# Patient Record
Sex: MALE | Race: WHITE | NOT HISPANIC OR LATINO | Employment: OTHER | URBAN - METROPOLITAN AREA
[De-identification: names, ages, dates, MRNs, and addresses within clinical notes are randomized per-mention and may not be internally consistent; named-entity substitution may affect disease eponyms.]

---

## 2021-09-15 ENCOUNTER — OFFICE VISIT (OUTPATIENT)
Dept: INTERNAL MEDICINE CLINIC | Facility: CLINIC | Age: 81
End: 2021-09-15
Payer: MEDICARE

## 2021-09-15 VITALS
HEART RATE: 58 BPM | DIASTOLIC BLOOD PRESSURE: 70 MMHG | HEIGHT: 65 IN | TEMPERATURE: 97.5 F | SYSTOLIC BLOOD PRESSURE: 120 MMHG | WEIGHT: 128 LBS | RESPIRATION RATE: 18 BRPM | BODY MASS INDEX: 21.33 KG/M2 | OXYGEN SATURATION: 96 %

## 2021-09-15 DIAGNOSIS — Z12.11 SCREEN FOR COLON CANCER: ICD-10-CM

## 2021-09-15 DIAGNOSIS — Z11.59 ENCOUNTER FOR HEPATITIS C SCREENING TEST FOR LOW RISK PATIENT: ICD-10-CM

## 2021-09-15 DIAGNOSIS — G70.00 MYASTHENIA GRAVIS WITHOUT EXACERBATION (HCC): ICD-10-CM

## 2021-09-15 DIAGNOSIS — I10 ESSENTIAL HYPERTENSION: ICD-10-CM

## 2021-09-15 DIAGNOSIS — E78.2 MIXED HYPERLIPIDEMIA: ICD-10-CM

## 2021-09-15 DIAGNOSIS — Z13.31 NEGATIVE DEPRESSION SCREENING: ICD-10-CM

## 2021-09-15 DIAGNOSIS — R41.3 MEMORY DIFFICULTY: ICD-10-CM

## 2021-09-15 DIAGNOSIS — H25.89 OTHER AGE-RELATED CATARACT, UNSPECIFIED LATERALITY: ICD-10-CM

## 2021-09-15 DIAGNOSIS — Z13.1 SCREENING FOR DIABETES MELLITUS (DM): ICD-10-CM

## 2021-09-15 DIAGNOSIS — Z13.220 SCREENING FOR LIPID DISORDERS: ICD-10-CM

## 2021-09-15 DIAGNOSIS — R94.31 ABNORMAL EKG: ICD-10-CM

## 2021-09-15 DIAGNOSIS — H35.00 RETINOPATHY: ICD-10-CM

## 2021-09-15 DIAGNOSIS — N40.0 BENIGN PROSTATIC HYPERPLASIA WITHOUT LOWER URINARY TRACT SYMPTOMS: ICD-10-CM

## 2021-09-15 DIAGNOSIS — E11.319 TYPE 2 DIABETES MELLITUS WITH RETINOPATHY OF BOTH EYES, WITHOUT LONG-TERM CURRENT USE OF INSULIN, MACULAR EDEMA PRESENCE UNSPECIFIED, UNSPECIFIED RETINOPATHY SEVERITY (HCC): Primary | ICD-10-CM

## 2021-09-15 DIAGNOSIS — H40.1131 PRIMARY OPEN ANGLE GLAUCOMA (POAG) OF BOTH EYES, MILD STAGE: ICD-10-CM

## 2021-09-15 DIAGNOSIS — Z13.29 SCREENING FOR THYROID DISORDER: ICD-10-CM

## 2021-09-15 DIAGNOSIS — Z12.5 SCREENING FOR PROSTATE CANCER: ICD-10-CM

## 2021-09-15 DIAGNOSIS — R01.1 HEART MURMUR: ICD-10-CM

## 2021-09-15 DIAGNOSIS — Z13.0 SCREENING FOR DEFICIENCY ANEMIA: ICD-10-CM

## 2021-09-15 DIAGNOSIS — H02.403 PTOSIS OF BOTH EYELIDS: ICD-10-CM

## 2021-09-15 DIAGNOSIS — Z23 ENCOUNTER FOR VACCINATION: ICD-10-CM

## 2021-09-15 PROBLEM — E11.9 TYPE 2 DIABETES MELLITUS, WITHOUT LONG-TERM CURRENT USE OF INSULIN (HCC): Status: ACTIVE | Noted: 2021-09-15

## 2021-09-15 LAB — SL AMB POCT HEMOGLOBIN AIC: 9.7 (ref ?–6.5)

## 2021-09-15 PROCEDURE — 93000 ELECTROCARDIOGRAM COMPLETE: CPT | Performed by: INTERNAL MEDICINE

## 2021-09-15 PROCEDURE — 99204 OFFICE O/P NEW MOD 45 MIN: CPT | Performed by: INTERNAL MEDICINE

## 2021-09-15 PROCEDURE — 90662 IIV NO PRSV INCREASED AG IM: CPT | Performed by: INTERNAL MEDICINE

## 2021-09-15 PROCEDURE — G0008 ADMIN INFLUENZA VIRUS VAC: HCPCS | Performed by: INTERNAL MEDICINE

## 2021-09-15 PROCEDURE — G0009 ADMIN PNEUMOCOCCAL VACCINE: HCPCS | Performed by: INTERNAL MEDICINE

## 2021-09-15 PROCEDURE — 83036 HEMOGLOBIN GLYCOSYLATED A1C: CPT | Performed by: INTERNAL MEDICINE

## 2021-09-15 PROCEDURE — 90670 PCV13 VACCINE IM: CPT | Performed by: INTERNAL MEDICINE

## 2021-09-15 RX ORDER — ASPIRIN 81 MG/1
81 TABLET ORAL
COMMUNITY

## 2021-09-15 RX ORDER — LATANOPROST 50 UG/ML
1 SOLUTION/ DROPS OPHTHALMIC
COMMUNITY

## 2021-09-15 RX ORDER — ATORVASTATIN CALCIUM 20 MG/1
TABLET, FILM COATED ORAL
COMMUNITY
Start: 2021-05-12 | End: 2021-11-24 | Stop reason: SDUPTHER

## 2021-09-15 RX ORDER — GLIPIZIDE 5 MG/1
5 TABLET ORAL
COMMUNITY
End: 2021-10-22

## 2021-09-15 RX ORDER — CHLORAL HYDRATE 500 MG
CAPSULE ORAL
COMMUNITY

## 2021-09-15 RX ORDER — TAMSULOSIN HYDROCHLORIDE 0.4 MG/1
CAPSULE ORAL
COMMUNITY
Start: 2021-05-12 | End: 2022-01-10 | Stop reason: SDUPTHER

## 2021-09-15 NOTE — PROGRESS NOTES
Depression Screening and Follow-up Plan:   Patient was screened for depression during today's encounter  They screened negative with a PHQ-2 score of 0      Assessment/Plan:  Problem List Items Addressed This Visit        Endocrine    Type 2 diabetes mellitus with retinopathy, without long-term current use of insulin (HCC) - Primary    Relevant Medications    glipiZIDE (GLUCOTROL) 5 mg tablet    latanoprost (XALATAN) 0 005 % ophthalmic solution    metFORMIN (GLUCOPHAGE) 1000 MG tablet    Other Relevant Orders    CBC and differential    Comprehensive metabolic panel    Microalbumin / creatinine urine ratio    TSH, 3rd generation with Free T4 reflex    POCT hemoglobin A1c (Completed)       Cardiovascular and Mediastinum    Essential hypertension    Relevant Orders    POCT ECG (Completed)    Echo complete with contrast if indicated       Nervous and Auditory    Myasthenia gravis without exacerbation (HCC)    Relevant Orders    TSH, 3rd generation with Free T4 reflex       Genitourinary    Benign prostatic hyperplasia without lower urinary tract symptoms       Other    Retinopathy    Relevant Medications    latanoprost (XALATAN) 0 005 % ophthalmic solution    Ptosis of both eyelids    Primary open angle glaucoma (POAG) of both eyes, mild stage    Relevant Medications    latanoprost (XALATAN) 0 005 % ophthalmic solution    Other age-related cataract    Relevant Medications    latanoprost (XALATAN) 0 005 % ophthalmic solution    Mixed hyperlipidemia    Relevant Medications    atorvastatin (LIPITOR) 20 mg tablet    Other Relevant Orders    Lipid Panel with Direct LDL reflex    TSH, 3rd generation with Free T4 reflex    Memory difficulty      Other Visit Diagnoses     Negative depression screening        Screening for prostate cancer        Relevant Orders    PSA, Total Screen    Screening for lipid disorders        Screening for diabetes mellitus (DM)        Screening for thyroid disorder        Screening for deficiency anemia        Screen for colon cancer        Encounter for vaccination        Relevant Orders    influenza vaccine, high-dose, PF 0 5 mL (Completed)    PNEUMOCOCCAL CONJUGATE VACCINE 13-VALENT GREATER THAN 6 MONTHS (Completed)    Encounter for hepatitis C screening test for low risk patient        Relevant Orders    Hepatitis C antibody    Abnormal EKG        Relevant Orders    Echo complete with contrast if indicated    Heart murmur        Relevant Orders    Echo complete with contrast if indicated           Diagnoses and all orders for this visit:    Type 2 diabetes mellitus with retinopathy of both eyes, without long-term current use of insulin, macular edema presence unspecified, unspecified retinopathy severity (HCC)  -     CBC and differential; Future  -     Comprehensive metabolic panel; Future  -     Microalbumin / creatinine urine ratio  -     TSH, 3rd generation with Free T4 reflex; Future  -     POCT hemoglobin A1c    Essential hypertension  -     POCT ECG  -     Echo complete with contrast if indicated; Future    Myasthenia gravis without exacerbation (HCC)  -     TSH, 3rd generation with Free T4 reflex; Future    Benign prostatic hyperplasia without lower urinary tract symptoms    Mixed hyperlipidemia  -     Lipid Panel with Direct LDL reflex; Future  -     TSH, 3rd generation with Free T4 reflex; Future    Retinopathy    Primary open angle glaucoma (POAG) of both eyes, mild stage    Ptosis of both eyelids    Other age-related cataract, unspecified laterality    Negative depression screening    Screening for prostate cancer  -     PSA, Total Screen;  Future    Screening for lipid disorders    Screening for diabetes mellitus (DM)    Screening for thyroid disorder    Screening for deficiency anemia    Screen for colon cancer    Encounter for vaccination  -     influenza vaccine, high-dose, PF 0 5 mL  -     PNEUMOCOCCAL CONJUGATE VACCINE 13-VALENT GREATER THAN 6 MONTHS  -     Cancel: influenza vaccine, high-dose, PF 0 5 mL    Memory difficulty    Encounter for hepatitis C screening test for low risk patient  -     Hepatitis C antibody; Future    Abnormal EKG  -     Echo complete with contrast if indicated; Future    Heart murmur  -     Echo complete with contrast if indicated; Future    Other orders  -     B Complex-C (B COMPLEX-B12-C IJ); Take by mouth  -     Saw Palmetto, Serenoa repens, (Saw [place] Berries) 540 MG CAPS; Take by mouth  -     Misc Natural Products (PUMPKIN SEED OIL PO); Take by mouth  -     Omega-3 Fatty Acids (fish oil) 1,000 mg; Take by mouth  -     ECHINACEA HERB PO; Take by mouth  -     aspirin (ECOTRIN LOW STRENGTH) 81 mg EC tablet; Take 81 mg by mouth  -     atorvastatin (LIPITOR) 20 mg tablet; TAKE 10MG (ONE-HALF TABLET) BY MOUTH EVERY DAY AT 5 PM FOR CHOLESTEROL  -     glipiZIDE (GLUCOTROL) 5 mg tablet; Take 5 mg by mouth  -     latanoprost (XALATAN) 0 005 % ophthalmic solution; Apply 1 drop to eye  -     tamsulosin (FLOMAX) 0 4 mg; TAKE 1 CAPSULE BY MOUTH DAILY FOR PROSTATE/NIGHTTIME URINATION (BPH)  -     metFORMIN (GLUCOPHAGE) 1000 MG tablet; Take 1,000 mg by mouth 2 (two) times a day with meals        No problem-specific Assessment & Plan notes found for this encounter  A/P: Doing ok and in office HgA1c was 9 7  Needs meds adjustment, but will get the labs first to see what his renal function is  Check labs    Discussed vaccines and will get his flu and first pneumonia  EKG w/o acute changes, but ? Old IWMI and has a murmur  Consider getting an echo  BMI is good and continue current treatment  ??some  Memory issues  Await labs  COnsider aricept trial  Will discuss with relatives to see what degree of impairment there might be  Continue current treatment and RTC one month for f/u labs, ekg, DM and AWV  Just seen by his DPM      Subjective:      Patient ID: Donte Escalante is a [address] y o  male  WM, former pt of [facility], presents to establish   PMH includes DM, HTN, retinopathy, glaucoma, Myastenia Graves, cataracts, HLD, and BPH  PSH of tonsils, colonoscopy, dental, and vascetomy  Denies being a Smoker and denies ETOH  Doing ok and no c/o's  Remains active w/o difficulty and no falls  Denies depression  No suicidal or violent ideations  Working on group home from the [employer]  No recent travel  No fever, chills, or sweats  No unexplained wt change  Denies CP, SOB, palpitations, edema, orthopnea, or PND  No sz or syncope  No changes in bowel or bladder habits  No trouble swallowing  Appetite and sleep are good  Over due to see both a DDS and an eye doctor  Currently due for labs, vaccines, and CRC                The following portions of the patient's history were reviewed and updated as appropriate:   He has a past medical history of Diabetes mellitus ([address] ) and Hyperlipidemia ,  does not have any pertinent problems on file  ,   has a past surgical history that includes Tonsillectomy; Eye surgery; and Cataract extraction  ,  family history is not on file  ,   reports that he has never smoked  He has never used smokeless tobacco  He reports previous alcohol use  He reports that he does not use drugs  ,  is allergic to simvastatin     Current Outpatient Medications   Medication Sig Dispense Refill    aspirin (ECOTRIN LOW STRENGTH) 81 mg EC tablet Take 81 mg by mouth      atorvastatin (LIPITOR) 20 mg tablet TAKE 10MG (ONE-HALF TABLET) BY MOUTH EVERY DAY AT 5 PM FOR CHOLESTEROL      B Complex-C (B COMPLEX-B12-C IJ) Take by mouth      ECHINACEA HERB PO Take by mouth      glipiZIDE (GLUCOTROL) 5 mg tablet Take 5 mg by mouth      latanoprost (XALATAN) 0 005 % ophthalmic solution Apply 1 drop to eye      metFORMIN (GLUCOPHAGE) 1000 MG tablet Take 1,000 mg by mouth 2 (two) times a day with meals      Misc Natural Products (PUMPKIN SEED OIL PO) Take by mouth      Omega-3 Fatty Acids (fish oil) 1,000 mg Take by mouth      Saw Palmetto, Serenoa repens, (Saw [place] Berries) 540 MG CAPS Take by mouth      tamsulosin (FLOMAX) 0 4 mg TAKE 1 CAPSULE BY MOUTH DAILY FOR PROSTATE/NIGHTTIME URINATION (BPH)       No current facility-administered medications for this visit  Review of Systems   Constitutional: Negative for activity change, chills, diaphoresis, fatigue and fever  HENT: Negative  Eyes: Negative for visual disturbance  Respiratory: Negative for cough, chest tightness, shortness of breath and wheezing  Cardiovascular: Negative for chest pain, palpitations and leg swelling  Gastrointestinal: Negative for abdominal pain, constipation, diarrhea, nausea and vomiting  Endocrine: Negative for cold intolerance and heat intolerance  Genitourinary: Negative for difficulty urinating, dysuria and frequency  Musculoskeletal: Negative for arthralgias, gait problem and myalgias  Neurological: Negative for dizziness, seizures, syncope, weakness, light-headedness and headaches  Psychiatric/Behavioral: Negative for confusion, dysphoric mood and sleep disturbance  The patient is not nervous/anxious  PHQ-9 Depression Screening    PHQ-9:   Frequency of the following problems over the past two weeks:      Little interest or pleasure in doing things: 0 - not at all  Feeling down, depressed, or hopeless: 0 - not at all  PHQ-2 Score: 0        Objective:  Vitals:    09/15/21 1521   BP: 120/70   BP Location: Right arm   Patient Position: Sitting   Cuff Size: Adult   Pulse: 58   Resp: 18   Temp: 97 5 °F (36 4 °C)   TempSrc: Temporal   SpO2: 96%   Weight: 58 1 kg (128 lb)   Height: 5' 5" (1 651 m)     Body mass index is 21 3 kg/m²  Physical Exam  Vitals and nursing note reviewed  Constitutional:       General: He is not in acute distress  Appearance: Normal appearance  He is not ill-appearing  HENT:      Head: Normocephalic and atraumatic  Mouth/Throat:      Mouth: Mucous membranes are moist    Eyes:      Extraocular Movements: Extraocular movements intact  Conjunctiva/sclera: Conjunctivae normal       Pupils: Pupils are equal, round, and reactive to light  Neck:      Vascular: No carotid bruit  Cardiovascular:      Rate and Rhythm: Normal rate and regular rhythm  Pulses: no weak pulses          Dorsalis pedis pulses are 1+ on the right side and 1+ on the left side  Posterior tibial pulses are 1+ on the right side and 1+ on the left side  Heart sounds: Murmur heard  Pulmonary:      Effort: Pulmonary effort is normal  No respiratory distress  Breath sounds: Normal breath sounds  No wheezing or rales  Abdominal:      General: Bowel sounds are normal  There is no distension  Palpations: Abdomen is soft  Tenderness: There is no abdominal tenderness  Musculoskeletal:         General: No swelling, tenderness, deformity or signs of injury  Normal range of motion  Cervical back: Normal range of motion and neck supple  No rigidity or tenderness  Right lower leg: No edema  Left lower leg: No edema  Feet:      Right foot:      Skin integrity: No ulcer, skin breakdown, erythema, warmth, callus or dry skin  Left foot:      Skin integrity: No ulcer, skin breakdown, erythema, warmth, callus or dry skin  Lymphadenopathy:      Cervical: No cervical adenopathy  Neurological:      General: No focal deficit present  Mental Status: He is alert and oriented to person, place, and time  Mental status is at baseline  Cranial Nerves: No cranial nerve deficit  Motor: No weakness  Coordination: Coordination normal       Gait: Gait normal       Deep Tendon Reflexes: Reflexes normal    Psychiatric:         Mood and Affect: Mood normal          Behavior: Behavior normal          Thought Content: Thought content normal          Judgment: Judgment normal            Patient's shoes and socks removed  Right Foot/Ankle   Right Foot Inspection  Skin Exam: skin normal and skin intact no dry skin, no warmth, no callus, no erythema, no maceration, no abnormal color, no pre-ulcer, no ulcer and no callus                          Toe Exam: ROM and strength within normal limitsno swelling, no tenderness, erythema and  no right toe deformity  Sensory       Monofilament testing: intact  Vascular  Capillary refills: < 3 seconds  The right DP pulse is 1+  The right PT pulse is 1+  Left Foot/Ankle  Left Foot Inspection  Skin Exam: skin normal and skin intactno dry skin, no warmth, no erythema, no maceration, normal color, no pre-ulcer, no ulcer and no callus                         Toe Exam: ROM and strength within normal limitsno swelling, no tenderness, no erythema and no left toe deformity                   Sensory       Monofilament: intact  Vascular  Capillary refills: < 3 seconds  The left DP pulse is 1+  The left PT pulse is 1+  Assign Risk Category:  No deformity present; No loss of protective sensation;  No weak pulses       Risk: 0

## 2021-09-15 NOTE — PATIENT INSTRUCTIONS
Type 2 Diabetes in the Older Adult   WHAT YOU NEED TO KNOW:   The risk for type 2 diabetes increases as a person gets older  Type 2 diabetes means your pancreas does not make enough insulin, or your body does not use insulin well  Insulin helps move sugar out of the blood so it can be used for energy  Diabetes cannot be cured, but it can be managed  DISCHARGE INSTRUCTIONS:   Call or have someone close to you call your local emergency number (911 in the [address] [facility]) for any of the following:   · You have any of the following signs of a stroke:      ? Numbness or drooping on one side of your face     ? Weakness in an arm or leg    ? Confusion or difficulty speaking    ? Dizziness, a severe headache, or vision loss    · You have any of the following signs of a heart attack:      ? Squeezing, pressure, or pain in your chest    ? You may  also have any of the following:     § Discomfort or pain in your back, neck, jaw, stomach, or arm    § Shortness of breath    § Nausea or vomiting    § Lightheadedness or a sudden cold sweat    Return to the emergency department if:   · Your blood sugar level is higher than your goal and does not come down with treatment  · You have signs of a high blood sugar level, such as blurred or double vision  · You have signs of a high ketone level, such as fruity, sweet smelling breath, or shallow breathing  · You have symptoms of a low blood sugar level, such as trouble thinking, sweating, or a pounding heartbeat  · Your blood sugar level is lower than normal and does not improve with treatment  Call your doctor or diabetes care team if:   · You are vomiting or have diarrhea  · You have an upset stomach and cannot eat the foods on your meal plan  · You feel weak or more tired than usual     · You feel dizzy, have headaches, or are easily irritated  · Your skin is red, warm, dry, or swollen      · You have a wound that does not heal     · You have numbness in your arms or legs     · You have trouble coping with diabetes, or you feel anxious or depressed  · You have problems with your memory  · You have changes in your vision  · You have questions or concerns about your condition or care  Diabetes education:  Diabetes education will start right away, if the diagnosis is new  You may need diabetes education at a later time to refresh your memory  Members of your care team can help you, your family, and caregivers with a plan for the following:  · How to check your blood sugar level: You will learn when to check your blood sugar level and what the level should be  You will learn what to do if your level is too high or too low  Write down the times of your checks and your levels  Take them to all follow-up appointments  · About diabetes medicine:  Oral diabetes medicine may be given to help control your blood sugar levels  Your healthcare provider will teach you how and when to take your diabetes medicine  You will also be taught when not to take the medicine  You will also be taught about side effects oral diabetes medicine can cause  · If you need insulin:  Insulin may be added if oral diabetes medicine becomes less effective over time  You and your family members will be taught how to draw up and give insulin, if needed  You will learn how much insulin you need and what time to inject insulin  You will be taught when not to give insulin  You will also be taught what to do if your blood sugar level drops too low  This may happen if you take insulin and do not eat the right amount of carbohydrates  Your team will also teach you how to dispose of needles and syringes  · About nutrition:  A dietitian will help you make a meal plan to keep your blood sugar level steady  You will learn why protein in your diet is important  You will learn how food affects your blood sugar levels  You will also learn to keep track of sugar and starchy foods (carbohydrates)   Do not skip meals  Your blood sugar level may drop too low if you have taken insulin and do not eat  · How to prevent complications:  Diabetes that is not well controlled can lead to health problems  Examples include foot sores, retinopathy (vision loss), and peripheral neuropathy (loss of feeling in your hands and feet)  Also, risk for dementia can increase when blood sugar levels that are too high or too low for long periods of time  Your team will help you know when to get regular checkups, such as vision checks  They will teach you how to watch for problems and when to get a problem checked  Manage diabetes and prevent problems:  Sometimes type 2 diabetes can be managed with changes in nutrition and physical activity  · Work with your diabetes care team to create plans to meet your needs  Your diabetes care team may include a physician, nurse practitioner, and physician assistant  It may also include a diabetes nurse educator, dietitian, and an exercise specialist  Family members, or others who are close to you, may also be part of the team  You and your team will make goals and plans to manage diabetes and other health problems  For example, the plan will include how to manage medicines you may take for diabetes and for other health conditions  The plans and goals will be specific to your needs and abilities  Your plan will change as your needs and abilities change  · Manage other health issues as directed  Health issues may include high blood pressure, high cholesterol levels, and heart problems  Health issues may also include depression  Together you and your care team can create a plan to manage any other health issues  · Try to be physically active for 30 to 60 minutes most days of the week  Physical activity, such as exercise, helps keep your blood sugar level steady and lowers your risk for heart disease  Physical activity can help improve your balance and strength and lower your risk for falls  Start slowly  Activity can be done in 10-minute intervals  ? Set a goal for 30 minutes of aerobic activity at least 5 times a week  Aerobic activity helps your heart stay strong  Aerobic activity includes walking, bicycling, dancing, swimming, and raking leaves  ? Set a goal for strength training 2 times a week  Strength training helps you keep the muscles you have and build new muscles  Strength training includes lifting weights, climbing stairs, and doing yoga or gianna chi          ? Stay steady on your on your feet with balancing activities  These include walking backwards, standing on one foot, and walking heel to toe in a straight line  · Maintain a healthy weight  Ask your provider what a healthy weight is for you  A healthy weight can help you control diabetes and prevent heart disease  Ask your provider to help you create a weight loss plan if you are overweight  Weight loss of 10 to 15 pounds can help make a difference in managing diabetes  Together you and your care team can set manageable weight loss goals  · Know the risks if you choose to drink alcohol  Alcohol can cause your blood sugar levels to be low if you use insulin  Alcohol can cause high blood sugar levels and weight gain if you drink too much  Women 21 years or older and men 72 years or older should limit alcohol to 1 drink a day  Men 21 to 64 years should limit alcohol to 2 drinks a day  A drink of alcohol is 12 ounces of beer, 5 ounces of wine, or 1½ ounces of liquor  · Do not smoke  Nicotine and other chemicals in cigarettes can cause lung disease and other health problems  It can also cause blood vessel damage that makes diabetes more difficult to manage  Ask your healthcare provider for information if you currently smoke and need help to quit  Do not use e-cigarettes or smokeless tobacco in place of cigarettes or to help you quit  They still contain nicotine      Other ways to manage diabetes:   · Check your feet every day for sores  Look at your whole foot, including the bottom, and between and under your toes  Check for wounds, corns, and calluses  Use a mirror to see the bottom of your feet  The skin on your feet may be shiny, tight, dry, or darker than normal  Your feet may also be cold and pale  Feel your feet by running your hands along the tops, bottoms, sides, and between your toes  Redness, swelling, and warmth are signs of blood flow problems that can lead to a foot ulcer  Do not try to remove corns or calluses yourself  · Wear medical alert identification  Wear medical alert jewelry or carry a card that says you have type 2 diabetes  Ask your healthcare provider where to get these items  · Ask about vaccines  You have a higher risk for serious illness if you get the flu, pneumonia, or hepatitis  Ask your healthcare provider if you should get a flu, pneumonia, shingles, or hepatitis B vaccine, and when to get the vaccine  · Get help from family and friends  You may need help checking your blood sugar level, giving insulin injections, or preparing your meals  You may also need help to check your feet for sores  Ask your family and friends to help you with these tasks  Talk to your care team if you need someone at home to help you  Follow up with your doctor or diabetes care team as directed: You will need to return to meet with different care team members  You may need tests to monitor for problems  Write down your questions so you remember to ask them during your visits  Talk to your care team or doctor if you cannot afford your medicines  © Copyright [employer] 2021 Information is for End User's use only and may not be sold, redistributed or otherwise used for commercial purposes  All illustrations and images included in CareNotes® are the copyrighted property of [employer]  or [facility]   The above information is an  only   It is not intended as medical advice for individual conditions or treatments  Talk to your doctor, nurse or pharmacist before following any medical regimen to see if it is safe and effective for you

## 2021-09-16 ENCOUNTER — APPOINTMENT (OUTPATIENT)
Dept: LAB | Facility: CLINIC | Age: 81
End: 2021-09-16
Payer: MEDICARE

## 2021-09-16 ENCOUNTER — CONSULT (OUTPATIENT)
Dept: INTERNAL MEDICINE CLINIC | Facility: CLINIC | Age: 81
End: 2021-09-16
Payer: MEDICARE

## 2021-09-16 VITALS
BODY MASS INDEX: 21.16 KG/M2 | HEART RATE: 76 BPM | OXYGEN SATURATION: 98 % | TEMPERATURE: 96.5 F | HEIGHT: 65 IN | DIASTOLIC BLOOD PRESSURE: 70 MMHG | SYSTOLIC BLOOD PRESSURE: 120 MMHG | WEIGHT: 127 LBS

## 2021-09-16 DIAGNOSIS — E78.2 MIXED HYPERLIPIDEMIA: ICD-10-CM

## 2021-09-16 DIAGNOSIS — Z01.818 VISIT FOR PRE-OPERATIVE EXAMINATION: Primary | ICD-10-CM

## 2021-09-16 DIAGNOSIS — Z12.5 SCREENING FOR PROSTATE CANCER: ICD-10-CM

## 2021-09-16 DIAGNOSIS — I10 ESSENTIAL HYPERTENSION: ICD-10-CM

## 2021-09-16 DIAGNOSIS — E11.319 TYPE 2 DIABETES MELLITUS WITH RETINOPATHY OF BOTH EYES, WITHOUT LONG-TERM CURRENT USE OF INSULIN, MACULAR EDEMA PRESENCE UNSPECIFIED, UNSPECIFIED RETINOPATHY SEVERITY (HCC): ICD-10-CM

## 2021-09-16 DIAGNOSIS — G70.00 MYASTHENIA GRAVIS WITHOUT EXACERBATION (HCC): ICD-10-CM

## 2021-09-16 DIAGNOSIS — H02.403 PTOSIS OF BOTH EYELIDS: ICD-10-CM

## 2021-09-16 DIAGNOSIS — Z11.59 ENCOUNTER FOR HEPATITIS C SCREENING TEST FOR LOW RISK PATIENT: ICD-10-CM

## 2021-09-16 LAB
ALBUMIN SERPL BCP-MCNC: 3.5 G/DL (ref 3.5–5)
ALP SERPL-CCNC: 84 U/L (ref 46–116)
ALT SERPL W P-5'-P-CCNC: 24 U/L (ref 12–78)
ANION GAP SERPL CALCULATED.3IONS-SCNC: 1 MMOL/L (ref 4–13)
AST SERPL W P-5'-P-CCNC: 22 U/L (ref 5–45)
BASOPHILS # BLD AUTO: 0.03 THOUSANDS/ΜL (ref 0–0.1)
BASOPHILS NFR BLD AUTO: 1 % (ref 0–1)
BILIRUB SERPL-MCNC: 0.59 MG/DL (ref 0.2–1)
BUN SERPL-MCNC: 14 MG/DL (ref 5–25)
CALCIUM SERPL-MCNC: 9.6 MG/DL (ref 8.3–10.1)
CHLORIDE SERPL-SCNC: 106 MMOL/L (ref 100–108)
CHOLEST SERPL-MCNC: 174 MG/DL (ref 50–200)
CO2 SERPL-SCNC: 30 MMOL/L (ref 21–32)
CREAT SERPL-MCNC: 0.86 MG/DL (ref 0.6–1.3)
CREAT UR-MCNC: 108 MG/DL
EOSINOPHIL # BLD AUTO: 0.1 THOUSAND/ΜL (ref 0–0.61)
EOSINOPHIL NFR BLD AUTO: 2 % (ref 0–6)
ERYTHROCYTE [DISTWIDTH] IN BLOOD BY AUTOMATED COUNT: 12.2 % (ref 11.6–15.1)
GFR SERPL CREATININE-BSD FRML MDRD: 82 ML/MIN/1.73SQ M
GLUCOSE P FAST SERPL-MCNC: 179 MG/DL (ref 65–99)
HCT VFR BLD AUTO: 40.4 % (ref 36.5–49.3)
HCV AB SER QL: NORMAL
HDLC SERPL-MCNC: 61 MG/DL
HGB BLD-MCNC: 13.4 G/DL (ref 12–17)
IMM GRANULOCYTES # BLD AUTO: 0.02 THOUSAND/UL (ref 0–0.2)
IMM GRANULOCYTES NFR BLD AUTO: 0 % (ref 0–2)
LDLC SERPL CALC-MCNC: 87 MG/DL (ref 0–100)
LYMPHOCYTES # BLD AUTO: 1.36 THOUSANDS/ΜL (ref 0.6–4.47)
LYMPHOCYTES NFR BLD AUTO: 25 % (ref 14–44)
MCH RBC QN AUTO: 30.5 PG (ref 26.8–34.3)
MCHC RBC AUTO-ENTMCNC: 33.2 G/DL (ref 31.4–37.4)
MCV RBC AUTO: 92 FL (ref 82–98)
MICROALBUMIN UR-MCNC: 5.7 MG/L (ref 0–20)
MICROALBUMIN/CREAT 24H UR: 5 MG/G CREATININE (ref 0–30)
MONOCYTES # BLD AUTO: 0.54 THOUSAND/ΜL (ref 0.17–1.22)
MONOCYTES NFR BLD AUTO: 10 % (ref 4–12)
NEUTROPHILS # BLD AUTO: 3.5 THOUSANDS/ΜL (ref 1.85–7.62)
NEUTS SEG NFR BLD AUTO: 62 % (ref 43–75)
NRBC BLD AUTO-RTO: 0 /100 WBCS
PLATELET # BLD AUTO: 190 THOUSANDS/UL (ref 149–390)
PMV BLD AUTO: 10.3 FL (ref 8.9–12.7)
POTASSIUM SERPL-SCNC: 4.4 MMOL/L (ref 3.5–5.3)
PROT SERPL-MCNC: 7.3 G/DL (ref 6.4–8.2)
PSA SERPL-MCNC: 6.1 NG/ML (ref 0–4)
RBC # BLD AUTO: 4.39 MILLION/UL (ref 3.88–5.62)
SODIUM SERPL-SCNC: 137 MMOL/L (ref 136–145)
TRIGL SERPL-MCNC: 129 MG/DL
TSH SERPL DL<=0.05 MIU/L-ACNC: 2.27 UIU/ML (ref 0.36–3.74)
WBC # BLD AUTO: 5.55 THOUSAND/UL (ref 4.31–10.16)

## 2021-09-16 PROCEDURE — 80053 COMPREHEN METABOLIC PANEL: CPT

## 2021-09-16 PROCEDURE — 82043 UR ALBUMIN QUANTITATIVE: CPT | Performed by: INTERNAL MEDICINE

## 2021-09-16 PROCEDURE — 84443 ASSAY THYROID STIM HORMONE: CPT

## 2021-09-16 PROCEDURE — 99213 OFFICE O/P EST LOW 20 MIN: CPT | Performed by: INTERNAL MEDICINE

## 2021-09-16 PROCEDURE — 80061 LIPID PANEL: CPT

## 2021-09-16 PROCEDURE — 36415 COLL VENOUS BLD VENIPUNCTURE: CPT

## 2021-09-16 PROCEDURE — G0103 PSA SCREENING: HCPCS

## 2021-09-16 PROCEDURE — 86803 HEPATITIS C AB TEST: CPT

## 2021-09-16 PROCEDURE — 82570 ASSAY OF URINE CREATININE: CPT | Performed by: INTERNAL MEDICINE

## 2021-09-16 PROCEDURE — 85025 COMPLETE CBC W/AUTO DIFF WBC: CPT

## 2021-09-16 NOTE — PROGRESS NOTES
Assessment/Plan:  Problem List Items Addressed This Visit        Endocrine    Type 2 diabetes mellitus with retinopathy, without long-term current use of insulin ([address] )       Cardiovascular and Mediastinum    Essential hypertension       Other    Ptosis of both eyelids      Other Visit Diagnoses     Visit for pre-operative examination    -  Primary           Diagnoses and all orders for this visit:    Visit for pre-operative examination    Ptosis of both eyelids    Essential hypertension    Type 2 diabetes mellitus with retinopathy of both eyes, without long-term current use of insulin, macular edema presence unspecified, unspecified retinopathy severity ([address] )        No problem-specific Assessment & Plan notes found for this encounter  A/P: PAT tests done, but just had an HgA1c and an ekg  Sugars are up and ekg with old changes and no new issues    Pt and co-morbidities are stable other than the sugars and do not feel this will impact the sx  Pt is a Terrell's Class II, mainly due to his age and carries a cardiac risk of 1-7%, but given the type of sx and anesthesia, his risk is on the lower end    Recommend holding any ASA, NSAID's, omega 3, and MVT one week prior to the procedure  On the day prior to sx, pt to not take his PM metformin  ON the day of sx, he can take his flomax only with a sip of water  May resume meds once he is eating  Would check a sugar post op and cover with rapid insulin as needed  No other recs at this time  Thanks and good luck  Subjective:      Patient ID: Fannie Vernon is a [de-identified] y o  male  WM presents at the request of Dr Alee Lantigua for pre-op eval for upcoming OU Ptosis sx  tentatively scheduled for 9/23/21  Since last visit, doing well and no recent illnesses  Remains active w/o difficulty and no falls  No travel history  Denies depression  No recent illnesses  No fever, chills, or sweats  No unexplained wt changes  Denies CP, SOB, or palpitations  No edema  No orthopnea or PND  No sz or syncope  No changes in bowel or bladder habits  PMH includes DM, HTN, Myasthenia, BPH, glaucoma/retinopathy, and hyperlipidemia    Past sx include tonsils, vascetomy, colonoscopy, eye sx, cataracts,and dental sx, but  reports no problems with prior procedures or anesthesia  Denies smoking and denies ETOH use  No history of DVT or PE  NO history of bleeding issues and is on ASA, but not on anti-coagulants  Denies dental plates  Denies C spine issues  No objections to getting blood products if deemed necessary  No PAT testing done  The following portions of the patient's history were reviewed and updated as appropriate:   He has a past medical history of Diabetes mellitus ([address] ) and Hyperlipidemia ,  does not have any pertinent problems on file  ,   has a past surgical history that includes Tonsillectomy; Eye surgery; Cataract extraction; Dental surgery; Vasectomy; and Colonoscopy  ,  family history is not on file  ,   reports that he has never smoked  He has never used smokeless tobacco  He reports previous alcohol use  He reports that he does not use drugs  ,  is allergic to simvastatin     Current Outpatient Medications   Medication Sig Dispense Refill    aspirin (ECOTRIN LOW STRENGTH) 81 mg EC tablet Take 81 mg by mouth      atorvastatin (LIPITOR) 20 mg tablet TAKE 10MG (ONE-HALF TABLET) BY MOUTH EVERY DAY AT 5 PM FOR CHOLESTEROL      B Complex-C (B COMPLEX-B12-C IJ) Take by mouth      ECHINACEA HERB PO Take by mouth      glipiZIDE (GLUCOTROL) 5 mg tablet Take 5 mg by mouth      latanoprost (XALATAN) 0 005 % ophthalmic solution Apply 1 drop to eye      metFORMIN (GLUCOPHAGE) 1000 MG tablet Take 1,000 mg by mouth 2 (two) times a day with meals      Misc Natural Products (PUMPKIN SEED OIL PO) Take by mouth      Omega-3 Fatty Acids (fish oil) 1,000 mg Take by mouth      Saw Palmetto, Serenoa repens, (Saw [place] Berries) 540 MG CAPS Take by mouth      tamsulosin (FLOMAX) 0 4 mg TAKE 1 CAPSULE BY MOUTH DAILY FOR PROSTATE/NIGHTTIME URINATION (BPH)       No current facility-administered medications for this visit  Review of Systems   Constitutional: Negative for activity change, chills, diaphoresis, fatigue and fever  HENT: Negative  Eyes: Positive for visual disturbance  Respiratory: Negative for cough, chest tightness, shortness of breath and wheezing  Cardiovascular: Negative for chest pain, palpitations and leg swelling  Gastrointestinal: Negative for abdominal pain, constipation, diarrhea, nausea and vomiting  Endocrine: Negative for cold intolerance and heat intolerance  Genitourinary: Negative for difficulty urinating, dysuria and frequency  Musculoskeletal: Negative for arthralgias, gait problem and myalgias  Neurological: Negative for dizziness, seizures, syncope, weakness, light-headedness and headaches  Psychiatric/Behavioral: Negative for confusion, dysphoric mood and sleep disturbance  The patient is not nervous/anxious  PHQ-9 Depression Screening    PHQ-9:   Frequency of the following problems over the past two weeks:            Objective:  Vitals:    09/16/21 1301   BP: 120/70   Pulse: 76   Temp: (!) 96 5 °F (35 8 °C)   SpO2: 98%   Weight: 57 6 kg (127 lb)   Height: 5' 5" (1 651 m)     Body mass index is 21 13 kg/m²  Physical Exam  Vitals and nursing note reviewed  Constitutional:       General: He is not in acute distress  Appearance: Normal appearance  He is not ill-appearing  HENT:      Head: Normocephalic and atraumatic  Comments: No oropharyngeal obstructions  Mouth/Throat:      Mouth: Mucous membranes are moist    Eyes:      Extraocular Movements: Extraocular movements intact  Conjunctiva/sclera: Conjunctivae normal       Pupils: Pupils are equal, round, and reactive to light  Neck:      Comments: No C spine restrictions  Cardiovascular:      Rate and Rhythm: Normal rate and regular rhythm        Heart sounds: Normal heart sounds  Pulmonary:      Effort: Pulmonary effort is normal  No respiratory distress  Breath sounds: Normal breath sounds  No wheezing or rales  Abdominal:      General: Bowel sounds are normal  There is no distension  Palpations: Abdomen is soft  Tenderness: There is no abdominal tenderness  Musculoskeletal:      Cervical back: Neck supple  Right lower leg: No edema  Left lower leg: No edema  Neurological:      General: No focal deficit present  Mental Status: He is alert and oriented to person, place, and time  Mental status is at baseline  Psychiatric:         Mood and Affect: Mood normal          Behavior: Behavior normal          Thought Content:  Thought content normal          Judgment: Judgment normal

## 2021-09-17 DIAGNOSIS — R97.20 ELEVATED PSA: Primary | ICD-10-CM

## 2021-10-11 ENCOUNTER — HOSPITAL ENCOUNTER (OUTPATIENT)
Dept: NON INVASIVE DIAGNOSTICS | Facility: CLINIC | Age: 81
Discharge: HOME | End: 2021-10-11
Payer: MEDICARE

## 2021-10-11 VITALS
HEIGHT: 65 IN | DIASTOLIC BLOOD PRESSURE: 70 MMHG | BODY MASS INDEX: 21.16 KG/M2 | WEIGHT: 127 LBS | HEART RATE: 63 BPM | SYSTOLIC BLOOD PRESSURE: 120 MMHG

## 2021-10-11 DIAGNOSIS — R01.1 HEART MURMUR: ICD-10-CM

## 2021-10-11 DIAGNOSIS — I10 ESSENTIAL HYPERTENSION: ICD-10-CM

## 2021-10-11 DIAGNOSIS — R94.31 ABNORMAL EKG: ICD-10-CM

## 2021-10-11 LAB
AORTIC ROOT: 3.5 CM
AORTIC VALVE MEAN VELOCITY: 13.3 M/S
APICAL FOUR CHAMBER EJECTION FRACTION: 57 %
ASCENDING AORTA: 3.4 CM
AV AREA BY CONTINUOUS VTI: 1.5 CM2
AV AREA PEAK VELOCITY: 1.5 CM2
AV LVOT MEAN GRADIENT: 2 MMHG
AV LVOT PEAK GRADIENT: 3 MMHG
AV MEAN GRADIENT: 8 MMHG
AV PEAK GRADIENT: 14 MMHG
AV VELOCITY RATIO: 0.48
DOP CALC AO PEAK VEL: 1.9 M/S
DOP CALC AO VTI: 46.16 CM
DOP CALC LVOT DIAMETER: 2 CM
DOP CALC LVOT PEAK VEL VTI: 21.63 CM
DOP CALC LVOT PEAK VEL: 0.92 M/S
DOP CALC LVOT STROKE INDEX: 41.1 ML/M2
DOP CALC LVOT STROKE VOLUME: 67
E WAVE DECELERATION TIME: 371 MS
E/A RATIO: 1
FRACTIONAL SHORTENING: 26 (ref 28–44)
INTERVENTRICULAR SEPTUM IN DIASTOLE (PARASTERNAL SHORT AXIS VIEW): 1.3 CM
LEFT INTERNAL DIMENSION IN SYSTOLE: 2.6 CM (ref 2.1–4)
LEFT VENTRICULAR INTERNAL DIMENSION IN DIASTOLE: 3.5 CM (ref 3.77–5.61)
LEFT VENTRICULAR POSTERIOR WALL IN END DIASTOLE: 1.1 CM
LEFT VENTRICULAR STROKE VOLUME: 25 ML
MV E'TISSUE VEL-SEP: 5 CM/S
MV PEAK A VEL: 0.76 M/S
MV PEAK E VEL: 76 CM/S
RIGHT VENTRICLE ID DIMENSION: 3.1 CM
SL CV LV EF: 60
SL CV PED ECHO LEFT VENTRICLE DIASTOLIC VOLUME (MOD BIPLANE) 2D: 50 ML
SL CV PED ECHO LEFT VENTRICLE SYSTOLIC VOLUME (MOD BIPLANE) 2D: 25 ML
TRICUSPID VALVE S': 0.9 CM/S

## 2021-10-11 PROCEDURE — 93306 TTE W/DOPPLER COMPLETE: CPT

## 2021-10-11 PROCEDURE — 93306 TTE W/DOPPLER COMPLETE: CPT | Performed by: INTERNAL MEDICINE

## 2021-10-18 ENCOUNTER — RA CDI HCC (OUTPATIENT)
Dept: OTHER | Facility: HOSPITAL | Age: 81
End: 2021-10-18

## 2021-10-22 ENCOUNTER — OFFICE VISIT (OUTPATIENT)
Dept: INTERNAL MEDICINE CLINIC | Facility: CLINIC | Age: 81
End: 2021-10-22
Payer: MEDICARE

## 2021-10-22 ENCOUNTER — TELEPHONE (OUTPATIENT)
Dept: INTERNAL MEDICINE CLINIC | Facility: CLINIC | Age: 81
End: 2021-10-22

## 2021-10-22 VITALS
WEIGHT: 129 LBS | SYSTOLIC BLOOD PRESSURE: 128 MMHG | HEART RATE: 61 BPM | TEMPERATURE: 98.5 F | BODY MASS INDEX: 21.49 KG/M2 | DIASTOLIC BLOOD PRESSURE: 70 MMHG | OXYGEN SATURATION: 98 % | HEIGHT: 65 IN

## 2021-10-22 DIAGNOSIS — R41.3 MEMORY DIFFICULTY: ICD-10-CM

## 2021-10-22 DIAGNOSIS — I51.89 DIASTOLIC DYSFUNCTION: ICD-10-CM

## 2021-10-22 DIAGNOSIS — R97.20 ELEVATED PSA: ICD-10-CM

## 2021-10-22 DIAGNOSIS — E11.319 TYPE 2 DIABETES MELLITUS WITH RETINOPATHY OF BOTH EYES, WITHOUT LONG-TERM CURRENT USE OF INSULIN, MACULAR EDEMA PRESENCE UNSPECIFIED, UNSPECIFIED RETINOPATHY SEVERITY (HCC): Primary | ICD-10-CM

## 2021-10-22 DIAGNOSIS — I34.0 MILD MITRAL REGURGITATION: ICD-10-CM

## 2021-10-22 DIAGNOSIS — N40.0 BENIGN PROSTATIC HYPERPLASIA WITHOUT LOWER URINARY TRACT SYMPTOMS: ICD-10-CM

## 2021-10-22 DIAGNOSIS — I35.0 MILD AORTIC STENOSIS: ICD-10-CM

## 2021-10-22 DIAGNOSIS — I51.7 MILD CONCENTRIC LEFT VENTRICULAR HYPERTROPHY (LVH): ICD-10-CM

## 2021-10-22 PROCEDURE — G0438 PPPS, INITIAL VISIT: HCPCS | Performed by: INTERNAL MEDICINE

## 2021-10-22 PROCEDURE — 99214 OFFICE O/P EST MOD 30 MIN: CPT | Performed by: INTERNAL MEDICINE

## 2021-10-22 PROCEDURE — 1123F ACP DISCUSS/DSCN MKR DOCD: CPT | Performed by: INTERNAL MEDICINE

## 2021-10-22 RX ORDER — LISINOPRIL 5 MG/1
5 TABLET ORAL DAILY
Qty: 90 TABLET | Refills: 3 | Status: SHIPPED | OUTPATIENT
Start: 2021-10-22 | End: 2022-01-10 | Stop reason: SDUPTHER

## 2021-10-22 RX ORDER — DONEPEZIL HYDROCHLORIDE 5 MG/1
5 TABLET, FILM COATED ORAL
Qty: 90 TABLET | Refills: 1 | Status: SHIPPED | OUTPATIENT
Start: 2021-10-22 | End: 2021-12-29 | Stop reason: SDUPTHER

## 2021-10-22 RX ORDER — GLIMEPIRIDE 4 MG/1
8 TABLET ORAL
Qty: 180 TABLET | Refills: 3 | Status: SHIPPED | OUTPATIENT
Start: 2021-10-22 | End: 2021-11-24 | Stop reason: SDUPTHER

## 2021-10-25 ENCOUNTER — APPOINTMENT (OUTPATIENT)
Dept: LAB | Facility: CLINIC | Age: 81
End: 2021-10-25
Payer: MEDICARE

## 2021-10-25 DIAGNOSIS — R97.20 ELEVATED PSA: ICD-10-CM

## 2021-10-25 LAB — PSA SERPL-MCNC: 7.2 NG/ML (ref 0–4)

## 2021-10-25 PROCEDURE — 84153 ASSAY OF PSA TOTAL: CPT

## 2021-11-01 DIAGNOSIS — R97.20 ELEVATED PSA: Primary | ICD-10-CM

## 2021-11-24 ENCOUNTER — OFFICE VISIT (OUTPATIENT)
Dept: INTERNAL MEDICINE CLINIC | Facility: CLINIC | Age: 81
End: 2021-11-24
Payer: MEDICARE

## 2021-11-24 VITALS
BODY MASS INDEX: 21.58 KG/M2 | HEIGHT: 65 IN | HEART RATE: 97 BPM | TEMPERATURE: 96.6 F | SYSTOLIC BLOOD PRESSURE: 116 MMHG | WEIGHT: 129.5 LBS | DIASTOLIC BLOOD PRESSURE: 60 MMHG | OXYGEN SATURATION: 98 %

## 2021-11-24 DIAGNOSIS — E11.319 TYPE 2 DIABETES MELLITUS WITH RETINOPATHY OF BOTH EYES, WITHOUT LONG-TERM CURRENT USE OF INSULIN, MACULAR EDEMA PRESENCE UNSPECIFIED, UNSPECIFIED RETINOPATHY SEVERITY (HCC): Primary | ICD-10-CM

## 2021-11-24 DIAGNOSIS — Z78.9 DRUG INTOLERANCE: ICD-10-CM

## 2021-11-24 DIAGNOSIS — R41.3 MEMORY DIFFICULTY: ICD-10-CM

## 2021-11-24 DIAGNOSIS — E78.2 MIXED HYPERLIPIDEMIA: ICD-10-CM

## 2021-11-24 DIAGNOSIS — R19.7 DIARRHEA, UNSPECIFIED TYPE: ICD-10-CM

## 2021-11-24 PROCEDURE — 99213 OFFICE O/P EST LOW 20 MIN: CPT | Performed by: INTERNAL MEDICINE

## 2021-11-24 RX ORDER — LANCETS
EACH MISCELLANEOUS
Qty: 100 EACH | Refills: 5 | Status: SHIPPED | OUTPATIENT
Start: 2021-11-24 | End: 2021-11-26

## 2021-11-24 RX ORDER — BLOOD-GLUCOSE METER
KIT MISCELLANEOUS
Qty: 1 KIT | Refills: 0 | Status: SHIPPED | OUTPATIENT
Start: 2021-11-24

## 2021-11-24 RX ORDER — GLIMEPIRIDE 4 MG/1
4 TABLET ORAL
Qty: 180 TABLET | Refills: 3
Start: 2021-11-24 | End: 2022-01-10 | Stop reason: SDUPTHER

## 2021-11-24 RX ORDER — BLOOD SUGAR DIAGNOSTIC
STRIP MISCELLANEOUS
Qty: 100 STRIP | Refills: 5 | Status: SHIPPED | OUTPATIENT
Start: 2021-11-24

## 2021-11-24 RX ORDER — ATORVASTATIN CALCIUM 20 MG/1
20 TABLET, FILM COATED ORAL DAILY
Qty: 90 TABLET | Refills: 1 | Status: SHIPPED | OUTPATIENT
Start: 2021-11-24 | End: 2022-01-10 | Stop reason: SDUPTHER

## 2021-11-26 DIAGNOSIS — E11.319 TYPE 2 DIABETES MELLITUS WITH RETINOPATHY OF BOTH EYES, WITHOUT LONG-TERM CURRENT USE OF INSULIN, MACULAR EDEMA PRESENCE UNSPECIFIED, UNSPECIFIED RETINOPATHY SEVERITY (HCC): Primary | ICD-10-CM

## 2021-11-26 RX ORDER — LANCETS 33 GAUGE
EACH MISCELLANEOUS
Qty: 100 EACH | Refills: 5 | Status: SHIPPED | OUTPATIENT
Start: 2021-11-26

## 2021-11-29 ENCOUNTER — TELEPHONE (OUTPATIENT)
Dept: INTERNAL MEDICINE CLINIC | Facility: CLINIC | Age: 81
End: 2021-11-29

## 2021-12-22 ENCOUNTER — RA CDI HCC (OUTPATIENT)
Dept: OTHER | Facility: HOSPITAL | Age: 81
End: 2021-12-22

## 2021-12-29 ENCOUNTER — OFFICE VISIT (OUTPATIENT)
Dept: INTERNAL MEDICINE CLINIC | Facility: CLINIC | Age: 81
End: 2021-12-29
Payer: MEDICARE

## 2021-12-29 VITALS
DIASTOLIC BLOOD PRESSURE: 76 MMHG | HEIGHT: 65 IN | SYSTOLIC BLOOD PRESSURE: 116 MMHG | WEIGHT: 125.5 LBS | TEMPERATURE: 97.3 F | HEART RATE: 59 BPM | BODY MASS INDEX: 20.91 KG/M2 | OXYGEN SATURATION: 96 %

## 2021-12-29 DIAGNOSIS — R41.3 MEMORY DIFFICULTY: ICD-10-CM

## 2021-12-29 DIAGNOSIS — R19.7 DIARRHEA, UNSPECIFIED TYPE: Primary | ICD-10-CM

## 2021-12-29 DIAGNOSIS — Z78.9 DRUG INTOLERANCE: ICD-10-CM

## 2021-12-29 DIAGNOSIS — I10 ESSENTIAL HYPERTENSION: ICD-10-CM

## 2021-12-29 DIAGNOSIS — E11.319 TYPE 2 DIABETES MELLITUS WITH RETINOPATHY OF BOTH EYES, WITHOUT LONG-TERM CURRENT USE OF INSULIN, MACULAR EDEMA PRESENCE UNSPECIFIED, UNSPECIFIED RETINOPATHY SEVERITY (HCC): ICD-10-CM

## 2021-12-29 PROCEDURE — 99214 OFFICE O/P EST MOD 30 MIN: CPT | Performed by: INTERNAL MEDICINE

## 2021-12-29 RX ORDER — DONEPEZIL HYDROCHLORIDE 5 MG/1
10 TABLET, FILM COATED ORAL
Qty: 90 TABLET | Refills: 1
Start: 2021-12-29 | End: 2022-01-10 | Stop reason: SDUPTHER

## 2022-01-10 DIAGNOSIS — E78.2 MIXED HYPERLIPIDEMIA: ICD-10-CM

## 2022-01-10 DIAGNOSIS — R41.3 MEMORY DIFFICULTY: ICD-10-CM

## 2022-01-10 DIAGNOSIS — R32 URINARY INCONTINENCE, UNSPECIFIED TYPE: Primary | ICD-10-CM

## 2022-01-10 DIAGNOSIS — E11.319 TYPE 2 DIABETES MELLITUS WITH RETINOPATHY OF BOTH EYES, WITHOUT LONG-TERM CURRENT USE OF INSULIN, MACULAR EDEMA PRESENCE UNSPECIFIED, UNSPECIFIED RETINOPATHY SEVERITY (HCC): ICD-10-CM

## 2022-01-10 RX ORDER — GLIMEPIRIDE 4 MG/1
4 TABLET ORAL
Qty: 180 TABLET | Refills: 3 | Status: SHIPPED | OUTPATIENT
Start: 2022-01-10

## 2022-01-10 RX ORDER — DONEPEZIL HYDROCHLORIDE 5 MG/1
10 TABLET, FILM COATED ORAL
Qty: 90 TABLET | Refills: 1 | Status: SHIPPED | OUTPATIENT
Start: 2022-01-10

## 2022-01-10 RX ORDER — LISINOPRIL 5 MG/1
5 TABLET ORAL DAILY
Qty: 90 TABLET | Refills: 3 | Status: SHIPPED | OUTPATIENT
Start: 2022-01-10

## 2022-01-10 RX ORDER — TAMSULOSIN HYDROCHLORIDE 0.4 MG/1
0.4 CAPSULE ORAL
Qty: 90 CAPSULE | Refills: 1 | Status: SHIPPED | OUTPATIENT
Start: 2022-01-10

## 2022-01-10 RX ORDER — ATORVASTATIN CALCIUM 20 MG/1
20 TABLET, FILM COATED ORAL DAILY
Qty: 90 TABLET | Refills: 1 | Status: SHIPPED | OUTPATIENT
Start: 2022-01-10

## 2022-01-14 ENCOUNTER — TELEPHONE (OUTPATIENT)
Dept: INTERNAL MEDICINE CLINIC | Facility: CLINIC | Age: 82
End: 2022-01-14

## 2022-01-14 NOTE — TELEPHONE ENCOUNTER
----- Message from Jese Jane DO sent at 12/29/2021  2:55 PM EST -----  Call pt in two weeks and see how he is doing in regards to his diarrhea and sugars

## 2022-01-14 NOTE — TELEPHONE ENCOUNTER
Spoke with patient  He doing well said he is feeling better, diarrhea getting better believes it was bad salad dressing, his sugars are good they are running between 80-90

## 2022-01-25 ENCOUNTER — OFFICE VISIT (OUTPATIENT)
Dept: INTERNAL MEDICINE CLINIC | Facility: CLINIC | Age: 82
End: 2022-01-25
Payer: MEDICARE

## 2022-01-25 VITALS
WEIGHT: 123.5 LBS | HEART RATE: 74 BPM | BODY MASS INDEX: 20.58 KG/M2 | TEMPERATURE: 98.5 F | DIASTOLIC BLOOD PRESSURE: 60 MMHG | OXYGEN SATURATION: 97 % | SYSTOLIC BLOOD PRESSURE: 128 MMHG | HEIGHT: 65 IN

## 2022-01-25 DIAGNOSIS — Z12.5 SCREENING FOR PROSTATE CANCER: ICD-10-CM

## 2022-01-25 DIAGNOSIS — I10 ESSENTIAL HYPERTENSION: ICD-10-CM

## 2022-01-25 DIAGNOSIS — G70.00 MYASTHENIA GRAVIS WITHOUT EXACERBATION (HCC): ICD-10-CM

## 2022-01-25 DIAGNOSIS — R97.20 ELEVATED PSA: ICD-10-CM

## 2022-01-25 DIAGNOSIS — E11.319 TYPE 2 DIABETES MELLITUS WITH RETINOPATHY OF BOTH EYES, WITHOUT LONG-TERM CURRENT USE OF INSULIN, MACULAR EDEMA PRESENCE UNSPECIFIED, UNSPECIFIED RETINOPATHY SEVERITY (HCC): Primary | ICD-10-CM

## 2022-01-25 DIAGNOSIS — R13.10 DYSPHAGIA, UNSPECIFIED TYPE: ICD-10-CM

## 2022-01-25 DIAGNOSIS — E44.1 MILD PROTEIN-CALORIE MALNUTRITION (HCC): ICD-10-CM

## 2022-01-25 DIAGNOSIS — E78.2 MIXED HYPERLIPIDEMIA: ICD-10-CM

## 2022-01-25 DIAGNOSIS — N40.0 BENIGN PROSTATIC HYPERPLASIA WITHOUT LOWER URINARY TRACT SYMPTOMS: ICD-10-CM

## 2022-01-25 LAB — SL AMB POCT HEMOGLOBIN AIC: 6.8 (ref ?–6.5)

## 2022-01-25 PROCEDURE — 83036 HEMOGLOBIN GLYCOSYLATED A1C: CPT | Performed by: INTERNAL MEDICINE

## 2022-01-25 PROCEDURE — 99214 OFFICE O/P EST MOD 30 MIN: CPT | Performed by: INTERNAL MEDICINE

## 2022-01-25 NOTE — PATIENT INSTRUCTIONS
Foot Care for People with Diabetes   AMBULATORY CARE:   What you need to know about foot care:   · Foot care helps protect your feet and prevent foot ulcers or sores  Long-term high blood sugar levels can damage the blood vessels and nerves in your legs and feet  This damage makes it hard to feel pressure, pain, temperature, and touch  You may not be able to feel a cut or sore, or shoes that are too tight  Foot care is needed to prevent serious problems, such as an infection or amputation  · Diabetes may cause your toes to become crooked or curved under  These changes may affect the way you walk and can lead to increased pressure on your foot  The pressure can decrease blood flow to your feet  Lack of blood flow increases your risk for a foot ulcer  Do not ignore small problems, such as dry skin or small wounds  These can become life-threatening over time without proper care  Call your care team provider if:   · Your feet become numb, weak, or hard to move  · You have pus draining from a sore on your foot  · You have a wound on your foot that gets bigger, deeper, or does not heal      · You see blisters, cuts, scratches, calluses, or sores on your foot  · You have a fever, and your feet become red, warm, and swollen  · Your toenails become thick, curled, or yellow  · You find it hard to check your feet because your vision is poor  · You have questions or concerns about your condition or care  How to care for your feet:   · Check your feet each day  Look at your whole foot, including the bottom, and between and under your toes  Check for wounds, corns, and calluses  Use a mirror to see the bottom of your feet  The skin on your feet may be shiny, tight, or darker than normal  Your feet may also be cold and pale  Feel your feet by running your hands along the tops, bottoms, sides, and between your toes   Redness, swelling, and warmth are signs of blood flow problems that can lead to a foot ulcer  Do not try to remove corns or calluses yourself  · Wash your feet each day with soap and warm water  Do not use hot water, because this can injure your foot  Dry your feet gently with a towel after you wash them  Dry between and under your toes  · Apply lotion or a moisturizer on your dry feet  Ask your care team provider what lotions are best to use  Do not put lotion or moisturizer between your toes  Moisture between your toes could lead to skin breakdown  · Cut your toenails correctly  File or cut your toenails straight across  Use a soft brush to clean around your toenails  If your toenails are very thick, you may need to have a care team provider or specialist cut them  · Protect your feet  Do not walk barefoot or wear your shoes without socks  Check your shoes for rocks or other objects that can hurt your feet  Wear cotton socks to help keep your feet dry  Wear socks without toe seams, or wear them with the seams inside out  Change your socks each day  Do not wear socks that are dirty or damp  · Wear shoes that fit well  Wear shoes that do not rub against any area of your feet  Your shoes should be ½ to ¾ inch (1 to 2 centimeters) longer than your feet  Your shoes should also have extra space around the widest part of your feet  Walking or athletic shoes with laces or straps that adjust are best  Ask your care team provider for help to choose shoes that fit you best  Ask him or her if you need to wear an insert, orthotic, or bandage on your feet  · Go to your follow-up visits  Your care team provider will do a foot exam at least once a year  You may need a foot exam more often if you have nerve damage, foot deformities, or ulcers  He will check for nerve damage and how well you can feel your feet  He will check your shoes to see if they fit well  · Do not smoke  Smoking can damage your blood vessels and put you at increased risk for foot ulcers   Ask your care team provider for information if you currently smoke and need help to quit  E-cigarettes or smokeless tobacco still contain nicotine  Talk to your care team provider before you use these products  Follow up with your diabetes care team provider or foot specialist as directed: You will need to have your feet checked at least once a year  You may need a foot exam more often if you have nerve damage, foot deformities, or ulcers  Write down your questions so you remember to ask them during your visits  © Copyright [employer] 2021 Information is for End User's use only and may not be sold, redistributed or otherwise used for commercial purposes  All illustrations and images included in CareNotes® are the copyrighted property of A D A M , Inc  or [facility] Antonio Johnson   The above information is an  only  It is not intended as medical advice for individual conditions or treatments  Talk to your doctor, nurse or pharmacist before following any medical regimen to see if it is safe and effective for you

## 2022-01-25 NOTE — PROGRESS NOTES
Assessment/Plan:  Problem List Items Addressed This Visit        Endocrine    Type 2 diabetes mellitus with retinopathy, without long-term current use of insulin ([facility] [address] ) - Primary    Relevant Orders    Comprehensive metabolic panel    LDL cholesterol, direct    Triglycerides    POCT hemoglobin A1c (Completed)       Cardiovascular and Mediastinum    Essential hypertension       Nervous and Auditory    Myasthenia gravis without exacerbation (HCC)       Genitourinary    Benign prostatic hyperplasia without lower urinary tract symptoms       Other    Mixed hyperlipidemia    Relevant Orders    Comprehensive metabolic panel    LDL cholesterol, direct    Triglycerides    Mild protein-calorie malnutrition ([facility] [address] )      Other Visit Diagnoses     Screening for prostate cancer        Relevant Orders    PSA Total, Diagnostic    Elevated PSA        Relevant Orders    PSA Total, Diagnostic    Dysphagia, unspecified type               Diagnoses and all orders for this visit:    Type 2 diabetes mellitus with retinopathy of both eyes, without long-term current use of insulin, macular edema presence unspecified, unspecified retinopathy severity ([facility] [address] )  -     Comprehensive metabolic panel; Future  -     LDL cholesterol, direct; Future  -     Triglycerides; Future  -     POCT hemoglobin A1c    Essential hypertension    Myasthenia gravis without exacerbation (HCC)    Benign prostatic hyperplasia without lower urinary tract symptoms    Mixed hyperlipidemia  -     Comprehensive metabolic panel; Future  -     LDL cholesterol, direct; Future  -     Triglycerides; Future    Screening for prostate cancer  -     PSA Total, Diagnostic; Future    Mild protein-calorie malnutrition (HCC)    Elevated PSA  -     PSA Total, Diagnostic; Future    Dysphagia, unspecified type        No problem-specific Assessment & Plan notes found for this encounter  A/P: Doing ok and in office HgA1c much better at 6 8  Will order labs  Discussed vaccines is up to date  Karlene Trent Recommended barium swallow, but defers  BMI is good  Continue current treatment and RTC three months for routine  Subjective:      Patient ID: Christy Mask is a [de-identified] y o  male  WM RTC for f/u dm, htn, etc  DOing ok and no now issues  Was having diarrhea for ?cause and pt felt I may be due to the amaryl  Remains active w/o difficulty and no falls  Sugars less than 140 and no low sugar events  MS w/o flare  Vision and memory no worse  Due for labs and vaccines  ROS is positive for some difficulty swallowing  The following portions of the patient's history were reviewed and updated as appropriate:   He has a past medical history of Diabetes mellitus ([facility] [address] ) and Hyperlipidemia ,  does not have any pertinent problems on file  ,   has a past surgical history that includes Tonsillectomy; Eye surgery; Cataract extraction; Dental surgery; Vasectomy; and Colonoscopy  ,  family history is not on file  ,   reports that he has never smoked  He has never used smokeless tobacco  He reports previous alcohol use  He reports that he does not use drugs  ,  is allergic to simvastatin     Current Outpatient Medications   Medication Sig Dispense Refill    atorvastatin (LIPITOR) 20 mg tablet Take 1 tablet (20 mg total) by mouth daily 90 tablet 1    B Complex-C (B COMPLEX-B12-C IJ) Take by mouth      Blood Glucose Monitoring Suppl (ONE TOUCH ULTRA MINI) w/Device KIT Test once a day 1 kit 0    donepezil (ARICEPT) 5 mg tablet Take 2 tablets (10 mg total) by mouth daily at bedtime 90 tablet 1    ECHINACEA HERB PO Take by mouth      glimepiride (AMARYL) 4 mg tablet Take 1 tablet (4 mg total) by mouth daily with breakfast 180 tablet 3    glucose blood (OneTouch Verio) test strip Test once a day 100 strip 5    latanoprost (XALATAN) 0 005 % ophthalmic solution Apply 1 drop to eye      lisinopril (ZESTRIL) 5 mg tablet Take 1 tablet (5 mg total) by mouth daily 90 tablet 3    metFORMIN (GLUCOPHAGE) 1000 MG tablet Take 1 tablet (1,000 mg total) by mouth 2 (two) times a day with meals 180 tablet 1    Misc Natural Products (PUMPKIN SEED OIL PO) Take by mouth      Omega-3 Fatty Acids (fish oil) 1,000 mg Take by mouth      OneTouch Delica Lancets 92G MISC Test once a day 100 each 5    Saw Palmetto, Serenoa repens, (Saw [place] Berries) 540 MG CAPS Take by mouth      tamsulosin (FLOMAX) 0 4 mg Take 1 capsule (0 4 mg total) by mouth daily with dinner 90 capsule 1    aspirin (ECOTRIN LOW STRENGTH) 81 mg EC tablet Take 81 mg by mouth (Patient not taking: Reported on 10/22/2021)       No current facility-administered medications for this visit  Review of Systems   Constitutional: Negative for activity change, chills, diaphoresis, fatigue and fever  HENT: Positive for trouble swallowing  Eyes: Positive for visual disturbance  Respiratory: Negative for cough, chest tightness, shortness of breath and wheezing  Cardiovascular: Negative for chest pain, palpitations and leg swelling  Gastrointestinal: Negative for abdominal pain, constipation, diarrhea, nausea and vomiting  Endocrine: Negative for cold intolerance and heat intolerance  Genitourinary: Negative for difficulty urinating, dysuria and frequency  Musculoskeletal: Negative for arthralgias, gait problem and myalgias  Neurological: Negative for dizziness, seizures, syncope, weakness, light-headedness and headaches  Psychiatric/Behavioral: Negative for confusion, dysphoric mood and sleep disturbance  The patient is not nervous/anxious  PHQ-2/9 Depression Screening          Objective:  Vitals:    01/25/22 1409   BP: 128/60   Pulse: 74   Temp: 98 5 °F (36 9 °C)   SpO2: 97%   Weight: 56 kg (123 lb 8 oz)   Height: 5' 5" (1 651 m)     Body mass index is 20 55 kg/m²  Physical Exam  Vitals and nursing note reviewed  Constitutional:       General: He is not in acute distress  Appearance: Normal appearance  He is not ill-appearing     HENT:      Head: Normocephalic and atraumatic  Mouth/Throat:      Mouth: Mucous membranes are moist    Eyes:      Extraocular Movements: Extraocular movements intact  Conjunctiva/sclera: Conjunctivae normal       Pupils: Pupils are equal, round, and reactive to light  Cardiovascular:      Rate and Rhythm: Normal rate and regular rhythm  Heart sounds: Murmur heard  Pulmonary:      Effort: Pulmonary effort is normal  No respiratory distress  Breath sounds: Normal breath sounds  No wheezing or rales  Abdominal:      General: Bowel sounds are normal  There is no distension  Palpations: Abdomen is soft  Tenderness: There is no abdominal tenderness  Musculoskeletal:      Cervical back: Neck supple  Right lower leg: No edema  Left lower leg: No edema  Neurological:      General: No focal deficit present  Mental Status: He is alert and oriented to person, place, and time  Mental status is at baseline  Psychiatric:         Mood and Affect: Mood normal          Behavior: Behavior normal          Thought Content:  Thought content normal          Judgment: Judgment normal

## 2022-04-11 ENCOUNTER — RA CDI HCC (OUTPATIENT)
Dept: OTHER | Facility: HOSPITAL | Age: 82
End: 2022-04-11

## 2022-04-11 NOTE — PROGRESS NOTES
[address]  coding opportunities       Chart reviewed, no opportunity found: CHART REVIEWED, NO OPPORTUNITY FOUND        Patients Insurance     Medicare Insurance: Medicare

## 2022-04-20 ENCOUNTER — APPOINTMENT (OUTPATIENT)
Dept: LAB | Facility: CLINIC | Age: 82
End: 2022-04-20
Payer: MEDICARE

## 2022-04-20 DIAGNOSIS — E78.2 MIXED HYPERLIPIDEMIA: ICD-10-CM

## 2022-04-20 DIAGNOSIS — R97.20 ELEVATED PSA: ICD-10-CM

## 2022-04-20 DIAGNOSIS — Z12.5 SCREENING FOR PROSTATE CANCER: ICD-10-CM

## 2022-04-20 DIAGNOSIS — E11.319 TYPE 2 DIABETES MELLITUS WITH RETINOPATHY OF BOTH EYES, WITHOUT LONG-TERM CURRENT USE OF INSULIN, MACULAR EDEMA PRESENCE UNSPECIFIED, UNSPECIFIED RETINOPATHY SEVERITY (HCC): ICD-10-CM

## 2022-04-20 LAB
ALBUMIN SERPL BCP-MCNC: 4.1 G/DL (ref 3.5–5)
ALP SERPL-CCNC: 84 U/L (ref 46–116)
ALT SERPL W P-5'-P-CCNC: 30 U/L (ref 12–78)
ANION GAP SERPL CALCULATED.3IONS-SCNC: 2 MMOL/L (ref 4–13)
AST SERPL W P-5'-P-CCNC: 37 U/L (ref 5–45)
BILIRUB SERPL-MCNC: 0.5 MG/DL (ref 0.2–1)
BUN SERPL-MCNC: 28 MG/DL (ref 5–25)
CALCIUM SERPL-MCNC: 9.9 MG/DL (ref 8.3–10.1)
CHLORIDE SERPL-SCNC: 105 MMOL/L (ref 100–108)
CO2 SERPL-SCNC: 29 MMOL/L (ref 21–32)
CREAT SERPL-MCNC: 0.9 MG/DL (ref 0.6–1.3)
GFR SERPL CREATININE-BSD FRML MDRD: 80 ML/MIN/1.73SQ M
GLUCOSE P FAST SERPL-MCNC: 75 MG/DL (ref 65–99)
LDLC SERPL DIRECT ASSAY-MCNC: 70 MG/DL (ref 0–100)
POTASSIUM SERPL-SCNC: 4.4 MMOL/L (ref 3.5–5.3)
PROT SERPL-MCNC: 7.9 G/DL (ref 6.4–8.2)
PSA SERPL-MCNC: 7.8 NG/ML (ref 0–4)
SODIUM SERPL-SCNC: 136 MMOL/L (ref 136–145)
TRIGL SERPL-MCNC: 56 MG/DL

## 2022-04-20 PROCEDURE — 84153 ASSAY OF PSA TOTAL: CPT

## 2022-04-20 PROCEDURE — 84478 ASSAY OF TRIGLYCERIDES: CPT

## 2022-04-20 PROCEDURE — 36415 COLL VENOUS BLD VENIPUNCTURE: CPT

## 2022-04-20 PROCEDURE — 80053 COMPREHEN METABOLIC PANEL: CPT

## 2022-04-20 PROCEDURE — 83721 ASSAY OF BLOOD LIPOPROTEIN: CPT

## 2022-04-27 ENCOUNTER — OFFICE VISIT (OUTPATIENT)
Dept: INTERNAL MEDICINE CLINIC | Facility: CLINIC | Age: 82
End: 2022-04-27
Payer: MEDICARE

## 2022-04-27 VITALS
RESPIRATION RATE: 12 BRPM | BODY MASS INDEX: 20.96 KG/M2 | OXYGEN SATURATION: 98 % | WEIGHT: 125.8 LBS | HEIGHT: 65 IN | SYSTOLIC BLOOD PRESSURE: 116 MMHG | TEMPERATURE: 96.7 F | HEART RATE: 59 BPM | DIASTOLIC BLOOD PRESSURE: 60 MMHG

## 2022-04-27 DIAGNOSIS — E78.2 MIXED HYPERLIPIDEMIA: ICD-10-CM

## 2022-04-27 DIAGNOSIS — I10 ESSENTIAL HYPERTENSION: ICD-10-CM

## 2022-04-27 DIAGNOSIS — I51.89 DIASTOLIC DYSFUNCTION: ICD-10-CM

## 2022-04-27 DIAGNOSIS — E11.319 TYPE 2 DIABETES MELLITUS WITH RETINOPATHY OF BOTH EYES, WITHOUT LONG-TERM CURRENT USE OF INSULIN, MACULAR EDEMA PRESENCE UNSPECIFIED, UNSPECIFIED RETINOPATHY SEVERITY (HCC): Primary | ICD-10-CM

## 2022-04-27 DIAGNOSIS — N40.0 BENIGN PROSTATIC HYPERPLASIA WITHOUT LOWER URINARY TRACT SYMPTOMS: ICD-10-CM

## 2022-04-27 DIAGNOSIS — Z23 ENCOUNTER FOR VACCINATION: ICD-10-CM

## 2022-04-27 DIAGNOSIS — G70.00 MYASTHENIA GRAVIS WITHOUT EXACERBATION (HCC): ICD-10-CM

## 2022-04-27 LAB — SL AMB POCT HEMOGLOBIN AIC: 6.4 (ref ?–6.5)

## 2022-04-27 PROCEDURE — 99214 OFFICE O/P EST MOD 30 MIN: CPT | Performed by: INTERNAL MEDICINE

## 2022-04-27 PROCEDURE — 83036 HEMOGLOBIN GLYCOSYLATED A1C: CPT | Performed by: INTERNAL MEDICINE

## 2022-04-27 NOTE — PROGRESS NOTES
Assessment/Plan:  Problem List Items Addressed This Visit        Endocrine    Type 2 diabetes mellitus with retinopathy, without long-term current use of insulin ([address] ) - Primary    Relevant Medications    metFORMIN (GLUCOPHAGE) 1000 MG tablet    Other Relevant Orders    POCT hemoglobin A1c (Completed)       Cardiovascular and Mediastinum    Essential hypertension       Nervous and Auditory    Myasthenia gravis without exacerbation (HCC)       Genitourinary    Benign prostatic hyperplasia without lower urinary tract symptoms       Other    Mixed hyperlipidemia    Diastolic dysfunction      Other Visit Diagnoses     Encounter for vaccination               Diagnoses and all orders for this visit:    Type 2 diabetes mellitus with retinopathy of both eyes, without long-term current use of insulin, macular edema presence unspecified, unspecified retinopathy severity (HCC)  -     POCT hemoglobin A1c  -     metFORMIN (GLUCOPHAGE) 1000 MG tablet; Take 1 tablet (1,000 mg total) by mouth 2 (two) times a day with meals    Essential hypertension    Myasthenia gravis without exacerbation (HCC)    Benign prostatic hyperplasia without lower urinary tract symptoms    Mixed hyperlipidemia    Diastolic dysfunction    Encounter for vaccination        No problem-specific Assessment & Plan notes found for this encounter  A/P: Doing well and labs up to date except for HgA1c  In office HgA1c was 6 4  Discussed vaccines just had his covid booster    Continue current treatment and RTC three months for routine  Subjective:      Patient ID: Liat Signs is a [de-identified] y o  male  WM RTC for f/u dm, htn, etc  Doing well and no new issues  Remains active w/o difficulty and no falls  Sugars less than 140 and no low sugar events  No swallowing issues  Denies CP, SOB, palpitations, edema, orthopnea or PND  Memory no worse  Vision no worse  Due for labs and vaccines         The following portions of the patient's history were reviewed and updated as appropriate:   He has a past medical history of Diabetes mellitus ([address] ) and Hyperlipidemia ,  does not have any pertinent problems on file  ,   has a past surgical history that includes Tonsillectomy; Eye surgery; Cataract extraction; Dental surgery; Vasectomy; and Colonoscopy  ,  family history is not on file  ,   reports that he has never smoked  He has never used smokeless tobacco  He reports previous alcohol use  He reports that he does not use drugs  ,  is allergic to simvastatin     Current Outpatient Medications   Medication Sig Dispense Refill    atorvastatin (LIPITOR) 20 mg tablet Take 1 tablet (20 mg total) by mouth daily 90 tablet 1    B Complex-C (B COMPLEX-B12-C IJ) Take by mouth      Blood Glucose Monitoring Suppl (ONE TOUCH ULTRA MINI) w/Device KIT Test once a day 1 kit 0    donepezil (ARICEPT) 5 mg tablet Take 2 tablets (10 mg total) by mouth daily at bedtime 90 tablet 1    glimepiride (AMARYL) 4 mg tablet Take 1 tablet (4 mg total) by mouth daily with breakfast 180 tablet 3    glucose blood (OneTouch Verio) test strip Test once a day 100 strip 5    lisinopril (ZESTRIL) 5 mg tablet Take 1 tablet (5 mg total) by mouth daily 90 tablet 3    metFORMIN (GLUCOPHAGE) 1000 MG tablet Take 1 tablet (1,000 mg total) by mouth 2 (two) times a day with meals 180 tablet 1    Omega-3 Fatty Acids (fish oil) 1,000 mg Take by mouth      Saw Palmetto, Serenoa repens, (Saw Accident Berries) 540 MG CAPS Take by mouth      tamsulosin (FLOMAX) 0 4 mg Take 1 capsule (0 4 mg total) by mouth daily with dinner 90 capsule 1    aspirin (ECOTRIN LOW STRENGTH) 81 mg EC tablet Take 81 mg by mouth (Patient not taking: Reported on 10/22/2021)      ECHINACEA HERB PO Take by mouth (Patient not taking: Reported on 4/27/2022 )      latanoprost (XALATAN) 0 005 % ophthalmic solution Apply 1 drop to eye (Patient not taking: Reported on 4/27/2022 )      Misc Natural Products (PUMPKIN SEED OIL PO) Take by mouth (Patient not taking: Reported on 4/27/2022 )      OneTouch Delica Lancets 52J MISC Test once a day (Patient not taking: Reported on 4/27/2022 ) 100 each 5     No current facility-administered medications for this visit  Review of Systems   Constitutional: Negative for activity change, chills, diaphoresis, fatigue and fever  HENT: Negative  Eyes: Negative for visual disturbance  Respiratory: Negative for cough, chest tightness, shortness of breath and wheezing  Cardiovascular: Negative for chest pain, palpitations and leg swelling  Gastrointestinal: Negative for abdominal pain, constipation, diarrhea, nausea and vomiting  Endocrine: Negative for cold intolerance and heat intolerance  Genitourinary: Negative for difficulty urinating, dysuria and frequency  Musculoskeletal: Negative for arthralgias, gait problem and myalgias  Neurological: Negative for dizziness, seizures, syncope, weakness, light-headedness and headaches  Psychiatric/Behavioral: Negative for confusion, dysphoric mood and sleep disturbance  The patient is not nervous/anxious  PHQ-2/9 Depression Screening    Little interest or pleasure in doing things: 0 - not at all  Feeling down, depressed, or hopeless: 0 - not at all  PHQ-2 Score: 0  PHQ-2 Interpretation: Negative depression screen        Objective:  Vitals:    04/27/22 1317   BP: 116/60   BP Location: Left arm   Patient Position: Sitting   Cuff Size: Child   Pulse: 59   Resp: 12   Temp: (!) 96 7 °F (35 9 °C)   SpO2: 98%   Weight: 57 1 kg (125 lb 12 8 oz)   Height: 5' 5" (1 651 m)     Body mass index is 20 93 kg/m²  Physical Exam  Vitals and nursing note reviewed  Constitutional:       General: He is not in acute distress  Appearance: Normal appearance  He is not ill-appearing  HENT:      Head: Normocephalic and atraumatic  Mouth/Throat:      Mouth: Mucous membranes are moist    Eyes:      Extraocular Movements: Extraocular movements intact  Conjunctiva/sclera: Conjunctivae normal       Pupils: Pupils are equal, round, and reactive to light  Neck:      Vascular: No carotid bruit  Cardiovascular:      Rate and Rhythm: Normal rate and regular rhythm  Heart sounds: Normal heart sounds  Pulmonary:      Effort: Pulmonary effort is normal  No respiratory distress  Breath sounds: Normal breath sounds  No wheezing or rales  Abdominal:      General: Bowel sounds are normal  There is no distension  Palpations: Abdomen is soft  Tenderness: There is no abdominal tenderness  Musculoskeletal:      Cervical back: Neck supple  Right lower leg: No edema  Left lower leg: No edema  Neurological:      General: No focal deficit present  Mental Status: He is alert and oriented to person, place, and time  Mental status is at baseline  Psychiatric:         Mood and Affect: Mood normal          Behavior: Behavior normal          Thought Content:  Thought content normal          Judgment: Judgment normal

## 2022-04-27 NOTE — PATIENT INSTRUCTIONS
Type 2 Diabetes in Adults: New Diagnosis   AMBULATORY CARE:   Type 2 diabetes  is a disease that affects how your body uses glucose (sugar)  Normally, when the blood sugar level increases, the pancreas makes more insulin  Insulin helps move sugar out of the blood so it can be used for energy  Type 2 diabetes develops because either the body cannot make enough insulin, or it cannot use the insulin correctly  Type 2 diabetes can be controlled to prevent damage to your heart, blood vessels, and other organs  Common symptoms include the following:   · Numbness in your fingers or toes    · Blurred vision    · Frequent urination    · More hunger or thirst than usual    Have someone call your local emergency number (911 in the [address] [facility]) if:   · You have any of the following signs of a stroke:      ? Numbness or drooping on one side of your face     ? Weakness in an arm or leg    ? Confusion or difficulty speaking    ? Dizziness, a severe headache, or vision loss    · You have any of the following signs of a heart attack:      ? Squeezing, pressure, or pain in your chest    ? You may  also have any of the following:     § Discomfort or pain in your back, neck, jaw, stomach, or arm    § Shortness of breath    § Nausea or vomiting    § Lightheadedness or a sudden cold sweat    · You have trouble breathing  Seek care immediately if:   · You have severe abdominal pain  · You vomit for more than 2 hours  · You have trouble staying awake or focusing  · You are shaking or sweating  · You feel weak or more tired than usual     · You have blurred or double vision  · Your breath has a fruity, sweet smell  Call your doctor or diabetes care team if:   · Your arms and legs are swollen  · You have an upset stomach and cannot eat the foods on your meal plan  · You feel dizzy, have headaches, or are easily irritated  · Your skin is red, warm, dry, or swollen      · You have a wound that does not heal     · You have numbness in your arms or legs  · You have trouble coping with your illness, or you feel anxious or depressed  · You have questions or concerns about your condition or care  Treatment for type 2 diabetes  helps prevent or delay complications, including heart and kidney disease  You must eat healthy meals and do regular physical activity  You may  need any of the following:  · Diabetes medicines or insulin  may be given to decrease the amount of sugar in your blood  · Blood pressure medicine  may be given to lower your blood pressure  · Cholesterol-lowering medicine  may be given to prevent heart disease  · Antiplatelets , such as aspirin, help prevent blood clots  Take your antiplatelet medicine exactly as directed  These medicines make it more likely for you to bleed or bruise  If you are told to take aspirin, do not take acetaminophen or ibuprofen instead  · Take your medicine as directed  Contact your healthcare provider if you think your medicine is not helping or if you have side effects  Tell him or her if you are allergic to any medicine  Keep a list of the medicines, vitamins, and herbs you take  Include the amounts, and when and why you take them  Bring the list or the pill bottles to follow-up visits  Carry your medicine list with you in case of an emergency  Diabetes education:  Diabetes education will start right away  Diabetes education may also happen later to refresh your memory  Your diabetes care team may include physicians, nurse practitioners, and physician assistants  It may also include nurses, dietitians, exercise specialists, pharmacists, dentists, and podiatrists  Family members, or others who are close to you, may also be part of the team  You and your team will make goals and plans to manage diabetes and other health problems  The plans and goals will be specific to your needs   Members of your diabetes care team teach you the following:  · About nutrition:  A dietitian will help you make a meal plan to keep your blood sugar level steady  You will learn how food affects your blood sugar levels  You will also learn to keep track of sugar and starchy foods (carbohydrates)  Do not skip meals  Your blood sugar level may drop too low if you have taken diabetes medicine and do not eat  You may be taught to use the plate method for portion control  With the plate method, ½ of your plate contains vegetables  The other half is divided so that ¼ contains protein or meat, and ¼ contains starches, such as potatoes  · About physical activity and diabetes: You will learn why physical activity, such as walking, is important  You and your care team provider will make a plan for your activity  Your care team provider will tell you what a healthy weight is for you  He or she will help you make a plan to get to that weight and stay there  Maintain a healthy weight to help delay or prevent complications of diabetes  · About your blood sugar level: You will learn what your blood sugar level should be  You will be given information on when and how to check your blood sugar level  You may need to check by testing a drop of blood in a glucose monitor  You may instead be given a continuous glucose monitoring (CGM) device  A sensor is placed in your abdomen or on your arm  You put a transmitter on the sensor to get a reading that shows up on the monitor  You will learn what to do if your level is too high or too low  Write down the times of your checks and your levels  Take them to all follow-up appointments  · About diabetes medicine: You may need oral diabetes medicine, insulin, or both to help control your blood sugar levels  Your healthcare provider will teach you how and when to take oral diabetes medicine  You will also be taught about side effects oral diabetes medicine can cause   Insulin may be added if oral diabetes medicine becomes less effective over time  Insulin may be injected, or given through a pump or pen  You and your care team will discuss which method is best for you  ? An insulin pump  is an implanted device that gives your insulin 24 hours a day  An insulin pump prevents the need for multiple insulin injections in a day  ? An insulin pen  is a device prefilled with the right amount of insulin  ? You and your family members will be taught how to draw up and give insulin  if this is the best method for you  Your education team will also teach you how to dispose of needles and syringes  ? You will learn how much insulin you need  and when to give it  You will be taught when not to give insulin  You will also be taught what to do if your blood sugar level drops too low  This may happen if you take insulin and do not eat the right amount of carbohydrates  Other ways to help manage type 2 diabetes:   · Talk to your care team if you have increased stress about your diagnosis  When you feel stressed about your diagnosis, it can cause you not to take care of yourself properly  Your care team can help by offering tips about self-care  Your care team may suggest you talk to a mental health provider  The provider can listen and offer help with self-care issues  · Check your feet each day for sores  Wear shoes and socks that fit correctly  Do not trim your toenails  Go to a podiatrist  Ask your care team for more information about foot care  · Do not smoke  Nicotine and other chemicals in cigarettes and cigars can cause lung damage and make diabetes harder to manage  Ask your care team provider for information if you currently smoke and need help to quit  E-cigarettes or smokeless tobacco still contain nicotine  Talk to your care team provider before you use these products  · Drink water instead of sugary drinks such as soft drinks and fruit juices    Sugary drinks cause high blood sugar and cause an increase in your weight  Your healthcare provider may tell you that diet drinks do not help with weight loss  · Know the risks if you choose to drink alcohol  Alcohol can cause your blood sugar levels to be low if you use insulin  Alcohol can cause high blood sugar levels and weight gain if you drink too much  A drink of alcohol is 12 ounces of beer, 5 ounces of wine, or 1½ ounces of liquor  Your care team can tell you how many drinks are okay to have in 24 hours and in 1 week  · Check your blood pressure as directed  If you have high blood pressure (BP), talk to your care team about your BP goals  Together you can create a plan to lower your BP if needed and keep it in a healthy range  The plan may include lifestyle changes or medicines  A normal BP is 119/79 or lower  A normal blood pressure can help prevent or delay certain complications from diabetes  Examples include retinopathy (eye damage) and kidney damage  · Wear medical alert identification  Wear medical alert jewelry or carry a card that says you have diabetes  Ask your care team provider where to get these items  · Ask about vaccines you may need  You have a higher risk for serious illness if you get the flu, pneumonia, COVID-19, or hepatitis  Ask your provider if you should get a flu, pneumonia, or hepatitis B vaccine, and when to get the COVID-19 vaccine  Risks of type 2 diabetes: It is important to learn to keep your diabetes in control  Uncontrolled diabetes can damage your nerves, veins, and arteries  Your risk for dementia increases faster the longer your diabetes is not controlled  High blood sugar levels may damage other body tissues and organs over time  Damage to arteries may increase your risk for heart attack and stroke  Nerve damage may also lead to other heart, stomach, and nerve problems  Diabetes can become life-threatening if it is not controlled  Follow up with your care team providers as directed:   You may need to return to have your A1c checked every 3 months  You will need to have your feet checked during at least 1 visit each year  You will need an eye exam 1 time each year to check for retinopathy  You will also need urine tests every year to check for kidney problems  You may need tests to monitor for heart disease, such as an EKG, stress test, blood pressure monitoring, and blood tests  Write down your questions so you remember to ask them during your visits  © Copyright [employer] 2022 Information is for End User's use only and may not be sold, redistributed or otherwise used for commercial purposes  All illustrations and images included in CareNotes® are the copyrighted property of [employer]  or [address] [place], [place] [address]emily   The above information is an  only  It is not intended as medical advice for individual conditions or treatments  Talk to your doctor, nurse or pharmacist before following any medical regimen to see if it is safe and effective for you

## 2022-06-30 ENCOUNTER — RA CDI HCC (OUTPATIENT)
Dept: OTHER | Facility: HOSPITAL | Age: 82
End: 2022-06-30

## 2022-07-11 LAB
LEFT EYE DIABETIC RETINOPATHY: NORMAL
RIGHT EYE DIABETIC RETINOPATHY: NORMAL

## 2022-07-29 ENCOUNTER — OFFICE VISIT (OUTPATIENT)
Dept: INTERNAL MEDICINE CLINIC | Facility: CLINIC | Age: 82
End: 2022-07-29
Payer: MEDICARE

## 2022-07-29 VITALS
SYSTOLIC BLOOD PRESSURE: 112 MMHG | OXYGEN SATURATION: 97 % | TEMPERATURE: 97.3 F | HEART RATE: 67 BPM | WEIGHT: 133 LBS | DIASTOLIC BLOOD PRESSURE: 62 MMHG | HEIGHT: 65 IN | BODY MASS INDEX: 22.16 KG/M2

## 2022-07-29 DIAGNOSIS — R97.20 ELEVATED PSA: ICD-10-CM

## 2022-07-29 DIAGNOSIS — R97.20 ELEVATED PROSTATE SPECIFIC ANTIGEN (PSA): ICD-10-CM

## 2022-07-29 DIAGNOSIS — I10 ESSENTIAL HYPERTENSION: ICD-10-CM

## 2022-07-29 DIAGNOSIS — E44.1 MILD PROTEIN-CALORIE MALNUTRITION (HCC): ICD-10-CM

## 2022-07-29 DIAGNOSIS — E78.2 MIXED HYPERLIPIDEMIA: ICD-10-CM

## 2022-07-29 DIAGNOSIS — E11.9 ENCOUNTER FOR DIABETIC FOOT EXAM (HCC): ICD-10-CM

## 2022-07-29 DIAGNOSIS — E11.319 TYPE 2 DIABETES MELLITUS WITH RETINOPATHY OF BOTH EYES, WITHOUT LONG-TERM CURRENT USE OF INSULIN, MACULAR EDEMA PRESENCE UNSPECIFIED, UNSPECIFIED RETINOPATHY SEVERITY (HCC): Primary | ICD-10-CM

## 2022-07-29 DIAGNOSIS — R41.3 MEMORY DIFFICULTY: ICD-10-CM

## 2022-07-29 DIAGNOSIS — Z12.5 SCREENING FOR PROSTATE CANCER: ICD-10-CM

## 2022-07-29 DIAGNOSIS — G70.00 MYASTHENIA GRAVIS WITHOUT EXACERBATION (HCC): ICD-10-CM

## 2022-07-29 LAB — SL AMB POCT HEMOGLOBIN AIC: 6.6 (ref ?–6.5)

## 2022-07-29 PROCEDURE — 83036 HEMOGLOBIN GLYCOSYLATED A1C: CPT | Performed by: INTERNAL MEDICINE

## 2022-07-29 PROCEDURE — 99214 OFFICE O/P EST MOD 30 MIN: CPT | Performed by: INTERNAL MEDICINE

## 2022-07-29 NOTE — PATIENT INSTRUCTIONS
Chronic Hypertension   AMBULATORY CARE:   Hypertension  is high blood pressure  Your blood pressure is the force of your blood moving against the walls of your arteries  Hypertension causes your blood pressure to get so high that your heart has to work much harder than normal  This can damage your heart  Even if you have hypertension for years, lifestyle changes, medicines, or both can help bring your blood pressure to normal   Call your local emergency number (911 in the [address] [facility]) or have someone call if:   You have chest pain  You have any of the following signs of a heart attack:      Squeezing, pressure, or pain in your chest    You may  also have any of the following:     Discomfort or pain in your back, neck, jaw, stomach, or arm    Shortness of breath    Nausea or vomiting    Lightheadedness or a sudden cold sweat    You become confused or have difficulty speaking  You suddenly feel lightheaded or have trouble breathing  Seek care immediately if:   You have a severe headache or vision loss  You have weakness in an arm or leg  Call your doctor or cardiologist if:   You feel faint, dizzy, confused, or drowsy  You have been taking your blood pressure medicine but your pressure is higher than your provider says it should be  You have questions or concerns about your condition or care  Treatment for chronic hypertension  may include medicine to lower your blood pressure and cholesterol levels  A low cholesterol level helps prevent heart disease and makes it easier to control your blood pressure  Heart disease can make your blood pressure harder to control  You may also need to make lifestyle changes  What you need to know about the stages of hypertension:       Normal blood pressure is 119/79 or lower   Your healthcare provider may only check your blood pressure each year if it stays at a normal level  Elevated blood pressure is 120/79 to 129/79   This is sometimes called prehypertension  Your healthcare provider may suggest lifestyle changes to help lower your blood pressure to a normal level  He or she may then check it again in 3 to 6 months  Stage 1 hypertension is 130/80  to 139/89   Your provider may recommend lifestyle changes, medication, and checks every 3 to 6 months until your blood pressure is controlled  Stage 2 hypertension is 140/90 or higher   Your provider will recommend lifestyle changes and have you take 2 kinds of hypertension medicines  You will also need to have your blood pressure checked monthly until it is controlled  Manage chronic hypertension:   Check your blood pressure at home  Avoid smoking, caffeine, and exercise at least 30 minutes before checking your blood pressure  Sit and rest for 5 minutes before you take your blood pressure  Extend your arm and support it on a flat surface  Your arm should be at the same level as your heart  Follow the directions that came with your blood pressure monitor  Check your blood pressure 2 times, 1 minute apart, before you take your medicine in the morning  Also check your blood pressure before your evening meal  Keep a record of your readings and bring it to your follow-up visits  Ask your healthcare provider what your blood pressure should be  Manage any other health conditions you have  Health conditions such as diabetes can increase your risk for hypertension  Follow your healthcare provider's instructions and take all your medicines as directed  Talk to your healthcare provider about any new health conditions you have recently developed  Ask about all medicines  Certain medicines can increase your blood pressure  Examples include oral birth control pills, decongestants, herbal supplements, and NSAIDs, such as ibuprofen  Your healthcare provider can tell you which medicines are safe for you to take  This includes prescription and over-the-counter medicines      Lifestyle changes you can make to lower your blood pressure: Your provider may want you to make more lifestyle changes if you are having trouble controlling your blood pressure  This may feel difficult over time, especially if you think you are making good changes but your pressure is still high  It might help to focus on one new change at a time  For example, try to add 1 more day of exercise, or exercise for an extra 10 minutes on 2 days  Small changes can make a big difference  Your healthcare provider can also refer you to specialists such as a dietitian who can help you make small changes  Your family members may be included in helping you learn to create lifestyle changes, such as the following:     Limit sodium (salt) as directed  Too much sodium can affect your fluid balance  Check labels to find low-sodium or no-salt-added foods  Some low-sodium foods use potassium salts for flavor  Too much potassium can also cause health problems  Your healthcare provider will tell you how much sodium and potassium are safe for you to have in a day  He or she may recommend that you limit sodium to 2,300 mg a day  Follow the meal plan recommended by your healthcare provider  A dietitian or your provider can give you more information on low-sodium plans or the DASH (Dietary Approaches to Stop Hypertension) eating plan  The DASH plan is low in sodium, processed sugar, unhealthy fats, and total fat  It is high in potassium, calcium, and fiber  These can be found in vegetables, fruit, and whole-grain foods  Be physically active throughout the day  Physical activity, such as exercise, can help control your blood pressure and your weight  Be physically active for at least 30 minutes per day, on most days of the week  Include aerobic activity, such as walking or riding a bicycle  Also include strength training at least 2 times each week  Your healthcare providers can help you create a physical activity plan  Decrease stress    This may help lower your blood pressure  Learn ways to relax, such as deep breathing or listening to music  Limit alcohol as directed  Alcohol can increase your blood pressure  A drink of alcohol is 12 ounces of beer, 5 ounces of wine, or 1½ ounces of liquor  Do not smoke  Nicotine and other chemicals in cigarettes and cigars can increase your blood pressure and also cause lung damage  Ask your healthcare provider for information if you currently smoke and need help to quit  E-cigarettes or smokeless tobacco still contain nicotine  Talk to your healthcare provider before you use these products  Follow up with your doctor or cardiologist as directed: You will need to return to have your blood pressure checked and to have other lab tests done  Write down your questions so you remember to ask them during your visits  © Copyright [facility] 2022 Information is for End User's use only and may not be sold, redistributed or otherwise used for commercial purposes  All illustrations and images included in CareNotes® are the copyrighted property of A D A M [employer] Inc  or [facility] [employer] Johnson   The above information is an  only  It is not intended as medical advice for individual conditions or treatments  Talk to your doctor, nurse or pharmacist before following any medical regimen to see if it is safe and effective for you

## 2022-07-29 NOTE — PROGRESS NOTES
Assessment/Plan:  Problem List Items Addressed This Visit        Endocrine    Type 2 diabetes mellitus with retinopathy, without long-term current use of insulin ([place] [address] ) - Primary    Relevant Orders    POCT hemoglobin A1c (Completed)       Cardiovascular and Mediastinum    Essential hypertension       Nervous and Auditory    Myasthenia gravis without exacerbation (HCC)       Other    Mixed hyperlipidemia    Mild protein-calorie malnutrition (HCC)    Memory difficulty    Elevated PSA      Other Visit Diagnoses     Screening for prostate cancer        Relevant Orders    PSA Total, Diagnostic    Elevated prostate specific antigen (PSA)         Relevant Orders    PSA Total, Diagnostic    Encounter for diabetic foot exam ([place] [address] )               Diagnoses and all orders for this visit:    Type 2 diabetes mellitus with retinopathy of both eyes, without long-term current use of insulin, macular edema presence unspecified, unspecified retinopathy severity ([place] [address] )  -     POCT hemoglobin A1c    Essential hypertension    Myasthenia gravis without exacerbation ([place] [address] )    Mixed hyperlipidemia    Mild protein-calorie malnutrition ([place] [address] )    Memory difficulty    Screening for prostate cancer  -     PSA Total, Diagnostic; Future    Elevated PSA    Elevated prostate specific antigen (PSA)   -     PSA Total, Diagnostic; Future    Encounter for diabetic foot exam ([place] [address] )      No problem-specific Assessment & Plan notes found for this encounter  A/P:  Doing well and will check labs  In office HgA1c was up slightly 6 6  Pt to work on his diet again rather than med adjustment    Continue current treatment and RTC three months for routine  Subjective:      Patient ID: Wynell Snellen is a 81 y o  male  WM RTC for f/u HTN, DM, etc  Doing well and no new issues  Sugars less than 140 and no low sugar events  Remains active w/o difficulty and no falls  Denies CP, SOB, palpitations, edema, orthopnea or PND  No trouble swallowing  VIsion no worse  No urinary s/s  Due for labs  The following portions of the patient's history were reviewed and updated as appropriate:   He has a past medical history of Diabetes mellitus ([address] ) and Hyperlipidemia ,  does not have any pertinent problems on file  ,   has a past surgical history that includes Tonsillectomy; Eye surgery; Cataract extraction; Dental surgery; Vasectomy; and Colonoscopy  ,  family history is not on file  ,   reports that he has never smoked  He has never used smokeless tobacco  He reports previous alcohol use  He reports that he does not use drugs  ,  is allergic to simvastatin     Current Outpatient Medications   Medication Sig Dispense Refill    aspirin (ECOTRIN LOW STRENGTH) 81 mg EC tablet Take 81 mg by mouth (Patient not taking: Reported on 10/22/2021)      atorvastatin (LIPITOR) 20 mg tablet Take 1 tablet (20 mg total) by mouth daily 90 tablet 1    B Complex-C (B COMPLEX-B12-C IJ) Take by mouth      Blood Glucose Monitoring Suppl (ONE TOUCH ULTRA MINI) w/Device KIT Test once a day 1 kit 0    donepezil (ARICEPT) 5 mg tablet Take 2 tablets (10 mg total) by mouth daily at bedtime 90 tablet 1    ECHINACEA HERB PO Take by mouth (Patient not taking: Reported on 4/27/2022 )      glimepiride (AMARYL) 4 mg tablet Take 1 tablet (4 mg total) by mouth daily with breakfast 180 tablet 3    glucose blood (OneTouch Verio) test strip Test once a day 100 strip 5    latanoprost (XALATAN) 0 005 % ophthalmic solution Apply 1 drop to eye (Patient not taking: Reported on 4/27/2022 )      lisinopril (ZESTRIL) 5 mg tablet Take 1 tablet (5 mg total) by mouth daily 90 tablet 3    metFORMIN (GLUCOPHAGE) 1000 MG tablet Take 1 tablet (1,000 mg total) by mouth 2 (two) times a day with meals 180 tablet 1    Misc Natural Products (PUMPKIN SEED OIL PO) Take by mouth (Patient not taking: Reported on 4/27/2022 )      Omega-3 Fatty Acids (fish oil) 1,000 mg Take by mouth      OneTouch Delica Lancets 90U MISC Test once a day (Patient not taking: Reported on 4/27/2022 ) 100 each 5    Saw Palmetto, Serenoa repens, (Saw [place]) 540 MG CAPS Take by mouth      tamsulosin (FLOMAX) 0 4 mg Take 1 capsule (0 4 mg total) by mouth daily with dinner 90 capsule 1     No current facility-administered medications for this visit  Review of Systems   Constitutional: Negative for activity change, chills, diaphoresis, fatigue and fever  HENT: Negative  Eyes: Negative for visual disturbance  Respiratory: Negative for cough, chest tightness, shortness of breath and wheezing  Cardiovascular: Negative for chest pain, palpitations and leg swelling  Gastrointestinal: Negative for abdominal pain, constipation, diarrhea, nausea and vomiting  Endocrine: Negative for cold intolerance and heat intolerance  Genitourinary: Negative for difficulty urinating, dysuria and frequency  Musculoskeletal: Negative for arthralgias, gait problem and myalgias  Neurological: Negative for dizziness, seizures, syncope, weakness, light-headedness and headaches  Psychiatric/Behavioral: Negative for confusion, dysphoric mood and sleep disturbance  The patient is not nervous/anxious  PHQ-2/9 Depression Screening          Objective:  Vitals:    07/29/22 1332   BP: 112/62   Pulse: 67   Temp: (!) 97 3 °F (36 3 °C)   SpO2: 97%   Weight: 60 3 kg (133 lb)   Height: 5' 5" (1 651 m)     Body mass index is 22 13 kg/m²  Physical Exam  Vitals and nursing note reviewed  Constitutional:       General: He is not in acute distress  Appearance: Normal appearance  He is not ill-appearing  HENT:      Head: Normocephalic and atraumatic  Mouth/Throat:      Mouth: Mucous membranes are moist    Eyes:      Extraocular Movements: Extraocular movements intact  Conjunctiva/sclera: Conjunctivae normal       Pupils: Pupils are equal, round, and reactive to light  Neck:      Vascular: No carotid bruit     Cardiovascular:      Rate and Rhythm: Normal rate and regular rhythm  Pulses: no weak pulses          Dorsalis pedis pulses are 1+ on the right side and 1+ on the left side  Posterior tibial pulses are 1+ on the right side and 1+ on the left side  Heart sounds: Normal heart sounds  Pulmonary:      Effort: Pulmonary effort is normal  No respiratory distress  Breath sounds: Normal breath sounds  No wheezing or rales  Abdominal:      General: Bowel sounds are normal  There is no distension  Palpations: Abdomen is soft  Tenderness: There is no abdominal tenderness  Musculoskeletal:      Cervical back: Neck supple  Right lower leg: No edema  Left lower leg: No edema  Feet:      Right foot:      Skin integrity: No ulcer, skin breakdown, erythema, warmth, callus or dry skin  Left foot:      Skin integrity: No ulcer, skin breakdown, erythema, warmth, callus or dry skin  Neurological:      General: No focal deficit present  Mental Status: He is alert and oriented to person, place, and time  Mental status is at baseline  Psychiatric:         Mood and Affect: Mood normal          Behavior: Behavior normal          Thought Content: Thought content normal          Judgment: Judgment normal        Patient's shoes and socks removed  Right Foot/Ankle   Right Foot Inspection  Skin Exam: skin normal and skin intact  No dry skin, no warmth, no callus, no erythema, no maceration, no abnormal color, no pre-ulcer, no ulcer and no callus  Toe Exam: ROM and strength within normal limits  No swelling, no tenderness, erythema and  no right toe deformity    Sensory   Monofilament testing: intact    Vascular  Capillary refills: < 3 seconds  The right DP pulse is 1+  The right PT pulse is 1+  Left Foot/Ankle  Left Foot Inspection  Skin Exam: skin normal and skin intact  No dry skin, no warmth, no erythema, no maceration, normal color, no pre-ulcer, no ulcer and no callus       Toe Exam: ROM and strength within normal limits  No swelling, no tenderness, no erythema and no left toe deformity  Sensory   Monofilament testing: intact    Vascular  Capillary refills: < 3 seconds  The left DP pulse is 1+  The left PT pulse is 1+       Assign Risk Category  No deformity present  No loss of protective sensation  No weak pulses  Risk: 0

## 2022-08-15 ENCOUNTER — APPOINTMENT (OUTPATIENT)
Dept: LAB | Facility: CLINIC | Age: 82
End: 2022-08-15
Payer: MEDICARE

## 2022-08-15 DIAGNOSIS — R97.20 ELEVATED PROSTATE SPECIFIC ANTIGEN (PSA): ICD-10-CM

## 2022-08-15 DIAGNOSIS — Z12.5 SCREENING FOR PROSTATE CANCER: ICD-10-CM

## 2022-08-15 LAB — PSA SERPL-MCNC: 8.2 NG/ML (ref 0–4)

## 2022-08-15 PROCEDURE — 84153 ASSAY OF PSA TOTAL: CPT

## 2022-08-17 ENCOUNTER — TELEPHONE (OUTPATIENT)
Dept: INTERNAL MEDICINE CLINIC | Facility: CLINIC | Age: 82
End: 2022-08-17

## 2022-08-17 DIAGNOSIS — R97.20 ELEVATED PSA: Primary | ICD-10-CM

## 2022-08-17 NOTE — TELEPHONE ENCOUNTER
PT called back regarding PSA labs  PT is willing to see   Can you please place referral  Please advise

## 2022-10-31 ENCOUNTER — OFFICE VISIT (OUTPATIENT)
Dept: INTERNAL MEDICINE CLINIC | Facility: CLINIC | Age: 82
End: 2022-10-31

## 2022-10-31 VITALS
DIASTOLIC BLOOD PRESSURE: 70 MMHG | HEIGHT: 65 IN | TEMPERATURE: 97.8 F | SYSTOLIC BLOOD PRESSURE: 126 MMHG | BODY MASS INDEX: 22.96 KG/M2 | OXYGEN SATURATION: 98 % | WEIGHT: 137.8 LBS | HEART RATE: 67 BPM

## 2022-10-31 DIAGNOSIS — Z23 ENCOUNTER FOR VACCINATION: ICD-10-CM

## 2022-10-31 DIAGNOSIS — N40.0 BENIGN PROSTATIC HYPERPLASIA WITHOUT LOWER URINARY TRACT SYMPTOMS: ICD-10-CM

## 2022-10-31 DIAGNOSIS — R97.20 ELEVATED PSA: ICD-10-CM

## 2022-10-31 DIAGNOSIS — E11.319 TYPE 2 DIABETES MELLITUS WITH RETINOPATHY OF BOTH EYES, WITHOUT LONG-TERM CURRENT USE OF INSULIN, MACULAR EDEMA PRESENCE UNSPECIFIED, UNSPECIFIED RETINOPATHY SEVERITY (HCC): Primary | ICD-10-CM

## 2022-10-31 DIAGNOSIS — G70.00 MYASTHENIA GRAVIS WITHOUT EXACERBATION (HCC): ICD-10-CM

## 2022-10-31 DIAGNOSIS — E78.2 MIXED HYPERLIPIDEMIA: ICD-10-CM

## 2022-10-31 DIAGNOSIS — Z00.00 MEDICARE ANNUAL WELLNESS VISIT, SUBSEQUENT: ICD-10-CM

## 2022-10-31 DIAGNOSIS — I10 ESSENTIAL HYPERTENSION: ICD-10-CM

## 2022-10-31 LAB — SL AMB POCT HEMOGLOBIN AIC: 7 (ref ?–6.5)

## 2022-10-31 NOTE — PROGRESS NOTES
Assessment and Plan:     Problem List Items Addressed This Visit        Endocrine    Type 2 diabetes mellitus with retinopathy, without long-term current use of insulin ([address] ) - Primary    Relevant Orders    CBC and differential    Comprehensive metabolic panel    Lipid Panel with Direct LDL reflex    Microalbumin / creatinine urine ratio    TSH, 3rd generation with Free T4 reflex    POCT hemoglobin A1c (Completed)       Cardiovascular and Mediastinum    Essential hypertension    Relevant Orders    Comprehensive metabolic panel    Microalbumin / creatinine urine ratio       Nervous and Auditory    Myasthenia gravis without exacerbation (HCC)       Genitourinary    Benign prostatic hyperplasia without lower urinary tract symptoms    Relevant Orders    PSA Total, Diagnostic       Other    Mixed hyperlipidemia    Elevated PSA    Relevant Orders    PSA Total, Diagnostic      Other Visit Diagnoses     Encounter for vaccination        Relevant Orders    influenza vaccine, high-dose, PF 0 5 mL    Medicare annual wellness visit, subsequent               Preventive health issues were discussed with patient, and age appropriate screening tests were ordered as noted in patient's After Visit Summary  Personalized health advice and appropriate referrals for health education or preventive services given if needed, as noted in patient's After Visit Summary       History of Present Illness:     Patient presents for a Medicare Wellness Visit    HPI   Patient Care Team:  Justino Trimble DO as PCP - General (Internal Medicine)  Jeannette Nichols as Nurse Practitioner (Urology)     Review of Systems:     Review of Systems     Problem List:     Patient Active Problem List   Diagnosis   • Type 2 diabetes mellitus with retinopathy, without long-term current use of insulin ([address] )   • Myasthenia gravis without exacerbation ([address] )   • Mixed hyperlipidemia   • Retinopathy   • Primary open angle glaucoma (POAG) of both eyes, mild stage   • Ptosis of both eyelids   • Benign prostatic hyperplasia without lower urinary tract symptoms   • Essential hypertension   • Other age-related cataract   • Memory difficulty   • Mild aortic stenosis   • Mild mitral regurgitation   • Diastolic dysfunction   • Mild concentric left ventricular hypertrophy (LVH)   • Mild protein-calorie malnutrition (HCC)   • Dysphagia   • Elevated PSA      Past Medical and Surgical History:     Past Medical History:   Diagnosis Date   • Diabetes mellitus ([address] )    • Hyperlipidemia      Past Surgical History:   Procedure Laterality Date   • CATARACT EXTRACTION     • COLONOSCOPY     • DENTAL SURGERY     • EYE SURGERY     • TONSILLECTOMY     • VASECTOMY        Family History:     History reviewed  No pertinent family history  Social History:     Social History     Socioeconomic History   • Marital status:      Spouse name: None   • Number of children: None   • Years of education: None   • Highest education level: None   Occupational History   • Occupation: retired   Tobacco Use   • Smoking status: Never Smoker   • Smokeless tobacco: Never Used   Vaping Use   • Vaping Use: Never used   Substance and Sexual Activity   • Alcohol use: Not Currently   • Drug use: Never   • Sexual activity: Not Currently     Partners: Female   Other Topics Concern   • None   Social History Narrative        Two children    Lives alone    Retired   Served in the [employer] and deployed to [facility]  Social Determinants of Health     Financial Resource Strain: Low Risk    • Difficulty of Paying Living Expenses: Not hard at all   Food Insecurity: Not on file   Transportation Needs: No Transportation Needs   • Lack of Transportation (Medical): No   • Lack of Transportation (Non-Medical):  No   Physical Activity: Not on file   Stress: Not on file   Social Connections: Not on file   Intimate Partner Violence: Not on file   Housing Stability: Not on file      Medications and Allergies: Current Outpatient Medications   Medication Sig Dispense Refill   • atorvastatin (LIPITOR) 20 mg tablet Take 1 tablet (20 mg total) by mouth daily 90 tablet 1   • B Complex-C (B COMPLEX-B12-C IJ) Take 1 capsule by mouth if needed     • Blood Glucose Monitoring Suppl (ONE TOUCH ULTRA MINI) w/Device KIT Test once a day 1 kit 0   • donepezil (ARICEPT) 5 mg tablet Take 2 tablets (10 mg total) by mouth daily at bedtime 90 tablet 1   • glimepiride (AMARYL) 4 mg tablet Take 1 tablet (4 mg total) by mouth daily with breakfast 180 tablet 3   • glucose blood (OneTouch Verio) test strip Test once a day 100 strip 5   • lisinopril (ZESTRIL) 5 mg tablet Take 1 tablet (5 mg total) by mouth daily 90 tablet 3   • metFORMIN (GLUCOPHAGE) 1000 MG tablet Take 1 tablet (1,000 mg total) by mouth 2 (two) times a day with meals 180 tablet 1   • Omega-3 Fatty Acids (fish oil) 1,000 mg Take 1,000 mg by mouth if needed     • OneTouch Delica Lancets 90S MISC Test once a day 100 each 5   • Saw Palmetto, Serenoa repens, (Saw [place] Berries) 540 MG CAPS Take 1 capsule by mouth in the morning     • tamsulosin (FLOMAX) 0 4 mg Take 1 capsule (0 4 mg total) by mouth daily with dinner 90 capsule 1   • aspirin (ECOTRIN LOW STRENGTH) 81 mg EC tablet Take 81 mg by mouth (Patient not taking: No sig reported)     • ECHINACEA HERB PO Take by mouth (Patient not taking: No sig reported)     • latanoprost (XALATAN) 0 005 % ophthalmic solution Apply 1 drop to eye (Patient not taking: No sig reported)     • Misc Natural Products (PUMPKIN SEED OIL PO) Take by mouth (Patient not taking: No sig reported)       No current facility-administered medications for this visit       Allergies   Allergen Reactions   • Simvastatin Myalgia and Other (See Comments)      Immunizations:     Immunization History   Administered Date(s) Administered   • COVID-19 PFIZER VACCINE 0 3 ML IM 04/01/2021, 04/22/2021   • H1N1, All Formulations 12/31/2009   • INFLUENZA 01/01/2002, 08/04/2003, 09/15/2003, 12/10/2004, 10/25/2005, 10/31/2006, 10/11/2007, 11/16/2007, 10/07/2008, 10/13/2009, 09/23/2010, 10/17/2011, 10/16/2012, 10/01/2016, 10/01/2017, 10/13/2018, 10/01/2019, 09/07/2020   • Influenza, high dose seasonal 0 7 mL 09/15/2021   • Pneumococcal 12/10/2004, 12/31/2009   • Pneumococcal Conjugate 13-Valent 06/01/2016, 09/15/2021   • Zoster 09/18/2014      Health Maintenance:         Topic Date Due   • Hepatitis C Screening  Completed         Topic Date Due   • COVID-19 Vaccine (3 - Booster for Pfizer series) 09/22/2021   • Influenza Vaccine (1) 09/01/2022   • Pneumococcal Vaccine: 65+ Years (2 - PPSV23 or PCV20) 09/15/2022      Medicare Screening Tests and Risk Assessments:     Jake Lux is here for his Subsequent Wellness visit  Last Medicare Wellness visit information reviewed, patient interviewed and updates made to the record today  Health Risk Assessment:   Patient rates overall health as very good  Patient feels that their physical health rating is same  Patient is satisfied with their life  Eyesight was rated as slightly worse  Hearing was rated as same  Patient feels that their emotional and mental health rating is same  Patients states they are sometimes angry  Patient states they are sometimes unusually tired/fatigued  Pain experienced in the last 7 days has been none  Patient states that he has experienced no weight loss or gain in last 6 months  Depression Screening:   PHQ-2 Score: 0      Fall Risk Screening: In the past year, patient has experienced: no history of falling in past year      Home Safety:  Patient does not have trouble with stairs inside or outside of their home  Patient has working smoke alarms and has working carbon monoxide detector  Home safety hazards include: none  Nutrition:   Current diet is Regular  Medications:   Patient is currently taking over-the-counter supplements   OTC medications include: see medication list  Patient is able to manage medications  Activities of Daily Living (ADLs)/Instrumental Activities of Daily Living (IADLs):   Walk and transfer into and out of bed and chair?: Yes  Dress and groom yourself?: Yes    Bathe or shower yourself?: Yes    Feed yourself? Yes  Do your laundry/housekeeping?: Yes  Manage your money, pay your bills and track your expenses?: Yes  Make your own meals?: Yes    Do your own shopping?: Yes    Previous Hospitalizations:   Any hospitalizations or ED visits within the last 12 months?: No      Advance Care Planning:   Living will: Yes    Durable POA for healthcare: Yes    Advanced directive: Yes    Advanced directive counseling given: Yes    Five wishes given: Yes    Patient declined ACP directive: No    End of Life Decisions reviewed with patient: Yes    Provider agrees with end of life decisions: Yes      Comments: Will discuss further once pt reviews info given today  Cognitive Screening:   Provider or family/friend/caregiver concerned regarding cognition?: No    PREVENTIVE SCREENINGS      Cardiovascular Screening:    General: Screening Not Indicated and History Lipid Disorder    Due for: Lipid Panel      Diabetes Screening:     General: Screening Not Indicated and History Diabetes    Due for: Blood Glucose      Colorectal Cancer Screening:     General: Screening Not Indicated      Prostate Cancer Screening:    General: Screening Not Indicated      Osteoporosis Screening:    General: Patient Declines      Abdominal Aortic Aneurysm (AAA) Screening:        General: Screening Not Indicated      Lung Cancer Screening:     General: Screening Not Indicated      Hepatitis C Screening:    General: Screening Current    Screening, Brief Intervention, and Referral to Treatment (SBIRT)    Screening  Typical number of drinks in a day: 0  Typical number of drinks in a week: 0  Interpretation: Low risk drinking behavior      Other Counseling Topics:   Car/seat belt/driving safety, sunscreen and calcium and vitamin D intake and regular weightbearing exercise  No exam data present     Physical Exam:     /70   Pulse 67   Temp 97 8 °F (36 6 °C) (Tympanic)   Ht 5' 5" (1 651 m)   Wt 62 5 kg (137 lb 12 8 oz)   SpO2 98%   BMI 22 93 kg/m²     Physical Exam   A/P: Doing well and no falls  Denies depression and feels safe at home  Diverse diet  Doesn't drive, but uses seat belts  No living will or POA  Information give for pt to review  No DME or referrals needed today  RTC in one year for medicare wellness       Michael Mccallum DO

## 2022-10-31 NOTE — PATIENT INSTRUCTIONS
Basic Carbohydrate Counting   AMBULATORY CARE:   Carbohydrate counting  is a way to plan your meals by counting the amount of carbohydrate in foods  Carbohydrates are the sugars, starches, and fiber found in fruit, grains, vegetables, and milk products  Carbohydrates increase your blood sugar levels  Carbohydrate counting can help you eat the right amount of carbohydrate to keep your blood sugar levels under control  What you need to know about planning meals using carbohydrate counting:  · A dietitian or healthcare provider will help you develop a healthy meal plan that works best for you  You will be taught how much carbohydrate to eat or drink for each meal and snack  Your meal plan will be based on your age, weight, usual food intake, and physical activity level  If you have diabetes, it will also include your blood sugar levels and diabetes medicine  Once you know how much carbohydrate you should eat, you can decide what type of food you want to eat  · You will need to know what foods contain carbohydrate and how much they contain  Keep track of the amount of carbohydrate in meals and snacks in order to follow your meal plan  Do not avoid carbohydrates or skip meals  Your blood sugar may fall too low if you do not eat enough carbohydrate or you skip meals  Foods that contain carbohydrate:   · Breads:  Each serving of food listed below contains about 15 g of carbohydrate   ? 1 slice of bread (1 ounce) or 1 flour or corn tortilla (6 inch)    ? ½ of a hamburger bun or ¼ of a large bagel (about 1 ounce)    ? 1 pancake (about 4 inches across and ¼ inch thick)    · Cereals and grains:  Serving sizes of ready-to-eat cereals vary  Look at the serving size and the total carbohydrate amount listed on the food label  Each serving of food listed below contains about 15 g of carbohydrate   ? ¾ cup of dry, unsweetened, ready-to-eat cereal or ¼ cup of low-fat granola     ?  ½ cup of oatmeal or other cooked cereal     ? ? cup of cooked rice or pasta    · Starchy vegetables and beans:  Each serving of food listed below contains about 15 g of carbohydrate   ? ½ cup of corn, green peas, sweet potatoes, or mashed potatoes    ? ¼ of a large baked potato    ? ½ cup of beans, lentils, and peas (garbanzo, krishnan, kidney, white, split, black-eyed)    · Crackers and snacks:  Each serving of food listed below contains about 15 g of carbohydrate   ? 3 miky cracker squares or 8 animal crackers     ? 6 saltine-type crackers    ? 3 cups of popcorn or ¾ ounce of pretzels, potato chips, or tortilla chips    · Fruit:  Each serving of food listed below contains about 15 g of carbohydrate   ? 1 small (4 ounce) piece of fresh fruit or ¾ to 1 cup of fresh fruit    ? ½ cup of canned or frozen fruit, packed in natural juice    ? ½ cup (4 ounces) of unsweetened fruit juice    ? 2 tablespoons of dried fruit    · Desserts or sugary foods:  Each serving of food listed below contains about 15 g of carbohydrate   ? 2-inch square unfrosted cake or brownie     ? 2 small cookies    ? ½ cup of ice cream, frozen yogurt, or nondairy frozen yogurt    ? ¼ cup of sherbet or sorbet    ? 1 tablespoon of regular syrup, jam, or jelly    ? 2 tablespoons of light syrup    · Milk and yogurt:  Foods from the milk group contain about 12 g of carbohydrate per serving  ? 1 cup of fat-free or low-fat milk    ? 1 cup of soy milk    ? ? cup of fat-free, yogurt sweetened with artificial sweetener    · Non-starchy vegetables:  Each serving contains about 5 g of carbohydrate   Three servings of non-starch vegetables count as 1 carbohydrate serving  ? ½ cup of cooked vegetables or 1 cup of raw vegetables  This includes beets, broccoli, cabbage, cauliflower, cucumber, mushrooms, tomatoes, and zucchini    ?  ½ cup of vegetable juice    How to use carbohydrate counting to plan meals:   · Count carbohydrate amounts using serving sizes:      ? Pasta dinner example: You plan to have pasta, tossed salad, and an 8-ounce glass of milk  Your healthcare provider tells you that you may have 4 carbohydrate servings for dinner  One carbohydrate serving of pasta is ? cup  One cup of pasta will equal 3 carbohydrate servings  An 8-ounce glass of milk will count as 1 carbohydrate serving  These amounts of food would equal 4 carbohydrate servings  One cup of tossed salad does not count toward your carbohydrate servings  · Count carbohydrate amounts using food labels:  Find the total amount of carbohydrate in a packaged food by reading the food label  Food labels tell you the serving size of the food and the total carbohydrate amount in each serving  Find the serving size on the food label and then decide how many servings you will eat  Multiply the number of servings you plan to eat by the carbohydrate amount per serving  ? Granola bar snack example: Your meal plan allows you to have 2 carbohydrate servings (30 grams) of carbohydrate for a snack  You plan to eat 1 package of granola bars, which contains 2 bars  According to the food label, the serving size of food in this package is 1 bar  Each serving (1 bar) contains 25 grams of carbohydrate  The total amount of carbohydrate in this package of granola bars would be 50 g  Based on your meal plan, you should eat only 1 bar  Follow up with your doctor as directed:  Write down your questions so you remember to ask them during your visits  © Copyright [employer] 2022 Information is for End User's use only and may not be sold, redistributed or otherwise used for commercial purposes  All illustrations and images included in CareNotes® are the copyrighted property of A D A M , Inc  or [facility] Johnson   The above information is an  only  It is not intended as medical advice for individual conditions or treatments   Talk to your doctor, nurse or pharmacist before following any medical regimen to see if it is safe and effective for you  Medicare Preventive Visit Patient Instructions  Thank you for completing your Welcome to Medicare Visit or Medicare Annual Wellness Visit today  Your next wellness visit will be due in one year (11/1/2023)  The screening/preventive services that you may require over the next 5-10 years are detailed below  Some tests may not apply to you based off risk factors and/or age  Screening tests ordered at today's visit but not completed yet may show as past due  Also, please note that scanned in results may not display below  Preventive Screenings:  Service Recommendations Previous Testing/Comments   Colorectal Cancer Screening  · Colonoscopy    · Fecal Occult Blood Test (FOBT)/Fecal Immunochemical Test (FIT)  · Fecal DNA/Cologuard Test  · Flexible Sigmoidoscopy Age: 39-70 years old   Colonoscopy: every 10 years (May be performed more frequently if at higher risk)  OR  FOBT/FIT: every 1 year  OR  Cologuard: every 3 years  OR  Sigmoidoscopy: every 5 years  Screening may be recommended earlier than age 39 if at higher risk for colorectal cancer  Also, an individualized decision between you and your healthcare provider will decide whether screening between the ages of 74-80 would be appropriate   Colonoscopy: Not on file  FOBT/FIT: Not on file  Cologuard: Not on file  Sigmoidoscopy: Not on file          Prostate Cancer Screening Individualized decision between patient and health care provider in men between ages of 53-78   Medicare will cover every 12 months beginning on the day after your 50th birthday PSA: 8 2 ng/mL     Screening Not Indicated     Hepatitis C Screening Once for adults born between 1945 and 1965  More frequently in patients at high risk for Hepatitis C Hep C Antibody: 09/16/2021    Screening Current   Diabetes Screening 1-2 times per year if you're at risk for diabetes or have pre-diabetes Fasting glucose: 75 mg/dL (4/20/2022)  A1C: 6 6 (7/29/2022)  Screening Not Indicated  History Diabetes   Cholesterol Screening Once every 5 years if you don't have a lipid disorder  May order more often based on risk factors  Lipid panel: 09/16/2021  Screening Not Indicated  History Lipid Disorder      Other Preventive Screenings Covered by Medicare:  1  Abdominal Aortic Aneurysm (AAA) Screening: covered once if your at risk  You're considered to be at risk if you have a family history of AAA or a male between the age of 73-68 who smoking at least 100 cigarettes in your lifetime  2  Lung Cancer Screening: covers low dose CT scan once per year if you meet all of the following conditions: (1) Age 50-69; (2) No signs or symptoms of lung cancer; (3) Current smoker or have quit smoking within the last 15 years; (4) You have a tobacco smoking history of at least 20 pack years (packs per day x number of years you smoked); (5) You get a written order from a healthcare provider  3  Glaucoma Screening: covered annually if you're considered high risk: (1) You have diabetes OR (2) Family history of glaucoma OR (3)  aged 48 and older OR (3)  American aged 72 and older  3  Osteoporosis Screening: covered every 2 years if you meet one of the following conditions: (1) Have a vertebral abnormality; (2) On glucocorticoid therapy for more than 3 months; (3) Have primary hyperparathyroidism; (4) On osteoporosis medications and need to assess response to drug therapy  5  HIV Screening: covered annually if you're between the age of 12-76  Also covered annually if you are younger than 13 and older than 72 with risk factors for HIV infection  For pregnant patients, it is covered up to 3 times per pregnancy      Immunizations:  Immunization Recommendations   Influenza Vaccine Annual influenza vaccination during flu season is recommended for all persons aged >= 6 months who do not have contraindications   Pneumococcal Vaccine   * Pneumococcal conjugate vaccine = PCV13 (Prevnar 13), PCV15 (Vaxneuvance), PCV20 (Prevnar 20)  * Pneumococcal polysaccharide vaccine = PPSV23 (Pneumovax) Adults 2364 years old: 1-3 doses may be recommended based on certain risk factors  Adults 72 years old: 1-2 doses may be recommended based off what pneumonia vaccine you previously received   Hepatitis B Vaccine 3 dose series if at intermediate or high risk (ex: diabetes, end stage renal disease, liver disease)   Tetanus (Td) Vaccine - COST NOT COVERED BY MEDICARE PART B Following completion of primary series, a booster dose should be given every 10 years to maintain immunity against tetanus  Td may also be given as tetanus wound prophylaxis  Tdap Vaccine - COST NOT COVERED BY MEDICARE PART B Recommended at least once for all adults  For pregnant patients, recommended with each pregnancy  Shingles Vaccine (Shingrix) - COST NOT COVERED BY MEDICARE PART B  2 shot series recommended in those aged 48 and above     Health Maintenance Due:      Topic Date Due   • Hepatitis C Screening  Completed     Immunizations Due:      Topic Date Due   • COVID-19 Vaccine (3 - Booster for Pfizer series) 09/22/2021   • Influenza Vaccine (1) 09/01/2022   • Pneumococcal Vaccine: 65+ Years (2 - PPSV23 or PCV20) 09/15/2022     Advance Directives   What are advance directives? Advance directives are legal documents that state your wishes and plans for medical care  These plans are made ahead of time in case you lose your ability to make decisions for yourself  Advance directives can apply to any medical decision, such as the treatments you want, and if you want to donate organs  What are the types of advance directives? There are many types of advance directives, and each state has rules about how to use them  You may choose a combination of any of the following:  · Living will: This is a written record of the treatment you want  You can also choose which treatments you do not want, which to limit, and which to stop at a certain time  This includes surgery, medicine, IV fluid, and tube feedings  · Durable power of  for healthcare [facility]): This is a written record that states who you want to make healthcare choices for you when you are unable to make them for yourself  This person, called a proxy, is usually a family member or a friend  You may choose more than 1 proxy  · Do not resuscitate (DNR) order:  A DNR order is used in case your heart stops beating or you stop breathing  It is a request not to have certain forms of treatment, such as CPR  A DNR order may be included in other types of advance directives  · Medical directive: This covers the care that you want if you are in a coma, near death, or unable to make decisions for yourself  You can list the treatments you want for each condition  Treatment may include pain medicine, surgery, blood transfusions, dialysis, IV or tube feedings, and a ventilator (breathing machine)  · Values history: This document has questions about your views, beliefs, and how you feel and think about life  This information can help others choose the care that you would choose  Why are advance directives important? An advance directive helps you control your care  Although spoken wishes may be used, it is better to have your wishes written down  Spoken wishes can be misunderstood, or not followed  Treatments may be given even if you do not want them  An advance directive may make it easier for your family to make difficult choices about your care  © Copyright [employer] 2018 Information is for End User's use only and may not be sold, redistributed or otherwise used for commercial purposes  All illustrations and images included in CareNotes® are the copyrighted property of A D A [employer] , [employer]  or [facility] Preventive Visit Patient Instructions  Thank you for completing your Welcome to Medicare Visit or Medicare Annual Wellness Visit today   Your next wellness visit will be due in one year (11/1/2023)  The screening/preventive services that you may require over the next 5-10 years are detailed below  Some tests may not apply to you based off risk factors and/or age  Screening tests ordered at today's visit but not completed yet may show as past due  Also, please note that scanned in results may not display below  Preventive Screenings:  Service Recommendations Previous Testing/Comments   Colorectal Cancer Screening  · Colonoscopy    · Fecal Occult Blood Test (FOBT)/Fecal Immunochemical Test (FIT)  · Fecal DNA/Cologuard Test  · Flexible Sigmoidoscopy Age: 39-70 years old   Colonoscopy: every 10 years (May be performed more frequently if at higher risk)  OR  FOBT/FIT: every 1 year  OR  Cologuard: every 3 years  OR  Sigmoidoscopy: every 5 years  Screening may be recommended earlier than age 39 if at higher risk for colorectal cancer  Also, an individualized decision between you and your healthcare provider will decide whether screening between the ages of 74-80 would be appropriate  Colonoscopy: Not on file  FOBT/FIT: Not on file  Cologuard: Not on file  Sigmoidoscopy: Not on file          Prostate Cancer Screening Individualized decision between patient and health care provider in men between ages of 53-78   Medicare will cover every 12 months beginning on the day after your 50th birthday PSA: 8 2 ng/mL     Screening Not Indicated     Hepatitis C Screening Once for adults born between 1945 and 1965  More frequently in patients at high risk for Hepatitis C Hep C Antibody: 09/16/2021    Screening Current   Diabetes Screening 1-2 times per year if you're at risk for diabetes or have pre-diabetes Fasting glucose: 75 mg/dL (4/20/2022)  A1C: 6 6 (7/29/2022)  Screening Not Indicated  History Diabetes   Cholesterol Screening Once every 5 years if you don't have a lipid disorder  May order more often based on risk factors   Lipid panel: 09/16/2021  Screening Not Indicated  History Lipid Disorder Other Preventive Screenings Covered by Medicare:  6  Abdominal Aortic Aneurysm (AAA) Screening: covered once if your at risk  You're considered to be at risk if you have a family history of AAA or a male between the age of 73-68 who smoking at least 100 cigarettes in your lifetime  7  Lung Cancer Screening: covers low dose CT scan once per year if you meet all of the following conditions: (1) Age 50-69; (2) No signs or symptoms of lung cancer; (3) Current smoker or have quit smoking within the last 15 years; (4) You have a tobacco smoking history of at least 20 pack years (packs per day x number of years you smoked); (5) You get a written order from a healthcare provider  8  Glaucoma Screening: covered annually if you're considered high risk: (1) You have diabetes OR (2) Family history of glaucoma OR (3)  aged 48 and older OR (3)  American aged 72 and older  5  Osteoporosis Screening: covered every 2 years if you meet one of the following conditions: (1) Have a vertebral abnormality; (2) On glucocorticoid therapy for more than 3 months; (3) Have primary hyperparathyroidism; (4) On osteoporosis medications and need to assess response to drug therapy  10  HIV Screening: covered annually if you're between the age of 12-76  Also covered annually if you are younger than 13 and older than 72 with risk factors for HIV infection  For pregnant patients, it is covered up to 3 times per pregnancy      Immunizations:  Immunization Recommendations   Influenza Vaccine Annual influenza vaccination during flu season is recommended for all persons aged >= 6 months who do not have contraindications   Pneumococcal Vaccine   * Pneumococcal conjugate vaccine = PCV13 (Prevnar 13), PCV15 (Vaxneuvance), PCV20 (Prevnar 20)  * Pneumococcal polysaccharide vaccine = PPSV23 (Pneumovax) Adults 25-60 years old: 1-3 doses may be recommended based on certain risk factors  Adults 72 years old: 1-2 doses may be recommended based off what pneumonia vaccine you previously received   Hepatitis B Vaccine 3 dose series if at intermediate or high risk (ex: diabetes, end stage renal disease, liver disease)   Tetanus (Td) Vaccine - COST NOT COVERED BY MEDICARE PART B Following completion of primary series, a booster dose should be given every 10 years to maintain immunity against tetanus  Td may also be given as tetanus wound prophylaxis  Tdap Vaccine - COST NOT COVERED BY MEDICARE PART B Recommended at least once for all adults  For pregnant patients, recommended with each pregnancy  Shingles Vaccine (Shingrix) - COST NOT COVERED BY MEDICARE PART B  2 shot series recommended in those aged 48 and above     Health Maintenance Due:      Topic Date Due   • Hepatitis C Screening  Completed     Immunizations Due:      Topic Date Due   • COVID-19 Vaccine (3 - Booster for Pfizer series) 09/22/2021   • Influenza Vaccine (1) 09/01/2022   • Pneumococcal Vaccine: 65+ Years (2 - PPSV23 or PCV20) 09/15/2022     Advance Directives   What are advance directives? Advance directives are legal documents that state your wishes and plans for medical care  These plans are made ahead of time in case you lose your ability to make decisions for yourself  Advance directives can apply to any medical decision, such as the treatments you want, and if you want to donate organs  What are the types of advance directives? There are many types of advance directives, and each state has rules about how to use them  You may choose a combination of any of the following:  · Living will: This is a written record of the treatment you want  You can also choose which treatments you do not want, which to limit, and which to stop at a certain time  This includes surgery, medicine, IV fluid, and tube feedings  · Durable power of  for healthcare [place] [facility]):   This is a written record that states who you want to make healthcare choices for you when you are unable to make them for yourself  This person, called a proxy, is usually a family member or a friend  You may choose more than 1 proxy  · Do not resuscitate (DNR) order:  A DNR order is used in case your heart stops beating or you stop breathing  It is a request not to have certain forms of treatment, such as CPR  A DNR order may be included in other types of advance directives  · Medical directive: This covers the care that you want if you are in a coma, near death, or unable to make decisions for yourself  You can list the treatments you want for each condition  Treatment may include pain medicine, surgery, blood transfusions, dialysis, IV or tube feedings, and a ventilator (breathing machine)  · Values history: This document has questions about your views, beliefs, and how you feel and think about life  This information can help others choose the care that you would choose  Why are advance directives important? An advance directive helps you control your care  Although spoken wishes may be used, it is better to have your wishes written down  Spoken wishes can be misunderstood, or not followed  Treatments may be given even if you do not want them  An advance directive may make it easier for your family to make difficult choices about your care  © Copyright [employer] 2018 Information is for End User's use only and may not be sold, redistributed or otherwise used for commercial purposes   All illustrations and images included in CareNotes® are the copyrighted property of A D A [employer]  or [address] [place] [address]

## 2022-10-31 NOTE — PROGRESS NOTES
Assessment/Plan:  Problem List Items Addressed This Visit        Endocrine    Type 2 diabetes mellitus with retinopathy, without long-term current use of insulin ([address] ) - Primary    Relevant Orders    CBC and differential    Comprehensive metabolic panel    Lipid Panel with Direct LDL reflex    Microalbumin / creatinine urine ratio    TSH, 3rd generation with Free T4 reflex    POCT hemoglobin A1c (Completed)       Cardiovascular and Mediastinum    Essential hypertension    Relevant Orders    Comprehensive metabolic panel    Microalbumin / creatinine urine ratio       Nervous and Auditory    Myasthenia gravis without exacerbation (HCC)       Genitourinary    Benign prostatic hyperplasia without lower urinary tract symptoms    Relevant Orders    PSA Total, Diagnostic       Other    Mixed hyperlipidemia    Elevated PSA    Relevant Orders    PSA Total, Diagnostic      Other Visit Diagnoses     Encounter for vaccination        Relevant Orders    influenza vaccine, high-dose, PF 0 5 mL    Medicare annual wellness visit, subsequent               Diagnoses and all orders for this visit:    Type 2 diabetes mellitus with retinopathy of both eyes, without long-term current use of insulin, macular edema presence unspecified, unspecified retinopathy severity (HCC)  -     CBC and differential; Future  -     Comprehensive metabolic panel; Future  -     Lipid Panel with Direct LDL reflex; Future  -     Microalbumin / creatinine urine ratio  -     TSH, 3rd generation with Free T4 reflex; Future  -     POCT hemoglobin A1c    Essential hypertension  -     Comprehensive metabolic panel; Future  -     Microalbumin / creatinine urine ratio    Myasthenia gravis without exacerbation (HCC)    Benign prostatic hyperplasia without lower urinary tract symptoms  -     PSA Total, Diagnostic; Future    Mixed hyperlipidemia    Elevated PSA  -     PSA Total, Diagnostic;  Future    Encounter for vaccination  -     influenza vaccine, high-dose, PF 0 5 mL    Medicare annual wellness visit, subsequent      No problem-specific Assessment & Plan notes found for this encounter  A/P: Doing ok and will check labs  In office HgA1c was higher,but still acceptable at 7 0  Discussed vaccines will up date his flu vaccine  Continue current treatment and RTC three months for routine  Subjective:      Patient ID: Catherine Rojo is a 81 y o  male  WM RTC for f/u DM, HTN, etc  Doing ok and no new issues  Remains active w/o difficulty and no falls  Sugars less than 140 and no low sugar events  Denies CP, SOB, palpitations, edema, orthopnea or PND  MG is manageable and no recent flares  No urinary issues  Due for labs and vaccines  The following portions of the patient's history were reviewed and updated as appropriate:   He has a past medical history of Diabetes mellitus ([address] ) and Hyperlipidemia ,  does not have any pertinent problems on file  ,   has a past surgical history that includes Tonsillectomy; Eye surgery; Cataract extraction; Dental surgery; Vasectomy; and Colonoscopy  ,  family history is not on file  ,   reports that he has never smoked  He has never used smokeless tobacco  He reports previous alcohol use  He reports that he does not use drugs  ,  is allergic to simvastatin     Current Outpatient Medications   Medication Sig Dispense Refill   • atorvastatin (LIPITOR) 20 mg tablet Take 1 tablet (20 mg total) by mouth daily 90 tablet 1   • B Complex-C (B COMPLEX-B12-C IJ) Take 1 capsule by mouth if needed     • Blood Glucose Monitoring Suppl (ONE TOUCH ULTRA MINI) w/Device KIT Test once a day 1 kit 0   • donepezil (ARICEPT) 5 mg tablet Take 2 tablets (10 mg total) by mouth daily at bedtime 90 tablet 1   • glimepiride (AMARYL) 4 mg tablet Take 1 tablet (4 mg total) by mouth daily with breakfast 180 tablet 3   • glucose blood (OneTouch Verio) test strip Test once a day 100 strip 5   • lisinopril (ZESTRIL) 5 mg tablet Take 1 tablet (5 mg total) by mouth daily 90 tablet 3   • metFORMIN (GLUCOPHAGE) 1000 MG tablet Take 1 tablet (1,000 mg total) by mouth 2 (two) times a day with meals 180 tablet 1   • Omega-3 Fatty Acids (fish oil) 1,000 mg Take 1,000 mg by mouth if needed     • OneTouch Delica Lancets 80C MISC Test once a day 100 each 5   • Saw Palmetto, Serenoa repens, (Saw [place] Berries) 540 MG CAPS Take 1 capsule by mouth in the morning     • tamsulosin (FLOMAX) 0 4 mg Take 1 capsule (0 4 mg total) by mouth daily with dinner 90 capsule 1   • aspirin (ECOTRIN LOW STRENGTH) 81 mg EC tablet Take 81 mg by mouth (Patient not taking: No sig reported)     • ECHINACEA HERB PO Take by mouth (Patient not taking: No sig reported)     • latanoprost (XALATAN) 0 005 % ophthalmic solution Apply 1 drop to eye (Patient not taking: No sig reported)     • Misc Natural Products (PUMPKIN SEED OIL PO) Take by mouth (Patient not taking: No sig reported)       No current facility-administered medications for this visit  Review of Systems   Constitutional: Negative for activity change, chills, diaphoresis, fatigue and fever  HENT: Negative  Eyes: Negative for visual disturbance  Respiratory: Negative for cough, chest tightness, shortness of breath and wheezing  Cardiovascular: Negative for chest pain, palpitations and leg swelling  Gastrointestinal: Negative for abdominal pain, constipation, diarrhea, nausea and vomiting  Endocrine: Negative for cold intolerance and heat intolerance  Genitourinary: Negative for difficulty urinating, dysuria and frequency  Musculoskeletal: Negative for arthralgias, gait problem and myalgias  Neurological: Negative for dizziness, syncope, weakness, light-headedness and headaches  Psychiatric/Behavioral: Negative for confusion, dysphoric mood and sleep disturbance  The patient is not nervous/anxious          PHQ-2/9 Depression Screening    Little interest or pleasure in doing things: 0 - not at all  Feeling down, depressed, or hopeless: 0 - not at all  PHQ-2 Score: 0  PHQ-2 Interpretation: Negative depression screen        Objective:  Vitals:    10/31/22 1301   BP: 126/70   Pulse: 67   Temp: 97 8 °F (36 6 °C)   TempSrc: Tympanic   SpO2: 98%   Weight: 62 5 kg (137 lb 12 8 oz)   Height: 5' 5" (1 651 m)     Body mass index is 22 93 kg/m²  Physical Exam  Vitals and nursing note reviewed  Constitutional:       General: He is not in acute distress  Appearance: Normal appearance  He is not ill-appearing  HENT:      Head: Normocephalic and atraumatic  Mouth/Throat:      Mouth: Mucous membranes are moist    Eyes:      Extraocular Movements: Extraocular movements intact  Conjunctiva/sclera: Conjunctivae normal       Pupils: Pupils are equal, round, and reactive to light  Neck:      Vascular: No carotid bruit  Cardiovascular:      Rate and Rhythm: Normal rate and regular rhythm  Heart sounds: Normal heart sounds  Pulmonary:      Effort: Pulmonary effort is normal  No respiratory distress  Breath sounds: Normal breath sounds  No wheezing, rhonchi or rales  Abdominal:      General: Bowel sounds are normal  There is no distension  Palpations: Abdomen is soft  Tenderness: There is no abdominal tenderness  Musculoskeletal:      Cervical back: Neck supple  Right lower leg: No edema  Left lower leg: No edema  Neurological:      General: No focal deficit present  Mental Status: He is alert and oriented to person, place, and time  Mental status is at baseline  Psychiatric:         Mood and Affect: Mood normal          Behavior: Behavior normal          Thought Content:  Thought content normal          Judgment: Judgment normal

## 2022-12-01 ENCOUNTER — APPOINTMENT (OUTPATIENT)
Dept: LAB | Facility: CLINIC | Age: 82
End: 2022-12-01

## 2022-12-01 DIAGNOSIS — R97.20 ELEVATED PSA: ICD-10-CM

## 2022-12-01 DIAGNOSIS — I10 ESSENTIAL HYPERTENSION: ICD-10-CM

## 2022-12-01 DIAGNOSIS — N40.0 BENIGN PROSTATIC HYPERPLASIA WITHOUT LOWER URINARY TRACT SYMPTOMS: ICD-10-CM

## 2022-12-01 DIAGNOSIS — E11.319 TYPE 2 DIABETES MELLITUS WITH RETINOPATHY OF BOTH EYES, WITHOUT LONG-TERM CURRENT USE OF INSULIN, MACULAR EDEMA PRESENCE UNSPECIFIED, UNSPECIFIED RETINOPATHY SEVERITY (HCC): ICD-10-CM

## 2022-12-01 LAB
ALBUMIN SERPL BCP-MCNC: 3.8 G/DL (ref 3.5–5)
ALP SERPL-CCNC: 89 U/L (ref 46–116)
ALT SERPL W P-5'-P-CCNC: 19 U/L (ref 12–78)
ANION GAP SERPL CALCULATED.3IONS-SCNC: 4 MMOL/L (ref 4–13)
AST SERPL W P-5'-P-CCNC: 22 U/L (ref 5–45)
BASOPHILS # BLD AUTO: 0.04 THOUSANDS/ÂΜL (ref 0–0.1)
BASOPHILS NFR BLD AUTO: 1 % (ref 0–1)
BILIRUB SERPL-MCNC: 0.44 MG/DL (ref 0.2–1)
BUN SERPL-MCNC: 18 MG/DL (ref 5–25)
CALCIUM SERPL-MCNC: 9.8 MG/DL (ref 8.3–10.1)
CHLORIDE SERPL-SCNC: 108 MMOL/L (ref 96–108)
CHOLEST SERPL-MCNC: 161 MG/DL
CO2 SERPL-SCNC: 28 MMOL/L (ref 21–32)
CREAT SERPL-MCNC: 1.01 MG/DL (ref 0.6–1.3)
CREAT UR-MCNC: 145 MG/DL
EOSINOPHIL # BLD AUTO: 0.13 THOUSAND/ÂΜL (ref 0–0.61)
EOSINOPHIL NFR BLD AUTO: 2 % (ref 0–6)
ERYTHROCYTE [DISTWIDTH] IN BLOOD BY AUTOMATED COUNT: 12.5 % (ref 11.6–15.1)
GFR SERPL CREATININE-BSD FRML MDRD: 69 ML/MIN/1.73SQ M
GLUCOSE P FAST SERPL-MCNC: 82 MG/DL (ref 65–99)
HCT VFR BLD AUTO: 39.1 % (ref 36.5–49.3)
HDLC SERPL-MCNC: 74 MG/DL
HGB BLD-MCNC: 12.8 G/DL (ref 12–17)
IMM GRANULOCYTES # BLD AUTO: 0.03 THOUSAND/UL (ref 0–0.2)
IMM GRANULOCYTES NFR BLD AUTO: 0 % (ref 0–2)
LDLC SERPL CALC-MCNC: 72 MG/DL (ref 0–100)
LYMPHOCYTES # BLD AUTO: 2.21 THOUSANDS/ÂΜL (ref 0.6–4.47)
LYMPHOCYTES NFR BLD AUTO: 32 % (ref 14–44)
MCH RBC QN AUTO: 29.9 PG (ref 26.8–34.3)
MCHC RBC AUTO-ENTMCNC: 32.7 G/DL (ref 31.4–37.4)
MCV RBC AUTO: 91 FL (ref 82–98)
MICROALBUMIN UR-MCNC: 12.1 MG/L (ref 0–20)
MICROALBUMIN/CREAT 24H UR: 8 MG/G CREATININE (ref 0–30)
MONOCYTES # BLD AUTO: 0.55 THOUSAND/ÂΜL (ref 0.17–1.22)
MONOCYTES NFR BLD AUTO: 8 % (ref 4–12)
NEUTROPHILS # BLD AUTO: 3.96 THOUSANDS/ÂΜL (ref 1.85–7.62)
NEUTS SEG NFR BLD AUTO: 57 % (ref 43–75)
NRBC BLD AUTO-RTO: 0 /100 WBCS
PLATELET # BLD AUTO: 226 THOUSANDS/UL (ref 149–390)
PMV BLD AUTO: 9.6 FL (ref 8.9–12.7)
POTASSIUM SERPL-SCNC: 4.4 MMOL/L (ref 3.5–5.3)
PROT SERPL-MCNC: 7.2 G/DL (ref 6.4–8.4)
PSA SERPL-MCNC: 8.1 NG/ML (ref 0–4)
RBC # BLD AUTO: 4.28 MILLION/UL (ref 3.88–5.62)
SODIUM SERPL-SCNC: 140 MMOL/L (ref 135–147)
TRIGL SERPL-MCNC: 77 MG/DL
TSH SERPL DL<=0.05 MIU/L-ACNC: 4.34 UIU/ML (ref 0.45–4.5)
WBC # BLD AUTO: 6.92 THOUSAND/UL (ref 4.31–10.16)

## 2022-12-05 DIAGNOSIS — E11.319 TYPE 2 DIABETES MELLITUS WITH RETINOPATHY OF BOTH EYES, WITHOUT LONG-TERM CURRENT USE OF INSULIN, MACULAR EDEMA PRESENCE UNSPECIFIED, UNSPECIFIED RETINOPATHY SEVERITY (HCC): ICD-10-CM

## 2022-12-05 RX ORDER — BLOOD-GLUCOSE METER
KIT MISCELLANEOUS
Qty: 1 KIT | Refills: 0 | Status: SHIPPED | OUTPATIENT
Start: 2022-12-05

## 2022-12-06 DIAGNOSIS — E11.319 TYPE 2 DIABETES MELLITUS WITH RETINOPATHY OF BOTH EYES, WITHOUT LONG-TERM CURRENT USE OF INSULIN, MACULAR EDEMA PRESENCE UNSPECIFIED, UNSPECIFIED RETINOPATHY SEVERITY (HCC): ICD-10-CM

## 2022-12-06 RX ORDER — BLOOD SUGAR DIAGNOSTIC
STRIP MISCELLANEOUS
Qty: 50 EACH | Refills: 0 | Status: SHIPPED | OUTPATIENT
Start: 2022-12-06

## 2023-01-31 ENCOUNTER — RA CDI HCC (OUTPATIENT)
Dept: OTHER | Facility: HOSPITAL | Age: 83
End: 2023-01-31

## 2023-02-07 ENCOUNTER — OFFICE VISIT (OUTPATIENT)
Dept: INTERNAL MEDICINE CLINIC | Facility: CLINIC | Age: 83
End: 2023-02-07

## 2023-02-07 VITALS
HEIGHT: 65 IN | OXYGEN SATURATION: 97 % | HEART RATE: 74 BPM | WEIGHT: 143.13 LBS | BODY MASS INDEX: 23.85 KG/M2 | TEMPERATURE: 97.4 F | SYSTOLIC BLOOD PRESSURE: 132 MMHG | DIASTOLIC BLOOD PRESSURE: 74 MMHG

## 2023-02-07 DIAGNOSIS — G70.00 MYASTHENIA GRAVIS WITHOUT EXACERBATION (HCC): ICD-10-CM

## 2023-02-07 DIAGNOSIS — E44.1 MILD PROTEIN-CALORIE MALNUTRITION (HCC): ICD-10-CM

## 2023-02-07 DIAGNOSIS — R41.3 MEMORY DIFFICULTY: ICD-10-CM

## 2023-02-07 DIAGNOSIS — E11.319 TYPE 2 DIABETES MELLITUS WITH RETINOPATHY OF BOTH EYES, WITHOUT LONG-TERM CURRENT USE OF INSULIN, MACULAR EDEMA PRESENCE UNSPECIFIED, UNSPECIFIED RETINOPATHY SEVERITY (HCC): Primary | ICD-10-CM

## 2023-02-07 DIAGNOSIS — Z23 ENCOUNTER FOR VACCINATION: ICD-10-CM

## 2023-02-07 DIAGNOSIS — I10 ESSENTIAL HYPERTENSION: ICD-10-CM

## 2023-02-07 DIAGNOSIS — E78.2 MIXED HYPERLIPIDEMIA: ICD-10-CM

## 2023-02-07 PROBLEM — R97.20 ELEVATED PSA: Status: RESOLVED | Noted: 2022-07-29 | Resolved: 2023-02-07

## 2023-02-07 LAB — SL AMB POCT HEMOGLOBIN AIC: 8.2 (ref ?–6.5)

## 2023-02-07 NOTE — PATIENT INSTRUCTIONS

## 2023-05-01 ENCOUNTER — RA CDI HCC (OUTPATIENT)
Dept: OTHER | Facility: HOSPITAL | Age: 83
End: 2023-05-01

## 2023-05-08 ENCOUNTER — OFFICE VISIT (OUTPATIENT)
Dept: INTERNAL MEDICINE CLINIC | Facility: CLINIC | Age: 83
End: 2023-05-08

## 2023-05-08 VITALS
OXYGEN SATURATION: 97 % | BODY MASS INDEX: 23.18 KG/M2 | WEIGHT: 139.13 LBS | HEIGHT: 65 IN | TEMPERATURE: 97.1 F | HEART RATE: 67 BPM | DIASTOLIC BLOOD PRESSURE: 64 MMHG | SYSTOLIC BLOOD PRESSURE: 122 MMHG

## 2023-05-08 DIAGNOSIS — G70.00 MYASTHENIA GRAVIS WITHOUT EXACERBATION (HCC): ICD-10-CM

## 2023-05-08 DIAGNOSIS — I10 ESSENTIAL HYPERTENSION: ICD-10-CM

## 2023-05-08 DIAGNOSIS — R32 URINARY INCONTINENCE, UNSPECIFIED TYPE: ICD-10-CM

## 2023-05-08 DIAGNOSIS — N40.0 BENIGN PROSTATIC HYPERPLASIA WITHOUT LOWER URINARY TRACT SYMPTOMS: ICD-10-CM

## 2023-05-08 DIAGNOSIS — R41.3 MEMORY DIFFICULTY: ICD-10-CM

## 2023-05-08 DIAGNOSIS — E11.319 TYPE 2 DIABETES MELLITUS WITH RETINOPATHY OF BOTH EYES, WITHOUT LONG-TERM CURRENT USE OF INSULIN, MACULAR EDEMA PRESENCE UNSPECIFIED, UNSPECIFIED RETINOPATHY SEVERITY (HCC): Primary | ICD-10-CM

## 2023-05-08 DIAGNOSIS — E78.2 MIXED HYPERLIPIDEMIA: ICD-10-CM

## 2023-05-08 DIAGNOSIS — E44.1 MILD PROTEIN-CALORIE MALNUTRITION (HCC): ICD-10-CM

## 2023-05-08 LAB — SL AMB POCT HEMOGLOBIN AIC: 8.2 (ref ?–6.5)

## 2023-05-08 RX ORDER — ATORVASTATIN CALCIUM 20 MG/1
20 TABLET, FILM COATED ORAL DAILY
Qty: 90 TABLET | Refills: 1 | Status: SHIPPED | OUTPATIENT
Start: 2023-05-08

## 2023-05-08 RX ORDER — TAMSULOSIN HYDROCHLORIDE 0.4 MG/1
0.4 CAPSULE ORAL
Qty: 90 CAPSULE | Refills: 1 | Status: SHIPPED | OUTPATIENT
Start: 2023-05-08

## 2023-05-08 RX ORDER — LISINOPRIL 5 MG/1
5 TABLET ORAL DAILY
Qty: 90 TABLET | Refills: 3 | Status: SHIPPED | OUTPATIENT
Start: 2023-05-08

## 2023-05-08 RX ORDER — GLIMEPIRIDE 4 MG/1
8 TABLET ORAL
Qty: 180 TABLET | Refills: 3 | Status: SHIPPED | OUTPATIENT
Start: 2023-05-08

## 2023-05-08 NOTE — PROGRESS NOTES
Assessment/Plan:  Problem List Items Addressed This Visit        Endocrine    Type 2 diabetes mellitus with retinopathy, without long-term current use of insulin (HCC) - Primary    Relevant Medications    atorvastatin (LIPITOR) 20 mg tablet    glimepiride (AMARYL) 4 mg tablet    lisinopril (ZESTRIL) 5 mg tablet    metFORMIN (GLUCOPHAGE) 1000 MG tablet    Other Relevant Orders    POCT hemoglobin A1c (Completed)       Cardiovascular and Mediastinum    Essential hypertension    Relevant Medications    lisinopril (ZESTRIL) 5 mg tablet       Nervous and Auditory    Myasthenia gravis without exacerbation (HCC)       Genitourinary    Benign prostatic hyperplasia without lower urinary tract symptoms       Other    Mixed hyperlipidemia    Relevant Medications    atorvastatin (LIPITOR) 20 mg tablet    Mild protein-calorie malnutrition (HCC)    Memory difficulty   Other Visit Diagnoses     Urinary incontinence, unspecified type        Relevant Medications    tamsulosin (FLOMAX) 0 4 mg           Diagnoses and all orders for this visit:    Type 2 diabetes mellitus with retinopathy of both eyes, without long-term current use of insulin, macular edema presence unspecified, unspecified retinopathy severity (HCC)  -     POCT hemoglobin A1c  -     atorvastatin (LIPITOR) 20 mg tablet; Take 1 tablet (20 mg total) by mouth daily  -     glimepiride (AMARYL) 4 mg tablet; Take 2 tablets (8 mg total) by mouth daily with breakfast  -     lisinopril (ZESTRIL) 5 mg tablet; Take 1 tablet (5 mg total) by mouth daily  -     metFORMIN (GLUCOPHAGE) 1000 MG tablet; Take 1 tablet (1,000 mg total) by mouth 2 (two) times a day with meals    Essential hypertension    Myasthenia gravis without exacerbation (HCC)    Benign prostatic hyperplasia without lower urinary tract symptoms    Mixed hyperlipidemia  -     atorvastatin (LIPITOR) 20 mg tablet;  Take 1 tablet (20 mg total) by mouth daily    Memory difficulty    Mild protein-calorie malnutrition ([facility] )    Urinary incontinence, unspecified type  -     tamsulosin (FLOMAX) 0 4 mg; Take 1 capsule (0 4 mg total) by mouth daily with dinner      No problem-specific Assessment & Plan notes found for this encounter  A/P: Doing ok and labs are up to date except for HgA1c, which was still up at 8 2  Will increase his amaryl to 8mg q day since newer meds unable to be afforded by the pt    Continue current treatment and RTC three months for routine per his request, but will call in several weeks for f/u DM  Subjective:      Patient ID: Sarabjit Evans is a 81 y o  male  WM RTC for f/u DM, HNT, etc  Doing ok and no new issues  Remains active w/o difficulty and no falls  Sugars less than 140 and no low sugar events  MG is manageable  Memory no worse  Due for labs  The following portions of the patient's history were reviewed and updated as appropriate:   He has a past medical history of Diabetes mellitus ([facility] ) and Hyperlipidemia ,  does not have any pertinent problems on file  ,   has a past surgical history that includes Tonsillectomy; Eye surgery; Cataract extraction; Dental surgery; Vasectomy; and Colonoscopy  ,  family history is not on file  ,   reports that he has never smoked  He has never used smokeless tobacco  He reports that he does not currently use alcohol  He reports that he does not use drugs  ,  is allergic to simvastatin     Current Outpatient Medications   Medication Sig Dispense Refill   • atorvastatin (LIPITOR) 20 mg tablet Take 1 tablet (20 mg total) by mouth daily 90 tablet 1   • glimepiride (AMARYL) 4 mg tablet Take 2 tablets (8 mg total) by mouth daily with breakfast 180 tablet 3   • lisinopril (ZESTRIL) 5 mg tablet Take 1 tablet (5 mg total) by mouth daily 90 tablet 3   • metFORMIN (GLUCOPHAGE) 1000 MG tablet Take 1 tablet (1,000 mg total) by mouth 2 (two) times a day with meals 180 tablet 1   • tamsulosin (FLOMAX) 0 4 mg Take 1 capsule (0 4 mg total) by mouth daily with dinner 90 capsule 1   • aspirin (ECOTRIN LOW STRENGTH) 81 mg EC tablet Take 81 mg by mouth (Patient not taking: No sig reported)     • B Complex-C (B COMPLEX-B12-C IJ) Take 1 capsule by mouth if needed     • Blood Glucose Monitoring Suppl (OneTouch Verio Reflect) w/Device KIT USE  ONCE DAILY 1 kit 0   • donepezil (ARICEPT) 5 mg tablet Take 2 tablets (10 mg total) by mouth daily at bedtime 90 tablet 1   • glucose blood (OneTouch Verio) test strip USE 1 STRIP TO CHECK GLUCOSE ONCE DAILY 50 each 0   • Misc Natural Products (PUMPKIN SEED OIL PO) Take by mouth (Patient not taking: No sig reported)     • Omega-3 Fatty Acids (fish oil) 1,000 mg Take 1,000 mg by mouth if needed     • OneTouch Delica Lancets 04P MISC Test once a day 100 each 5   • Saw Palmetto, Serenoa repens, (Saw [place] Berries) 540 MG CAPS Take 1 capsule by mouth in the morning       No current facility-administered medications for this visit  Review of Systems   Constitutional: Negative for activity change, chills, diaphoresis, fatigue and fever  HENT: Negative  Eyes: Negative for visual disturbance  Respiratory: Negative for cough, chest tightness, shortness of breath and wheezing  Cardiovascular: Negative for chest pain, palpitations and leg swelling  Gastrointestinal: Negative for abdominal pain, constipation, diarrhea, nausea and vomiting  Endocrine: Negative for cold intolerance and heat intolerance  Genitourinary: Negative for difficulty urinating, dysuria and frequency  Musculoskeletal: Negative for arthralgias, gait problem and myalgias  Neurological: Negative for dizziness, seizures, syncope, weakness, light-headedness and headaches  Psychiatric/Behavioral: Negative for confusion, dysphoric mood and sleep disturbance  The patient is not nervous/anxious          PHQ-2/9 Depression Screening    Little interest or pleasure in doing things: 0 - not at all  Feeling down, depressed, or hopeless: 0 - not at all  PHQ-2 Score: 0  PHQ-2 "Interpretation: Negative depression screen        Objective:  Vitals:    05/08/23 1310   BP: 122/64   Pulse: 67   Temp: (!) 97 1 °F (36 2 °C)   SpO2: 97%   Weight: 63 1 kg (139 lb 2 oz)   Height: 5' 5\" (1 651 m)     Body mass index is 23 15 kg/m²  Physical Exam  Vitals and nursing note reviewed  Constitutional:       General: He is not in acute distress  Appearance: Normal appearance  He is not ill-appearing  HENT:      Head: Normocephalic and atraumatic  Mouth/Throat:      Mouth: Mucous membranes are moist    Eyes:      Extraocular Movements: Extraocular movements intact  Conjunctiva/sclera: Conjunctivae normal       Pupils: Pupils are equal, round, and reactive to light  Neck:      Vascular: No carotid bruit  Cardiovascular:      Rate and Rhythm: Normal rate and regular rhythm  Heart sounds: Murmur heard  Pulmonary:      Effort: Pulmonary effort is normal  No respiratory distress  Breath sounds: Normal breath sounds  No wheezing, rhonchi or rales  Abdominal:      General: Bowel sounds are normal  There is no distension  Palpations: Abdomen is soft  Tenderness: There is no abdominal tenderness  Musculoskeletal:      Cervical back: Neck supple  No tenderness  Right lower leg: No edema  Left lower leg: No edema  Neurological:      General: No focal deficit present  Mental Status: He is alert and oriented to person, place, and time  Mental status is at baseline  Psychiatric:         Mood and Affect: Mood normal          Behavior: Behavior normal          Thought Content:  Thought content normal          Judgment: Judgment normal          "

## 2023-05-08 NOTE — PATIENT INSTRUCTIONS
Basic Carbohydrate Counting   AMBULATORY CARE:   Carbohydrate counting  is a way to plan your meals by counting the amount of carbohydrate in foods  Carbohydrates are the sugars, starches, and fiber found in fruit, grains, vegetables, and milk products  Carbohydrates increase your blood sugar levels  Carbohydrate counting can help you eat the right amount of carbohydrate to keep your blood sugar levels under control  What you need to know about planning meals using carbohydrate counting:  • A dietitian or healthcare provider will help you develop a healthy meal plan that works best for you  You will be taught how much carbohydrate to eat or drink for each meal and snack  Your meal plan will be based on your age, weight, usual food intake, and physical activity level  If you have diabetes, it will also include your blood sugar levels and diabetes medicine  Once you know how much carbohydrate you should eat, you can decide what type of food you want to eat  • You will need to know what foods contain carbohydrate and how much they contain  Keep track of the amount of carbohydrate in meals and snacks in order to follow your meal plan  Do not avoid carbohydrates or skip meals  Your blood sugar may fall too low if you do not eat enough carbohydrate or you skip meals  Foods that contain carbohydrate:   • Breads:  Each serving of food listed below contains about 15 g of carbohydrate   ? 1 slice of bread (1 ounce) or 1 flour or corn tortilla (6 inch)    ? ½ of a hamburger bun or ¼ of a large bagel (about 1 ounce)    ? 1 pancake (about 4 inches across and ¼ inch thick)    • Cereals and grains:  Serving sizes of ready-to-eat cereals vary  Look at the serving size and the total carbohydrate amount listed on the food label  Each serving of food listed below contains about 15 g of carbohydrate   ? ¾ cup of dry, unsweetened, ready-to-eat cereal or ¼ cup of low-fat granola     ?  ½ cup of oatmeal or other cooked cereal     ? ? cup of cooked rice or pasta    • Starchy vegetables and beans:  Each serving of food listed below contains about 15 g of carbohydrate   ? ½ cup of corn, green peas, sweet potatoes, or mashed potatoes    ? ¼ of a large baked potato    ? ½ cup of beans, lentils, and peas (garbanzo, krishnan, kidney, white, split, black-eyed)    • Crackers and snacks:  Each serving of food listed below contains about 15 g of carbohydrate   ? 3 miky cracker squares or 8 animal crackers     ? 6 saltine-type crackers    ? 3 cups of popcorn or ¾ ounce of pretzels, potato chips, or tortilla chips    • Fruit:  Each serving of food listed below contains about 15 g of carbohydrate   ? 1 small (4 ounce) piece of fresh fruit or ¾ to 1 cup of fresh fruit    ? ½ cup of canned or frozen fruit, packed in natural juice    ? ½ cup (4 ounces) of unsweetened fruit juice    ? 2 tablespoons of dried fruit    • Desserts or sugary foods:  Each serving of food listed below contains about 15 g of carbohydrate   ? 2-inch square unfrosted cake or brownie     ? 2 small cookies    ? ½ cup of ice cream, frozen yogurt, or nondairy frozen yogurt    ? ¼ cup of sherbet or sorbet    ? 1 tablespoon of regular syrup, jam, or jelly    ? 2 tablespoons of light syrup    • Milk and yogurt:  Foods from the milk group contain about 12 g of carbohydrate per serving  ? 1 cup of fat-free or low-fat milk    ? 1 cup of soy milk    ? ? cup of fat-free, yogurt sweetened with artificial sweetener    • Non-starchy vegetables:  Each serving contains about 5 g of carbohydrate   Three servings of non-starch vegetables count as 1 carbohydrate serving  ? ½ cup of cooked vegetables or 1 cup of raw vegetables  This includes beets, broccoli, cabbage, cauliflower, cucumber, mushrooms, tomatoes, and zucchini    ?  ½ cup of vegetable juice    How to use carbohydrate counting to plan meals:   • Count carbohydrate amounts using serving sizes:      ? Pasta dinner example: You plan to have pasta, tossed salad, and an 8-ounce glass of milk  Your healthcare provider tells you that you may have 4 carbohydrate servings for dinner  One carbohydrate serving of pasta is ? cup  One cup of pasta will equal 3 carbohydrate servings  An 8-ounce glass of milk will count as 1 carbohydrate serving  These amounts of food would equal 4 carbohydrate servings  One cup of tossed salad does not count toward your carbohydrate servings  • Count carbohydrate amounts using food labels:  Find the total amount of carbohydrate in a packaged food by reading the food label  Food labels tell you the serving size of the food and the total carbohydrate amount in each serving  Find the serving size on the food label and then decide how many servings you will eat  Multiply the number of servings you plan to eat by the carbohydrate amount per serving  ? Granola bar snack example: Your meal plan allows you to have 2 carbohydrate servings (30 grams) of carbohydrate for a snack  You plan to eat 1 package of granola bars, which contains 2 bars  According to the food label, the serving size of food in this package is 1 bar  Each serving (1 bar) contains 25 grams of carbohydrate  The total amount of carbohydrate in this package of granola bars would be 50 g  Based on your meal plan, you should eat only 1 bar  Follow up with your doctor as directed:  Write down your questions so you remember to ask them during your visits  © Copyright [employer] 2022 Information is for End User's use only and may not be sold, redistributed or otherwise used for commercial purposes  The above information is an  only  It is not intended as medical advice for individual conditions or treatments  Talk to your doctor, nurse or pharmacist before following any medical regimen to see if it is safe and effective for you

## 2023-05-19 ENCOUNTER — HOSPITAL ENCOUNTER (EMERGENCY)
Facility: HOSPITAL | Age: 83
Discharge: HOME | End: 2023-05-20
Attending: EMERGENCY MEDICINE

## 2023-05-19 ENCOUNTER — APPOINTMENT (EMERGENCY)
Dept: CT IMAGING | Facility: HOSPITAL | Age: 83
End: 2023-05-19

## 2023-05-19 VITALS
HEIGHT: 65 IN | OXYGEN SATURATION: 96 % | SYSTOLIC BLOOD PRESSURE: 120 MMHG | TEMPERATURE: 98.6 F | DIASTOLIC BLOOD PRESSURE: 69 MMHG | RESPIRATION RATE: 18 BRPM | HEART RATE: 92 BPM | WEIGHT: 139.11 LBS | BODY MASS INDEX: 23.18 KG/M2
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
## 2023-05-19 DIAGNOSIS — S22.49XA RIB FRACTURES: Primary | ICD-10-CM

## 2023-05-19 DIAGNOSIS — R07.81 RIB PAIN: ICD-10-CM

## 2023-05-19 RX ORDER — LIDOCAINE 50 MG/G
1 PATCH TOPICAL ONCE
Status: DISCONTINUED | OUTPATIENT
Start: 2023-05-19 | End: 2023-05-20 | Stop reason: HOSPADM

## 2023-05-19 RX ORDER — ACETAMINOPHEN 325 MG/1
650 TABLET ORAL ONCE
Status: COMPLETED | OUTPATIENT
Start: 2023-05-19 | End: 2023-05-19

## 2023-05-19 RX ADMIN — TETANUS TOXOID, REDUCED DIPHTHERIA TOXOID AND ACELLULAR PERTUSSIS VACCINE, ADSORBED 0.5 ML: 5; 2.5; 8; 8; 2.5 SUSPENSION INTRAMUSCULAR at 23:52

## 2023-05-19 RX ADMIN — ACETAMINOPHEN 650 MG: 325 TABLET ORAL at 23:01

## 2023-05-19 RX ADMIN — LIDOCAINE 1 PATCH: 50 PATCH CUTANEOUS at 23:02

## 2023-05-20 RX ORDER — OXYCODONE HYDROCHLORIDE 5 MG/1
5 TABLET ORAL EVERY 6 HOURS PRN
Qty: 8 TABLET | Refills: 0 | Status: SHIPPED | OUTPATIENT
Start: 2023-05-20

## 2023-05-20 RX ORDER — LIDOCAINE 50 MG/G
1 PATCH TOPICAL DAILY
Qty: 14 PATCH | Refills: 0 | Status: SHIPPED | OUTPATIENT
Start: 2023-05-20

## 2023-05-20 RX ORDER — OXYCODONE HYDROCHLORIDE 5 MG/1
5 TABLET ORAL ONCE
Status: COMPLETED | OUTPATIENT
Start: 2023-05-20 | End: 2023-05-20

## 2023-05-20 RX ADMIN — OXYCODONE HYDROCHLORIDE 5 MG: 5 TABLET ORAL at 00:35

## 2023-05-20 NOTE — DISCHARGE INSTRUCTIONS
Recommend tylenol and ibuprofen as needed for mild to moderate pain  Apply lidoderm patches daily  Take oxycodone as needed for severe/breakthrough pain    Follow-up with primary care physician return for any worsening symptoms including worsening pain, difficulty breathing, or any other concerning symptoms

## 2023-05-20 NOTE — ED PROVIDER NOTES
History  Chief Complaint   Patient presents with   • Fall     Patient fell out of bed Wednesday night and is now complaining of back pain  67-year-old male presents emergency department for evaluation of right posterior rib pain  Patient states he excellently fell out of bed yesterday and struck his back on a stepstool  He denies striking his head or losing consciousness  He reports progressively worsening right posterior rib pain since that is worse with movement  He denies any associated neck pain or stiffness  No numbness or tingling  No chest pain, shortness of breath, abdominal pain, nausea or vomiting  No blurry vision or double vision  Patient does not take any blood thinners or antiplatelet medications  Prior to Admission Medications   Prescriptions Last Dose Informant Patient Reported? Taking?    B Complex-C (B COMPLEX-B12-C IJ)   Yes No   Sig: Take 1 capsule by mouth if needed   Blood Glucose Monitoring Suppl (OneTouch Verio Reflect) w/Device KIT   No No   Sig: USE  ONCE DAILY   Misc Natural Products (PUMPKIN SEED OIL PO)   Yes No   Sig: Take by mouth   Patient not taking: No sig reported   Omega-3 Fatty Acids (fish oil) 1,000 mg   Yes No   Sig: Take 1,000 mg by mouth if needed   OneTouch Delica Lancets 82T MISC   No No   Sig: Test once a day   Saw Palmetto, Serenoa repens, (Saw [place] Berries) 540 MG CAPS   Yes No   Sig: Take 1 capsule by mouth in the morning   aspirin (ECOTRIN LOW STRENGTH) 81 mg EC tablet   Yes No   Sig: Take 81 mg by mouth   Patient not taking: No sig reported   atorvastatin (LIPITOR) 20 mg tablet   No No   Sig: Take 1 tablet (20 mg total) by mouth daily   donepezil (ARICEPT) 5 mg tablet   No No   Sig: Take 2 tablets (10 mg total) by mouth daily at bedtime   glimepiride (AMARYL) 4 mg tablet   No No   Sig: Take 2 tablets (8 mg total) by mouth daily with breakfast   glucose blood (OneTouch Verio) test strip   No No   Sig: USE 1 STRIP TO CHECK GLUCOSE ONCE DAILY lisinopril (ZESTRIL) 5 mg tablet   No No   Sig: Take 1 tablet (5 mg total) by mouth daily   metFORMIN (GLUCOPHAGE) 1000 MG tablet   No No   Sig: Take 1 tablet (1,000 mg total) by mouth 2 (two) times a day with meals   tamsulosin (FLOMAX) 0 4 mg   No No   Sig: Take 1 capsule (0 4 mg total) by mouth daily with dinner      Facility-Administered Medications: None       Past Medical History:   Diagnosis Date   • Diabetes mellitus ([facility] [address] )    • Hyperlipidemia        Past Surgical History:   Procedure Laterality Date   • CATARACT EXTRACTION     • COLONOSCOPY     • DENTAL SURGERY     • EYE SURGERY     • TONSILLECTOMY     • VASECTOMY         No family history on file  I have reviewed and agree with the history as documented  E-Cigarette/Vaping   • E-Cigarette Use Never User      E-Cigarette/Vaping Substances   • Nicotine No    • THC No    • CBD No    • Flavoring No    • Other No    • Unknown No      Social History     Tobacco Use   • Smoking status: Never   • Smokeless tobacco: Never   Vaping Use   • Vaping Use: Never used   Substance Use Topics   • Alcohol use: Not Currently   • Drug use: Never       Review of Systems   Constitutional: Negative for chills and fever  HENT: Negative for ear pain and sore throat  Eyes: Negative for pain and visual disturbance  Respiratory: Negative for cough and shortness of breath  Cardiovascular: Negative for chest pain and palpitations  Gastrointestinal: Negative for abdominal pain and vomiting  Genitourinary: Negative for dysuria and hematuria  Musculoskeletal: Positive for back pain  Negative for arthralgias  Skin: Negative for color change and rash  Neurological: Negative for seizures and syncope  All other systems reviewed and are negative  Physical Exam  Physical Exam  Vitals and nursing note reviewed  Constitutional:       General: He is not in acute distress  HENT:      Head: Normocephalic and atraumatic        Right Ear: External ear normal       Left Ear: External ear normal       Nose: Nose normal       Mouth/Throat:      Mouth: Mucous membranes are moist    Eyes:      Extraocular Movements: Extraocular movements intact  Conjunctiva/sclera: Conjunctivae normal       Pupils: Pupils are equal, round, and reactive to light  Cardiovascular:      Rate and Rhythm: Normal rate and regular rhythm  Pulses: Normal pulses  Heart sounds: Murmur heard  Comments: No anterior Chest wall tenderness, no crepitus, no obvious deformity or bruising  Patient does have tenderness to palpation over the right posterior ribs  Pulmonary:      Effort: Pulmonary effort is normal  No respiratory distress  Breath sounds: Normal breath sounds  No stridor  Abdominal:      General: Abdomen is flat  Bowel sounds are normal       Tenderness: There is no abdominal tenderness  There is no guarding or rebound  Musculoskeletal:      Cervical back: Normal range of motion and neck supple  No tenderness  Comments: No midline neck or back tenderness, no step-offs or deformities, pelvis is stable, patient has full range of motion of all extremities without pain  Skin:     General: Skin is warm and dry  Capillary Refill: Capillary refill takes less than 2 seconds  Comments: Abrasion, small skin tear right lateral upper back   Neurological:      General: No focal deficit present  Mental Status: He is alert and oriented to person, place, and time     Psychiatric:         Mood and Affect: Mood normal          Behavior: Behavior normal          Vital Signs  ED Triage Vitals   Temperature Pulse Respirations Blood Pressure SpO2   05/19/23 2226 05/19/23 2226 05/19/23 2226 05/19/23 2226 05/19/23 2226   98 6 °F (37 °C) 92 18 120/69 96 %      Temp Source Heart Rate Source Patient Position - Orthostatic VS BP Location FiO2 (%)   05/19/23 2226 05/19/23 2226 05/19/23 2226 05/19/23 2226 --   Tympanic Monitor Lying Left arm       Pain Score       05/19/23 2301       6 Vitals:    05/19/23 2226   BP: 120/69   Pulse: 92   Patient Position - Orthostatic VS: Lying         Visual Acuity      ED Medications  Medications   lidocaine (LIDODERM) 5 % patch 1 patch (1 patch Topical Medication Applied 5/19/23 2302)   acetaminophen (TYLENOL) tablet 650 mg (650 mg Oral Given 5/19/23 2301)   tetanus-diphtheria-acellular pertussis (BOOSTRIX) IM injection 0 5 mL (0 5 mL Intramuscular Given 5/19/23 2352)   oxyCODONE (ROXICODONE) IR tablet 5 mg (5 mg Oral Given 5/20/23 0035)       Diagnostic Studies  Results Reviewed     None                 CT chest wo contrast    (Results Pending)   CT recon only thoracic spine    (Results Pending)              Procedures  Procedures         ED Course  ED Course as of 05/20/23 0040   Sat May 20, 2023   0015 CT scan read by V rad mistreats acute minimally displaced fractures of the right posterior ninth and 10th ribs, no pneumothorax, no pleural effusions                               SBIRT 22yo+    Flowsheet Row Most Recent Value   Initial Alcohol Screen: US AUDIT-C     1  How often do you have a drink containing alcohol? 0 Filed at: 05/19/2023 2229   2  How many drinks containing alcohol do you have on a typical day you are drinking? 0 Filed at: 05/19/2023 2229   3a  Male UNDER 65: How often do you have five or more drinks on one occasion? 0 Filed at: 05/19/2023 2229   3b  FEMALE Any Age, or MALE 65+: How often do you have 4 or more drinks on one occassion? 0 Filed at: 05/19/2023 2229   Audit-C Score 0 Filed at: 05/19/2023 2229   KATINA: How many times in the past year have you    Used an illegal drug or used a prescription medication for non-medical reasons? Never Filed at: 05/19/2023 2229                    Medical Decision Making  81-year-old male presents emergency department for evaluation of back pain after a fall out of bed  Fall happened yesterday  There was no head strike or loss of consciousness    Patient does not take any blood thinners or antiplatelet medications  Has an abrasion, small skin tear, and tenderness to palpation of the right posterior ribs  Vital signs are stable  Airway is intact he has bilateral breath sounds and intact distal pulses  Differential diagnosis includes but is not limited to contusion, strain, fracture, pneumothorax unlikely  We will get CT scan of the chest and thoracic spine  We will treat symptomatically  Will update tetanus  Patient found to have 2 rib fractures on CT imaging without any associated pleural effusion or pneumothorax  Symptoms improved following medications  He will be discharged home with prescriptions for oxycodone and Lidoderm patches  He was advised to take Tylenol and ibuprofen as needed for mild to moderate pain and to take the oxycodone as needed for severe/breakthrough pain  He was also advised to apply Lidoderm patches daily and to utilize incentive spirometry multiple times a day while awake  He was given strict return precautions  Fall, initial encounter: acute illness or injury  Rib fractures: acute illness or injury  Rib pain: acute illness or injury  Amount and/or Complexity of Data Reviewed  Radiology: ordered and independent interpretation performed  Risk  OTC drugs  Prescription drug management  Disposition  Final diagnoses:   Rib fractures   Fall, initial encounter   Rib pain     Time reflects when diagnosis was documented in both MDM as applicable and the Disposition within this note     Time User Action Codes Description Comment    5/20/2023 12:16 AM Check, Vidya Lyles Add [S22 49XA] Rib fractures     5/20/2023 12:16 AM Oscar Arora [V78  Marylee Efrain Fall, initial encounter     5/20/2023 12:16 AM Oscar Arora [Y18 72] Rib pain       ED Disposition     ED Disposition   Discharge    Condition   Stable    Date/Time   Sat May 20, 2023 12:24 AM    Comment   Blayne Flanagan discharge to home/self care                 Follow-up Information     Follow up With Specialties Details Why Contact Info Additional [address] [place]  Emergency Department Emergency Medicine  If symptoms worsen [address] [place] Kruse [phone]  [facility] Emergency Department, [address] [facility] Ambrosio Mel[address]ter, [address] [facility], DO Internal Medicine Schedule an appointment as soon as possible for a visit   [address]  DUANE Wiley   [phone]             Discharge Medication List as of 5/20/2023 12:25 AM      START taking these medications    Details   lidocaine (Lidoderm) 5 % Apply 1 patch topically over 12 hours daily Remove & Discard patch within 12 hours or as directed by MD, Starting Sat 5/20/2023, Normal      oxyCODONE (Roxicodone) 5 immediate release tablet Take 1 tablet (5 mg total) by mouth every 6 (six) hours as needed for severe pain for up to 8 doses Max Daily Amount: 20 mg, Starting Sat 5/20/2023, Normal         CONTINUE these medications which have NOT CHANGED    Details   aspirin (ECOTRIN LOW STRENGTH) 81 mg EC tablet Take 81 mg by mouth, Historical Med      atorvastatin (LIPITOR) 20 mg tablet Take 1 tablet (20 mg total) by mouth daily, Starting Mon 5/8/2023, Normal      B Complex-C (B COMPLEX-B12-C IJ) Take 1 capsule by mouth if needed, Historical Med      Blood Glucose Monitoring Suppl (OneTouch Verio Reflect) w/Device KIT USE  ONCE DAILY, Normal      donepezil (ARICEPT) 5 mg tablet Take 2 tablets (10 mg total) by mouth daily at bedtime, Starting Mon 1/10/2022, Normal      glimepiride (AMARYL) 4 mg tablet Take 2 tablets (8 mg total) by mouth daily with breakfast, Starting Mon 5/8/2023, Normal      glucose blood (OneTouch Verio) test strip USE 1 STRIP TO CHECK GLUCOSE ONCE DAILY, Normal      lisinopril (ZESTRIL) 5 mg tablet Take 1 tablet (5 mg total) by mouth daily, Starting Mon 5/8/2023, Normal      metFORMIN (GLUCOPHAGE) 1000 MG tablet Take 1 tablet (1,000 mg total) by mouth 2 (two) times a day with meals, Starting Mon 5/8/2023, Normal      Misc Natural Products (PUMPKIN SEED OIL PO) Take by mouth, Historical Med      Omega-3 Fatty Acids (fish oil) 1,000 mg Take 1,000 mg by mouth if needed, Historical Med      OneTouch Delica Lancets 87B MISC Test once a day, Normal      Saw Palmetto, Serenoa repens, (Saw [place] [employer]ries) 540 MG CAPS Take 1 capsule by mouth in the morning, Historical Med      tamsulosin (FLOMAX) 0 4 mg Take 1 capsule (0 4 mg total) by mouth daily with dinner, Starting Mon 5/8/2023, Normal             No discharge procedures on file      PDMP Review     None          ED Provider  Electronically Signed by           Niels Bermeo MD  05/20/23 0040

## 2023-05-25 ENCOUNTER — OFFICE VISIT (OUTPATIENT)
Dept: URGENT CARE | Facility: CLINIC | Age: 83
End: 2023-05-25

## 2023-05-25 VITALS
SYSTOLIC BLOOD PRESSURE: 149 MMHG | DIASTOLIC BLOOD PRESSURE: 65 MMHG | OXYGEN SATURATION: 98 % | WEIGHT: 139 LBS | RESPIRATION RATE: 18 BRPM | TEMPERATURE: 97.7 F | BODY MASS INDEX: 23.13 KG/M2 | HEART RATE: 64 BPM

## 2023-05-25 DIAGNOSIS — T14.8XXA WOUND INFECTION: Primary | ICD-10-CM

## 2023-05-25 DIAGNOSIS — L08.9 WOUND INFECTION: Primary | ICD-10-CM

## 2023-05-25 RX ORDER — SULFAMETHOXAZOLE AND TRIMETHOPRIM 800; 160 MG/1; MG/1
1 TABLET ORAL EVERY 12 HOURS SCHEDULED
Qty: 14 TABLET | Refills: 0 | Status: SHIPPED | OUTPATIENT
Start: 2023-05-25 | End: 2023-05-30

## 2023-05-25 NOTE — PROGRESS NOTES
[facility]        NAME: Chanel Gonzalez is a 81 y o  male  : 1940    MRN: [id number]  DATE: May 25, 2023  TIME: 2:21 PM    Assessment and Plan   Wound infection [T14  8XXA, L08 9]  1  Wound infection  sulfamethoxazole-trimethoprim (BACTRIM DS) 800-160 mg per tablet            Patient Instructions     Apply bacitracin to affected area  Start Bactrim as prescribed  Keep wound clean and dry  Watch for signs of further infection including but not limited to fever, chills, chest pain, shortness of breath, swelling, redness, pus drainage, etc   Follow up with PCP in 3-5 days  Proceed to  ER if symptoms worsen  Eat yogurt with live and active cultures and/or take a probiotic at least 3 hours before or after antibiotic dose  Monitor stool for diarrhea and/or blood  If this occurs, contact primary care doctor ASAP  Chief Complaint     Chief Complaint   Patient presents with   • Abrasion     back         History of Present Illness       Patient is an 26-year-old male with significant PMH of DM2 presenting in the clinic today for back abrasion  Patient was seen in the ED on 2023 for rib fracture and fall  Back abrasion noted at the ED  patient notes that over the past couple of days the home health aide has been applying his lidocaine patches over the back abrasion  Admits purulent drainage, erythema, swelling, and warmth around back abrasion  Patient locates the back abrasion along the thoracic left paraspinal region  Denies numbness, tingling, fever, chills, chest pain, and SOB  Denies the use of OTC treatment for symptom management  Review of Systems   Review of Systems   Constitutional: Negative for chills and fever  Respiratory: Negative for shortness of breath  Cardiovascular: Negative for chest pain  Skin: Positive for wound  Negative for rash  Neurological: Negative for numbness           Current Medications       Current Outpatient Medications:   •  atorvastatin (LIPITOR) 20 mg tablet, Take 1 tablet (20 mg total) by mouth daily, Disp: 90 tablet, Rfl: 1  •  B Complex-C (B COMPLEX-B12-C IJ), Take 1 capsule by mouth if needed, Disp: , Rfl:   •  Blood Glucose Monitoring Suppl (OneTouch Verio Reflect) w/Device KIT, USE  ONCE DAILY, Disp: 1 kit, Rfl: 0  •  donepezil (ARICEPT) 5 mg tablet, Take 2 tablets (10 mg total) by mouth daily at bedtime, Disp: 90 tablet, Rfl: 1  •  glimepiride (AMARYL) 4 mg tablet, Take 2 tablets (8 mg total) by mouth daily with breakfast, Disp: 180 tablet, Rfl: 3  •  glucose blood (OneTouch Verio) test strip, USE 1 STRIP TO CHECK GLUCOSE ONCE DAILY, Disp: 50 each, Rfl: 0  •  lidocaine (Lidoderm) 5 %, Apply 1 patch topically over 12 hours daily Remove & Discard patch within 12 hours or as directed by MD, Disp: 14 patch, Rfl: 0  •  lisinopril (ZESTRIL) 5 mg tablet, Take 1 tablet (5 mg total) by mouth daily, Disp: 90 tablet, Rfl: 3  •  metFORMIN (GLUCOPHAGE) 1000 MG tablet, Take 1 tablet (1,000 mg total) by mouth 2 (two) times a day with meals, Disp: 180 tablet, Rfl: 1  •  Omega-3 Fatty Acids (fish oil) 1,000 mg, Take 1,000 mg by mouth if needed, Disp: , Rfl:   •  OneTouch Delica Lancets 29Z MISC, Test once a day, Disp: 100 each, Rfl: 5  •  oxyCODONE (Roxicodone) 5 immediate release tablet, Take 1 tablet (5 mg total) by mouth every 6 (six) hours as needed for severe pain for up to 8 doses Max Daily Amount: 20 mg, Disp: 8 tablet, Rfl: 0  •  Saw Palmetto, Serenoa repens, (Saw [place] [employer]) 540 MG CAPS, Take 1 capsule by mouth in the morning, Disp: , Rfl:   •  sulfamethoxazole-trimethoprim (BACTRIM DS) 800-160 mg per tablet, Take 1 tablet by mouth every 12 (twelve) hours for 7 days, Disp: 14 tablet, Rfl: 0  •  tamsulosin (FLOMAX) 0 4 mg, Take 1 capsule (0 4 mg total) by mouth daily with dinner, Disp: 90 capsule, Rfl: 1  •  aspirin (ECOTRIN LOW STRENGTH) 81 mg EC tablet, Take 81 mg by mouth (Patient not taking: Reported on 10/22/2021), Disp: , Rfl:   •  Misc Natural Products (PUMPKIN SEED OIL PO), Take by mouth (Patient not taking: Reported on 4/27/2022), Disp: , Rfl:     Current Allergies     Allergies as of 05/25/2023 - Reviewed 05/25/2023   Allergen Reaction Noted   • Simvastatin Myalgia and Other (See Comments) 04/25/2012            The following portions of the patient's history were reviewed and updated as appropriate: allergies, current medications, past family history, past medical history, past social history, past surgical history and problem list      Past Medical History:   Diagnosis Date   • Diabetes mellitus ([facility] [address] )    • Hyperlipidemia        Past Surgical History:   Procedure Laterality Date   • CATARACT EXTRACTION     • COLONOSCOPY     • DENTAL SURGERY     • EYE SURGERY     • TONSILLECTOMY     • VASECTOMY         History reviewed  No pertinent family history  Medications have been verified  Objective   /65   Pulse 64   Temp 97 7 °F (36 5 °C)   Resp 18   Wt 63 kg (139 lb)   SpO2 98%   BMI 23 13 kg/m²        Physical Exam     Physical Exam  Vitals reviewed  Constitutional:       General: He is not in acute distress  Appearance: Normal appearance  He is normal weight  He is not ill-appearing  HENT:      Head: Normocephalic  Nose: Nose normal       Mouth/Throat:      Mouth: Mucous membranes are moist    Eyes:      Conjunctiva/sclera: Conjunctivae normal    Cardiovascular:      Rate and Rhythm: Normal rate and regular rhythm  Pulses: Normal pulses  Heart sounds: Normal heart sounds  No murmur heard  No friction rub  No gallop  Pulmonary:      Effort: Pulmonary effort is normal       Breath sounds: Normal breath sounds  No wheezing, rhonchi or rales  Skin:     General: Skin is warm  Findings: Wound present  Comments: Abrasion and wound approximately 2 cm in length noted on the left thoracic paraspinous region  Purulent drainage and erythema noted     Neurological:      Mental Status: He is alert    Psychiatric:         Mood and Affect: Mood normal          Behavior: Behavior normal

## 2023-05-25 NOTE — PATIENT INSTRUCTIONS
Apply bacitracin to affected area  Start Bactrim as prescribed  Keep wound clean and dry  Watch for signs of further infection including but not limited to fever, chills, chest pain, shortness of breath, swelling, redness, pus drainage, etc   Follow up with PCP in 3-5 days  Proceed to  ER if symptoms worsen  Eat yogurt with live and active cultures and/or take a probiotic at least 3 hours before or after antibiotic dose  Monitor stool for diarrhea and/or blood  If this occurs, contact primary care doctor ASAP

## 2023-05-30 ENCOUNTER — OFFICE VISIT (OUTPATIENT)
Dept: INTERNAL MEDICINE CLINIC | Facility: CLINIC | Age: 83
End: 2023-05-30

## 2023-05-30 VITALS
HEART RATE: 65 BPM | TEMPERATURE: 97.7 F | DIASTOLIC BLOOD PRESSURE: 62 MMHG | OXYGEN SATURATION: 99 % | BODY MASS INDEX: 22.87 KG/M2 | SYSTOLIC BLOOD PRESSURE: 116 MMHG | WEIGHT: 137.25 LBS | HEIGHT: 65 IN

## 2023-05-30 DIAGNOSIS — S20.411D ABRASION OF RIGHT SIDE OF BACK, SUBSEQUENT ENCOUNTER: ICD-10-CM

## 2023-05-30 DIAGNOSIS — S22.41XD CLOSED FRACTURE OF MULTIPLE RIBS OF RIGHT SIDE WITH ROUTINE HEALING, SUBSEQUENT ENCOUNTER: ICD-10-CM

## 2023-05-30 DIAGNOSIS — R11.2 NAUSEA AND VOMITING, UNSPECIFIED VOMITING TYPE: ICD-10-CM

## 2023-05-30 DIAGNOSIS — R19.7 DIARRHEA, UNSPECIFIED TYPE: Primary | ICD-10-CM

## 2023-05-30 DIAGNOSIS — Z78.9 DRUG INTOLERANCE: ICD-10-CM
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
## 2023-05-30 NOTE — PROGRESS NOTES
Assessment/Plan:  Problem List Items Addressed This Visit    None  Visit Diagnoses     Diarrhea, unspecified type    -  Primary    Nausea and vomiting, unspecified vomiting type        Drug intolerance        Abrasion of right side of back, subsequent encounter        Fall, subsequent encounter        Closed fracture of multiple ribs of right side with routine healing, subsequent encounter               Diagnoses and all orders for this visit:    Diarrhea, unspecified type    Nausea and vomiting, unspecified vomiting type    Drug intolerance    Abrasion of right side of back, subsequent encounter    Fall, subsequent encounter    Closed fracture of multiple ribs of right side with routine healing, subsequent encounter        No problem-specific Assessment & Plan notes found for this encounter  A/P: Doing a little better  No more falls  Pain improving with ribs healing  Wound healing and doesn't look infected  Suspect n/v/d due to bactrim  Will monitor off abx  Start clears and advance to BRAT  No more topicals to wound and allow scabbing  Keep an eye on the wound for infection  Will label allergy to bactrim  RTC as scheduled, but will call for an up date in 48 hours  Subjective:      Patient ID: Delfin Ken is a 81 y o  male  WM presents with his granddaughter for f/u recent UC visit after following OOB and suffering an abrasion to the back and fractured ribs  Given a lidoderm patch and was inadvertently placed over the wound    Wound became infected and again seen in the UC and given bactrim for cellulitis  Several days after starting the abx, developed n/v/d  No one else ill  No other new meds  No dietary changes  Stopped the abx yesterday and s/s improving  The following portions of the patient's history were reviewed and updated as appropriate:   He has a past medical history of Diabetes mellitus ([address] ) and Hyperlipidemia ,  does not have any pertinent problems on file  ,   has a past surgical history that includes Tonsillectomy; Eye surgery; Cataract extraction; Dental surgery; Vasectomy; and Colonoscopy  ,  family history is not on file  ,   reports that he has never smoked  He has never used smokeless tobacco  He reports that he does not currently use alcohol  He reports that he does not use drugs  ,  is allergic to bactrim [sulfamethoxazole-trimethoprim] and simvastatin     Current Outpatient Medications   Medication Sig Dispense Refill   • aspirin (ECOTRIN LOW STRENGTH) 81 mg EC tablet Take 81 mg by mouth     • atorvastatin (LIPITOR) 20 mg tablet Take 1 tablet (20 mg total) by mouth daily 90 tablet 1   • B Complex-C (B COMPLEX-B12-C IJ) Take 1 capsule by mouth if needed     • donepezil (ARICEPT) 5 mg tablet Take 2 tablets (10 mg total) by mouth daily at bedtime 90 tablet 1   • glimepiride (AMARYL) 4 mg tablet Take 2 tablets (8 mg total) by mouth daily with breakfast 180 tablet 3   • lisinopril (ZESTRIL) 5 mg tablet Take 1 tablet (5 mg total) by mouth daily 90 tablet 3   • metFORMIN (GLUCOPHAGE) 1000 MG tablet Take 1 tablet (1,000 mg total) by mouth 2 (two) times a day with meals 180 tablet 1   • Omega-3 Fatty Acids (fish oil) 1,000 mg Take 1,000 mg by mouth if needed     • oxyCODONE (Roxicodone) 5 immediate release tablet Take 1 tablet (5 mg total) by mouth every 6 (six) hours as needed for severe pain for up to 8 doses Max Daily Amount: 20 mg 8 tablet 0   • Saw Palmetto, Serenoa repens, (Saw [place] Berries) 540 MG CAPS Take 1 capsule by mouth in the morning     • tamsulosin (FLOMAX) 0 4 mg Take 1 capsule (0 4 mg total) by mouth daily with dinner 90 capsule 1   • Blood Glucose Monitoring Suppl (OneTouch Verio Reflect) w/Device KIT USE  ONCE DAILY 1 kit 0   • glucose blood (OneTouch Verio) test strip USE 1 STRIP TO CHECK GLUCOSE ONCE DAILY 50 each 0   • lidocaine (Lidoderm) 5 % Apply 1 patch topically over 12 hours daily Remove & Discard patch within 12 hours or as directed by MD 14 patch 0   • Misc "Natural Products (PUMPKIN SEED OIL PO) Take by mouth (Patient not taking: Reported on 4/27/2022)     • OneTouch Delica Lancets 95H MISC Test once a day 100 each 5     No current facility-administered medications for this visit  Review of Systems   Constitutional: Negative for activity change, chills, diaphoresis, fatigue and fever  Eyes: Negative for visual disturbance  Respiratory: Negative for cough, chest tightness, shortness of breath and wheezing  Cardiovascular: Negative for chest pain, palpitations and leg swelling  Gastrointestinal: Positive for diarrhea, nausea and vomiting  Negative for abdominal pain and constipation  Endocrine: Negative for cold intolerance and heat intolerance  Genitourinary: Negative for difficulty urinating, dysuria and frequency  Musculoskeletal: Negative for arthralgias, gait problem and myalgias  Skin: Positive for wound  Neurological: Negative for dizziness, seizures, syncope, weakness, light-headedness and headaches  Psychiatric/Behavioral: Negative for confusion  The patient is not nervous/anxious  PHQ-2/9 Depression Screening          Objective:  Vitals:    05/30/23 1544   BP: 116/62   Pulse: 65   Temp: 97 7 °F (36 5 °C)   SpO2: 99%   Weight: 62 3 kg (137 lb 4 oz)   Height: 5' 5\" (1 651 m)     Body mass index is 22 84 kg/m²  Physical Exam  Vitals and nursing note reviewed  Constitutional:       General: He is not in acute distress  Appearance: Normal appearance  He is not ill-appearing  HENT:      Head: Normocephalic and atraumatic  Mouth/Throat:      Mouth: Mucous membranes are moist    Eyes:      Extraocular Movements: Extraocular movements intact  Conjunctiva/sclera: Conjunctivae normal       Pupils: Pupils are equal, round, and reactive to light  Cardiovascular:      Rate and Rhythm: Normal rate and regular rhythm  Heart sounds: Normal heart sounds  No murmur heard    Pulmonary:      Effort: Pulmonary effort is " normal  No respiratory distress  Breath sounds: Normal breath sounds  No wheezing, rhonchi or rales  Abdominal:      General: Bowel sounds are normal  There is no distension  Palpations: Abdomen is soft  Tenderness: There is no abdominal tenderness  Skin:     Findings: Lesion (quarter size abrasion right flank with good scab and slightly red border  No d/c, induration or fluctuation  ) present  Neurological:      General: No focal deficit present  Mental Status: He is alert and oriented to person, place, and time  Mental status is at baseline  Psychiatric:         Mood and Affect: Mood normal          Behavior: Behavior normal          Thought Content:  Thought content normal          Judgment: Judgment normal

## 2023-05-30 NOTE — PATIENT INSTRUCTIONS
Nutrition Tips for Relief of Diarrhea   AMBULATORY CARE:   Nutrition tips for relief of diarrhea  are diet changes you can make to help relieve or stop diarrhea  These changes include limiting or avoiding foods and liquids that are high in sugar, fat, fiber, and lactose  Lactose is a sugar found in milk products  Milk products can cause diarrhea in people who are lactose intolerant  You should also drink extra liquids to replace fluids that are lost when you have diarrhea  Diarrhea can lead to dehydration  Foods to limit or avoid:   Dairy:      Whole milk    Half-and-half, cream, and sour cream    Regular (whole milk) ice cream    Grains:      Whole wheat and whole grain breads, pasta, cereals, and crackers    Brown and wild rice    Breads and cereals with seeds or nuts    Popcorn    Fruit and vegetables: All raw fruits, except bananas and melon    Dried fruits, including prunes and raisins    Canned fruit in heavy syrup    Prune juice and any fruit juice with pulp    Raw vegetables, except lettuce     Fried vegetables    Corn, raw and cooked broccoli, cabbage, cauliflower, and mikayla greens    Protein:      Fried meat, poultry, and fish    High-fat luncheon meats, such as bologna    Fatty meats, such as sausage, knight, and hot dogs    Beans and nuts    Liquids:      Sodas and fruit-flavored drinks    Drinks that contain caffeine, such as energy drinks, coffee, and tea     Drinks that contain alcohol or sugar alcohol, such as sorbitol    Foods and liquids you may eat or drink:  Most people can tolerate the foods and liquids listed below  If any of them make your symptoms worse, stop eating or drinking them until you feel better  If you are lactose intolerant, avoid milk products    Dairy:      Skim or low-fat milk or evaporated milk    Soy milk or buttermilk     Low-fat, part-skim, and aged cheese    Yogurt, low-fat ice cream, or sherbert    Grains:  (Choose foods with less than 2 grams of dietary fiber per serving )     White or refined flour breads, bagels, pasta, and crackers    Cold or hot cereals made from white or refined flour such as puffed rice, cornflakes, or cream of wheat    White rice    Fruit and vegetables:      Bananas or melon    Fruit juice without pulp, except prune juice    Canned fruit in juice or light syrup    Lettuce and most well-cooked vegetables without seeds or skins     Strained vegetable juice    Protein:      Tender, well-cooked meat, poultry, or fish    Well-cooked eggs or soy foods (cooked without added fat)    Smooth nut butters    Fats:  (Limit fats to less than 8 teaspoons a day)     Oil, butter, or margarine, or mayonnaise    Cream cheese or salad dressings    Liquids: For infants, breast milk or formula    Oral rehydration solution     Decaffeinated coffee or caffeine-free teas    Soft drinks without caffeine    Other guidelines to follow:   Drink liquids as directed  You may need to drink more liquids than usual to prevent dehydration  Ask how much liquid to drink each day and which liquids are best for you  You may need to drink an oral rehydration solution (ORS)  An ORS helps replace fluids and electrolytes that you lose when you have diarrhea  Eat small meals or snacks every 3 to 4 hours  instead of large meals  Continue eating even if you still have diarrhea  Your diarrhea will continue for a few days but should gradually go away  Follow up with your doctor as directed:  Write down your questions so you remember to ask them during your visits  © Copyright Diana Mckeon 2022 Information is for End User's use only and may not be sold, redistributed or otherwise used for commercial purposes  The above information is an  only  It is not intended as medical advice for individual conditions or treatments  Talk to your doctor, nurse or pharmacist before following any medical regimen to see if it is safe and effective for you

## 2023-06-05 ENCOUNTER — TELEPHONE (OUTPATIENT)
Dept: INTERNAL MEDICINE CLINIC | Facility: CLINIC | Age: 83
End: 2023-06-05

## 2023-06-05 NOTE — TELEPHONE ENCOUNTER
----- Message from Teodora Boudreaux DO sent at 5/30/2023  4:11 PM EDT -----  Call granddaughter in two days and get up date on the wound and n/v/D

## 2023-06-08 DIAGNOSIS — R41.3 MEMORY DIFFICULTY: ICD-10-CM

## 2023-06-08 RX ORDER — DONEPEZIL HYDROCHLORIDE 5 MG/1
10 TABLET, FILM COATED ORAL
Qty: 90 TABLET | Refills: 1 | Status: SHIPPED | OUTPATIENT
Start: 2023-06-08

## 2023-06-29 DIAGNOSIS — E11.319 TYPE 2 DIABETES MELLITUS WITH RETINOPATHY OF BOTH EYES, WITHOUT LONG-TERM CURRENT USE OF INSULIN, MACULAR EDEMA PRESENCE UNSPECIFIED, UNSPECIFIED RETINOPATHY SEVERITY (HCC): Primary | ICD-10-CM

## 2023-07-26 DIAGNOSIS — E11.319 TYPE 2 DIABETES MELLITUS WITH RETINOPATHY OF BOTH EYES, WITHOUT LONG-TERM CURRENT USE OF INSULIN, MACULAR EDEMA PRESENCE UNSPECIFIED, UNSPECIFIED RETINOPATHY SEVERITY (HCC): ICD-10-CM

## 2023-07-26 RX ORDER — LANCETS 33 GAUGE
EACH MISCELLANEOUS
Qty: 100 EACH | Refills: 5 | Status: SHIPPED | OUTPATIENT
Start: 2023-07-26

## 2023-07-30 ENCOUNTER — RA CDI HCC (OUTPATIENT)
Dept: OTHER | Facility: HOSPITAL | Age: 83
End: 2023-07-30

## 2023-07-30 NOTE — PROGRESS NOTES
e11.65  [facility] coding opportunities          Chart Reviewed number of suggestions sent to Provider: 1     Patients Insurance     Medicare Insurance: [address] [place] [address]

## 2023-08-03 NOTE — PATIENT INSTRUCTIONS
Basic Carbohydrate Counting   AMBULATORY CARE:   Carbohydrate counting  is a way to plan your meals by counting the amount of carbohydrate in foods. Carbohydrates are the sugars, starches, and fiber found in fruit, grains, vegetables, and milk products. Carbohydrates increase your blood sugar levels. Carbohydrate counting can help you eat the right amount of carbohydrate to keep your blood sugar levels under control. What you need to know about planning meals using carbohydrate counting:  • A dietitian or healthcare provider will help you develop a healthy meal plan that works best for you. You will be taught how much carbohydrate to eat or drink for each meal and snack. Your meal plan will be based on your age, weight, usual food intake, and physical activity level. If you have diabetes, it will also include your blood sugar levels and diabetes medicine. Once you know how much carbohydrate you should eat, you can decide what type of food you want to eat. • You will need to know what foods contain carbohydrate and how much they contain. Keep track of the amount of carbohydrate in meals and snacks in order to follow your meal plan. Do not avoid carbohydrates or skip meals. Your blood sugar may fall too low if you do not eat enough carbohydrate or you skip meals. Foods that contain carbohydrate:   • Breads:  Each serving of food listed below contains about 15 g of carbohydrate . ? 1 slice of bread (1 ounce) or 1 flour or corn tortilla (6 inch)    ? ½ of a hamburger bun or ¼ of a large bagel (about 1 ounce)    ? 1 pancake (about 4 inches across and ¼ inch thick)    • Cereals and grains:  Serving sizes of ready-to-eat cereals vary. Look at the serving size and the total carbohydrate amount listed on the food label. Each serving of food listed below contains about 15 g of carbohydrate . ? ¾ cup of dry, unsweetened, ready-to-eat cereal or ¼ cup of low-fat granola     ?  ½ cup of oatmeal or other cooked cereal     ? ? cup of cooked rice or pasta    • Starchy vegetables and beans:  Each serving of food listed below contains about 15 g of carbohydrate . ? ½ cup of corn, green peas, sweet potatoes, or mashed potatoes    ? ¼ of a large baked potato    ? ½ cup of beans, lentils, and peas (garbanzo, krishnan, kidney, white, split, black-eyed)    • Crackers and snacks:  Each serving of food listed below contains about 15 g of carbohydrate . ? 3 miky cracker squares or 8 animal crackers     ? 6 saltine-type crackers    ? 3 cups of popcorn or ¾ ounce of pretzels, potato chips, or tortilla chips    • Fruit:  Each serving of food listed below contains about 15 g of carbohydrate . ? 1 small (4 ounce) piece of fresh fruit or ¾ to 1 cup of fresh fruit    ? ½ cup of canned or frozen fruit, packed in natural juice    ? ½ cup (4 ounces) of unsweetened fruit juice    ? 2 tablespoons of dried fruit    • Desserts or sugary foods:  Each serving of food listed below contains about 15 g of carbohydrate . ? 2-inch square unfrosted cake or brownie     ? 2 small cookies    ? ½ cup of ice cream, frozen yogurt, or nondairy frozen yogurt    ? ¼ cup of sherbet or sorbet    ? 1 tablespoon of regular syrup, jam, or jelly    ? 2 tablespoons of light syrup    • Milk and yogurt:  Foods from the milk group contain about 12 g of carbohydrate per serving. ? 1 cup of fat-free or low-fat milk    ? 1 cup of soy milk    ? ? cup of fat-free, yogurt sweetened with artificial sweetener    • Non-starchy vegetables:  Each serving contains about 5 g of carbohydrate . Three servings of non-starch vegetables count as 1 carbohydrate serving. ? ½ cup of cooked vegetables or 1 cup of raw vegetables. This includes beets, broccoli, cabbage, cauliflower, cucumber, mushrooms, tomatoes, and zucchini    ?  ½ cup of vegetable juice    How to use carbohydrate counting to plan meals:   • Count carbohydrate amounts using serving sizes:      ? Pasta dinner example: You plan to have pasta, tossed salad, and an 8-ounce glass of milk. Your healthcare provider tells you that you may have 4 carbohydrate servings for dinner. One carbohydrate serving of pasta is ? cup. One cup of pasta will equal 3 carbohydrate servings. An 8-ounce glass of milk will count as 1 carbohydrate serving. These amounts of food would equal 4 carbohydrate servings. One cup of tossed salad does not count toward your carbohydrate servings. • Count carbohydrate amounts using food labels:  Find the total amount of carbohydrate in a packaged food by reading the food label. Food labels tell you the serving size of the food and the total carbohydrate amount in each serving. Find the serving size on the food label and then decide how many servings you will eat. Multiply the number of servings you plan to eat by the carbohydrate amount per serving. ? Granola bar snack example: Your meal plan allows you to have 2 carbohydrate servings (30 grams) of carbohydrate for a snack. You plan to eat 1 package of granola bars, which contains 2 bars. According to the food label, the serving size of food in this package is 1 bar. Each serving (1 bar) contains 25 grams of carbohydrate. The total amount of carbohydrate in this package of granola bars would be 50 g. Based on your meal plan, you should eat only 1 bar. Follow up with your doctor as directed:  Write down your questions so you remember to ask them during your visits. © Copyright Hilary Guardian 2022 Information is for End User's use only and may not be sold, redistributed or otherwise used for commercial purposes. The above information is an  only. It is not intended as medical advice for individual conditions or treatments. Talk to your doctor, nurse or pharmacist before following any medical regimen to see if it is safe and effective for you.

## 2023-08-08 ENCOUNTER — OFFICE VISIT (OUTPATIENT)
Dept: INTERNAL MEDICINE CLINIC | Facility: CLINIC | Age: 83
End: 2023-08-08
Payer: COMMERCIAL

## 2023-08-08 VITALS
HEIGHT: 65 IN | DIASTOLIC BLOOD PRESSURE: 70 MMHG | SYSTOLIC BLOOD PRESSURE: 116 MMHG | OXYGEN SATURATION: 96 % | BODY MASS INDEX: 22.82 KG/M2 | TEMPERATURE: 97.6 F | WEIGHT: 137 LBS | HEART RATE: 63 BPM

## 2023-08-08 DIAGNOSIS — E11.9 ENCOUNTER FOR DIABETIC FOOT EXAM (HCC): ICD-10-CM

## 2023-08-08 DIAGNOSIS — G70.00 MYASTHENIA GRAVIS WITHOUT EXACERBATION (HCC): ICD-10-CM

## 2023-08-08 DIAGNOSIS — E78.2 MIXED HYPERLIPIDEMIA: ICD-10-CM

## 2023-08-08 DIAGNOSIS — Z23 ENCOUNTER FOR VACCINATION: ICD-10-CM

## 2023-08-08 DIAGNOSIS — N40.0 BENIGN PROSTATIC HYPERPLASIA WITHOUT LOWER URINARY TRACT SYMPTOMS: ICD-10-CM

## 2023-08-08 DIAGNOSIS — R41.3 MEMORY DIFFICULTY: ICD-10-CM

## 2023-08-08 DIAGNOSIS — Z12.5 SCREENING FOR PROSTATE CANCER: ICD-10-CM

## 2023-08-08 DIAGNOSIS — E11.319 TYPE 2 DIABETES MELLITUS WITH RETINOPATHY OF BOTH EYES, WITHOUT LONG-TERM CURRENT USE OF INSULIN, MACULAR EDEMA PRESENCE UNSPECIFIED, UNSPECIFIED RETINOPATHY SEVERITY (HCC): Primary | ICD-10-CM

## 2023-08-08 DIAGNOSIS — I10 ESSENTIAL HYPERTENSION: ICD-10-CM

## 2023-08-08 LAB — SL AMB POCT HEMOGLOBIN AIC: 7.7 (ref ?–6.5)

## 2023-08-08 PROCEDURE — 90677 PCV20 VACCINE IM: CPT | Performed by: INTERNAL MEDICINE

## 2023-08-08 PROCEDURE — 83036 HEMOGLOBIN GLYCOSYLATED A1C: CPT | Performed by: INTERNAL MEDICINE

## 2023-08-08 PROCEDURE — 99214 OFFICE O/P EST MOD 30 MIN: CPT | Performed by: INTERNAL MEDICINE

## 2023-08-08 PROCEDURE — G0009 ADMIN PNEUMOCOCCAL VACCINE: HCPCS | Performed by: INTERNAL MEDICINE

## 2023-08-08 NOTE — PROGRESS NOTES
Assessment/Plan:  Problem List Items Addressed This Visit        Endocrine    Type 2 diabetes mellitus with retinopathy, without long-term current use of insulin ([address]) - Primary    Relevant Orders    Comprehensive metabolic panel    LDL cholesterol, direct    Triglycerides    POCT hemoglobin A1c (Completed)       Cardiovascular and Mediastinum    Essential hypertension    Relevant Orders    Comprehensive metabolic panel       Nervous and Auditory    Myasthenia gravis without exacerbation (HCC)       Genitourinary    Benign prostatic hyperplasia without lower urinary tract symptoms       Other    Mixed hyperlipidemia    Relevant Orders    Comprehensive metabolic panel    LDL cholesterol, direct    Triglycerides    Memory difficulty   Other Visit Diagnoses     Screening for prostate cancer        Relevant Orders    PSA Total, Diagnostic    Encounter for vaccination        Relevant Orders    Pneumococcal Conjugate Vaccine 20-valent (Pcv20)    Encounter for diabetic foot exam ([address])               Diagnoses and all orders for this visit:    Type 2 diabetes mellitus with retinopathy of both eyes, without long-term current use of insulin, macular edema presence unspecified, unspecified retinopathy severity ([address])  -     Comprehensive metabolic panel; Future  -     LDL cholesterol, direct; Future  -     Triglycerides; Future  -     POCT hemoglobin A1c    Essential hypertension  -     Comprehensive metabolic panel; Future    Myasthenia gravis without exacerbation ([address])    Mixed hyperlipidemia  -     Comprehensive metabolic panel; Future  -     LDL cholesterol, direct; Future  -     Triglycerides; Future    Memory difficulty    Benign prostatic hyperplasia without lower urinary tract symptoms    Screening for prostate cancer  -     PSA Total, Diagnostic;  Future    Encounter for vaccination  -     Pneumococcal Conjugate Vaccine 20-valent (Pcv20)    Encounter for diabetic foot exam ([address])        No problem-specific Assessment & Plan notes found for this encounter. A/P: Doing ok and will check labs. In office HgA1c was better at 7.7. Could be a little lower, but given his age, probably ok. Add another med may go to low. But, would be able to get off the amaryl. Discussed with pt and wants to continue his current course. WIll up date his pneumonia vaccine. . Continue current treatment otherwise and RTC three months for routine. Subjective:      Patient ID: Mckenzie Meléndez is a 81 y.o. male. WM RTC for f/u DM, HTN, etc. Doing ok and no new issues. Remains active w/o difficulty and no falls. Sugars are less than 200 and no low sugar events. Denies CP, SOB, edema, edema, orthopnea or PND. MG is manageable. Memory no worse. Due for labs and vaccine. The following portions of the patient's history were reviewed and updated as appropriate:   He has a past medical history of Diabetes mellitus ([address]) and Hyperlipidemia.,  does not have any pertinent problems on file. ,   has a past surgical history that includes Tonsillectomy; Eye surgery; Cataract extraction; Dental surgery; Vasectomy; and Colonoscopy. ,  family history is not on file. ,   reports that he has never smoked. He has never been exposed to tobacco smoke. He has never used smokeless tobacco. He reports that he does not currently use alcohol. He reports that he does not use drugs. ,  is allergic to bactrim [sulfamethoxazole-trimethoprim] and simvastatin. .  Current Outpatient Medications   Medication Sig Dispense Refill   • aspirin (ECOTRIN LOW STRENGTH) 81 mg EC tablet Take 81 mg by mouth     • atorvastatin (LIPITOR) 20 mg tablet Take 1 tablet (20 mg total) by mouth daily 90 tablet 1   • B Complex-C (B COMPLEX-B12-C IJ) Take 1 capsule by mouth if needed     • Blood Glucose Monitoring Suppl (OneTouch Verio Reflect) w/Device KIT USE  ONCE DAILY 1 kit 0   • donepezil (ARICEPT) 5 mg tablet Take 2 tablets (10 mg total) by mouth daily at bedtime 90 tablet 1   • glimepiride (AMARYL) 4 mg tablet Take 2 tablets (8 mg total) by mouth daily with breakfast 180 tablet 3   • glucose blood (OneTouch Verio) test strip USE 1 STRIP TO CHECK GLUCOSE ONCE DAILY 50 each 0   • lidocaine (Lidoderm) 5 % Apply 1 patch topically over 12 hours daily Remove & Discard patch within 12 hours or as directed by MD 14 patch 0   • linaGLIPtin 5 MG TABS Take 5 mg by mouth daily with or without food 90 tablet 3   • lisinopril (ZESTRIL) 5 mg tablet Take 1 tablet (5 mg total) by mouth daily 90 tablet 3   • metFORMIN (GLUCOPHAGE) 1000 MG tablet Take 1 tablet (1,000 mg total) by mouth 2 (two) times a day with meals 180 tablet 1   • Misc Natural Products (PUMPKIN SEED OIL PO) Take by mouth (Patient not taking: Reported on 4/27/2022)     • Omega-3 Fatty Acids (fish oil) 1,000 mg Take 1,000 mg by mouth if needed     • OneTouch Delica Lancets 75N MISC Test once a day 100 each 5   • oxyCODONE (Roxicodone) 5 immediate release tablet Take 1 tablet (5 mg total) by mouth every 6 (six) hours as needed for severe pain for up to 8 doses Max Daily Amount: 20 mg 8 tablet 0   • Saw Palmetto, Serenoa repens, (Saw [place] Berries) 540 MG CAPS Take 1 capsule by mouth in the morning     • tamsulosin (FLOMAX) 0.4 mg Take 1 capsule (0.4 mg total) by mouth daily with dinner 90 capsule 1     No current facility-administered medications for this visit. Review of Systems   Constitutional: Negative for activity change, chills, diaphoresis, fatigue and fever. HENT: Negative. Eyes: Negative for visual disturbance. Respiratory: Negative for cough, chest tightness, shortness of breath and wheezing. Cardiovascular: Negative for chest pain, palpitations and leg swelling. Gastrointestinal: Negative for abdominal pain, constipation, diarrhea, nausea and vomiting. Endocrine: Negative for cold intolerance and heat intolerance. Genitourinary: Negative for difficulty urinating, dysuria and frequency.    Musculoskeletal: Negative for arthralgias, gait problem and myalgias. Neurological: Negative for dizziness, seizures, syncope, weakness, light-headedness and headaches. Psychiatric/Behavioral: Negative for confusion, dysphoric mood and sleep disturbance. The patient is not nervous/anxious. PHQ-2/9 Depression Screening          Objective:  Vitals:    08/08/23 1306   BP: 116/70   Pulse: 63   Temp: 97.6 °F (36.4 °C)   SpO2: 96%   Weight: 62.1 kg (137 lb)   Height: 5' 5" (1.651 m)     Body mass index is 22.8 kg/m². Physical Exam  Vitals and nursing note reviewed. Constitutional:       General: He is not in acute distress. Appearance: Normal appearance. He is not ill-appearing. HENT:      Head: Normocephalic and atraumatic. Mouth/Throat:      Mouth: Mucous membranes are moist.   Eyes:      Extraocular Movements: Extraocular movements intact. Conjunctiva/sclera: Conjunctivae normal.      Pupils: Pupils are equal, round, and reactive to light. Cardiovascular:      Rate and Rhythm: Normal rate and regular rhythm. Pulses: no weak pulses          Dorsalis pedis pulses are 1+ on the right side and 1+ on the left side. Posterior tibial pulses are 1+ on the right side and 1+ on the left side. Heart sounds: Normal heart sounds. No murmur heard. Pulmonary:      Effort: Pulmonary effort is normal. No respiratory distress. Breath sounds: Normal breath sounds. No wheezing, rhonchi or rales. Abdominal:      General: Bowel sounds are normal. There is no distension. Palpations: Abdomen is soft. Tenderness: There is no abdominal tenderness. Musculoskeletal:      Right lower leg: No edema. Left lower leg: No edema. Feet:      Right foot:      Skin integrity: No ulcer, skin breakdown, erythema, warmth, callus or dry skin. Left foot:      Skin integrity: No ulcer, skin breakdown, erythema, warmth, callus or dry skin. Neurological:      General: No focal deficit present.       Mental Status: He is alert and oriented to person, place, and time. Mental status is at baseline. Psychiatric:         Mood and Affect: Mood normal.         Behavior: Behavior normal.         Thought Content: Thought content normal.         Judgment: Judgment normal.       Patient's shoes and socks removed. Right Foot/Ankle   Right Foot Inspection  Skin Exam: skin normal and skin intact. No dry skin, no warmth, no callus, no erythema, no maceration, no abnormal color, no pre-ulcer, no ulcer and no callus. Toe Exam: ROM and strength within normal limits. No swelling, no tenderness, erythema and  no right toe deformity    Sensory   Monofilament testing: intact    Vascular  Capillary refills: < 3 seconds  The right DP pulse is 1+. The right PT pulse is 1+. Left Foot/Ankle  Left Foot Inspection  Skin Exam: skin normal and skin intact. No dry skin, no warmth, no erythema, no maceration, normal color, no pre-ulcer, no ulcer and no callus. Toe Exam: ROM and strength within normal limits. No swelling, no tenderness, no erythema and no left toe deformity. Sensory   Monofilament testing: intact    Vascular  Capillary refills: < 3 seconds  The left DP pulse is 1+. The left PT pulse is 1+.      Assign Risk Category  No deformity present  No loss of protective sensation  No weak pulses  Risk: 0

## 2023-08-10 ENCOUNTER — TELEPHONE (OUTPATIENT)
Dept: INTERNAL MEDICINE CLINIC | Facility: CLINIC | Age: 83
End: 2023-08-10

## 2023-08-10 NOTE — TELEPHONE ENCOUNTER
Rose Fowler (Marce Caller sister-in law) called and stated that she did call the insurance to get sergio a contunious glucose monitorer for his arm she stated that he is having a hard time seeing and using the needles to read his blood sugar, denny stated that as long as PCP authorizes the device patient should be able to get it.        She stated that if we needed to call the insurance to use this number [phone]    (Patient would need a device that does not connect to cell phone due to him not having one)  Please advise   Thank you

## 2023-08-11 DIAGNOSIS — E11.319 TYPE 2 DIABETES MELLITUS WITH RETINOPATHY OF BOTH EYES, WITHOUT LONG-TERM CURRENT USE OF INSULIN, MACULAR EDEMA PRESENCE UNSPECIFIED, UNSPECIFIED RETINOPATHY SEVERITY (HCC): Primary | ICD-10-CM

## 2023-08-17 ENCOUNTER — APPOINTMENT (OUTPATIENT)
Dept: LAB | Facility: CLINIC | Age: 83
End: 2023-08-17
Payer: COMMERCIAL

## 2023-08-17 DIAGNOSIS — Z12.5 SCREENING FOR PROSTATE CANCER: ICD-10-CM

## 2023-08-17 DIAGNOSIS — E78.2 MIXED HYPERLIPIDEMIA: ICD-10-CM

## 2023-08-17 DIAGNOSIS — E11.319 TYPE 2 DIABETES MELLITUS WITH RETINOPATHY OF BOTH EYES, WITHOUT LONG-TERM CURRENT USE OF INSULIN, MACULAR EDEMA PRESENCE UNSPECIFIED, UNSPECIFIED RETINOPATHY SEVERITY (HCC): ICD-10-CM

## 2023-08-17 DIAGNOSIS — I10 ESSENTIAL HYPERTENSION: ICD-10-CM

## 2023-08-17 PROCEDURE — 84478 ASSAY OF TRIGLYCERIDES: CPT

## 2023-08-17 PROCEDURE — 84153 ASSAY OF PSA TOTAL: CPT

## 2023-08-17 PROCEDURE — 36415 COLL VENOUS BLD VENIPUNCTURE: CPT

## 2023-08-17 PROCEDURE — 80053 COMPREHEN METABOLIC PANEL: CPT

## 2023-08-17 PROCEDURE — 83721 ASSAY OF BLOOD LIPOPROTEIN: CPT

## 2023-08-18 LAB
ALBUMIN SERPL BCP-MCNC: 3.8 G/DL (ref 3.5–5)
ALP SERPL-CCNC: 108 U/L (ref 46–116)
ALT SERPL W P-5'-P-CCNC: 22 U/L (ref 12–78)
ANION GAP SERPL CALCULATED.3IONS-SCNC: 3 MMOL/L
AST SERPL W P-5'-P-CCNC: 26 U/L (ref 5–45)
BILIRUB SERPL-MCNC: 0.27 MG/DL (ref 0.2–1)
BUN SERPL-MCNC: 23 MG/DL (ref 5–25)
CALCIUM SERPL-MCNC: 9.6 MG/DL (ref 8.3–10.1)
CHLORIDE SERPL-SCNC: 108 MMOL/L (ref 96–108)
CO2 SERPL-SCNC: 30 MMOL/L (ref 21–32)
CREAT SERPL-MCNC: 1.15 MG/DL (ref 0.6–1.3)
GFR SERPL CREATININE-BSD FRML MDRD: 58 ML/MIN/1.73SQ M
GLUCOSE P FAST SERPL-MCNC: 70 MG/DL (ref 65–99)
LDLC SERPL DIRECT ASSAY-MCNC: 62 MG/DL (ref 0–100)
POTASSIUM SERPL-SCNC: 4.9 MMOL/L (ref 3.5–5.3)
PROT SERPL-MCNC: 7.7 G/DL (ref 6.4–8.4)
PSA SERPL-MCNC: 10.11 NG/ML (ref 0–4)
SODIUM SERPL-SCNC: 141 MMOL/L (ref 135–147)
TRIGL SERPL-MCNC: 64 MG/DL

## 2023-08-30 DIAGNOSIS — R41.3 MEMORY DIFFICULTY: ICD-10-CM

## 2023-08-30 RX ORDER — DONEPEZIL HYDROCHLORIDE 5 MG/1
10 TABLET, FILM COATED ORAL
Qty: 90 TABLET | Refills: 0 | Status: SHIPPED | OUTPATIENT
Start: 2023-08-30

## 2023-08-31 ENCOUNTER — TELEPHONE (OUTPATIENT)
Dept: INTERNAL MEDICINE CLINIC | Facility: CLINIC | Age: 83
End: 2023-08-31

## 2023-08-31 NOTE — TELEPHONE ENCOUNTER
Patient has been having bad leg cramps at night for a while and would like some recommendations on what to take or what could you prescribe for them?

## 2023-10-11 ENCOUNTER — OFFICE VISIT (OUTPATIENT)
Dept: PODIATRY | Facility: CLINIC | Age: 83
End: 2023-10-11
Payer: COMMERCIAL

## 2023-10-11 VITALS — HEIGHT: 65 IN | WEIGHT: 137 LBS | BODY MASS INDEX: 22.82 KG/M2

## 2023-10-11 DIAGNOSIS — B35.1 ONYCHOMYCOSIS: ICD-10-CM

## 2023-10-11 DIAGNOSIS — E11.8 DIABETIC FOOT (HCC): Primary | ICD-10-CM

## 2023-10-11 DIAGNOSIS — I73.9 PERIPHERAL ARTERIAL DISEASE (HCC): ICD-10-CM

## 2023-10-11 PROCEDURE — 11720 DEBRIDE NAIL 1-5: CPT | Performed by: PODIATRIST

## 2023-10-11 PROCEDURE — 99203 OFFICE O/P NEW LOW 30 MIN: CPT | Performed by: PODIATRIST

## 2023-10-11 NOTE — PROGRESS NOTES
Diabetic Foot Exam    Patient's shoes and socks removed. Right Foot/Ankle   Right Foot Inspection  Skin Exam: skin normal and skin intact. No dry skin, no warmth, no callus, no erythema, no maceration, no abnormal color, no pre-ulcer, no ulcer and no callus. Sensory   Vibration: diminished  Proprioception: intact  Monofilament testing: intact    Vascular  Capillary refills: elevated  The right DP pulse is 1+. The right PT pulse is 0. Left Foot/Ankle  Left Foot Inspection  Skin Exam: skin normal and skin intact. No dry skin, no warmth, no erythema, no maceration, normal color, no pre-ulcer, no ulcer and no callus. Sensory   Vibration: diminished  Proprioception: intact  Monofilament testing: intact    Vascular  The left DP pulse is 1+. The left PT pulse is 0. Assign Risk Category  No deformity present  No loss of protective sensation  Weak pulses  Risk: 0            PATIENT:  Radha Ansari    1940    ASSESSMENT:     1. Diabetic foot ([address])        2. Peripheral arterial disease ([address])        3. Onychomycosis              PLAN:  1. Patient was counseled on the condition and diagnosis. 2.  Educated disease prevention and risks related to diabetes. 3.  Educated proper daily foot care and exam.  Instructed proper skin care / protection and footwear. Instructed to identify any signs of infection and related foot problem. 4.  The recent blood work was reviewed / discussed and the last HbA1c was 7.7. Discussed proper blood glucose control with diet and exercise. 5.  The patient has *0* risk diabetic foot and will return in 12 months for diabetic foot exam.      Imaging: I have personally reviewed pertinent films in PACS  Labs, pathology, and Other Studies: I have personally reviewed pertinent reports.       High risk  foot precautions reviewed with patient including the need to wear protective shoegear at all times when walking including in the home, the need to check feet all surfaces daily with a mirror and report and skin breaks to podiatry, the need to apply an emollient to skin of feet daily. Diabetic Foot Ulcer Risks    - Between 10-15% of diabetic foot ulcers do not heal.[v]  - Of diabetic foot ulcers that do not heal, 25% will require amputation. [v]    iv Zohaib Taylor., BOOGIE Hinton, Tyrone Schulte., and Precious POSADA, Foot Ulcers in the Diabetic Patient, Prevention and Treatment. Vascular Health Risk Management. 2007 Feb; 3(1): 65-76  v GLO MORALES., HERNANDEZ Scott, SWETHA Bower, YEIMY Ulrich., Ghazala, TAllyT., Risk factors for lower extremity amputation in patients with diabetic foot ulcers: a hospital-based case-control study. Diabetic Foot & Ankle, 2015. 6:10.3402        Procedure: All mycotic toenails were reduced and debrided in length, width, and girth using a nail nipper and dremel. All hyperkeratotic skin lesion(s) were sharply pared with a scalpel with no bleeding or evidence of ulceration. Patient tolerated procedure(s) well without complications. [id number], q8      Subjective:          HPI  The patient presents for diabetic foot evaluation. The patient has diabetes for >20 years. The blood glucose is under control and the last HbA1c was 7.7. The patient denied any history of diabetic foot ulcer or related foot infection. No significant numbness or paresthesia. Denied weakness or significant functional deficit. The patient denied any acute pedal disorder or injury. Denied symptoms of arterial claudication in legs. The patient presents with elongated painful nails he has difficulty cutting himself. The following portions of the patient's history were reviewed and updated as appropriate: allergies, current medications, past family history, past medical history, past social history, past surgical history and problem list.  All pertinent labs and images were reviewed.     Past Medical History  Past Medical History:   Diagnosis Date    Diabetes mellitus ([address])     Hyperlipidemia        Past Surgical History  Past Surgical History:   Procedure Laterality Date    CATARACT EXTRACTION      COLONOSCOPY      DENTAL SURGERY      EYE SURGERY      TONSILLECTOMY      VASECTOMY          Allergies:  Bactrim [sulfamethoxazole-trimethoprim] and Simvastatin    Medications:  Current Outpatient Medications   Medication Sig Dispense Refill    aspirin (ECOTRIN LOW STRENGTH) 81 mg EC tablet Take 81 mg by mouth      atorvastatin (LIPITOR) 20 mg tablet Take 1 tablet (20 mg total) by mouth daily 90 tablet 1    B Complex-C (B COMPLEX-B12-C IJ) Take 1 capsule by mouth if needed      Blood Glucose Monitoring Suppl (OneTouch Verio Reflect) w/Device KIT USE  ONCE DAILY 1 kit 0    Continuous Blood Gluc  (FreeStyle Petra 2 [place]) JAZLYN Check blood sugars multiple times per day 1 each 0    donepezil (ARICEPT) 5 mg tablet TAKE 2 TABLETS BY MOUTH ONCE DAILY AT BEDTIME 90 tablet 0    glimepiride (AMARYL) 4 mg tablet Take 2 tablets (8 mg total) by mouth daily with breakfast 180 tablet 3    glucose blood (OneTouch Verio) test strip USE 1 STRIP TO CHECK GLUCOSE ONCE DAILY 50 each 0    lidocaine (Lidoderm) 5 % Apply 1 patch topically over 12 hours daily Remove & Discard patch within 12 hours or as directed by MD 14 patch 0    linaGLIPtin 5 MG TABS Take 5 mg by mouth daily with or without food 90 tablet 3    lisinopril (ZESTRIL) 5 mg tablet Take 1 tablet (5 mg total) by mouth daily 90 tablet 3    metFORMIN (GLUCOPHAGE) 1000 MG tablet Take 1 tablet (1,000 mg total) by mouth 2 (two) times a day with meals 180 tablet 1    Omega-3 Fatty Acids (fish oil) 1,000 mg Take 1,000 mg by mouth if needed      OneTouch Delica Lancets 56N MISC Test once a day 100 each 5    oxyCODONE (Roxicodone) 5 immediate release tablet Take 1 tablet (5 mg total) by mouth every 6 (six) hours as needed for severe pain for up to 8 doses Max Daily Amount: 20 mg 8 tablet 0    Saw Palmetto, Serenoa repens, (Saw [place] Berries) 540 MG CAPS Take 1 capsule by mouth in the morning      tamsulosin (FLOMAX) 0.4 mg Take 1 capsule (0.4 mg total) by mouth daily with dinner 90 capsule 1    Misc Natural Products (PUMPKIN SEED OIL PO) Take by mouth (Patient not taking: Reported on 4/27/2022)       No current facility-administered medications for this visit. Social History:  Social History     Socioeconomic History    Marital status:      Spouse name: None    Number of children: None    Years of education: None    Highest education level: None   Occupational History    Occupation: retired   Tobacco Use    Smoking status: Never     Passive exposure: Never    Smokeless tobacco: Never   Vaping Use    Vaping Use: Never used   Substance and Sexual Activity    Alcohol use: Not Currently    Drug use: Never    Sexual activity: Not Currently     Partners: Female   Other Topics Concern    None   Social History Narrative        Two children    Lives alone    Retired . Served in the [employer] and deployed to [employer]. Social Determinants of Health     Financial Resource Strain: Low Risk  (10/31/2022)    Overall Financial Resource Strain (CARDIA)     Difficulty of Paying Living Expenses: Not hard at all   Food Insecurity: Not on file   Transportation Needs: No Transportation Needs (10/31/2022)    PRAPARE - Transportation     Lack of Transportation (Medical): No     Lack of Transportation (Non-Medical): No   Physical Activity: Not on file   Stress: Not on file   Social Connections: Not on file   Intimate Partner Violence: Not on file   Housing Stability: Not on file        Review of Systems   Constitutional:  Negative for chills and fever. HENT:  Negative for ear pain and sore throat. Eyes:  Negative for pain and visual disturbance. Respiratory:  Negative for cough and shortness of breath. Cardiovascular:  Negative for chest pain and palpitations. Gastrointestinal:  Negative for abdominal pain and vomiting.    Genitourinary:  Negative for dysuria and hematuria. Musculoskeletal:  Negative for arthralgias and back pain. Skin:  Negative for color change and rash. Neurological:  Negative for seizures and syncope. All other systems reviewed and are negative. Objective:      Ht 5' 5" (1.651 m)   Wt 62.1 kg (137 lb)   BMI 22.80 kg/m²          Physical Exam  Vitals reviewed. Constitutional:       Appearance: Normal appearance. HENT:      Head: Normocephalic and atraumatic. Nose: Nose normal.      Mouth/Throat:      Mouth: Mucous membranes are moist.   Eyes:      Conjunctiva/sclera: Conjunctivae normal.      Pupils: Pupils are equal, round, and reactive to light. Cardiovascular:      Pulses: Pulses are weak. Dorsalis pedis pulses are 1+ on the right side and 1+ on the left side. Posterior tibial pulses are 0 on the right side and 0 on the left side. Musculoskeletal:      Right lower leg: Edema present. Left lower leg: Edema present. Comments: Skin is shiny and atrophic, warm to cool proximal to distal. Nails 1,5 are thickened elongated with significant subunugal debris. Feet:      Right foot:      Skin integrity: No ulcer, skin breakdown, erythema, warmth, callus or dry skin. Left foot:      Skin integrity: No ulcer, skin breakdown, erythema, warmth, callus or dry skin. Skin:     Capillary Refill: Capillary refill takes more than 3 seconds. Neurological:      General: No focal deficit present. Mental Status: He is alert and oriented to person, place, and time. Mental status is at baseline.

## 2023-11-01 ENCOUNTER — RA CDI HCC (OUTPATIENT)
Dept: OTHER | Facility: HOSPITAL | Age: 83
End: 2023-11-01

## 2023-11-01 NOTE — PROGRESS NOTES
E11.65  [address] [facility] coding opportunities          Chart Reviewed number of suggestions sent to Provider: 1     Patients Insurance     Medicare Insurance: [employer]

## 2023-11-22 NOTE — PATIENT INSTRUCTIONS
Basic Carbohydrate Counting   AMBULATORY CARE:   Carbohydrate counting  is a way to plan your meals by counting the amount of carbohydrate in foods. Carbohydrates are the sugars, starches, and fiber found in fruit, grains, vegetables, and milk products. Carbohydrates increase your blood sugar levels. Carbohydrate counting can help you eat the right amount of carbohydrate to keep your blood sugar levels under control. What you need to know about planning meals using carbohydrate counting:  A dietitian or healthcare provider will help you develop a healthy meal plan that works best for you. You will be taught how much carbohydrate to eat or drink for each meal and snack. Your meal plan will be based on your age, weight, usual food intake, and physical activity level. If you have diabetes, it will also include your blood sugar levels and diabetes medicine. Once you know how much carbohydrate you should eat, you can decide what type of food you want to eat. You will need to know what foods contain carbohydrate and how much they contain. Keep track of the amount of carbohydrate in meals and snacks in order to follow your meal plan. Do not avoid carbohydrates or skip meals. Your blood sugar may fall too low if you do not eat enough carbohydrate or you skip meals. Foods that contain carbohydrate:   Breads:  Each serving of food listed below contains about 15 g of carbohydrate . 1 slice of bread (1 ounce) or 1 flour or corn tortilla (6 inch)    ½ of a hamburger bun or ¼ of a large bagel (about 1 ounce)    1 pancake (about 4 inches across and ¼ inch thick)    Cereals and grains:  Serving sizes of ready-to-eat cereals vary. Look at the serving size and the total carbohydrate amount listed on the food label. Each serving of food listed below contains about 15 g of carbohydrate .     ¾ cup of dry, unsweetened, ready-to-eat cereal or ¼ cup of low-fat granola     ½ cup of oatmeal or other cooked cereal     ? cup of cooked rice or pasta    Starchy vegetables and beans:  Each serving of food listed below contains about 15 g of carbohydrate . ½ cup of corn, green peas, sweet potatoes, or mashed potatoes    ¼ of a large baked potato    ½ cup of beans, lentils, and peas (garbanzo, krishnan, kidney, white, split, black-eyed)    Crackers and snacks:  Each serving of food listed below contains about 15 g of carbohydrate . 3 miky cracker squares or 8 animal crackers     6 saltine-type crackers    3 cups of popcorn or ¾ ounce of pretzels, potato chips, or tortilla chips    Fruit:  Each serving of food listed below contains about 15 g of carbohydrate . 1 small (4 ounce) piece of fresh fruit or ¾ to 1 cup of fresh fruit    ½ cup of canned or frozen fruit, packed in natural juice    ½ cup (4 ounces) of unsweetened fruit juice    2 tablespoons of dried fruit    Desserts or sugary foods:  Each serving of food listed below contains about 15 g of carbohydrate . 2-inch square unfrosted cake or brownie     2 small cookies    ½ cup of ice cream, frozen yogurt, or nondairy frozen yogurt    ¼ cup of sherbet or sorbet    1 tablespoon of regular syrup, jam, or jelly    2 tablespoons of light syrup    Milk and yogurt:  Foods from the milk group contain about 12 g of carbohydrate per serving. 1 cup of fat-free or low-fat milk    1 cup of soy milk    ? cup of fat-free, yogurt sweetened with artificial sweetener    Non-starchy vegetables:  Each serving contains about 5 g of carbohydrate . Three servings of non-starch vegetables count as 1 carbohydrate serving. ½ cup of cooked vegetables or 1 cup of raw vegetables. This includes beets, broccoli, cabbage, cauliflower, cucumber, mushrooms, tomatoes, and zucchini    ½ cup of vegetable juice    How to use carbohydrate counting to plan meals:   Count carbohydrate amounts using serving sizes:      Pasta dinner example: You plan to have pasta, tossed salad, and an 8-ounce glass of milk.  Your healthcare provider tells you that you may have 4 carbohydrate servings for dinner. One carbohydrate serving of pasta is ? cup. One cup of pasta will equal 3 carbohydrate servings. An 8-ounce glass of milk will count as 1 carbohydrate serving. These amounts of food would equal 4 carbohydrate servings. One cup of tossed salad does not count toward your carbohydrate servings. Count carbohydrate amounts using food labels:  Find the total amount of carbohydrate in a packaged food by reading the food label. Food labels tell you the serving size of the food and the total carbohydrate amount in each serving. Find the serving size on the food label and then decide how many servings you will eat. Multiply the number of servings you plan to eat by the carbohydrate amount per serving. Granola bar snack example: Your meal plan allows you to have 2 carbohydrate servings (30 grams) of carbohydrate for a snack. You plan to eat 1 package of granola bars, which contains 2 bars. According to the food label, the serving size of food in this package is 1 bar. Each serving (1 bar) contains 25 grams of carbohydrate. The total amount of carbohydrate in this package of granola bars would be 50 g. Based on your meal plan, you should eat only 1 bar. Follow up with your doctor as directed:  Write down your questions so you remember to ask them during your visits. © Copyright Haylee Desouza 2023 Information is for End User's use only and may not be sold, redistributed or otherwise used for commercial purposes. The above information is an  only. It is not intended as medical advice for individual conditions or treatments. Talk to your doctor, nurse or pharmacist before following any medical regimen to see if it is safe and effective for you. Medicare Preventive Visit Patient Instructions  Thank you for completing your Welcome to Medicare Visit or Medicare Annual Wellness Visit today.  Your next wellness visit will be due in one year (12/1/2024). The screening/preventive services that you may require over the next 5-10 years are detailed below. Some tests may not apply to you based off risk factors and/or age. Screening tests ordered at today's visit but not completed yet may show as past due. Also, please note that scanned in results may not display below. Preventive Screenings:  Service Recommendations Previous Testing/Comments   Colorectal Cancer Screening  Colonoscopy    Fecal Occult Blood Test (FOBT)/Fecal Immunochemical Test (FIT)  Fecal DNA/Cologuard Test  Flexible Sigmoidoscopy Age: 43-73 years old   Colonoscopy: every 10 years (May be performed more frequently if at higher risk)  OR  FOBT/FIT: every 1 year  OR  Cologuard: every 3 years  OR  Sigmoidoscopy: every 5 years  Screening may be recommended earlier than age 39 if at higher risk for colorectal cancer. Also, an individualized decision between you and your healthcare provider will decide whether screening between the ages of 77-80 would be appropriate. Colonoscopy: Not on file  FOBT/FIT: Not on file  Cologuard: Not on file  Sigmoidoscopy: Not on file    Screening Not Indicated     Prostate Cancer Screening Individualized decision between patient and health care provider in men between ages of 53-66   Medicare will cover every 12 months beginning on the day after your 50th birthday PSA: 10.11 ng/mL     Screening Not Indicated     Hepatitis C Screening Once for adults born between 1945 and 1965  More frequently in patients at high risk for Hepatitis C Hep C Antibody: 09/16/2021    Screening Current   Diabetes Screening 1-2 times per year if you're at risk for diabetes or have pre-diabetes Fasting glucose: 70 mg/dL (8/17/2023)  A1C: 7.7 (8/8/2023)  Screening Not Indicated  History Diabetes  Due for Blood Glucose   Cholesterol Screening Once every 5 years if you don't have a lipid disorder. May order more often based on risk factors.  Lipid panel: 12/01/2022  Screening Not Indicated  History Lipid Disorder  Due for Lipid Panel      Other Preventive Screenings Covered by Medicare:  Abdominal Aortic Aneurysm (AAA) Screening: covered once if your at risk. You're considered to be at risk if you have a family history of AAA or a male between the age of 70-76 who smoking at least 100 cigarettes in your lifetime. Lung Cancer Screening: covers low dose CT scan once per year if you meet all of the following conditions: (1) Age 48-67; (2) No signs or symptoms of lung cancer; (3) Current smoker or have quit smoking within the last 15 years; (4) You have a tobacco smoking history of at least 20 pack years (packs per day x number of years you smoked); (5) You get a written order from a healthcare provider. Glaucoma Screening: covered annually if you're considered high risk: (1) You have diabetes OR (2) Family history of glaucoma OR (3)  aged 48 and older OR (3)  American aged 72 and older  Osteoporosis Screening: covered every 2 years if you meet one of the following conditions: (1) Have a vertebral abnormality; (2) On glucocorticoid therapy for more than 3 months; (3) Have primary hyperparathyroidism; (4) On osteoporosis medications and need to assess response to drug therapy. HIV Screening: covered annually if you're between the age of 14-79. Also covered annually if you are younger than 13 and older than 72 with risk factors for HIV infection. For pregnant patients, it is covered up to 3 times per pregnancy.     Immunizations:  Immunization Recommendations   Influenza Vaccine Annual influenza vaccination during flu season is recommended for all persons aged >= 6 months who do not have contraindications   Pneumococcal Vaccine   * Pneumococcal conjugate vaccine = PCV13 (Prevnar 13), PCV15 (Vaxneuvance), PCV20 (Prevnar 20)  * Pneumococcal polysaccharide vaccine = PPSV23 (Pneumovax) Adults 69-34 yo with certain risk factors or if 69+ yo  If never received any pneumonia vaccine: recommend Prevnar 21 (PCV20)  Give PCV20 if previously received 1 dose of PCV13 or PPSV23   Hepatitis B Vaccine 3 dose series if at intermediate or high risk (ex: diabetes, end stage renal disease, liver disease)   Respiratory syncytial virus (RSV) Vaccine - COVERED BY MEDICARE PART D  * RSVPreF3 (Arexvy) CDC recommends that adults 61years of age and older may receive a single dose of RSV vaccine using shared clinical decision-making (SCDM)   Tetanus (Td) Vaccine - COST NOT COVERED BY MEDICARE PART B Following completion of primary series, a booster dose should be given every 10 years to maintain immunity against tetanus. Td may also be given as tetanus wound prophylaxis. Tdap Vaccine - COST NOT COVERED BY MEDICARE PART B Recommended at least once for all adults. For pregnant patients, recommended with each pregnancy. Shingles Vaccine (Shingrix) - COST NOT COVERED BY MEDICARE PART B  2 shot series recommended in those 19 years and older who have or will have weakened immune systems or those 50 years and older     Health Maintenance Due:      Topic Date Due   • Hepatitis C Screening  Completed     Immunizations Due:      Topic Date Due   • COVID-19 Vaccine (3 - Pfizer series) 06/17/2021   • Influenza Vaccine (1) 09/01/2023     Advance Directives   What are advance directives? Advance directives are legal documents that state your wishes and plans for medical care. These plans are made ahead of time in case you lose your ability to make decisions for yourself. Advance directives can apply to any medical decision, such as the treatments you want, and if you want to donate organs. What are the types of advance directives? There are many types of advance directives, and each state has rules about how to use them. You may choose a combination of any of the following:  Living will: This is a written record of the treatment you want.  You can also choose which treatments you do not want, which to limit, and which to stop at a certain time. This includes surgery, medicine, IV fluid, and tube feedings. Durable power of  for [facility]): This is a written record that states who you want to make healthcare choices for you when you are unable to make them for yourself. This person, called a proxy, is usually a family member or a friend. You may choose more than 1 proxy. Do not resuscitate (DNR) order:  A DNR order is used in case your heart stops beating or you stop breathing. It is a request not to have certain forms of treatment, such as CPR. A DNR order may be included in other types of advance directives. Medical directive: This covers the care that you want if you are in a coma, near death, or unable to make decisions for yourself. You can list the treatments you want for each condition. Treatment may include pain medicine, surgery, blood transfusions, dialysis, IV or tube feedings, and a ventilator (breathing machine). Values history: This document has questions about your views, beliefs, and how you feel and think about life. This information can help others choose the care that you would choose. Why are advance directives important? An advance directive helps you control your care. Although spoken wishes may be used, it is better to have your wishes written down. Spoken wishes can be misunderstood, or not followed. Treatments may be given even if you do not want them. An advance directive may make it easier for your family to make difficult choices about your care.    Narcotic (Opioid) Safety    Use narcotics safely:  Take prescribed narcotics exactly as directed  Do not give narcotics to others or take narcotics that belong to someone else  Do not mix narcotics without medicines or alcohol  Do not drive or operate heavy machinery after you take the narcotic  Monitor for side effects and notify your healthcare provider if you experienced side effects such as nausea, sleepiness, itching, or trouble thinking clearly. Manage constipation:    Constipation is the most common side effect of narcotic medicine. Constipation is when you have hard, dry bowel movements, or you go longer than usual between bowel movements. Tell your healthcare provider about all changes in your bowel movements while you are taking narcotics. He or she may recommend laxative medicine to help you have a bowel movement. He or she may also change the kind of narcotic you are taking, or change when you take it. The following are more ways you can prevent or relieve constipation:    Drink liquids as directed. You may need to drink extra liquids to help soften and move your bowels. Ask how much liquid to drink each day and which liquids are best for you. Eat high-fiber foods. This may help decrease constipation by adding bulk to your bowel movements. High-fiber foods include fruits, vegetables, whole-grain breads and cereals, and beans. Your healthcare provider or dietitian can help you create a high-fiber meal plan. Your provider may also recommend a fiber supplement if you cannot get enough fiber from food. Exercise regularly. Regular physical activity can help stimulate your intestines. Walking is a good exercise to prevent or relieve constipation. Ask which exercises are best for you. Schedule a time each day to have a bowel movement. This may help train your body to have regular bowel movements. Bend forward while you are on the toilet to help move the bowel movement out. Sit on the toilet for at least 10 minutes, even if you do not have a bowel movement. Store narcotics safely:   Store narcotics where others cannot easily get them. Keep them in a locked cabinet or secure area. Do not  keep them in a purse or other bag you carry with you. A person may be looking for something else and find the narcotics. Make sure narcotics are stored out of the reach of children.   A child can easily overdose on narcotics. Narcotics may look like candy to a small child. The best way to dispose of narcotics: The laws vary by country and area. In the [facility], the best way is to return the narcotics through a take-back program. This program is offered by the [employer] The following are options for using the program:  Take the narcotics to a [employer] collection site. The site is often a law enforcement center. Call your local law enforcement center for scheduled take-back days in your area. You will be given information on where to go if the collection site is in a different location. Take the narcotics to an approved pharmacy or hospital.  A pharmacy or hospital may be set up as a collection site. You will need to ask if it is a [employer] collection site if you were not directed there. A pharmacy or doctor's office may not be able to take back narcotics unless it is a [employer] site. Use a mail-back system. This means you are given containers to put the narcotics into. You will then mail them in the containers. Use a take-back drop box. This is a place to leave the narcotics at any time. People and animals will not be able to get into the box. Your local law enforcement agency can tell you where to find a drop box in your area. Other ways to manage pain:   Ask your healthcare provider about non-narcotic medicines to control pain. Nonprescription medicines include NSAIDs (such as ibuprofen) and acetaminophen. Prescription medicines include muscle relaxers, antidepressants, and steroids. Pain may be managed without any medicines. Some ways to relieve pain include massage, aromatherapy, or meditation. Physical or occupational therapy may also help. For more information:   Drug Enforcement Administration  [address] , [address]  Phone: [phone]  Web Address: [email or web address] [email or web address]/    [address] [facility]  [address] [facility] [address] [facility] , [address] [place] [address]  Phone: [phone]- [phone]  Web Address: http://[employer]/     © Copyright [address] 2018 Information is for End User's use only and may not be sold, redistributed or otherwise used for commercial purposes. All illustrations and images included in CareNotes® are the copyrighted property of [employer]. or [facility] Preventive Visit Patient Instructions  Thank you for completing your Welcome to Medicare Visit or Medicare Annual Wellness Visit today. Your next wellness visit will be due in one year (12/1/2024). The screening/preventive services that you may require over the next 5-10 years are detailed below. Some tests may not apply to you based off risk factors and/or age. Screening tests ordered at today's visit but not completed yet may show as past due. Also, please note that scanned in results may not display below. Preventive Screenings:  Service Recommendations Previous Testing/Comments   Colorectal Cancer Screening  Colonoscopy    Fecal Occult Blood Test (FOBT)/Fecal Immunochemical Test (FIT)  Fecal DNA/Cologuard Test  Flexible Sigmoidoscopy Age: 43-73 years old   Colonoscopy: every 10 years (May be performed more frequently if at higher risk)  OR  FOBT/FIT: every 1 year  OR  Cologuard: every 3 years  OR  Sigmoidoscopy: every 5 years  Screening may be recommended earlier than age 39 if at higher risk for colorectal cancer. Also, an individualized decision between you and your healthcare provider will decide whether screening between the ages of 77-80 would be appropriate.  Colonoscopy: Not on file  FOBT/FIT: Not on file  Cologuard: Not on file  Sigmoidoscopy: Not on file    Screening Not Indicated     Prostate Cancer Screening Individualized decision between patient and health care provider in men between ages of 53-66   Medicare will cover every 12 months beginning on the day after your 50th birthday PSA: 10.11 ng/mL     Screening Not Indicated     Hepatitis C Screening Once for adults born between 1945 and 1965  More frequently in patients at high risk for Hepatitis C Hep C Antibody: 09/16/2021    Screening Current   Diabetes Screening 1-2 times per year if you're at risk for diabetes or have pre-diabetes Fasting glucose: 70 mg/dL (8/17/2023)  A1C: 7.7 (8/8/2023)  Screening Not Indicated  History Diabetes  Due for Blood Glucose   Cholesterol Screening Once every 5 years if you don't have a lipid disorder. May order more often based on risk factors. Lipid panel: 12/01/2022  Screening Not Indicated  History Lipid Disorder  Due for Lipid Panel      Other Preventive Screenings Covered by Medicare:  Abdominal Aortic Aneurysm (AAA) Screening: covered once if your at risk. You're considered to be at risk if you have a family history of AAA or a male between the age of 70-76 who smoking at least 100 cigarettes in your lifetime. Lung Cancer Screening: covers low dose CT scan once per year if you meet all of the following conditions: (1) Age 48-67; (2) No signs or symptoms of lung cancer; (3) Current smoker or have quit smoking within the last 15 years; (4) You have a tobacco smoking history of at least 20 pack years (packs per day x number of years you smoked); (5) You get a written order from a healthcare provider. Glaucoma Screening: covered annually if you're considered high risk: (1) You have diabetes OR (2) Family history of glaucoma OR (3)  aged 48 and older OR (3)  American aged 72 and older  Osteoporosis Screening: covered every 2 years if you meet one of the following conditions: (1) Have a vertebral abnormality; (2) On glucocorticoid therapy for more than 3 months; (3) Have primary hyperparathyroidism; (4) On osteoporosis medications and need to assess response to drug therapy. HIV Screening: covered annually if you're between the age of 14-79.  Also covered annually if you are younger than 13 and older than 72 with risk factors for HIV infection. For pregnant patients, it is covered up to 3 times per pregnancy. Immunizations:  Immunization Recommendations   Influenza Vaccine Annual influenza vaccination during flu season is recommended for all persons aged >= 6 months who do not have contraindications   Pneumococcal Vaccine   * Pneumococcal conjugate vaccine = PCV13 (Prevnar 13), PCV15 (Vaxneuvance), PCV20 (Prevnar 20)  * Pneumococcal polysaccharide vaccine = PPSV23 (Pneumovax) Adults 01-88 yo with certain risk factors or if 69+ yo  If never received any pneumonia vaccine: recommend Prevnar 20 (PCV20)  Give PCV20 if previously received 1 dose of PCV13 or PPSV23   Hepatitis B Vaccine 3 dose series if at intermediate or high risk (ex: diabetes, end stage renal disease, liver disease)   Respiratory syncytial virus (RSV) Vaccine - COVERED BY MEDICARE PART D  * RSVPreF3 (Arexvy) CDC recommends that adults 61years of age and older may receive a single dose of RSV vaccine using shared clinical decision-making (SCDM)   Tetanus (Td) Vaccine - COST NOT COVERED BY MEDICARE PART B Following completion of primary series, a booster dose should be given every 10 years to maintain immunity against tetanus. Td may also be given as tetanus wound prophylaxis. Tdap Vaccine - COST NOT COVERED BY MEDICARE PART B Recommended at least once for all adults. For pregnant patients, recommended with each pregnancy. Shingles Vaccine (Shingrix) - COST NOT COVERED BY MEDICARE PART B  2 shot series recommended in those 19 years and older who have or will have weakened immune systems or those 50 years and older     Health Maintenance Due:      Topic Date Due   • Hepatitis C Screening  Completed     Immunizations Due:      Topic Date Due   • COVID-19 Vaccine (3 - Pfizer series) 06/17/2021   • Influenza Vaccine (1) 09/01/2023     Advance Directives   What are advance directives?   Advance directives are legal documents that state your wishes and plans for medical care. These plans are made ahead of time in case you lose your ability to make decisions for yourself. Advance directives can apply to any medical decision, such as the treatments you want, and if you want to donate organs. What are the types of advance directives? There are many types of advance directives, and each state has rules about how to use them. You may choose a combination of any of the following:  Living will: This is a written record of the treatment you want. You can also choose which treatments you do not want, which to limit, and which to stop at a certain time. This includes surgery, medicine, IV fluid, and tube feedings. Durable power of  for healthcare [facility]): This is a written record that states who you want to make healthcare choices for you when you are unable to make them for yourself. This person, called a proxy, is usually a family member or a friend. You may choose more than 1 proxy. Do not resuscitate (DNR) order:  A DNR order is used in case your heart stops beating or you stop breathing. It is a request not to have certain forms of treatment, such as CPR. A DNR order may be included in other types of advance directives. Medical directive: This covers the care that you want if you are in a coma, near death, or unable to make decisions for yourself. You can list the treatments you want for each condition. Treatment may include pain medicine, surgery, blood transfusions, dialysis, IV or tube feedings, and a ventilator (breathing machine). Values history: This document has questions about your views, beliefs, and how you feel and think about life. This information can help others choose the care that you would choose. Why are advance directives important? An advance directive helps you control your care. Although spoken wishes may be used, it is better to have your wishes written down. Spoken wishes can be misunderstood, or not followed.  Treatments may be given even if you do not want them. An advance directive may make it easier for your family to make difficult choices about your care. Narcotic (Opioid) Safety    Use narcotics safely:  Take prescribed narcotics exactly as directed  Do not give narcotics to others or take narcotics that belong to someone else  Do not mix narcotics without medicines or alcohol  Do not drive or operate heavy machinery after you take the narcotic  Monitor for side effects and notify your healthcare provider if you experienced side effects such as nausea, sleepiness, itching, or trouble thinking clearly. Manage constipation:    Constipation is the most common side effect of narcotic medicine. Constipation is when you have hard, dry bowel movements, or you go longer than usual between bowel movements. Tell your healthcare provider about all changes in your bowel movements while you are taking narcotics. He or she may recommend laxative medicine to help you have a bowel movement. He or she may also change the kind of narcotic you are taking, or change when you take it. The following are more ways you can prevent or relieve constipation:    Drink liquids as directed. You may need to drink extra liquids to help soften and move your bowels. Ask how much liquid to drink each day and which liquids are best for you. Eat high-fiber foods. This may help decrease constipation by adding bulk to your bowel movements. High-fiber foods include fruits, vegetables, whole-grain breads and cereals, and beans. Your healthcare provider or dietitian can help you create a high-fiber meal plan. Your provider may also recommend a fiber supplement if you cannot get enough fiber from food. Exercise regularly. Regular physical activity can help stimulate your intestines. Walking is a good exercise to prevent or relieve constipation. Ask which exercises are best for you. Schedule a time each day to have a bowel movement.   This may help train your body to have regular bowel movements. Bend forward while you are on the toilet to help move the bowel movement out. Sit on the toilet for at least 10 minutes, even if you do not have a bowel movement. Store narcotics safely:   Store narcotics where others cannot easily get them. Keep them in a locked cabinet or secure area. Do not  keep them in a purse or other bag you carry with you. A person may be looking for something else and find the narcotics. Make sure narcotics are stored out of the reach of children. A child can easily overdose on narcotics. Narcotics may look like candy to a small child. The best way to dispose of narcotics: The laws vary by country and area. In the [facility], the best way is to return the narcotics through a take-back program. This program is offered by the [employer] ([employer]). The following are options for using the program:  Take the narcotics to a [employer] collection site. The site is often a law enforcement center. Call your local law enforcement center for scheduled take-back days in your area. You will be given information on where to go if the collection site is in a different location. Take the narcotics to an approved pharmacy or hospital.  A pharmacy or hospital may be set up as a collection site. You will need to ask if it is a [employer] collection site if you were not directed there. A pharmacy or doctor's office may not be able to take back narcotics unless it is a [employer] site. Use a mail-back system. This means you are given containers to put the narcotics into. You will then mail them in the containers. Use a take-back drop box. This is a place to leave the narcotics at any time. People and animals will not be able to get into the box. Your local law enforcement agency can tell you where to find a drop box in your area. Other ways to manage pain:   Ask your healthcare provider about non-narcotic medicines to control pain.   Nonprescription medicines include NSAIDs (such as ibuprofen) and acetaminophen. Prescription medicines include muscle relaxers, antidepressants, and steroids. Pain may be managed without any medicines. Some ways to relieve pain include massage, aromatherapy, or meditation. Physical or occupational therapy may also help. For more information:   Drug Enforcement Administration  [address]  Phone: [phone]  Web Address: [facility].. [facility] [email or web address]_[email or web address]    [address] [place] [address]  Phone: 5- [phone]  Web Address: http://[employer]/     © Copyright [employer] 2018 Information is for End User's use only and may not be sold, redistributed or otherwise used for commercial purposes.  All illustrations and images included in CareNotes® are the copyrighted property of [employer]D.A[employer]M., Inc. or [address] [place] [address]

## 2023-12-01 ENCOUNTER — OFFICE VISIT (OUTPATIENT)
Dept: INTERNAL MEDICINE CLINIC | Facility: CLINIC | Age: 83
End: 2023-12-01
Payer: COMMERCIAL

## 2023-12-01 VITALS
OXYGEN SATURATION: 92 % | HEART RATE: 59 BPM | TEMPERATURE: 97 F | BODY MASS INDEX: 21.99 KG/M2 | DIASTOLIC BLOOD PRESSURE: 68 MMHG | HEIGHT: 65 IN | SYSTOLIC BLOOD PRESSURE: 122 MMHG | WEIGHT: 132 LBS

## 2023-12-01 DIAGNOSIS — Z23 ENCOUNTER FOR VACCINATION: ICD-10-CM

## 2023-12-01 DIAGNOSIS — N18.30 STAGE 3 CHRONIC KIDNEY DISEASE, UNSPECIFIED WHETHER STAGE 3A OR 3B CKD (HCC): ICD-10-CM

## 2023-12-01 DIAGNOSIS — I51.89 DIASTOLIC DYSFUNCTION: ICD-10-CM

## 2023-12-01 DIAGNOSIS — E78.2 MIXED HYPERLIPIDEMIA: ICD-10-CM

## 2023-12-01 DIAGNOSIS — H40.1131 PRIMARY OPEN ANGLE GLAUCOMA (POAG) OF BOTH EYES, MILD STAGE: ICD-10-CM

## 2023-12-01 DIAGNOSIS — R45.1 AGITATION: ICD-10-CM

## 2023-12-01 DIAGNOSIS — Z00.00 MEDICARE ANNUAL WELLNESS VISIT, SUBSEQUENT: ICD-10-CM

## 2023-12-01 DIAGNOSIS — G70.00 MYASTHENIA GRAVIS WITHOUT EXACERBATION (HCC): ICD-10-CM

## 2023-12-01 DIAGNOSIS — E11.319 TYPE 2 DIABETES MELLITUS WITH RETINOPATHY OF BOTH EYES, WITHOUT LONG-TERM CURRENT USE OF INSULIN, MACULAR EDEMA PRESENCE UNSPECIFIED, UNSPECIFIED RETINOPATHY SEVERITY (HCC): Primary | ICD-10-CM

## 2023-12-01 DIAGNOSIS — R41.3 MEMORY DIFFICULTY: ICD-10-CM

## 2023-12-01 DIAGNOSIS — I10 ESSENTIAL HYPERTENSION: ICD-10-CM

## 2023-12-01 DIAGNOSIS — N40.0 BENIGN PROSTATIC HYPERPLASIA WITHOUT LOWER URINARY TRACT SYMPTOMS: ICD-10-CM

## 2023-12-01 LAB — SL AMB POCT HEMOGLOBIN AIC: 6.9 (ref ?–6.5)

## 2023-12-01 PROCEDURE — G0008 ADMIN INFLUENZA VIRUS VAC: HCPCS

## 2023-12-01 PROCEDURE — 90662 IIV NO PRSV INCREASED AG IM: CPT

## 2023-12-01 PROCEDURE — G0439 PPPS, SUBSEQ VISIT: HCPCS

## 2023-12-01 PROCEDURE — 83036 HEMOGLOBIN GLYCOSYLATED A1C: CPT | Performed by: INTERNAL MEDICINE

## 2023-12-01 PROCEDURE — 99214 OFFICE O/P EST MOD 30 MIN: CPT | Performed by: INTERNAL MEDICINE

## 2023-12-01 RX ORDER — FLUOXETINE HYDROCHLORIDE 20 MG/1
20 CAPSULE ORAL EVERY MORNING
Qty: 90 CAPSULE | Refills: 1 | Status: SHIPPED | OUTPATIENT
Start: 2023-12-01

## 2023-12-01 NOTE — PROGRESS NOTES
Assessment and Plan:     Problem List Items Addressed This Visit        Endocrine    Type 2 diabetes mellitus with retinopathy, without long-term current use of insulin ([address]) - Primary    Relevant Orders    POCT hemoglobin A1c (Completed)       Cardiovascular and Mediastinum    Essential hypertension       Nervous and Auditory    Myasthenia gravis without exacerbation (HCC)       Genitourinary    Stage 3 chronic kidney disease, unspecified whether stage 3a or 3b CKD (HCC)    Benign prostatic hyperplasia without lower urinary tract symptoms       Other    Primary open angle glaucoma (POAG) of both eyes, mild stage    Mixed hyperlipidemia    Memory difficulty    Diastolic dysfunction   Other Visit Diagnoses     Medicare annual wellness visit, subsequent        Encounter for vaccination        Relevant Orders    influenza vaccine, high-dose, PF 0.5 mL    Agitation        Relevant Medications    FLUoxetine (PROzac) 20 mg capsule          Depression Screening and Follow-up Plan: Patient was screened for depression during today's encounter. They screened negative with a PHQ-2 score of 0. Preventive health issues were discussed with patient, and age appropriate screening tests were ordered as noted in patient's After Visit Summary. Personalized health advice and appropriate referrals for health education or preventive services given if needed, as noted in patient's After Visit Summary.      History of Present Illness:     Patient presents for a Medicare Wellness Visit    HPI   Patient Care Team:  Freeman Hogan DO as PCP - General (Internal Medicine)  Trevor Langley as Nurse Practitioner (Urology)     Review of Systems:     Review of Systems     Problem List:     Patient Active Problem List   Diagnosis   • Type 2 diabetes mellitus with retinopathy, without long-term current use of insulin ([address])   • Myasthenia gravis without exacerbation ([address])   • Mixed hyperlipidemia   • Retinopathy   • Primary open angle glaucoma (POAG) of both eyes, mild stage   • Ptosis of both eyelids   • Benign prostatic hyperplasia without lower urinary tract symptoms   • Essential hypertension   • Other age-related cataract   • Memory difficulty   • Mild aortic stenosis   • Mild mitral regurgitation   • Diastolic dysfunction   • Mild concentric left ventricular hypertrophy (LVH)   • Mild protein-calorie malnutrition (HCC)   • Dysphagia   • Stage 3 chronic kidney disease, unspecified whether stage 3a or 3b CKD (HCC)      Past Medical and Surgical History:     Past Medical History:   Diagnosis Date   • Diabetes mellitus ([address])    • Hyperlipidemia      Past Surgical History:   Procedure Laterality Date   • CATARACT EXTRACTION     • COLONOSCOPY     • DENTAL SURGERY     • EYE SURGERY     • TONSILLECTOMY     • VASECTOMY        Family History:     History reviewed. No pertinent family history. Social History:     Social History     Socioeconomic History   • Marital status:      Spouse name: None   • Number of children: None   • Years of education: None   • Highest education level: None   Occupational History   • Occupation: retired   Tobacco Use   • Smoking status: Never     Passive exposure: Never   • Smokeless tobacco: Never   Vaping Use   • Vaping Use: Never used   Substance and Sexual Activity   • Alcohol use: Not Currently   • Drug use: Never   • Sexual activity: Not Currently     Partners: Female   Other Topics Concern   • None   Social History Narrative        Two children    Lives alone    Retired . Served in the [employer] and deployed to [facility]. Social Determinants of Health     Financial Resource Strain: Low Risk  (12/1/2023)    Overall Financial Resource Strain (CARDIA)    • Difficulty of Paying Living Expenses: Not hard at all   Food Insecurity: Not on file   Transportation Needs: No Transportation Needs (12/1/2023)    PRAPARE - Transportation    • Lack of Transportation (Medical):  No    • Lack of Transportation (Non-Medical):  No   Physical Activity: Not on file   Stress: Not on file   Social Connections: Not on file   Intimate Partner Violence: Not on file   Housing Stability: Not on file      Medications and Allergies:     Current Outpatient Medications   Medication Sig Dispense Refill   • FLUoxetine (PROzac) 20 mg capsule Take 1 capsule (20 mg total) by mouth every morning 90 capsule 1   • aspirin (ECOTRIN LOW STRENGTH) 81 mg EC tablet Take 81 mg by mouth     • atorvastatin (LIPITOR) 20 mg tablet Take 1 tablet (20 mg total) by mouth daily 90 tablet 1   • B Complex-C (B COMPLEX-B12-C IJ) Take 1 capsule by mouth if needed     • Blood Glucose Monitoring Suppl (OneTouch Verio Reflect) w/Device KIT USE  ONCE DAILY 1 kit 0   • Continuous Blood Gluc  (FreeStyle Petra 2 [place]) JAZLYN Check blood sugars multiple times per day 1 each 0   • donepezil (ARICEPT) 5 mg tablet TAKE 2 TABLETS BY MOUTH ONCE DAILY AT BEDTIME 90 tablet 0   • glimepiride (AMARYL) 4 mg tablet Take 2 tablets (8 mg total) by mouth daily with breakfast 180 tablet 3   • glucose blood (OneTouch Verio) test strip USE 1 STRIP TO CHECK GLUCOSE ONCE DAILY 50 each 0   • lidocaine (Lidoderm) 5 % Apply 1 patch topically over 12 hours daily Remove & Discard patch within 12 hours or as directed by MD 14 patch 0   • linaGLIPtin 5 MG TABS Take 5 mg by mouth daily with or without food 90 tablet 3   • lisinopril (ZESTRIL) 5 mg tablet Take 1 tablet (5 mg total) by mouth daily 90 tablet 3   • metFORMIN (GLUCOPHAGE) 1000 MG tablet Take 1 tablet (1,000 mg total) by mouth 2 (two) times a day with meals 180 tablet 1   • Misc Natural Products (PUMPKIN SEED OIL PO) Take by mouth (Patient not taking: Reported on 4/27/2022)     • Omega-3 Fatty Acids (fish oil) 1,000 mg Take 1,000 mg by mouth if needed     • OneTouch Delica Lancets 87A MISC Test once a day 100 each 5   • oxyCODONE (Roxicodone) 5 immediate release tablet Take 1 tablet (5 mg total) by mouth every 6 (six) hours as needed for severe pain for up to 8 doses Max Daily Amount: 20 mg 8 tablet 0   • Saw Palmetto, Serenoa repens, (Saw Alsip Berries) 540 MG CAPS Take 1 capsule by mouth in the morning     • tamsulosin (FLOMAX) 0.4 mg Take 1 capsule (0.4 mg total) by mouth daily with dinner 90 capsule 1     No current facility-administered medications for this visit. Allergies   Allergen Reactions   • Bactrim [Sulfamethoxazole-Trimethoprim] GI Intolerance   • Simvastatin Myalgia and Other (See Comments)      Immunizations:     Immunization History   Administered Date(s) Administered   • COVID-19 PFIZER VACCINE 0.3 ML IM 04/01/2021, 04/22/2021   • H1N1, All Formulations 12/31/2009   • INFLUENZA 01/01/2002, 08/04/2003, 09/15/2003, 12/10/2004, 10/25/2005, 10/31/2006, 10/11/2007, 11/16/2007, 10/07/2008, 10/13/2009, 09/23/2010, 10/17/2011, 10/16/2012, 10/01/2016, 10/01/2017, 10/13/2018, 10/01/2019, 09/07/2020, 10/31/2022   • Influenza, high dose seasonal 0.7 mL 09/15/2021, 10/31/2022   • Pneumococcal 12/10/2004, 12/31/2009   • Pneumococcal Conjugate 13-Valent 06/01/2016, 09/15/2021   • Pneumococcal Conjugate Vaccine 20-valent (Pcv20), Polysace 08/09/2023   • Tdap 05/19/2023   • Zoster 09/18/2014      Health Maintenance:         Topic Date Due   • Hepatitis C Screening  Completed         Topic Date Due   • COVID-19 Vaccine (3 - Pfizer series) 06/17/2021   • Influenza Vaccine (1) 09/01/2023      Medicare Screening Tests and Risk Assessments:     Everette Rivera is here for his Subsequent Wellness visit. Last Medicare Wellness visit information reviewed, patient interviewed and updates made to the record today. Health Risk Assessment:   Patient rates overall health as very good. Patient feels that their physical health rating is same. Patient is satisfied with their life. Eyesight was rated as slightly worse. Hearing was rated as same. Patient feels that their emotional and mental health rating is same.  Patients states they are sometimes angry. Patient states they are sometimes unusually tired/fatigued. Pain experienced in the last 7 days has been none. Patient states that he has experienced no weight loss or gain in last 6 months. Depression Screening:   PHQ-2 Score: 0      Fall Risk Screening: In the past year, patient has experienced: no history of falling in past year      Home Safety:  Patient does not have trouble with stairs inside or outside of their home. Patient has working smoke alarms and has working carbon monoxide detector. Home safety hazards include: none. Nutrition:   Current diet is Regular. Medications:   Patient is currently taking over-the-counter supplements. OTC medications include: see medication list. Patient is able to manage medications. Activities of Daily Living (ADLs)/Instrumental Activities of Daily Living (IADLs):   Walk and transfer into and out of bed and chair?: Yes  Dress and groom yourself?: Yes    Bathe or shower yourself?: Yes    Feed yourself? Yes  Do your laundry/housekeeping?: Yes  Manage your money, pay your bills and track your expenses?: Yes  Make your own meals?: Yes    Do your own shopping?: Yes    Previous Hospitalizations:   Any hospitalizations or ED visits within the last 12 months?: No      Advance Care Planning:   Living will: Yes    Durable POA for healthcare: Yes    Advanced directive: Yes    Advanced directive counseling given: Yes    ACP document given: Yes    Patient declined ACP directive: No    End of Life Decisions reviewed with patient: Yes    Provider agrees with end of life decisions: Yes      Comments: Will discuss further once pt reviews info given today.     Cognitive Screening:   Provider or family/friend/caregiver concerned regarding cognition?: No    PREVENTIVE SCREENINGS      Cardiovascular Screening:    General: Screening Not Indicated, History Lipid Disorder and Screening Current      Diabetes Screening:     General: Screening Not Indicated, History Diabetes and Screening Current      Colorectal Cancer Screening:     General: Screening Not Indicated      Prostate Cancer Screening:    General: Screening Not Indicated      Osteoporosis Screening:    General: Patient Declines      Abdominal Aortic Aneurysm (AAA) Screening:        General: Screening Not Indicated      Lung Cancer Screening:     General: Screening Not Indicated      Hepatitis C Screening:    General: Screening Current    Screening, Brief Intervention, and Referral to Treatment (SBIRT)    Screening  Typical number of drinks in a day: 0  Typical number of drinks in a week: 0  Interpretation: Low risk drinking behavior. Single Item Drug Screening:  How often have you used an illegal drug (including marijuana) or a prescription medication for non-medical reasons in the past year? never    Single Item Drug Screen Score: 0  Interpretation: Negative screen for possible drug use disorder    Review of Current Opioid Use  Opioid Risk Tool (ORT) Score: 0  Opioid Risk Tool (ORT) Interpretation: Score 0-3: Low risk for opioid misuse    Other Counseling Topics:   Car/seat belt/driving safety, sunscreen and calcium and vitamin D intake and regular weightbearing exercise. No results found. Physical Exam:     /68   Pulse 59   Temp (!) 97 °F (36.1 °C)   Ht 5' 5" (1.651 m)   Wt 59.9 kg (132 lb)   SpO2 92%   BMI 21.97 kg/m²     Physical Exam   A/P: Doing well and no falls reported. Denies depression and feels safe at home. Diverse diet. Not driving, but uses seat belts. Has a living will and POA. No DME or referrals needed today. RTC one year for medicare wellness.      Gume Lee, DO

## 2023-12-01 NOTE — PROGRESS NOTES
Depression Screening and Follow-up Plan: Patient was screened for depression during today's encounter. They screened negative with a PHQ-2 score of 0. Assessment/Plan:  Problem List Items Addressed This Visit        Endocrine    Type 2 diabetes mellitus with retinopathy, without long-term current use of insulin ([address]) - Primary    Relevant Orders    POCT hemoglobin A1c (Completed)       Cardiovascular and Mediastinum    Essential hypertension       Nervous and Auditory    Myasthenia gravis without exacerbation (HCC)       Genitourinary    Stage 3 chronic kidney disease, unspecified whether stage 3a or 3b CKD (HCC)    Benign prostatic hyperplasia without lower urinary tract symptoms       Other    Primary open angle glaucoma (POAG) of both eyes, mild stage    Mixed hyperlipidemia    Memory difficulty    Diastolic dysfunction   Other Visit Diagnoses     Medicare annual wellness visit, subsequent        Encounter for vaccination        Relevant Orders    influenza vaccine, high-dose, PF 0.5 mL (Completed)    Agitation        Relevant Medications    FLUoxetine (PROzac) 20 mg capsule           Diagnoses and all orders for this visit:    Type 2 diabetes mellitus with retinopathy of both eyes, without long-term current use of insulin, macular edema presence unspecified, unspecified retinopathy severity ([address])  -     POCT hemoglobin A1c    Medicare annual wellness visit, subsequent    Essential hypertension    Myasthenia gravis without exacerbation ([address])    Benign prostatic hyperplasia without lower urinary tract symptoms    Diastolic dysfunction    Memory difficulty    Mixed hyperlipidemia    Primary open angle glaucoma (POAG) of both eyes, mild stage    Stage 3 chronic kidney disease, unspecified whether stage 3a or 3b CKD ([address])    Encounter for vaccination  -     influenza vaccine, high-dose, PF 0.5 mL    Agitation  -     FLUoxetine (PROzac) 20 mg capsule;  Take 1 capsule (20 mg total) by mouth every morning        No problem-specific Assessment & Plan notes found for this encounter. A/P: Doing ok except for the mood issues. Family ? rexulti. Will try prozac and ?depakote if needed. Will up date his flu vaccine. BMI is ok and will continue current program. Labs up to date with HgA1c was ok at 6.9. Continue current treatment and RTC one month for mood/agitation. Subjective:      Patient ID: Seven Connors is a 81 y.o. male. WM RTC for f/u DM, HTN, etc. Doing ok and no new issues, but family secretly reports pt has been nasty and agitated towards them. No new meds or dietary changes. No swallowing issues. MG is stable and no flares. Vision is no worse. MCI progressing. Denies CP, SOB, palpitations, edema, orthopnea or PND. Due for labs and vaccines. The following portions of the patient's history were reviewed and updated as appropriate:   He has a past medical history of Diabetes mellitus ([address]) and Hyperlipidemia.,  does not have any pertinent problems on file. ,   has a past surgical history that includes Tonsillectomy; Eye surgery; Cataract extraction; Dental surgery; Vasectomy; and Colonoscopy. ,  family history is not on file. ,   reports that he has never smoked. He has never been exposed to tobacco smoke. He has never used smokeless tobacco. He reports that he does not currently use alcohol. He reports that he does not use drugs. ,  is allergic to bactrim [sulfamethoxazole-trimethoprim] and simvastatin. .  Current Outpatient Medications   Medication Sig Dispense Refill   • FLUoxetine (PROzac) 20 mg capsule Take 1 capsule (20 mg total) by mouth every morning 90 capsule 1   • aspirin (ECOTRIN LOW STRENGTH) 81 mg EC tablet Take 81 mg by mouth     • atorvastatin (LIPITOR) 20 mg tablet Take 1 tablet (20 mg total) by mouth daily 90 tablet 1   • B Complex-C (B COMPLEX-B12-C IJ) Take 1 capsule by mouth if needed     • Blood Glucose Monitoring Suppl (OneTouch Verio Reflect) w/Device KIT USE  ONCE DAILY 1 kit 0   • Continuous Blood Gluc  (FreeStyle Petra 2 [place]) JAZLYN Check blood sugars multiple times per day 1 each 0   • donepezil (ARICEPT) 5 mg tablet TAKE 2 TABLETS BY MOUTH ONCE DAILY AT BEDTIME 90 tablet 0   • glimepiride (AMARYL) 4 mg tablet Take 2 tablets (8 mg total) by mouth daily with breakfast 180 tablet 3   • glucose blood (OneTouch Verio) test strip USE 1 STRIP TO CHECK GLUCOSE ONCE DAILY 50 each 0   • lidocaine (Lidoderm) 5 % Apply 1 patch topically over 12 hours daily Remove & Discard patch within 12 hours or as directed by MD 14 patch 0   • linaGLIPtin 5 MG TABS Take 5 mg by mouth daily with or without food 90 tablet 3   • lisinopril (ZESTRIL) 5 mg tablet Take 1 tablet (5 mg total) by mouth daily 90 tablet 3   • metFORMIN (GLUCOPHAGE) 1000 MG tablet Take 1 tablet (1,000 mg total) by mouth 2 (two) times a day with meals 180 tablet 1   • Misc Natural Products (PUMPKIN SEED OIL PO) Take by mouth (Patient not taking: Reported on 4/27/2022)     • Omega-3 Fatty Acids (fish oil) 1,000 mg Take 1,000 mg by mouth if needed     • OneTouch Delica Lancets 71V MISC Test once a day 100 each 5   • oxyCODONE (Roxicodone) 5 immediate release tablet Take 1 tablet (5 mg total) by mouth every 6 (six) hours as needed for severe pain for up to 8 doses Max Daily Amount: 20 mg 8 tablet 0   • Saw Palmetto, Serenoa repens, (Saw [place] Berries) 540 MG CAPS Take 1 capsule by mouth in the morning     • tamsulosin (FLOMAX) 0.4 mg Take 1 capsule (0.4 mg total) by mouth daily with dinner 90 capsule 1     No current facility-administered medications for this visit. Review of Systems   Constitutional:  Negative for activity change, chills, diaphoresis, fatigue and fever. HENT: Negative. Eyes:  Negative for visual disturbance. Respiratory:  Negative for cough, chest tightness, shortness of breath and wheezing. Cardiovascular:  Negative for chest pain, palpitations and leg swelling.    Gastrointestinal:  Negative for abdominal pain, constipation, diarrhea, nausea and vomiting. Endocrine: Negative for cold intolerance and heat intolerance. Genitourinary:  Negative for difficulty urinating, dysuria and frequency. Musculoskeletal:  Negative for arthralgias, gait problem and myalgias. Neurological:  Negative for dizziness, seizures, syncope, weakness, light-headedness and headaches. Psychiatric/Behavioral:  Positive for confusion. Negative for dysphoric mood and sleep disturbance. The patient is not nervous/anxious. PHQ-2/9 Depression Screening    Little interest or pleasure in doing things: 0 - not at all  Feeling down, depressed, or hopeless: 0 - not at all  PHQ-2 Score: 0  PHQ-2 Interpretation: Negative depression screen        Objective:  Vitals:    12/01/23 1039   BP: 122/68   Pulse: 59   Temp: (!) 97 °F (36.1 °C)   SpO2: 92%   Weight: 59.9 kg (132 lb)   Height: 5' 5" (1.651 m)     Body mass index is 21.97 kg/m². Physical Exam  Vitals and nursing note reviewed. Constitutional:       General: He is not in acute distress. Appearance: Normal appearance. He is not ill-appearing. HENT:      Head: Normocephalic and atraumatic. Mouth/Throat:      Mouth: Mucous membranes are moist.   Eyes:      Extraocular Movements: Extraocular movements intact. Conjunctiva/sclera: Conjunctivae normal.      Pupils: Pupils are equal, round, and reactive to light. Neck:      Vascular: No carotid bruit. Cardiovascular:      Rate and Rhythm: Normal rate and regular rhythm. Heart sounds: Murmur heard. Pulmonary:      Effort: Pulmonary effort is normal. No respiratory distress. Breath sounds: Normal breath sounds. No wheezing, rhonchi or rales. Abdominal:      General: Bowel sounds are normal. There is no distension. Palpations: Abdomen is soft. Tenderness: There is no abdominal tenderness. Musculoskeletal:      Cervical back: Neck supple. Right lower leg: No edema.       Left lower leg: No edema. Neurological:      General: No focal deficit present. Mental Status: He is alert and oriented to person, place, and time. Mental status is at baseline. Psychiatric:         Mood and Affect: Mood normal.         Behavior: Behavior normal.         Thought Content:  Thought content normal.         Judgment: Judgment normal.

## 2023-12-13 ENCOUNTER — OFFICE VISIT (OUTPATIENT)
Dept: PODIATRY | Facility: CLINIC | Age: 83
End: 2023-12-13
Payer: COMMERCIAL

## 2023-12-13 VITALS
WEIGHT: 132 LBS | BODY MASS INDEX: 21.99 KG/M2 | HEIGHT: 65 IN | DIASTOLIC BLOOD PRESSURE: 64 MMHG | SYSTOLIC BLOOD PRESSURE: 99 MMHG | HEART RATE: 83 BPM

## 2023-12-13 DIAGNOSIS — I73.9 PERIPHERAL ARTERIAL DISEASE (HCC): Primary | ICD-10-CM

## 2023-12-13 DIAGNOSIS — E11.8 DIABETIC FOOT (HCC): ICD-10-CM

## 2023-12-13 DIAGNOSIS — B35.1 ONYCHOMYCOSIS: ICD-10-CM

## 2023-12-13 PROCEDURE — 11720 DEBRIDE NAIL 1-5: CPT | Performed by: PODIATRIST

## 2023-12-13 NOTE — PROGRESS NOTES
Clem Bolaños  1940  AT RISK FOOT CARE    1. Peripheral arterial disease ([address])        2. Onychomycosis        [facility]. [facility]            Patient presents for at-risk foot care. Patient has no acute concerns today. Patient has significant lower extremity risk due to diminished pulses in the feet and trophic skin changes to the lower extremity including thick toenail, atrophic skin, and decreased hair growth. On exam patient has thickened, hypertrophic, discolored, brittle toenails with subungual debris and tenderness x4   Callus: 0  Patient has diminished pedal pulses and decreased perfusion to the lower extremities  Patient has significant trophic changes to the skin including thick toenails, decreased pedal hair and atrophic skin. Today's treatment includes:  Debridement of toenails. Using nail nipper, parker, and curette, nails were sharply debrided, reduced in thickness and length. Devitalized nail tissue and fungal debris excised and removed. Patient tolerated well. Discussed proper shoe gear, daily inspections of feet, and general foot health with patient. Patient has Q8  findings and is recommended for at risk foot care every 9-10 weeks.

## 2024-01-04 ENCOUNTER — OFFICE VISIT (OUTPATIENT)
Dept: INTERNAL MEDICINE CLINIC | Facility: CLINIC | Age: 84
End: 2024-01-04
Payer: COMMERCIAL

## 2024-01-04 VITALS
HEIGHT: 65 IN | OXYGEN SATURATION: 99 % | WEIGHT: 132 LBS | DIASTOLIC BLOOD PRESSURE: 64 MMHG | SYSTOLIC BLOOD PRESSURE: 120 MMHG | BODY MASS INDEX: 21.99 KG/M2 | HEART RATE: 60 BPM | TEMPERATURE: 96.7 F

## 2024-01-04 DIAGNOSIS — R45.1 AGITATION: Primary | ICD-10-CM

## 2024-01-04 DIAGNOSIS — R45.4 ANGER: ICD-10-CM

## 2024-01-04 PROCEDURE — 99213 OFFICE O/P EST LOW 20 MIN: CPT | Performed by: INTERNAL MEDICINE

## 2024-01-04 NOTE — PROGRESS NOTES
Assessment/Plan:  Problem List Items Addressed This Visit    None  Visit Diagnoses     Agitation    -  Primary    Anger               Diagnoses and all orders for this visit:    Agitation    Anger        No problem-specific Assessment & Plan notes found for this encounter.    A/P: Stable and tolerating the meds. Seems to be doing better, but without family/caretaker input, hard to know for sure. Will continue current prozac dose. If s/s worsen, consider increasing the prozac, adding seroquel bid, rexulti if covered or depakote if anger becomes more of an issue. RTC two months for routine.     Subjective:      Patient ID: Israel Hughes is a 83 y.o. male.    WM RTC for f/u mood/agitation per his family after starting prozac. Tolerating the med. No family accompanied the pt again today. Reports doing ok and denies any significant agitation or anger. No new issues. .        The following portions of the patient's history were reviewed and updated as appropriate:   He has a past medical history of Diabetes mellitus (HCC) and Hyperlipidemia.,  does not have any pertinent problems on file.,   has a past surgical history that includes Tonsillectomy; Eye surgery; Cataract extraction; Dental surgery; Vasectomy; and Colonoscopy.,  family history is not on file.,   reports that he has never smoked. He has never been exposed to tobacco smoke. He has never used smokeless tobacco. He reports that he does not currently use alcohol. He reports that he does not use drugs.,  is allergic to bactrim [sulfamethoxazole-trimethoprim] and simvastatin..  Current Outpatient Medications   Medication Sig Dispense Refill   • aspirin (ECOTRIN LOW STRENGTH) 81 mg EC tablet Take 81 mg by mouth     • atorvastatin (LIPITOR) 20 mg tablet Take 1 tablet (20 mg total) by mouth daily 90 tablet 1   • B Complex-C (B COMPLEX-B12-C IJ) Take 1 capsule by mouth if needed     • Blood Glucose Monitoring Suppl (OneTouch Verio Reflect) w/Device KIT USE  ONCE  DAILY 1 kit 0   • Continuous Blood Gluc  (FreeStyle Petra 2 [place]) JAZLYN Check blood sugars multiple times per day 1 each 0   • donepezil (ARICEPT) 5 mg tablet TAKE 2 TABLETS BY MOUTH ONCE DAILY AT BEDTIME 90 tablet 0   • FLUoxetine (PROzac) 20 mg capsule Take 1 capsule (20 mg total) by mouth every morning 90 capsule 1   • glimepiride (AMARYL) 4 mg tablet Take 2 tablets (8 mg total) by mouth daily with breakfast 180 tablet 3   • glucose blood (OneTouch Verio) test strip USE 1 STRIP TO CHECK GLUCOSE ONCE DAILY 50 each 0   • linaGLIPtin 5 MG TABS Take 5 mg by mouth daily with or without food 90 tablet 3   • lisinopril (ZESTRIL) 5 mg tablet Take 1 tablet (5 mg total) by mouth daily 90 tablet 3   • metFORMIN (GLUCOPHAGE) 1000 MG tablet Take 1 tablet (1,000 mg total) by mouth 2 (two) times a day with meals 180 tablet 1   • Omega-3 Fatty Acids (fish oil) 1,000 mg Take 1,000 mg by mouth if needed     • OneTouch Delica Lancets 33G MISC Test once a day 100 each 5   • oxyCODONE (Roxicodone) 5 immediate release tablet Take 1 tablet (5 mg total) by mouth every 6 (six) hours as needed for severe pain for up to 8 doses Max Daily Amount: 20 mg 8 tablet 0   • Saw Palmetto, Serenoa repens, (Saw [place] Berries) 540 MG CAPS Take 1 capsule by mouth in the morning     • tamsulosin (FLOMAX) 0.4 mg Take 1 capsule (0.4 mg total) by mouth daily with dinner 90 capsule 1   • lidocaine (Lidoderm) 5 % Apply 1 patch topically over 12 hours daily Remove & Discard patch within 12 hours or as directed by MD (Patient not taking: Reported on 1/4/2024) 14 patch 0   • Misc Natural Products (PUMPKIN SEED OIL PO) Take by mouth (Patient not taking: Reported on 4/27/2022)       No current facility-administered medications for this visit.       Review of Systems   Constitutional:  Negative for activity change, chills, diaphoresis, fatigue and fever.   Respiratory:  Negative for cough, chest tightness, shortness of breath and wheezing.   "  Cardiovascular:  Negative for chest pain, palpitations and leg swelling.   Gastrointestinal:  Negative for abdominal pain, constipation, diarrhea, nausea and vomiting.   Genitourinary:  Negative for difficulty urinating, dysuria and frequency.   Musculoskeletal:  Negative for arthralgias, gait problem and myalgias.   Neurological:  Negative for light-headedness and headaches.   Psychiatric/Behavioral:  Negative for agitation, behavioral problems, confusion, decreased concentration, dysphoric mood, hallucinations, self-injury, sleep disturbance and suicidal ideas. The patient is not nervous/anxious and is not hyperactive.        PHQ-2/9 Depression Screening    Little interest or pleasure in doing things: 0 - not at all  Feeling down, depressed, or hopeless: 0 - not at all  PHQ-2 Score: 0  PHQ-2 Interpretation: Negative depression screen        Objective:  Vitals:    01/04/24 1022   BP: 120/64   Pulse: 60   Temp: (!) 96.7 °F (35.9 °C)   TempSrc: Tympanic   SpO2: 99%   Weight: 59.9 kg (132 lb)   Height: 5' 5\" (1.651 m)     Body mass index is 21.97 kg/m².     Physical Exam  Vitals and nursing note reviewed.   Constitutional:       General: He is not in acute distress.     Appearance: Normal appearance. He is not ill-appearing.   HENT:      Head: Normocephalic and atraumatic.      Mouth/Throat:      Mouth: Mucous membranes are moist.   Eyes:      Extraocular Movements: Extraocular movements intact.      Conjunctiva/sclera: Conjunctivae normal.      Pupils: Pupils are equal, round, and reactive to light.   Cardiovascular:      Rate and Rhythm: Normal rate and regular rhythm.      Heart sounds: Normal heart sounds. No murmur heard.  Pulmonary:      Effort: Pulmonary effort is normal. No respiratory distress.      Breath sounds: Normal breath sounds. No wheezing, rhonchi or rales.   Abdominal:      General: Bowel sounds are normal. There is no distension.      Palpations: Abdomen is soft.      Tenderness: There is no " abdominal tenderness.   Neurological:      General: No focal deficit present.      Mental Status: He is alert and oriented to person, place, and time. Mental status is at baseline.   Psychiatric:         Mood and Affect: Mood normal.         Behavior: Behavior normal.         Thought Content: Thought content normal.         Judgment: Judgment normal.

## 2024-01-19 ENCOUNTER — HOSPITAL ENCOUNTER (INPATIENT)
Facility: HOSPITAL | Age: 84
LOS: 4 days | Discharge: NURSING, REHAB OR LONG-TERM CARE | DRG: 307 | End: 2024-01-24
Attending: EMERGENCY MEDICINE | Admitting: INTERNAL MEDICINE
Payer: COMMERCIAL

## 2024-01-19 ENCOUNTER — APPOINTMENT (EMERGENCY)
Dept: RADIOLOGY | Facility: HOSPITAL | Age: 84
DRG: 307 | End: 2024-01-19
Payer: COMMERCIAL

## 2024-01-19 DIAGNOSIS — S22.49XA RIB FRACTURES: ICD-10-CM

## 2024-01-19 DIAGNOSIS — I50.9 CHF EXACERBATION (HCC): Primary | ICD-10-CM

## 2024-01-19 LAB
2HR DELTA HS TROPONIN: 0 NG/L
ANION GAP SERPL CALCULATED.3IONS-SCNC: 9 MMOL/L
BASOPHILS # BLD AUTO: 0.02 THOUSANDS/ÂΜL (ref 0–0.1)
BASOPHILS NFR BLD AUTO: 0 % (ref 0–1)
BNP SERPL-MCNC: 212 PG/ML (ref 0–100)
BUN SERPL-MCNC: 21 MG/DL (ref 5–25)
CALCIUM SERPL-MCNC: 8.9 MG/DL (ref 8.4–10.2)
CARDIAC TROPONIN I PNL SERPL HS: 5 NG/L
CARDIAC TROPONIN I PNL SERPL HS: 5 NG/L
CHLORIDE SERPL-SCNC: 107 MMOL/L (ref 96–108)
CO2 SERPL-SCNC: 26 MMOL/L (ref 21–32)
CREAT SERPL-MCNC: 1.01 MG/DL (ref 0.6–1.3)
EOSINOPHIL # BLD AUTO: 0.07 THOUSAND/ÂΜL (ref 0–0.61)
EOSINOPHIL NFR BLD AUTO: 1 % (ref 0–6)
ERYTHROCYTE [DISTWIDTH] IN BLOOD BY AUTOMATED COUNT: 13.2 % (ref 11.6–15.1)
GFR SERPL CREATININE-BSD FRML MDRD: 68 ML/MIN/1.73SQ M
GLUCOSE SERPL-MCNC: 69 MG/DL (ref 65–140)
GLUCOSE SERPL-MCNC: 81 MG/DL (ref 65–140)
HCT VFR BLD AUTO: 33.2 % (ref 36.5–49.3)
HGB BLD-MCNC: 10.7 G/DL (ref 12–17)
IMM GRANULOCYTES # BLD AUTO: 0.02 THOUSAND/UL (ref 0–0.2)
IMM GRANULOCYTES NFR BLD AUTO: 0 % (ref 0–2)
LYMPHOCYTES # BLD AUTO: 1.46 THOUSANDS/ÂΜL (ref 0.6–4.47)
LYMPHOCYTES NFR BLD AUTO: 25 % (ref 14–44)
MCH RBC QN AUTO: 31 PG (ref 26.8–34.3)
MCHC RBC AUTO-ENTMCNC: 32.2 G/DL (ref 31.4–37.4)
MCV RBC AUTO: 96 FL (ref 82–98)
MONOCYTES # BLD AUTO: 0.62 THOUSAND/ÂΜL (ref 0.17–1.22)
MONOCYTES NFR BLD AUTO: 11 % (ref 4–12)
NEUTROPHILS # BLD AUTO: 3.62 THOUSANDS/ÂΜL (ref 1.85–7.62)
NEUTS SEG NFR BLD AUTO: 63 % (ref 43–75)
NRBC BLD AUTO-RTO: 0 /100 WBCS
PLATELET # BLD AUTO: 146 THOUSANDS/UL (ref 149–390)
PMV BLD AUTO: 9.5 FL (ref 8.9–12.7)
POTASSIUM SERPL-SCNC: 3.8 MMOL/L (ref 3.5–5.3)
RBC # BLD AUTO: 3.45 MILLION/UL (ref 3.88–5.62)
SODIUM SERPL-SCNC: 142 MMOL/L (ref 135–147)
WBC # BLD AUTO: 5.81 THOUSAND/UL (ref 4.31–10.16)

## 2024-01-19 PROCEDURE — 84484 ASSAY OF TROPONIN QUANT: CPT | Performed by: EMERGENCY MEDICINE

## 2024-01-19 PROCEDURE — 99285 EMERGENCY DEPT VISIT HI MDM: CPT

## 2024-01-19 PROCEDURE — 36415 COLL VENOUS BLD VENIPUNCTURE: CPT | Performed by: EMERGENCY MEDICINE

## 2024-01-19 PROCEDURE — 93005 ELECTROCARDIOGRAM TRACING: CPT

## 2024-01-19 PROCEDURE — 83880 ASSAY OF NATRIURETIC PEPTIDE: CPT | Performed by: EMERGENCY MEDICINE

## 2024-01-19 PROCEDURE — 82948 REAGENT STRIP/BLOOD GLUCOSE: CPT

## 2024-01-19 PROCEDURE — 85025 COMPLETE CBC W/AUTO DIFF WBC: CPT | Performed by: EMERGENCY MEDICINE

## 2024-01-19 PROCEDURE — 71045 X-RAY EXAM CHEST 1 VIEW: CPT

## 2024-01-19 PROCEDURE — 80048 BASIC METABOLIC PNL TOTAL CA: CPT | Performed by: EMERGENCY MEDICINE

## 2024-01-19 PROCEDURE — 99285 EMERGENCY DEPT VISIT HI MDM: CPT | Performed by: EMERGENCY MEDICINE

## 2024-01-19 RX ORDER — INSULIN LISPRO 100 [IU]/ML
1-5 INJECTION, SOLUTION INTRAVENOUS; SUBCUTANEOUS
Status: DISCONTINUED | OUTPATIENT
Start: 2024-01-20 | End: 2024-01-24 | Stop reason: HOSPADM

## 2024-01-19 RX ORDER — DONEPEZIL HYDROCHLORIDE 10 MG/1
10 TABLET, FILM COATED ORAL
Status: DISCONTINUED | OUTPATIENT
Start: 2024-01-19 | End: 2024-01-24 | Stop reason: HOSPADM

## 2024-01-19 RX ORDER — ACETAMINOPHEN 325 MG/1
650 TABLET ORAL EVERY 4 HOURS PRN
Status: DISCONTINUED | OUTPATIENT
Start: 2024-01-19 | End: 2024-01-24 | Stop reason: HOSPADM

## 2024-01-19 RX ORDER — POTASSIUM CHLORIDE 20 MEQ/1
20 TABLET, EXTENDED RELEASE ORAL DAILY
Status: DISCONTINUED | OUTPATIENT
Start: 2024-01-20 | End: 2024-01-24 | Stop reason: HOSPADM

## 2024-01-19 RX ORDER — INSULIN LISPRO 100 [IU]/ML
1-5 INJECTION, SOLUTION INTRAVENOUS; SUBCUTANEOUS
Status: DISCONTINUED | OUTPATIENT
Start: 2024-01-19 | End: 2024-01-24 | Stop reason: HOSPADM

## 2024-01-19 RX ORDER — FUROSEMIDE 10 MG/ML
40 INJECTION INTRAMUSCULAR; INTRAVENOUS DAILY
Status: DISCONTINUED | OUTPATIENT
Start: 2024-01-20 | End: 2024-01-21

## 2024-01-19 RX ORDER — CHLORAL HYDRATE 500 MG
1000 CAPSULE ORAL DAILY
Status: DISCONTINUED | OUTPATIENT
Start: 2024-01-20 | End: 2024-01-24 | Stop reason: HOSPADM

## 2024-01-19 RX ORDER — ONDANSETRON 2 MG/ML
4 INJECTION INTRAMUSCULAR; INTRAVENOUS EVERY 6 HOURS PRN
Status: DISCONTINUED | OUTPATIENT
Start: 2024-01-19 | End: 2024-01-24 | Stop reason: HOSPADM

## 2024-01-19 RX ORDER — ATORVASTATIN CALCIUM 20 MG/1
20 TABLET, FILM COATED ORAL DAILY
Status: DISCONTINUED | OUTPATIENT
Start: 2024-01-20 | End: 2024-01-24 | Stop reason: HOSPADM

## 2024-01-19 RX ORDER — LISINOPRIL 5 MG/1
5 TABLET ORAL DAILY
Status: DISCONTINUED | OUTPATIENT
Start: 2024-01-20 | End: 2024-01-24 | Stop reason: HOSPADM

## 2024-01-19 RX ORDER — FUROSEMIDE 10 MG/ML
40 INJECTION INTRAMUSCULAR; INTRAVENOUS ONCE
Status: COMPLETED | OUTPATIENT
Start: 2024-01-19 | End: 2024-01-19

## 2024-01-19 RX ORDER — HEPARIN SODIUM 5000 [USP'U]/ML
5000 INJECTION, SOLUTION INTRAVENOUS; SUBCUTANEOUS EVERY 8 HOURS SCHEDULED
Status: DISCONTINUED | OUTPATIENT
Start: 2024-01-19 | End: 2024-01-24 | Stop reason: HOSPADM

## 2024-01-19 RX ORDER — SODIUM CHLORIDE 9 MG/ML
3 INJECTION INTRAVENOUS
Status: DISCONTINUED | OUTPATIENT
Start: 2024-01-19 | End: 2024-01-24 | Stop reason: HOSPADM

## 2024-01-19 RX ORDER — FLUOXETINE HYDROCHLORIDE 20 MG/1
20 CAPSULE ORAL EVERY MORNING
Status: DISCONTINUED | OUTPATIENT
Start: 2024-01-20 | End: 2024-01-24 | Stop reason: HOSPADM

## 2024-01-19 RX ORDER — TAMSULOSIN HYDROCHLORIDE 0.4 MG/1
0.4 CAPSULE ORAL
Status: DISCONTINUED | OUTPATIENT
Start: 2024-01-20 | End: 2024-01-24 | Stop reason: HOSPADM

## 2024-01-19 RX ADMIN — FUROSEMIDE 40 MG: 10 INJECTION, SOLUTION INTRAMUSCULAR; INTRAVENOUS at 19:24

## 2024-01-19 RX ADMIN — HEPARIN SODIUM 5000 UNITS: 5000 INJECTION INTRAVENOUS; SUBCUTANEOUS at 22:48

## 2024-01-19 RX ADMIN — DONEPEZIL HYDROCHLORIDE 10 MG: 10 TABLET ORAL at 22:48

## 2024-01-20 PROBLEM — E87.70 VOLUME OVERLOAD: Status: ACTIVE | Noted: 2024-01-20

## 2024-01-20 PROBLEM — I50.31 ACUTE DIASTOLIC CONGESTIVE HEART FAILURE (HCC): Status: ACTIVE | Noted: 2024-01-20

## 2024-01-20 LAB
4HR DELTA HS TROPONIN: 0 NG/L
ANION GAP SERPL CALCULATED.3IONS-SCNC: 8 MMOL/L
ATRIAL RATE: 59 BPM
ATRIAL RATE: 65 BPM
BUN SERPL-MCNC: 17 MG/DL (ref 5–25)
CALCIUM SERPL-MCNC: 9.4 MG/DL (ref 8.4–10.2)
CARDIAC TROPONIN I PNL SERPL HS: 5 NG/L
CHLORIDE SERPL-SCNC: 102 MMOL/L (ref 96–108)
CO2 SERPL-SCNC: 30 MMOL/L (ref 21–32)
CREAT SERPL-MCNC: 0.98 MG/DL (ref 0.6–1.3)
ERYTHROCYTE [DISTWIDTH] IN BLOOD BY AUTOMATED COUNT: 13 % (ref 11.6–15.1)
GFR SERPL CREATININE-BSD FRML MDRD: 71 ML/MIN/1.73SQ M
GLUCOSE SERPL-MCNC: 108 MG/DL (ref 65–140)
GLUCOSE SERPL-MCNC: 119 MG/DL (ref 65–140)
GLUCOSE SERPL-MCNC: 137 MG/DL (ref 65–140)
GLUCOSE SERPL-MCNC: 167 MG/DL (ref 65–140)
GLUCOSE SERPL-MCNC: 228 MG/DL (ref 65–140)
HCT VFR BLD AUTO: 37.6 % (ref 36.5–49.3)
HGB BLD-MCNC: 12.1 G/DL (ref 12–17)
MCH RBC QN AUTO: 30.4 PG (ref 26.8–34.3)
MCHC RBC AUTO-ENTMCNC: 32.2 G/DL (ref 31.4–37.4)
MCV RBC AUTO: 95 FL (ref 82–98)
P AXIS: 15 DEGREES
P AXIS: 28 DEGREES
PLATELET # BLD AUTO: 155 THOUSANDS/UL (ref 149–390)
PMV BLD AUTO: 9.5 FL (ref 8.9–12.7)
POTASSIUM SERPL-SCNC: 3.7 MMOL/L (ref 3.5–5.3)
PR INTERVAL: 150 MS
PR INTERVAL: 166 MS
QRS AXIS: 20 DEGREES
QRS AXIS: 40 DEGREES
QRSD INTERVAL: 84 MS
QRSD INTERVAL: 88 MS
QT INTERVAL: 398 MS
QT INTERVAL: 430 MS
QTC INTERVAL: 413 MS
QTC INTERVAL: 425 MS
RBC # BLD AUTO: 3.98 MILLION/UL (ref 3.88–5.62)
SODIUM SERPL-SCNC: 140 MMOL/L (ref 135–147)
T WAVE AXIS: 6 DEGREES
T WAVE AXIS: 8 DEGREES
VENTRICULAR RATE: 59 BPM
VENTRICULAR RATE: 65 BPM
WBC # BLD AUTO: 6.16 THOUSAND/UL (ref 4.31–10.16)

## 2024-01-20 PROCEDURE — 99222 1ST HOSP IP/OBS MODERATE 55: CPT | Performed by: NURSE PRACTITIONER

## 2024-01-20 PROCEDURE — 93010 ELECTROCARDIOGRAM REPORT: CPT | Performed by: INTERNAL MEDICINE

## 2024-01-20 PROCEDURE — 99223 1ST HOSP IP/OBS HIGH 75: CPT | Performed by: INTERNAL MEDICINE

## 2024-01-20 PROCEDURE — 85027 COMPLETE CBC AUTOMATED: CPT | Performed by: PHYSICIAN ASSISTANT

## 2024-01-20 PROCEDURE — 97163 PT EVAL HIGH COMPLEX 45 MIN: CPT

## 2024-01-20 PROCEDURE — 80048 BASIC METABOLIC PNL TOTAL CA: CPT | Performed by: PHYSICIAN ASSISTANT

## 2024-01-20 PROCEDURE — 82948 REAGENT STRIP/BLOOD GLUCOSE: CPT

## 2024-01-20 RX ADMIN — ATORVASTATIN CALCIUM 20 MG: 20 TABLET, FILM COATED ORAL at 09:08

## 2024-01-20 RX ADMIN — OMEGA-3 FATTY ACIDS CAP 1000 MG 1000 MG: 1000 CAP at 09:08

## 2024-01-20 RX ADMIN — HEPARIN SODIUM 5000 UNITS: 5000 INJECTION INTRAVENOUS; SUBCUTANEOUS at 21:45

## 2024-01-20 RX ADMIN — FUROSEMIDE 40 MG: 10 INJECTION, SOLUTION INTRAMUSCULAR; INTRAVENOUS at 09:07

## 2024-01-20 RX ADMIN — LISINOPRIL 5 MG: 5 TABLET ORAL at 09:08

## 2024-01-20 RX ADMIN — TAMSULOSIN HYDROCHLORIDE 0.4 MG: 0.4 CAPSULE ORAL at 17:01

## 2024-01-20 RX ADMIN — ASPIRIN 81 MG: 81 TABLET, COATED ORAL at 09:07

## 2024-01-20 RX ADMIN — INSULIN LISPRO 2 UNITS: 100 INJECTION, SOLUTION INTRAVENOUS; SUBCUTANEOUS at 11:59

## 2024-01-20 RX ADMIN — FLUOXETINE 20 MG: 20 CAPSULE ORAL at 09:08

## 2024-01-20 RX ADMIN — DONEPEZIL HYDROCHLORIDE 10 MG: 10 TABLET ORAL at 21:45

## 2024-01-20 RX ADMIN — INSULIN LISPRO 1 UNITS: 100 INJECTION, SOLUTION INTRAVENOUS; SUBCUTANEOUS at 21:46

## 2024-01-20 RX ADMIN — HEPARIN SODIUM 5000 UNITS: 5000 INJECTION INTRAVENOUS; SUBCUTANEOUS at 06:27

## 2024-01-20 RX ADMIN — HEPARIN SODIUM 5000 UNITS: 5000 INJECTION INTRAVENOUS; SUBCUTANEOUS at 14:08

## 2024-01-20 RX ADMIN — POTASSIUM CHLORIDE 20 MEQ: 1500 TABLET, EXTENDED RELEASE ORAL at 09:07

## 2024-01-20 NOTE — ASSESSMENT & PLAN NOTE
Wt Readings from Last 3 Encounters:   01/19/24 60.2 kg (132 lb 9.7 oz)   01/04/24 59.9 kg (132 lb)   12/13/23 59.9 kg (132 lb)     Placed in observation telemetry  Initial BNP of 212 which x-ray findings consistent with mild pulmonary congestion  Trend cardiac enzymes  Begin Lasix 40 mg IV daily  Placed on no added salt diet  Obtain daily weights  Patient does not carry a formal diagnosis of congestive heart failure  Last echo available for review dated 10/11/2021 showed preserved ejection fraction of 60% normal diastolic function  Will obtain echocardiogram in a.m.  Consult cardiology

## 2024-01-20 NOTE — CONSULTS
[facility]  Consult  Name: Israel Hughes 83 y.o. male I MRN: [id number]  Unit/Bed#: -01 I Date of Admission: 1/19/2024   Date of Service: 1/20/2024 I Hospital Day: 0    Inpatient consult to Cardiology  Consult performed by: RUSH River  Consult ordered by: Marco Antonio Khalil PA-C          Assessment/Plan   Stage 3 chronic kidney disease, unspecified whether stage 3a or 3b CKD (HCC)  Assessment & Plan  Lab Results   Component Value Date    EGFR 71 01/20/2024    EGFR 68 01/19/2024    EGFR 58 08/17/2023    CREATININE 0.98 01/20/2024    CREATININE 1.01 01/19/2024    CREATININE 1.15 08/17/2023     Monitor with diuretic use    Mild aortic stenosis  Assessment & Plan  Noted on prior echo  Based on exam, suspect progression  Echocardiogram has been ordered    Essential hypertension  Assessment & Plan  Blood pressure well-controlled  Continue home medications    * Volume overload  Assessment & Plan  This is related to progression of underlying aortic stenosis  Volume status and symptoms improved following IV diuretics  Transition to Lasix 40 mg daily as needed for weight gain  Will order echo for further evaluation.  This can be performed in the outpatient setting           Other summary comments:   I suspect that Israel's presenting symptoms were related to mild volume overload.    Troponin levels are unremarkable, BNP only mildly elevated.  He responded well to IV Lasix.    A prior echo from 2021 suggests very mild aortic stenosis.  Based on exam, I suspect there is progression and this may be the cause of his volume overload.    An echocardiogram will be ordered for further assessment.  If the patient is deemed medically stable for discharge tomorrow, this can be performed in the outpatient setting.    Would recommend Lasix 40 mg as needed for weight gain of 3 pounds overnight or 5 pounds in 1 week.    We will arrange for outpatient cardiac follow-up.    Outpatient  Cardiologist: None    HPI: Israel Hughes is a 83 y.o. year old male who presented with pedal edema for 1 to 2 weeks, reported mild SOB and chest pain.      History is taken from the patient and his granddaughter, present at the bedside today.  He does have a history of dementia.  Some answers are inappropriate and in a joking manner, but with redirection from his granddaughter, he is able to better answer questions.    Israel says he is here for his lungs and his legs.  He reports a short episode of chest pain yesterday in the upper L chest.  This occurred while he was attempting to get up out of his recliner.  There were no exacerbating or alleviating factors.  There was spontaneous resolution after about 5 minutes without reported recurrence.  He was also noted to be short of breath when getting up out of the chair.  Lower extremity edema has been present for a few weeks, but was reportedly worse yesterday.    There is no known history of MI, CAD, or CHF.  His granddaughter notes that he was evaluated by cardiology in the past, but there are no records available on my chart review today.    In the ER, BNP was mildly elevated at 212.  He received IV Lasix with reported improvement in his edema.  He denies any recurrent chest pain or shortness of breath.  Weight is noted to be down 3 pounds.      EKG: Normal sinus rhythm, cannot exclude prior inferior MI, age undetermined      MOST  RECENT CARDIAC IMAGING:   Echo ordered    Echo 10/11/2021  EF 60%  Mild LVH, mild diastolic dysfunction  Very mild AS, mild MR      Review of Systems: Limited due to dementia.    Historical Information   Past Medical History:   Diagnosis Date    Diabetes mellitus (HCC)     Hyperlipidemia      Past Surgical History:   Procedure Laterality Date    CATARACT EXTRACTION      COLONOSCOPY      DENTAL SURGERY      EYE SURGERY      TONSILLECTOMY      VASECTOMY       Social History     Substance and Sexual Activity   Alcohol Use Not Currently  "    Social History     Substance and Sexual Activity   Drug Use Never     Social History     Tobacco Use   Smoking Status Never    Passive exposure: Never   Smokeless Tobacco Never       Family History: No longer relevant due to patient age      Meds/Allergies   all current active meds have been reviewed  Medications Prior to Admission   Medication    aspirin (ECOTRIN LOW STRENGTH) 81 mg EC tablet    atorvastatin (LIPITOR) 20 mg tablet    B Complex-C (B COMPLEX-B12-C IJ)    Blood Glucose Monitoring Suppl (OneTouch Verio Reflect) w/Device KIT    Continuous Blood Gluc  (FreeStyle Petra 2 [place]) JAZLYN    donepezil (ARICEPT) 5 mg tablet    FLUoxetine (PROzac) 20 mg capsule    glimepiride (AMARYL) 4 mg tablet    glucose blood (OneTouch Verio) test strip    lidocaine (Lidoderm) 5 %    linaGLIPtin 5 MG TABS    lisinopril (ZESTRIL) 5 mg tablet    metFORMIN (GLUCOPHAGE) 1000 MG tablet    Misc Natural Products (PUMPKIN SEED OIL PO)    Omega-3 Fatty Acids (fish oil) 1,000 mg    OneTouch Delica Lancets 33G MISC    oxyCODONE (Roxicodone) 5 immediate release tablet    Saw Palmetto, Serenoa repens, (Saw [place] Berries) 540 MG CAPS    tamsulosin (FLOMAX) 0.4 mg       Allergies   Allergen Reactions    Bactrim [Sulfamethoxazole-Trimethoprim] GI Intolerance    Simvastatin Myalgia and Other (See Comments)       Objective   Vitals: Blood pressure 138/71, pulse 57, temperature (!) 96.5 °F (35.8 °C), resp. rate 18, height 5' 2\" (1.575 m), weight 58.8 kg (129 lb 10.1 oz), SpO2 95%., Body mass index is 23.71 kg/m².,   Orthostatic Blood Pressures      Flowsheet Row Most Recent Value   Blood Pressure 138/71 filed at 2024 0733   Patient Position - Orthostatic VS Lying filed at 2024 0300            Systolic (24hrs), Av , Min:118 , Max:142     Diastolic (24hrs), Av, Min:61, Max:71      Physical Exam:    General:  Normal appearance in no distress.  Eyes:  Anicteric.  Oral mucosa:  Moist.  Neck:  No JVD. Carotid " upstrokes are brisk without bruits.  No masses.  Chest:  Clear to auscultation.  Cardiac:  No palpable PMI.  Normal S1 and S2.  3/6 systolic murmur at the base Abdomen:  Soft and nontender. No palpable organomegaly or aortic enlargement.  Extremities: Trace bilateral lower extremity edema   Musculoskeletal:  Symmetric.   Vascular:  Pedal pulses are intact.  Neuro:  Grossly symmetric.  Psych:  Alert and oriented to person and place.  He reports the year as 2023.        Lab Results:     Troponins:    Results from last 7 days   Lab Units 01/19/24  2342 01/19/24  2045 01/19/24  1827   HS TNI 0HR ng/L  --   --  5   HS TNI 2HR ng/L  --  5  --    HSTNI D2 ng/L  --  0  --    HS TNI 4HR ng/L 5  --   --    HSTNI D4 ng/L 0  --   --      BNP:   Results from last 6 Months   Lab Units 01/19/24  1827   BNP pg/mL 212*       CBC :   Results from last 7 days   Lab Units 01/20/24  0513 01/19/24  1827   WBC Thousand/uL 6.16 5.81   HEMOGLOBIN g/dL 12.1 10.7*   HEMATOCRIT % 37.6 33.2*   MCV fL 95 96   PLATELETS Thousands/uL 155 146*     TSH:     CMP:   Results from last 7 days   Lab Units 01/20/24  0513 01/19/24  1827   POTASSIUM mmol/L 3.7 3.8   CHLORIDE mmol/L 102 107   CO2 mmol/L 30 26   BUN mg/dL 17 21   CREATININE mg/dL 0.98 1.01   EGFR ml/min/1.73sq m 71 68     Lipid Profile:     Coags:

## 2024-01-20 NOTE — PLAN OF CARE
Problem: Potential for Falls  Goal: Patient will remain free of falls  Description: INTERVENTIONS:  - Educate patient/family on patient safety including physical limitations  - Instruct patient to call for assistance with activity   - Consult OT/PT to assist with strengthening/mobility   - Keep Call bell within reach  - Keep bed low and locked with side rails adjusted as appropriate  - Keep care items and personal belongings within reach  - Initiate and maintain comfort rounds  - Make Fall Risk Sign visible to staff  - Offer Toileting every 2 Hours, in advance of need  - Initiate/Maintain bed alarm  - Obtain necessary fall risk management equipment: bed alarm  - Apply yellow socks and bracelet for high fall risk patients  - Consider moving patient to room near nurses station  Outcome: Progressing     Problem: Prexisting or High Potential for Compromised Skin Integrity  Goal: Skin integrity is maintained or improved  Description: INTERVENTIONS:  - Identify patients at risk for skin breakdown  - Assess and monitor skin integrity  - Assess and monitor nutrition and hydration status  - Monitor labs   - Assess for incontinence   - Turn and reposition patient  - Assist with mobility/ambulation  - Relieve pressure over bony prominences  - Avoid friction and shearing  - Provide appropriate hygiene as needed including keeping skin clean and dry  - Evaluate need for skin moisturizer/barrier cream  - Collaborate with interdisciplinary team   - Patient/family teaching  - Consider wound care consult   Outcome: Progressing     Problem: PAIN - ADULT  Goal: Verbalizes/displays adequate comfort level or baseline comfort level  Description: Interventions:  - Encourage patient to monitor pain and request assistance  - Assess pain using appropriate pain scale  - Administer analgesics based on type and severity of pain and evaluate response  - Implement non-pharmacological measures as appropriate and evaluate response  - Consider  cultural and social influences on pain and pain management  - Notify physician/advanced practitioner if interventions unsuccessful or patient reports new pain  Outcome: Progressing     Problem: INFECTION - ADULT  Goal: Absence or prevention of progression during hospitalization  Description: INTERVENTIONS:  - Assess and monitor for signs and symptoms of infection  - Monitor lab/diagnostic results  - Monitor all insertion sites, i.e. indwelling lines, tubes, and drains  - Monitor endotracheal if appropriate and nasal secretions for changes in amount and color  - [place] appropriate cooling/warming therapies per order  - Administer medications as ordered  - Instruct and encourage patient and family to use good hand hygiene technique  - Identify and instruct in appropriate isolation precautions for identified infection/condition  Outcome: Progressing  Goal: Absence of fever/infection during neutropenic period  Description: INTERVENTIONS:  - Monitor WBC    Outcome: Progressing     Problem: SAFETY ADULT  Goal: Patient will remain free of falls  Description: INTERVENTIONS:  - Educate patient/family on patient safety including physical limitations  - Instruct patient to call for assistance with activity   - Consult OT/PT to assist with strengthening/mobility   - Keep Call bell within reach  - Keep bed low and locked with side rails adjusted as appropriate  - Keep care items and personal belongings within reach  - Initiate and maintain comfort rounds  - Make Fall Risk Sign visible to staff  - Offer Toileting every 2 Hours, in advance of need  - Initiate/Maintain bed  alarm  - Obtain necessary fall risk management equipment: bed alarm  - Apply yellow socks and bracelet for high fall risk patients  - Consider moving patient to room near nurses station  Outcome: Progressing  Goal: Maintain or return to baseline ADL function  Description: INTERVENTIONS:  -  Assess patient's ability to carry out ADLs; assess patient's baseline  for ADL function and identify physical deficits which impact ability to perform ADLs (bathing, care of mouth/teeth, toileting, grooming, dressing, etc.)  - Assess/evaluate cause of self-care deficits   - Assess range of motion  - Assess patient's mobility; develop plan if impaired  - Assess patient's need for assistive devices and provide as appropriate  - Encourage maximum independence but intervene and supervise when necessary  - Involve family in performance of ADLs  - Assess for home care needs following discharge   - Consider OT consult to assist with ADL evaluation and planning for discharge  - Provide patient education as appropriate  Outcome: Progressing  Goal: Maintains/Returns to pre admission functional level  Description: INTERVENTIONS:  - Perform AM-PAC 6 Click Basic Mobility/ Daily Activity assessment daily.  - Set and communicate daily mobility goal to care team and patient/family/caregiver.   - Collaborate with rehabilitation services on mobility goals if consulted  - Perform Range of Motion 3 times a day.  - Reposition patient every 2 hours.  - Dangle patient 3 times a day  - Stand patient 3 times a day  - Ambulate patient 3 times a day  - Out of bed to chair 3 times a day   - Out of bed for meals 3 times a day  - Out of bed for toileting  - Record patient progress and toleration of activity level   Outcome: Progressing     Problem: DISCHARGE PLANNING  Goal: Discharge to home or other facility with appropriate resources  Description: INTERVENTIONS:  - Identify barriers to discharge w/patient and caregiver  - Arrange for needed discharge resources and transportation as appropriate  - Identify discharge learning needs (meds, wound care, etc.)  - Arrange for interpretive services to assist at discharge as needed  - Refer to Case Management Department for coordinating discharge planning if the patient needs post-hospital services based on physician/advanced practitioner order or complex needs related to  functional status, cognitive ability, or social support system  Outcome: Progressing     Problem: Knowledge Deficit  Goal: Patient/family/caregiver demonstrates understanding of disease process, treatment plan, medications, and discharge instructions  Description: Complete learning assessment and assess knowledge base.  Interventions:  - Provide teaching at level of understanding  - Provide teaching via preferred learning methods  Outcome: Progressing

## 2024-01-20 NOTE — ASSESSMENT & PLAN NOTE
Lab Results   Component Value Date    HGBA1C 6.9 (A) 12/01/2023       Recent Labs     01/19/24 2101   POCGLU 81       Blood Sugar Average: Last 72 hrs:  (P) 81    Placed on [facility] type II diet  Hold prehospital oral antihyperglycemic's  Obtain Accu-Cheks before meals and at bedtime with Humalog correction dose before meals and at bedtime

## 2024-01-20 NOTE — PLAN OF CARE
Problem: PHYSICAL THERAPY ADULT  Goal: Performs mobility at highest level of function for planned discharge setting.  See evaluation for individualized goals.  Description: Treatment/Interventions: Functional transfer training, LE strengthening/ROM, Elevations, Therapeutic exercise, Cognitive reorientation, Endurance training, Patient/family training, Equipment eval/education, Bed mobility, Gait training, Spoke to nursing, Spoke to case management, Spoke to MD  Equipment Recommended: Walker       See flowsheet documentation for full assessment, interventions and recommendations.  Note: Prognosis: Fair  Problem List: Decreased strength, Decreased endurance, Impaired balance, Decreased mobility, Decreased coordination, Decreased cognition, Impaired judgement, Decreased safety awareness, Impaired hearing, Pain  Assessment: Pt is 83 y.o. male seen for PT evaluation s/p admit to  [facility]  on 1/19/2024 w/ Volume overload. PT consulted to assess pt's functional mobility and d/c needs. Order placed for PT eval and tx, w/ up as tolerated order. Comorbidities affecting pt's physical performance at time of assessment include: weakness, volume overload,memory difficulty, HTN,type 2 DM,Myasthenia Gravis, HTN. PTA, pt was  living with family . Personal factors affecting pt at time of IE include: inaccessible home environment, ambulating w/ assistive device, inability to ambulate household distances, inability to navigate level surfaces w/o external assistance, unable to perform dynamic tasks in community, decreased cognition, positive fall history, decreased initiation and engagement, impulsivity, unable to perform physical activity, limited insight into impairments, and inability to perform ADLs. Please find objective findings from PT assessment regarding body systems outlined above with impairments and limitations including weakness, impaired balance, decreased endurance, impaired coordination, gait  deviations, pain, decreased activity tolerance, decreased functional mobility tolerance, decreased safety awareness, impaired judgement, fall risk, and decreased cognition. The following objective measures performed on IE also reveal limitations: AM-PAC 6-Clicks: 11/24. From PT/mobility standpoint, recommendation at time of d/c would be of moderate resource intensity pending progress in order to facilitate return to PLOF.        Rehab Resource Intensity Level, PT: II (Moderate Resource Intensity)    See flowsheet documentation for full assessment.

## 2024-01-20 NOTE — UTILIZATION REVIEW
Initial Clinical Review    WAS OBSERVATION 01/19/2024 @ 1920 CONVERTED TO INPATIENT ADMISSION 01/20/2024 @ 1016 DUE TO CONTINUED STAY REQUIRED TO CARE FOR PATIENT WITH Acute Diastolic CHF.    Admission: Date/Time/Statement:   Admission Orders (From admission, onward)       Ordered        01/20/24 1016  Inpatient Admission  Once            01/19/24 1920  Place in Observation  Once                          Orders Placed This Encounter   Procedures    Inpatient Admission     Standing Status:   Standing     Number of Occurrences:   1     Order Specific Question:   Level of Care     Answer:   Med Surg [16]     Order Specific Question:   Estimated length of stay     Answer:   More than 2 Midnights     Order Specific Question:   Certification     Answer:   I certify that inpatient services are medically necessary for this patient for a duration of greater than two midnights. See H&P and MD Progress Notes for additional information about the patient's course of treatment.     ED Arrival Information       Expected   -    Arrival   1/19/2024 18:04    Acuity   Urgent              Means of arrival   Ambulance    Escorted by   [facility]    Service   Hospitalist    Admission type   Emergency              Arrival complaint   chest pain             Chief Complaint   Patient presents with    Edema    Shortness of Breath       Initial Presentation: 83 y.o. male , presented to the ED @ [facility], from home via EMS.   Admitted as Observation due to Acute Diastolic CHF.    History Of Dementia, DM, Hyperlipidemia  Date: 01/19/2024   Presents with pedal edema x 1 to 2 weeks.   Initial BNP of 212 which x-ray findings consistent with mild pulmonary congestion.  Trend cardiac enzymes.  Begin Lasix 40 mg IV daily.  Placed on no added salt diet.  Obtain daily weights.  I&O.  Patient does not carry a formal diagnosis of congestive heart failure.  Last echo available for review dated 10/11/2021 showed preserved ejection fraction  "of 60% normal diastolic function.  Will obtain echocardiogram in a.m.  Consult cardiology.    Day 1: 01/20/2024   Telemetry.  PT/OT.  This is related to progression of underlying aortic stenosis     01/20/2024  Consult Cardio:  Israel Hughes is a 83 y.o. year old male who presented with pedal edema for 1 to 2 weeks, reported mild SOB and chest pain.     History is taken from the patient and his granddaughter, present at the bedside today.  He does have a history of dementia.  Some answers are inappropriate and in a joking manner, but with redirection from his granddaughter, he is able to better answer questions.   Israel says he is here for his lungs and his legs.  He reports a short episode of chest pain yesterday in the upper L chest.  This occurred while he was attempting to get up out of his recliner.  There were no exacerbating or alleviating factors.  There was spontaneous resolution after about 5 minutes without reported recurrence.  He was also noted to be short of breath when getting up out of the chair.  Lower extremity edema has been present for a few weeks, but was reportedly worse yesterday.   There is no known history of MI, CAD, or CHF.  His granddaughter notes that he was evaluated by cardiology in the past, but there are no records available on my chart review today.   In the ER, BNP was mildly elevated at 212.  He received IV Lasix with reported improvement in his edema.  He denies any recurrent chest pain or shortness of breath.  Weight is noted to be down 3 pounds.    EKG: Normal sinus rhythm, cannot exclude prior inferior MI, age undetermined    Date 2: 01/21/2024.    Hospital Day 3: Has surpassed a 2nd midnight with active treatments and services, which include:  /60   Pulse 58   Temp (!) 96.6 °F (35.9 °C)   Resp 18   Ht 5' 2\" (1.575 m)   Wt 58.6 kg (129 lb 3 oz)   SpO2 94%   BMI 23.63 kg/m²   Telemetry.  Diuresis:  Continue IV lasix, I&O, daily weight.  Obtain ECHO.  Monitor " CKD, repeat CMP in AM.      ED Triage Vitals [01/19/24 1808]   Temperature Pulse Respirations Blood Pressure SpO2   97.8 °F (36.6 °C) 66 20 142/68 98 %      Temp Source Heart Rate Source Patient Position - Orthostatic VS BP Location FiO2 (%)   Temporal Monitor Lying Left arm --      Pain Score       No Pain          Wt Readings from Last 1 Encounters:   01/20/24 58.8 kg (129 lb 10.1 oz)     Additional Vital Signs:   Date/Time Temp Pulse Resp BP MAP (mmHg) SpO2 O2 Device Patient Position - Orthostatic VS   01/20/24 15:11:38 97.1 °F (36.2 °C) Abnormal  62 18 152/76 101 98 % -- --   01/20/24 0757 -- -- -- -- -- -- None (Room air) --   01/20/24 07:33:40 96.5 °F (35.8 °C) Abnormal  57 18 138/71 93 95 % -- --   01/20/24 0300 97.4 °F (36.3 °C) Abnormal  -- 18 120/66 94 -- None (Room air) Lying   01/19/24 22:38:16 97.7 °F (36.5 °C) 59 20 118/63 81 95 % -- --   01/19/24 20:14:52 97.6 °F (36.4 °C) 59 20 127/61 83 96 % -- --   01/19/24 1832 -- -- -- -- -- -- None (Room air) --   01/19/24 1808 97.8 °F (36.6 °C) 66 20 142/68 -- 98 % None (Room air) Lying         Pertinent Labs/Diagnostic Test Results:   X-ray chest 1 view portable   Final Result by Shawn Liu DO (01/20 0815)   No acute cardiopulmonary disease.        Results from last 7 days   Lab Units 01/20/24  0513 01/19/24  1827   WBC Thousand/uL 6.16 5.81   HEMOGLOBIN g/dL 12.1 10.7*   HEMATOCRIT % 37.6 33.2*   PLATELETS Thousands/uL 155 146*   NEUTROS ABS Thousands/µL  --  3.62     Results from last 7 days   Lab Units 01/20/24  0513 01/19/24  1827   SODIUM mmol/L 140 142   POTASSIUM mmol/L 3.7 3.8   CHLORIDE mmol/L 102 107   CO2 mmol/L 30 26   ANION GAP mmol/L 8 9   BUN mg/dL 17 21   CREATININE mg/dL 0.98 1.01   EGFR ml/min/1.73sq m 71 68   CALCIUM mg/dL 9.4 8.9     Results from last 7 days   Lab Units 01/20/24  1607 01/20/24  1101 01/20/24  0735 01/19/24  2101   POC GLUCOSE mg/dl 137 228* 119 81     Results from last 7 days   Lab Units 01/20/24  0513  01/19/24  1827   GLUCOSE RANDOM mg/dL 108 69     Results from last 7 days   Lab Units 01/19/24  2342 01/19/24  2045 01/19/24  1827   HS TNI 0HR ng/L  --   --  5   HS TNI 2HR ng/L  --  5  --    HSTNI D2 ng/L  --  0  --    HS TNI 4HR ng/L 5  --   --    HSTNI D4 ng/L 0  --   --      Results from last 7 days   Lab Units 01/19/24  1827   BNP pg/mL 212*     ED Treatment:   Medication Administration from 01/19/2024 1804 to 01/19/2024 1959         Date/Time Order Dose Route Action     01/19/2024 1924 EST furosemide (LASIX) injection 40 mg 40 mg Intravenous Given          Past Medical History:   Diagnosis Date    Diabetes mellitus ([facility])     Hyperlipidemia      Present on Admission:   Essential hypertension   Memory difficulty   Mixed hyperlipidemia   Stage 3 chronic kidney disease, unspecified whether stage 3a or 3b CKD ([facility])   Type 2 diabetes mellitus with retinopathy, without long-term current use of insulin ([facility])   Volume overload   Mild aortic stenosis      Admitting Diagnosis: Chest pain [R07.9]  CHF exacerbation (HCC) [I50.9]  Age/Sex: 83 y.o. male  Admission Orders:  Yuriy/CHO controlled diet  Up as tolerated  Telemetry  Daily weight / I&O q8h  Obtain ECHO  Gio SCDs    Scheduled Medications:  aspirin, 81 mg, Oral, Daily  atorvastatin, 20 mg, Oral, Daily  donepezil, 10 mg, Oral, HS  fish oil, 1,000 mg, Oral, Daily  FLUoxetine, 20 mg, Oral, QAM  furosemide, 40 mg, Intravenous, Daily  heparin (porcine), 5,000 Units, Subcutaneous, Q8H ZEINAB  insulin lispro, 1-5 Units, Subcutaneous, TID AC  insulin lispro, 1-5 Units, Subcutaneous, HS  lisinopril, 5 mg, Oral, Daily  potassium chloride, 20 mEq, Oral, Daily  tamsulosin, 0.4 mg, Oral, Daily With Dinner      Continuous IV Infusions:     PRN Meds:  acetaminophen, 650 mg, Oral, Q4H PRN  ondansetron, 4 mg, Intravenous, Q6H PRN  sodium chloride (PF), 3 mL, Intravenous, Q1H PRN        IP CONSULT TO NUTRITION SERVICES  IP CONSULT TO CARDIOLOGY:  CHF Exacerbation    Network Utilization  Review Department  ATTENTION: Please call with any questions or concerns to [phone] and carefully listen to the prompts so that you are directed to the right person. All voicemails are confidential.   For Discharge needs, contact Care Management DC Support Team at [phone] opt. [phone]  Send all requests for admission clinical reviews, approved or denied determinations and any other requests to dedicated fax number below belonging to the campus where the patient is receiving treatment. List of dedicated fax numbers for the Facilities:  FACILITY NAME UR FAX NUMBER   ADMISSION DENIALS (Administrative/Medical Necessity) [phone]   DISCHARGE SUPPORT TEAM (NETWORK) [phone]   PARENT CHILD HEALTH (Maternity/NICU/Pediatrics) [phone]   [facility] [phone]   [facility] [phone]   [facility] [phone]   [facility] [phone]   [facility] [phone]   [facility] [phone]   [facility] [phone]   [facility] [phone]   [facility] [phone]   [facility] [phone]   [facility] [phone]

## 2024-01-20 NOTE — ASSESSMENT & PLAN NOTE
Lab Results   Component Value Date    EGFR 71 01/20/2024    EGFR 68 01/19/2024    EGFR 58 08/17/2023    CREATININE 0.98 01/20/2024    CREATININE 1.01 01/19/2024    CREATININE 1.15 08/17/2023     Monitor with diuretic use

## 2024-01-20 NOTE — H&P
[facility]  H&P  Name: Israel Hughes 83 y.o. male I MRN: [id number]  Unit/Bed#: -01 I Date of Admission: 1/19/2024   Date of Service: 1/20/2024 I Hospital Day: 0      Assessment/Plan   * Acute diastolic congestive heart failure (HCC)  Assessment & Plan  Wt Readings from Last 3 Encounters:   01/19/24 60.2 kg (132 lb 9.7 oz)   01/04/24 59.9 kg (132 lb)   12/13/23 59.9 kg (132 lb)     Placed in observation telemetry  Initial BNP of 212 which x-ray findings consistent with mild pulmonary congestion  Trend cardiac enzymes  Begin Lasix 40 mg IV daily  Placed on no added salt diet  Obtain daily weights  Patient does not carry a formal diagnosis of congestive heart failure  Last echo available for review dated 10/11/2021 showed preserved ejection fraction of 60% normal diastolic function  Will obtain echocardiogram in a.m.  Consult cardiology    Stage 3 chronic kidney disease, unspecified whether stage 3a or 3b CKD ([facility])  Assessment & Plan  Lab Results   Component Value Date    EGFR 68 01/19/2024    EGFR 58 08/17/2023    EGFR 69 12/01/2022    CREATININE 1.01 01/19/2024    CREATININE 1.15 08/17/2023    CREATININE 1.01 12/01/2022     Creatinine appears to be at baseline  We will continue to monitor with repeat labs in a.m.    Memory difficulty  Assessment & Plan  Continue prehospital Aricept 10 mg p.o. nightly    Essential hypertension  Assessment & Plan  Continue prehospital lisinopril 5 mg p.o. daily    Mixed hyperlipidemia  Assessment & Plan  Continue prehospital Lipitor 20 mg p.o. daily    Type 2 diabetes mellitus with retinopathy, without long-term current use of insulin ([facility])  Assessment & Plan  Lab Results   Component Value Date    HGBA1C 6.9 (A) 12/01/2023       Recent Labs     01/19/24  2101   POCGLU 81       Blood Sugar Average: Last 72 hrs:  (P) 81    Placed on [facility] type II diet  Hold prehospital oral antihyperglycemic's  Obtain Accu-Cheks before meals and at bedtime with Humalog  correction dose before meals and at bedtime         VTE Prophylaxis: Heparin  Code Status: Level 1  Discussion with family: None present at bedside at time of exam    Anticipated Length of Stay:  Patient will be admitted on an Observation basis with an anticipated length of stay of  > 2 midnights.   Justification for Hospital Stay: Presumed diastolic congestive heart failure requiring IV diuresis and further cardiac evaluation    Total Time for Visit, including Counseling / Coordination of Care: 1 hour.  Greater than 50% of this total time spent on direct patient counseling and coordination of care.    Chief Complaint:   Pedal edema x 1 to 2 weeks    History of Present Illness:    Israel Hughes is a 83 y.o. male who presents with pedal edema x 1 to 2 weeks.  Patient has history of dementia and unclear how reliable his history is and unfortunately there is no family present at bedside at time of exam to verify history.    At time of exam patient is resting comfortably in bed, bilateral pedal edema that is been ongoing for the past 1 to 2 weeks and he states that this was noticed by his granddaughter who insisted that he come to the hospital for further evaluation and treatment.  Patient denies any chest pain, palpitations, lightheadedness or dizziness, no shortness of breath, cough, fever or chills.    Review of his records do not reveal a known history of congestive heart failure but he does have a previous echo from 10/11/2021 showed mild dysfunction.  Workup in ER revealed his BNP was slightly elevated at 212 but actually negative when age corrected but does have chest x-ray findings consistent with pulmonary congestion    Review of Systems:    Review of Systems   Constitutional:  Negative for chills and fever.   Respiratory:  Negative for cough, shortness of breath and wheezing.    Cardiovascular:  Positive for leg swelling. Negative for chest pain and palpitations.   Gastrointestinal:  Negative for abdominal  pain, diarrhea, nausea and vomiting.   Genitourinary:  Negative for dysuria, frequency, hematuria and urgency.   Neurological:  Negative for weakness, light-headedness and headaches.   All other systems reviewed and are negative.      Past Medical and Surgical History:     Past Medical History:   Diagnosis Date    Diabetes mellitus (HCC)     Hyperlipidemia        Past Surgical History:   Procedure Laterality Date    CATARACT EXTRACTION      COLONOSCOPY      DENTAL SURGERY      EYE SURGERY      TONSILLECTOMY      VASECTOMY         Meds/Allergies:    Prior to Admission medications    Medication Sig Start Date End Date Taking? Authorizing Provider   aspirin (ECOTRIN LOW STRENGTH) 81 mg EC tablet Take 81 mg by mouth    Historical Provider, MD   atorvastatin (LIPITOR) 20 mg tablet Take 1 tablet (20 mg total) by mouth daily 5/8/23   Giovanni Reyez DO   B Complex-C (B COMPLEX-B12-C IJ) Take 1 capsule by mouth if needed    Historical Provider, MD   Blood Glucose Monitoring Suppl (OneTouch Verio Reflect) w/Device KIT USE  ONCE DAILY 12/5/22   Giovanni Reyez DO   Continuous Blood Gluc  (FreeStyle Petra 2 [place]) JAZLYN Check blood sugars multiple times per day 8/11/23   Giovanni Reyez DO   donepezil (ARICEPT) 5 mg tablet TAKE 2 TABLETS BY MOUTH ONCE DAILY AT BEDTIME 8/30/23   Giovanni Reyez DO   FLUoxetine (PROzac) 20 mg capsule Take 1 capsule (20 mg total) by mouth every morning 12/1/23   Giovanni Reyez DO   glimepiride (AMARYL) 4 mg tablet Take 2 tablets (8 mg total) by mouth daily with breakfast 5/8/23   Giovanni Reyez DO   glucose blood (OneTouch Verio) test strip USE 1 STRIP TO CHECK GLUCOSE ONCE DAILY 12/6/22   Giovanni Reyez DO   lidocaine (Lidoderm) 5 % Apply 1 patch topically over 12 hours daily Remove & Discard patch within 12 hours or as directed by MD  Patient not taking: Reported on 1/4/2024 5/20/23   Carlos Arora MD   linaGLIPtin 5 MG TABS Take 5 mg by mouth daily with or without food 6/29/23 6/23/24   Giovanni Reyez DO   lisinopril (ZESTRIL) 5 mg tablet Take 1 tablet (5 mg total) by mouth daily 5/8/23   Giovanni Reyez DO   metFORMIN (GLUCOPHAGE) 1000 MG tablet Take 1 tablet (1,000 mg total) by mouth 2 (two) times a day with meals 5/8/23   Giovanni Reyez DO   Misc Natural Products (PUMPKIN SEED OIL PO) Take by mouth  Patient not taking: Reported on 4/27/2022    Historical Provider, MD   Omega-3 Fatty Acids (fish oil) 1,000 mg Take 1,000 mg by mouth if needed    Historical Provider, MD [place] Lancets 33G MISC Test once a day 7/26/23   Giovanni Reyez DO   oxyCODONE (Roxicodone) 5 immediate release tablet Take 1 tablet (5 mg total) by mouth every 6 (six) hours as needed for severe pain for up to 8 doses Max Daily Amount: 20 mg 5/20/23   MD Phuc Hernandez Serenoa repens, (Saw [place]) 540 MG CAPS Take 1 capsule by mouth in the morning    Historical Provider, MD   tamsulosin (FLOMAX) 0.4 mg Take 1 capsule (0.4 mg total) by mouth daily with dinner 5/8/23   Giovanni Reyez DO     all medications and allergies reviewed    Allergies:   Allergies   Allergen Reactions    Bactrim [Sulfamethoxazole-Trimethoprim] GI Intolerance    Simvastatin Myalgia and Other (See Comments)       Social History:     Marital Status:    Occupation: Retired  Patient Pre-hospital Living Situation: Resides at home with family  Patient Pre-hospital Level of Mobility: Full without assist  Patient Pre-hospital Diet Restrictions: None    Social History     Substance and Sexual Activity   Alcohol Use Not Currently     Social History     Tobacco Use   Smoking Status Never    Passive exposure: Never   Smokeless Tobacco Never     Social History     Substance and Sexual Activity   Drug Use Never       Family History:  I have reviewed the patient's family history    Physical Exam:     Vitals:   Blood Pressure: 118/63 (01/19/24 2238)  Pulse: 59 (01/19/24 2238)  Temperature: 97.7 °F (36.5 °C) (01/19/24 2238)  Temp  "Source: Temporal (01/19/24 1808)  Respirations: 20 (01/19/24 2238)  Height: 5' 2\" (157.5 cm) (01/19/24 2027)  Weight - Scale: 60.2 kg (132 lb 9.7 oz) (01/19/24 2027)  SpO2: 95 % (01/19/24 2238)    Physical Exam  Vitals and nursing note reviewed.   Constitutional:       General: He is not in acute distress.     Appearance: Normal appearance.   HENT:      Head: Normocephalic and atraumatic.      Right Ear: Tympanic membrane normal.      Left Ear: Tympanic membrane normal.      Nose: Nose normal.      Mouth/Throat:      Mouth: Mucous membranes are moist.      Pharynx: No oropharyngeal exudate or posterior oropharyngeal erythema.   Eyes:      Extraocular Movements: Extraocular movements intact.      Pupils: Pupils are equal, round, and reactive to light.   Cardiovascular:      Rate and Rhythm: Normal rate and regular rhythm.      Pulses: Normal pulses.      Heart sounds: Murmur heard.   Pulmonary:      Effort: Pulmonary effort is normal. No respiratory distress.      Breath sounds: Normal breath sounds.   Abdominal:      General: Abdomen is flat. Bowel sounds are normal.      Palpations: Abdomen is soft.   Musculoskeletal:         General: Normal range of motion.      Cervical back: Normal range of motion and neck supple.      Right lower leg: Edema present.      Left lower leg: Edema present.   Skin:     General: Skin is warm and dry.      Capillary Refill: Capillary refill takes less than 2 seconds.   Neurological:      General: No focal deficit present.      Mental Status: He is alert and oriented to person, place, and time.         Additional Data:     Lab Results: I have personally reviewed pertinent reports.      Results from last 7 days   Lab Units 01/19/24  1827   WBC Thousand/uL 5.81   HEMOGLOBIN g/dL 10.7*   HEMATOCRIT % 33.2*   PLATELETS Thousands/uL 146*   NEUTROS PCT % 63   LYMPHS PCT % 25   MONOS PCT % 11   EOS PCT % 1     Results from last 7 days   Lab Units 01/19/24  1827   SODIUM mmol/L 142   POTASSIUM " mmol/L 3.8   CHLORIDE mmol/L 107   CO2 mmol/L 26   BUN mg/dL 21   CREATININE mg/dL 1.01   ANION GAP mmol/L 9   CALCIUM mg/dL 8.9   GLUCOSE RANDOM mg/dL 69         Results from last 7 days   Lab Units 01/19/24  2101   POC GLUCOSE mg/dl 81               Imaging: I have personally reviewed pertinent reports.    X-ray chest 1 view portable    (Results Pending)         EKG, Pathology, and Other Studies Reviewed on Admission:   EKG: N/A    Epic / Care Everywhere Records Reviewed: Yes    ** Please Note: This note has been constructed using a voice recognition system. **

## 2024-01-20 NOTE — ASSESSMENT & PLAN NOTE
This is related to progression of underlying aortic stenosis  Volume status and symptoms improved following IV diuretics  Transition to Lasix 40 mg daily as needed for weight gain  Will order echo for further evaluation.  This can be performed in the outpatient setting

## 2024-01-20 NOTE — ASSESSMENT & PLAN NOTE
Lab Results   Component Value Date    EGFR 68 01/19/2024    EGFR 58 08/17/2023    EGFR 69 12/01/2022    CREATININE 1.01 01/19/2024    CREATININE 1.15 08/17/2023    CREATININE 1.01 12/01/2022     Creatinine appears to be at baseline  We will continue to monitor with repeat labs in a.m.

## 2024-01-20 NOTE — PLAN OF CARE
Problem: Potential for Falls  Goal: Patient will remain free of falls  Description: INTERVENTIONS:  - Educate patient/family on patient safety including physical limitations  - Instruct patient to call for assistance with activity   - Consult OT/PT to assist with strengthening/mobility   - Keep Call bell within reach  - Keep bed low and locked with side rails adjusted as appropriate  - Keep care items and personal belongings within reach  - Initiate and maintain comfort rounds  - Make Fall Risk Sign visible to staff  - Offer Toileting every 2 Hours, in advance of need  - Initiate/Maintain bed alarm    Problem: Prexisting or High Potential for Compromised Skin Integrity  Goal: Skin integrity is maintained or improved  Description: INTERVENTIONS:  - Identify patients at risk for skin breakdown  - Assess and monitor skin integrity  - Assess and monitor nutrition and hydration status  - Monitor labs   - Assess for incontinence   - Turn and reposition patient  - Assist with mobility/ambulation  - Relieve pressure over bony prominences  - Avoid friction and shearing  - Provide appropriate hygiene as needed including keeping skin clean and dry  - Evaluate need for skin moisturizer/barrier cream  - Collaborate with interdisciplinary team   - Patient/family teaching  - Consider wound care consult   Outcome: Progressing     Problem: PAIN - ADULT  Goal: Verbalizes/displays adequate comfort level or baseline comfort level  Description: Interventions:  - Encourage patient to monitor pain and request assistance  - Assess pain using appropriate pain scale  - Administer analgesics based on type and severity of pain and evaluate response  - Implement non-pharmacological measures as appropriate and evaluate response  - Consider cultural and social influences on pain and pain management  - Notify physician/advanced practitioner if interventions unsuccessful or patient reports new pain  Outcome: Progressing     Problem: INFECTION -  ADULT  Goal: Absence or prevention of progression during hospitalization  Description: INTERVENTIONS:  - Assess and monitor for signs and symptoms of infection  - Monitor lab/diagnostic results  - Monitor all insertion sites, i.e. indwelling lines, tubes, and drains  - Monitor endotracheal if appropriate and nasal secretions for changes in amount and color  - Irving appropriate cooling/warming therapies per order  - Administer medications as ordered  - Instruct and encourage patient and family to use good hand hygiene technique  - Identify and instruct in appropriate isolation precautions for identified infection/condition  Outcome: Progressing  Goal: Absence of fever/infection during neutropenic period  Description: INTERVENTIONS:  - Monitor WBC    Outcome: Progressing     Problem: SAFETY ADULT  Goal: Patient will remain free of falls  Description: INTERVENTIONS:  - Educate patient/family on patient safety including physical limitations  - Instruct patient to call for assistance with activity   - Consult OT/PT to assist with strengthening/mobility   - Keep Call bell within reach  - Keep bed low and locked with side rails adjusted as appropriate  - Keep care items and personal belongings within reach  - Initiate and maintain comfort rounds  - Make Fall Risk Sign visible to staff  - Offer Toileting every 2 Hours, in advance of need  - Initiate/Maintain bed alarm  - Apply yellow socks and bracelet for high fall risk patients  - Consider moving patient to room near nurses station  Outcome: Progressing  Goal: Maintain or return to baseline ADL function  Description: INTERVENTIONS:  -  Assess patient's ability to carry out ADLs; assess patient's baseline for ADL function and identify physical deficits which impact ability to perform ADLs (bathing, care of mouth/teeth, toileting, grooming, dressing, etc.)  - Assess/evaluate cause of self-care deficits   - Assess range of motion  - Assess patient's mobility; develop  plan if impaired  - Assess patient's need for assistive devices and provide as appropriate  - Encourage maximum independence but intervene and supervise when necessary  - Involve family in performance of ADLs  - Assess for home care needs following discharge   - Consider OT consult to assist with ADL evaluation and planning for discharge  - Provide patient education as appropriate  Outcome: Progressing  G  Problem: DISCHARGE PLANNING  Goal: Discharge to home or other facility with appropriate resources  Description: INTERVENTIONS:  - Identify barriers to discharge w/patient and caregiver  - Arrange for needed discharge resources and transportation as appropriate  - Identify discharge learning needs (meds, wound care, etc.)  - Arrange for interpretive services to assist at discharge as needed  - Refer to Case Management Department for coordinating discharge planning if the patient needs post-hospital services based on physician/advanced practitioner order or complex needs related to functional status, cognitive ability, or social support system  Outcome: Progressing     Problem: Knowledge Deficit  Goal: Patient/family/caregiver demonstrates understanding of disease process, treatment plan, medications, and discharge instructions  Description: Complete learning assessment and assess knowledge base.  Interventions:  - Provide teaching at level of understanding  - Provide teaching via preferred learning methods  Outcome: Progressing

## 2024-01-20 NOTE — PHYSICAL THERAPY NOTE
"Physical Therapy Evaluation     Patient's Name: Israel Hughes    Admitting Diagnosis  Chest pain [R07.9]  CHF exacerbation (HCC) [I50.9]    Problem List  Patient Active Problem List   Diagnosis    Type 2 diabetes mellitus with retinopathy, without long-term current use of insulin (HCC)    Myasthenia gravis without exacerbation (HCC)    Mixed hyperlipidemia    Retinopathy    Primary open angle glaucoma (POAG) of both eyes, mild stage    Ptosis of both eyelids    Benign prostatic hyperplasia without lower urinary tract symptoms    Essential hypertension    Other age-related cataract    Memory difficulty    Mild aortic stenosis    Mild mitral regurgitation    Diastolic dysfunction    Mild concentric left ventricular hypertrophy (LVH)    Mild protein-calorie malnutrition (HCC)    Dysphagia    Stage 3 chronic kidney disease, unspecified whether stage 3a or 3b CKD (HCC)    Volume overload       Past Medical History  Past Medical History:   Diagnosis Date    Diabetes mellitus (HCC)     Hyperlipidemia        Past Surgical History  Past Surgical History:   Procedure Laterality Date    CATARACT EXTRACTION      COLONOSCOPY      DENTAL SURGERY      EYE SURGERY      TONSILLECTOMY      VASECTOMY          01/20/24 1144   PT Last Visit   PT Visit Date 01/20/24   Note Type   Note type Evaluation   Pain Assessment   Pain Assessment Tool 0-10   Pain Score 3  (\"2 to 3\")   Pain Location/Orientation Orientation: Bilateral;Orientation: Lower;Location: Back   Pain Onset/Description Onset: Ongoing;Frequency: Constant/Continuous;Descriptor: Aching   Effect of Pain on Daily Activities yes   Patient's Stated Pain Goal No pain   Hospital Pain Intervention(s) Repositioned;Ambulation/increased activity;Emotional support;Environmental changes   Multiple Pain Sites No   Restrictions/Precautions   Weight Bearing Precautions Per Order No   Other Precautions Cognitive;Chair Alarm;Bed Alarm;Fall Risk;Pain;Telemetry;Hard of hearing;Impulsive  (condom " catheter)   Home Living   Type of Home House   Home Layout Performs ADLs on one level;Able to live on main level with bedroom/bathroom;Stairs to enter with rails  (2+6 BIENVENIDO)   Bathroom Shower/Tub Tub/shower unit  (has both)   Bathroom Toilet Standard   Bathroom Equipment Grab bars in shower   Bathroom Accessibility Accessible   Home Equipment Cane;Walker  (denies prior usage)   Prior Function   Level of [place] Independent with functional mobility;Needs assistance with IADLS;Needs assistance with ADLs   Lives With Family   Receives Help From Family   IADLs Family/Friend/Other provides transportation;Family/Friend/Other provides meals;Family/Friend/Other provides medication management   Falls in the last 6 months 1 to 4  (x 3)   Vocational Retired   General   Family/Caregiver Present Yes  (grand daughter)   Cognition   Overall Cognitive Status Impaired   Arousal/Participation Alert   Orientation Level Oriented to person;Disoriented to time;Disoriented to situation;Oriented to place   Memory Decreased short term memory;Decreased recall of recent events;Decreased recall of precautions   Following Commands Follows one step commands with increased time or repetition   Comments Israel was agreeable to PT assessment,pleasant.   RLE Assessment   RLE Assessment X  (3/5 gross musculature)   LLE Assessment   LLE Assessment X  (3/5 gross musculature)   Vision-Basic Assessment   Current Vision Wears glasses all the time   Vestibular   Spontaneous Nystagmus (-) no evidence of nystagmus at rest in room light   Gaze Holding Nystagmus (-) no evidence of nystagmus   Coordination   Movements are Fluid and Coordinated 0   Coordination and Movement Description Incremental mobility requiring significant physical assistance.   Bed Mobility   Supine to Sit 3  Moderate assistance   Additional items Assist x 1;HOB elevated;Bedrails;Increased time required;Verbal cues;LE management   Sit to Supine   (DNT as Israel was sitting out of bed  on the recliner upon conclusion.)   Additional Comments Verbal cues for bedrail utilization and proper body mechanics. Impulsive posterior lean noted requiring external cues for correction.   Transfers   Sit to Stand 3  Moderate assistance   Additional items Assist x 1;Bedrails;Increased time required;Verbal cues  (RW utilization)   Stand to Sit 3  Moderate assistance   Additional items Assist x 1;Bedrails;Increased time required;Verbal cues   Stand pivot 3  Moderate assistance   Additional items Assist x 1;Increased time required;Verbal cues;Impulsivity   Additional Comments Verbal cues for bedrail utilization and proper body mechanics.External cues for postural correction in standing.   Ambulation/Elevation   Gait pattern Improper Weight shift;Narrow AMBERLY;Forward Flexion;Decreased foot clearance;Short stride;Step to;Inconsistent juvenal   Gait Assistance 3  Moderate assist   Additional items Assist x 1;Verbal cues;Tactile cues   Assistive Device Rolling walker   Distance 2 feet  (to recliner, could not further advance due to safety concerns)   Stair Management Assistance Not tested   Ambulation/Elevation Additional Comments Verbal cues for base of support widening and proper AD usage.   Balance   Static Sitting Fair -   Dynamic Sitting Poor   Static Standing Poor   Dynamic Standing Poor -   Ambulatory Poor -   Endurance Deficit   Endurance Deficit Yes   Activity Tolerance   Activity Tolerance Patient limited by fatigue;Other (Comment)  (decreased insight)   Medical Staff Made Aware Yes, CM and Dr. Coelho were informed of d/c disposition recommendation.   Nurse Made Aware yes, Sunny RN   Assessment   Prognosis Fair   Problem List Decreased strength;Decreased endurance;Impaired balance;Decreased mobility;Decreased coordination;Decreased cognition;Impaired judgement;Decreased safety awareness;Impaired hearing;Pain   Assessment Pt is 83 y.o. male seen for PT evaluation s/p admit to  [facility]  on 1/19/2024  w/ Volume overload. PT consulted to assess pt's functional mobility and d/c needs. Order placed for PT eval and tx, w/ up as tolerated order. Comorbidities affecting pt's physical performance at time of assessment include: weakness, volume overload,memory difficulty, HTN,type 2 DM,Myasthenia Gravis, HTN. PTA, pt was  living with family . Personal factors affecting pt at time of IE include: inaccessible home environment, ambulating w/ assistive device, inability to ambulate household distances, inability to navigate level surfaces w/o external assistance, unable to perform dynamic tasks in community, decreased cognition, positive fall history, decreased initiation and engagement, impulsivity, unable to perform physical activity, limited insight into impairments, and inability to perform ADLs. Please find objective findings from PT assessment regarding body systems outlined above with impairments and limitations including weakness, impaired balance, decreased endurance, impaired coordination, gait deviations, pain, decreased activity tolerance, decreased functional mobility tolerance, decreased safety awareness, impaired judgement, fall risk, and decreased cognition. The following objective measures performed on IE also reveal limitations: AM-PAC 6-Clicks: 11/24. From PT/mobility standpoint, recommendation at time of d/c would be of moderate resource intensity pending progress in order to facilitate return to PLOF.   Goals   Patient Goals to have lunch  (setup upon conclusion)   LTG Expiration Date 01/30/24   Long Term Goal #1 1.)Patient will complete bed mobility min A of 1 for decrease need for caregiver assistance, decrease burden of care. 2.) Patient will complete transfers min A of 1 to decrease risk of falls, facilitate upright standing posture. 3.) BLE strength to greater than/equal to 4/5 gross musculature to increase ability to safely transfer, control descent to chair. 4.) Patient will exhibit increase  dynamic standing to Fair 2-3 minutes without LOB min A of 1 to improve activity tolerance. 5.) Patient will exhibit increase dynamic ambulatory balance to Fair 25-50 feet w/AD min A of 1 to improve ability to mobilize to toilet, chair and decrease risk for additional medical complications. 6.) Patient will exhibit good self monitoring and ability to follow 2 step commands to increase complexity of tasks and resume ADL's without LOB.   PT Treatment Day 0   Plan   Treatment/Interventions Functional transfer training;LE strengthening/ROM;Elevations;Therapeutic exercise;Cognitive reorientation;Endurance training;Patient/family training;Equipment eval/education;Bed mobility;Gait training;Spoke to nursing;Spoke to case management;Spoke to MD   PT Frequency 3-5x/wk   Discharge Recommendation   Rehab Resource Intensity Level, PT II (Moderate Resource Intensity)   Equipment Recommended Walker   Walker Package Recommended Wheeled walker   Change/add to Walker Package? No   Additional Comments Upon conclusion, Israel was resting out of bed on the recliner. The chair alarm was engaged and all needs within reach.   AM-PAC Basic Mobility Inpatient   Turning in Flat Bed Without Bedrails 2   Lying on Back to Sitting on Edge of Flat Bed Without Bedrails 2   Moving Bed to Chair 2   Standing Up From Chair Using Arms 2   Walk in Room 2   Climb 3-5 Stairs With Railing 1   Basic Mobility Inpatient Raw Score 11   Basic Mobility Standardized Score 30.25   Highest Level Of Mobility   JH-HLM Goal 4: Move to chair/commode   JH-HLM Achieved 4: Move to chair/commode     History/Personal Factors/Comorbidities: weakness, volume overload,memory difficulty, HTN,type 2 DM,Myasthenia Gravis, HTN    # of body structures/limitations: muscle weakness, activity intolerance,decreased endurance, impaired balance, gait deviations,pain,impaired coordination, impaired cognition    Clinical presentation: unstable as seen in cognitive impairment, impulsivity,  progressive symptoms prior to hospitalization, pain, hearing impairment    Initial Assessment Time: [id number]    Julianne Tejada, PT

## 2024-01-21 LAB
ANION GAP SERPL CALCULATED.3IONS-SCNC: 8 MMOL/L
BUN SERPL-MCNC: 21 MG/DL (ref 5–25)
CALCIUM SERPL-MCNC: 9.1 MG/DL (ref 8.4–10.2)
CHLORIDE SERPL-SCNC: 100 MMOL/L (ref 96–108)
CO2 SERPL-SCNC: 30 MMOL/L (ref 21–32)
CREAT SERPL-MCNC: 1.06 MG/DL (ref 0.6–1.3)
ERYTHROCYTE [DISTWIDTH] IN BLOOD BY AUTOMATED COUNT: 12.8 % (ref 11.6–15.1)
GFR SERPL CREATININE-BSD FRML MDRD: 65 ML/MIN/1.73SQ M
GLUCOSE SERPL-MCNC: 130 MG/DL (ref 65–140)
GLUCOSE SERPL-MCNC: 145 MG/DL (ref 65–140)
GLUCOSE SERPL-MCNC: 153 MG/DL (ref 65–140)
GLUCOSE SERPL-MCNC: 154 MG/DL (ref 65–140)
GLUCOSE SERPL-MCNC: 334 MG/DL (ref 65–140)
HCT VFR BLD AUTO: 36 % (ref 36.5–49.3)
HGB BLD-MCNC: 12 G/DL (ref 12–17)
MCH RBC QN AUTO: 31.1 PG (ref 26.8–34.3)
MCHC RBC AUTO-ENTMCNC: 33.3 G/DL (ref 31.4–37.4)
MCV RBC AUTO: 93 FL (ref 82–98)
PLATELET # BLD AUTO: 158 THOUSANDS/UL (ref 149–390)
PMV BLD AUTO: 10 FL (ref 8.9–12.7)
POTASSIUM SERPL-SCNC: 4 MMOL/L (ref 3.5–5.3)
RBC # BLD AUTO: 3.86 MILLION/UL (ref 3.88–5.62)
SODIUM SERPL-SCNC: 138 MMOL/L (ref 135–147)
WBC # BLD AUTO: 5.74 THOUSAND/UL (ref 4.31–10.16)

## 2024-01-21 PROCEDURE — 99232 SBSQ HOSP IP/OBS MODERATE 35: CPT | Performed by: INTERNAL MEDICINE

## 2024-01-21 PROCEDURE — 80048 BASIC METABOLIC PNL TOTAL CA: CPT | Performed by: INTERNAL MEDICINE

## 2024-01-21 PROCEDURE — 85027 COMPLETE CBC AUTOMATED: CPT | Performed by: INTERNAL MEDICINE

## 2024-01-21 PROCEDURE — 82948 REAGENT STRIP/BLOOD GLUCOSE: CPT

## 2024-01-21 RX ORDER — FUROSEMIDE 40 MG/1
40 TABLET ORAL DAILY
Status: DISCONTINUED | OUTPATIENT
Start: 2024-01-22 | End: 2024-01-24 | Stop reason: HOSPADM

## 2024-01-21 RX ADMIN — INSULIN LISPRO 1 UNITS: 100 INJECTION, SOLUTION INTRAVENOUS; SUBCUTANEOUS at 08:21

## 2024-01-21 RX ADMIN — LISINOPRIL 5 MG: 5 TABLET ORAL at 08:21

## 2024-01-21 RX ADMIN — HEPARIN SODIUM 5000 UNITS: 5000 INJECTION INTRAVENOUS; SUBCUTANEOUS at 05:22

## 2024-01-21 RX ADMIN — INSULIN LISPRO 4 UNITS: 100 INJECTION, SOLUTION INTRAVENOUS; SUBCUTANEOUS at 11:48

## 2024-01-21 RX ADMIN — TAMSULOSIN HYDROCHLORIDE 0.4 MG: 0.4 CAPSULE ORAL at 16:58

## 2024-01-21 RX ADMIN — HEPARIN SODIUM 5000 UNITS: 5000 INJECTION INTRAVENOUS; SUBCUTANEOUS at 13:53

## 2024-01-21 RX ADMIN — HEPARIN SODIUM 5000 UNITS: 5000 INJECTION INTRAVENOUS; SUBCUTANEOUS at 22:05

## 2024-01-21 RX ADMIN — INSULIN LISPRO 1 UNITS: 100 INJECTION, SOLUTION INTRAVENOUS; SUBCUTANEOUS at 22:05

## 2024-01-21 RX ADMIN — FUROSEMIDE 40 MG: 10 INJECTION, SOLUTION INTRAMUSCULAR; INTRAVENOUS at 08:21

## 2024-01-21 RX ADMIN — ATORVASTATIN CALCIUM 20 MG: 20 TABLET, FILM COATED ORAL at 08:21

## 2024-01-21 RX ADMIN — ASPIRIN 81 MG: 81 TABLET, COATED ORAL at 08:21

## 2024-01-21 RX ADMIN — DONEPEZIL HYDROCHLORIDE 10 MG: 10 TABLET ORAL at 22:04

## 2024-01-21 RX ADMIN — POTASSIUM CHLORIDE 20 MEQ: 1500 TABLET, EXTENDED RELEASE ORAL at 08:21

## 2024-01-21 RX ADMIN — OMEGA-3 FATTY ACIDS CAP 1000 MG 1000 MG: 1000 CAP at 08:21

## 2024-01-21 RX ADMIN — FLUOXETINE 20 MG: 20 CAPSULE ORAL at 08:21

## 2024-01-21 NOTE — ASSESSMENT & PLAN NOTE
Lab Results   Component Value Date    EGFR 65 01/21/2024    EGFR 71 01/20/2024    EGFR 68 01/19/2024    CREATININE 1.06 01/21/2024    CREATININE 0.98 01/20/2024    CREATININE 1.01 01/19/2024     Creatinine appears to be at baseline  We will continue to monitor with repeat labs in a.m.

## 2024-01-21 NOTE — ASSESSMENT & PLAN NOTE
Lab Results   Component Value Date    HGBA1C 6.9 (A) 12/01/2023       Recent Labs     01/20/24  1101 01/20/24  1607 01/20/24  2114 01/21/24  0713   POCGLU 228* 137 167* 153*         Blood Sugar Average: Last 72 hrs:  (P) 147.5    Placed on [facility] type II diet  Hold prehospital oral antihyperglycemic's  Obtain Accu-Cheks before meals and at bedtime with Humalog correction dose before meals and at bedtime

## 2024-01-21 NOTE — PLAN OF CARE
Problem: Potential for Falls  Goal: Patient will remain free of falls  Description: INTERVENTIONS:  - Educate patient/family on patient safety including physical limitations  - Instruct patient to call for assistance with activity   - Consult OT/PT to assist with strengthening/mobility   - Keep Call bell within reach  - Keep bed low and locked with side rails adjusted as appropriate  - Keep care items and personal belongings within reach  - Initiate and maintain comfort rounds  - Make Fall Risk Sign visible to staff  - Offer Toileting every 2 Hours, in advance of need  - Initiate/Maintain bed alarm  - Obtain necessary fall risk management equipment: bed alarm   - Apply yellow socks and bracelet for high fall risk patients  - Consider moving patient to room near nurses station  1/21/2024 1024 by Sunny Godinez RN  Outcome: Adequate for Discharge  1/21/2024 1023 by Sunny Godinez RN  Outcome: Progressing     Problem: Prexisting or High Potential for Compromised Skin Integrity  Goal: Skin integrity is maintained or improved  Description: INTERVENTIONS:  - Identify patients at risk for skin breakdown  - Assess and monitor skin integrity  - Assess and monitor nutrition and hydration status  - Monitor labs   - Assess for incontinence   - Turn and reposition patient  - Assist with mobility/ambulation  - Relieve pressure over bony prominences  - Avoid friction and shearing  - Provide appropriate hygiene as needed including keeping skin clean and dry  - Evaluate need for skin moisturizer/barrier cream  - Collaborate with interdisciplinary team   - Patient/family teaching  - Consider wound care consult   1/21/2024 1024 by Sunny Godinez RN  Outcome: Adequate for Discharge  1/21/2024 1023 by Sunny Godinez RN  Outcome: Progressing     Problem: PAIN - ADULT  Goal: Verbalizes/displays adequate comfort level or baseline comfort level  Description: Interventions:  - Encourage patient to monitor pain and request assistance  -  Assess pain using appropriate pain scale  - Administer analgesics based on type and severity of pain and evaluate response  - Implement non-pharmacological measures as appropriate and evaluate response  - Consider cultural and social influences on pain and pain management  - Notify physician/advanced practitioner if interventions unsuccessful or patient reports new pain  1/21/2024 1024 by Sunny Godinez RN  Outcome: Adequate for Discharge  1/21/2024 1023 by Sunny Godinez RN  Outcome: Progressing     Problem: INFECTION - ADULT  Goal: Absence or prevention of progression during hospitalization  Description: INTERVENTIONS:  - Assess and monitor for signs and symptoms of infection  - Monitor lab/diagnostic results  - Monitor all insertion sites, i.e. indwelling lines, tubes, and drains  - Monitor endotracheal if appropriate and nasal secretions for changes in amount and color  - [place] appropriate cooling/warming therapies per order  - Administer medications as ordered  - Instruct and encourage patient and family to use good hand hygiene technique  - Identify and instruct in appropriate isolation precautions for identified infection/condition  1/21/2024 1024 by Sunny Godinez RN  Outcome: Adequate for Discharge  1/21/2024 1023 by Sunny Godinez RN  Outcome: Progressing  Goal: Absence of fever/infection during neutropenic period  Description: INTERVENTIONS:  - Monitor WBC    1/21/2024 1024 by Sunny Godinez RN  Outcome: Adequate for Discharge  1/21/2024 1023 by Sunny Godinez RN  Outcome: Progressing     Problem: SAFETY ADULT  Goal: Patient will remain free of falls  Description: INTERVENTIONS:  - Educate patient/family on patient safety including physical limitations  - Instruct patient to call for assistance with activity   - Consult OT/PT to assist with strengthening/mobility   - Keep Call bell within reach  - Keep bed low and locked with side rails adjusted as appropriate  - Keep care items and personal  belongings within reach  - Initiate and maintain comfort rounds  - Make Fall Risk Sign visible to staff  - Offer Toileting every 2 Hours, in advance of need  - Initiate/Maintain bed alarm  - Obtain necessary fall risk management equipment: bed alarm   - Apply yellow socks and bracelet for high fall risk patients  - Consider moving patient to room near nurses station  1/21/2024 1024 by Sunny Godinez RN  Outcome: Adequate for Discharge  1/21/2024 1023 by Sunny Godinez RN  Outcome: Progressing  Goal: Maintain or return to baseline ADL function  Description: INTERVENTIONS:  -  Assess patient's ability to carry out ADLs; assess patient's baseline for ADL function and identify physical deficits which impact ability to perform ADLs (bathing, care of mouth/teeth, toileting, grooming, dressing, etc.)  - Assess/evaluate cause of self-care deficits   - Assess range of motion  - Assess patient's mobility; develop plan if impaired  - Assess patient's need for assistive devices and provide as appropriate  - Encourage maximum independence but intervene and supervise when necessary  - Involve family in performance of ADLs  - Assess for home care needs following discharge   - Consider OT consult to assist with ADL evaluation and planning for discharge  - Provide patient education as appropriate  1/21/2024 1024 by Sunny Godinez RN  Outcome: Adequate for Discharge  1/21/2024 1023 by Sunny Godinez RN  Outcome: Progressing  Goal: Maintains/Returns to pre admission functional level  Description: INTERVENTIONS:  - Perform AM-PAC 6 Click Basic Mobility/ Daily Activity assessment daily.  - Set and communicate daily mobility goal to care team and patient/family/caregiver.   - Collaborate with rehabilitation services on mobility goals if consulted  - Perform Range of Motion 3 times a day.  - Reposition patient every 2 hours.  - Dangle patient 3 times a day  - Stand patient 3 times a day  - Ambulate patient 3 times a day  - Out of bed to  chair 3 times a day   - Out of bed for meals 3 times a day  - Out of bed for toileting  - Record patient progress and toleration of activity level   1/21/2024 1024 by Sunny Godinez RN  Outcome: Adequate for Discharge  1/21/2024 1023 by Sunny Godinez RN  Outcome: Progressing     Problem: DISCHARGE PLANNING  Goal: Discharge to home or other facility with appropriate resources  Description: INTERVENTIONS:  - Identify barriers to discharge w/patient and caregiver  - Arrange for needed discharge resources and transportation as appropriate  - Identify discharge learning needs (meds, wound care, etc.)  - Arrange for interpretive services to assist at discharge as needed  - Refer to Case Management Department for coordinating discharge planning if the patient needs post-hospital services based on physician/advanced practitioner order or complex needs related to functional status, cognitive ability, or social support system  1/21/2024 1024 by Sunny Godinez RN  Outcome: Adequate for Discharge  1/21/2024 1023 by Sunny Godinez RN  Outcome: Progressing     Problem: Knowledge Deficit  Goal: Patient/family/caregiver demonstrates understanding of disease process, treatment plan, medications, and discharge instructions  Description: Complete learning assessment and assess knowledge base.  Interventions:  - Provide teaching at level of understanding  - Provide teaching via preferred learning methods  1/21/2024 1024 by Sunny Godinez RN  Outcome: Adequate for Discharge  1/21/2024 1023 by Sunny Godinez RN  Outcome: Progressing

## 2024-01-21 NOTE — CASE MANAGEMENT
Case Management Assessment & Discharge Planning Note    Patient name Israel Hughes  Location /-01 MRN [id number]  : 1940 Date 2024       Current Admission Date: 2024  Current Admission Diagnosis:Volume overload   Patient Active Problem List    Diagnosis Date Noted    Volume overload 2024    Stage 3 chronic kidney disease, unspecified whether stage 3a or 3b CKD ([facility]) 2023    Mild protein-calorie malnutrition ([facility]) 2022    Dysphagia 2022    Mild aortic stenosis 10/22/2021    Mild mitral regurgitation 10/22/2021    Diastolic dysfunction 10/22/2021    Mild concentric left ventricular hypertrophy (LVH) 10/22/2021    Type 2 diabetes mellitus with retinopathy, without long-term current use of insulin ([facility]) 09/15/2021    Myasthenia gravis without exacerbation ([facility]) 09/15/2021    Mixed hyperlipidemia 09/15/2021    Retinopathy 09/15/2021    Primary open angle glaucoma (POAG) of both eyes, mild stage 09/15/2021    Ptosis of both eyelids 09/15/2021    Benign prostatic hyperplasia without lower urinary tract symptoms 09/15/2021    Essential hypertension 09/15/2021    Other age-related cataract 09/15/2021    Memory difficulty 09/15/2021      LOS (days): 0  Geometric Mean LOS (GMLOS) (days):   Days to GMLOS:     OBJECTIVE:    Risk of Unplanned Readmission Score: 10.95         Current admission status: Inpatient  Referral Reason: Other (d/c planning)    Preferred Pharmacy:   [id number]  [address]  [address] [address]  Phone: [phone] Fax: [phone]    Primary Care Provider: Giovanni Reyez DO    Primary Insurance: [employer]  REP  Secondary Insurance:     ASSESSMENT:  Active Health Care Proxies       [facility] Representative - Sister In-Law   Primary Phone: [phone] (Mobile)                 Advance Directives  Does patient have a Health Care POA?: Yes (family need to bring  in paperwork)  Does patient have Advance Directives?: Yes (family needs to bring in paperwork)         Readmission Root Cause  30 Day Readmission: No    Patient Information  Admitted from:: Home  Mental Status: Alert  During Assessment patient was accompanied by: Other-Comment (grandchild)  Caregiver's Name:: Bianca Colbert  Caregiver's Relationship to Patient:: Family Member (granddaughter)  Support Systems: Family members (granddaughter)  County of Residence: [place]  What city do you live in?: [place]  Home entry access options. Select all that apply.: Stairs  Number of steps to enter home.: 8  Do the steps have railings?: Yes  Type of Current Residence: [address] home  Upon entering residence, is there a bedroom on the main floor (no further steps)?: Yes  Upon entering residence, is there a bathroom on the main floor (no further steps)?: Yes  Living Arrangements: Lives w/ Extended Family (lives with his granddaughter)  Is patient a ?: Yes ([employer] and 4 other branches- no benifits)  Is patient active with [facility] ([facility] [facility])?: No    Activities of Daily Living Prior to Admission  Functional Status: Assistance (cooking, cleaning, driving, laundry meds)  Completes ADLs independently?: Yes  Ambulates independently?: Yes  Does patient use assisted devices?: No  Does patient currently own DME?: Yes  What DME does the patient currently own?: Walker, Quad Cane, Shower Chair, Other (Comment) (glucometer, bp cuff)  Does patient have a history of Outpatient Therapy (PT/OT)?: No  Does the patient have a history of Short-Term Rehab?: No  Does patient have a history of HHC?: No  Does patient currently have HHC?: No         Patient Information Continued  Income Source: Pension/senior living  Does patient have prescription coverage?: Yes ([employer])  Does patient receive dialysis treatments?: No  Does patient have a history of substance abuse?: No  Does patient have a history of Mental Health Diagnosis?: No          Means of Transportation  Means of Transport to Appts:: Family transport      Housing Stability: Low Risk  (1/20/2024)    Housing Stability Vital Sign     Unable to Pay for Housing in the Last Year: No     Number of Places Lived in the Last Year: 1     Unstable Housing in the Last Year: No   Food Insecurity: No Food Insecurity (1/20/2024)    Hunger Vital Sign     Worried About Running Out of Food in the Last Year: Never true     Ran Out of Food in the Last Year: Never true   Transportation Needs: No Transportation Needs (1/20/2024)    PRAPARE - Transportation     Lack of Transportation (Medical): No     Lack of Transportation (Non-Medical): No   Utilities: Not At Risk (1/20/2024)    [facility] Utilities     Threatened with loss of utilities: No       DISCHARGE DETAILS:    Discharge planning discussed with:: angelia & Bianca at the bedside  Freedom of Choice: Yes  Comments - Freedom of Choice: pto/ot eval- cm will continue to follow  CM contacted family/caregiver?: Yes             Contacts  Patient Contacts: Bianca Colbert  Relationship to Patient:: Family (granddaughter)  Contact Method: In Person  Reason/Outcome: Discharge Planning                   Would you like to participate in our [facility] service program?  : No - Declined    Treatment Team Recommendation:  (d/c plan tbd- tbd)

## 2024-01-21 NOTE — PLAN OF CARE
Problem: Potential for Falls  Goal: Patient will remain free of falls  Description: INTERVENTIONS:  - Educate patient/family on patient safety including physical limitations  - Instruct patient to call for assistance with activity   - Consult OT/PT to assist with strengthening/mobility   - Keep Call bell within reach  - Keep bed low and locked with side rails adjusted as appropriate  - Keep care items and personal belongings within reach  - Initiate and maintain comfort rounds  - Make Fall Risk Sign visible to staff  - Offer Toileting every 2 Hours, in advance of need  - Initiate/Maintain bed alarm  - Obtain necessary fall risk management equipment: bed alarm  - Apply yellow socks and bracelet for high fall risk patients  - Consider moving patient to room near nurses station  Outcome: Progressing     Problem: Prexisting or High Potential for Compromised Skin Integrity  Goal: Skin integrity is maintained or improved  Description: INTERVENTIONS:  - Identify patients at risk for skin breakdown  - Assess and monitor skin integrity  - Assess and monitor nutrition and hydration status  - Monitor labs   - Assess for incontinence   - Turn and reposition patient  - Assist with mobility/ambulation  - Relieve pressure over bony prominences  - Avoid friction and shearing  - Provide appropriate hygiene as needed including keeping skin clean and dry  - Evaluate need for skin moisturizer/barrier cream  - Collaborate with interdisciplinary team   - Patient/family teaching  - Consider wound care consult   Outcome: Progressing     Problem: PAIN - ADULT  Goal: Verbalizes/displays adequate comfort level or baseline comfort level  Description: Interventions:  - Encourage patient to monitor pain and request assistance  - Assess pain using appropriate pain scale  - Administer analgesics based on type and severity of pain and evaluate response  - Implement non-pharmacological measures as appropriate and evaluate response  - Consider  cultural and social influences on pain and pain management  - Notify physician/advanced practitioner if interventions unsuccessful or patient reports new pain  Outcome: Progressing     Problem: INFECTION - ADULT  Goal: Absence or prevention of progression during hospitalization  Description: INTERVENTIONS:  - Assess and monitor for signs and symptoms of infection  - Monitor lab/diagnostic results  - Monitor all insertion sites, i.e. indwelling lines, tubes, and drains  - Monitor endotracheal if appropriate and nasal secretions for changes in amount and color  - [place] appropriate cooling/warming therapies per order  - Administer medications as ordered  - Instruct and encourage patient and family to use good hand hygiene technique  - Identify and instruct in appropriate isolation precautions for identified infection/condition  Outcome: Progressing  Goal: Absence of fever/infection during neutropenic period  Description: INTERVENTIONS:  - Monitor WBC    Outcome: Progressing     Problem: SAFETY ADULT  Goal: Patient will remain free of falls  Description: INTERVENTIONS:  - Educate patient/family on patient safety including physical limitations  - Instruct patient to call for assistance with activity   - Consult OT/PT to assist with strengthening/mobility   - Keep Call bell within reach  - Keep bed low and locked with side rails adjusted as appropriate  - Keep care items and personal belongings within reach  - Initiate and maintain comfort rounds  - Make Fall Risk Sign visible to staff  - Offer Toileting every 2 Hours, in advance of need  - Initiate/Maintain bed alarm  - Obtain necessary fall risk management equipment: bed alarm   - Apply yellow socks and bracelet for high fall risk patients  - Consider moving patient to room near nurses station  Outcome: Progressing  Goal: Maintain or return to baseline ADL function  Description: INTERVENTIONS:  -  Assess patient's ability to carry out ADLs; assess patient's baseline  for ADL function and identify physical deficits which impact ability to perform ADLs (bathing, care of mouth/teeth, toileting, grooming, dressing, etc.)  - Assess/evaluate cause of self-care deficits   - Assess range of motion  - Assess patient's mobility; develop plan if impaired  - Assess patient's need for assistive devices and provide as appropriate  - Encourage maximum independence but intervene and supervise when necessary  - Involve family in performance of ADLs  - Assess for home care needs following discharge   - Consider OT consult to assist with ADL evaluation and planning for discharge  - Provide patient education as appropriate  Outcome: Progressing  Goal: Maintains/Returns to pre admission functional level  Description: INTERVENTIONS:  - Perform AM-PAC 6 Click Basic Mobility/ Daily Activity assessment daily.  - Set and communicate daily mobility goal to care team and patient/family/caregiver.   - Collaborate with rehabilitation services on mobility goals if consulted  - Perform Range of Motion 3 times a day.  - Reposition patient every 2 hours.  - Dangle patient 3 times a day  - Stand patient 3 times a day  - Ambulate patient 3 times a day  - Out of bed to chair 3 times a day   - Out of bed for meals 3 times a day  - Out of bed for toileting  - Record patient progress and toleration of activity level   Outcome: Progressing     Problem: DISCHARGE PLANNING  Goal: Discharge to home or other facility with appropriate resources  Description: INTERVENTIONS:  - Identify barriers to discharge w/patient and caregiver  - Arrange for needed discharge resources and transportation as appropriate  - Identify discharge learning needs (meds, wound care, etc.)  - Arrange for interpretive services to assist at discharge as needed  - Refer to Case Management Department for coordinating discharge planning if the patient needs post-hospital services based on physician/advanced practitioner order or complex needs related to  functional status, cognitive ability, or social support system  Outcome: Progressing     Problem: Knowledge Deficit  Goal: Patient/family/caregiver demonstrates understanding of disease process, treatment plan, medications, and discharge instructions  Description: Complete learning assessment and assess knowledge base.  Interventions:  - Provide teaching at level of understanding  - Provide teaching via preferred learning methods  Outcome: Progressing

## 2024-01-21 NOTE — PROGRESS NOTES
[facility]  Progress Note  Name: Israel Hughes I  MRN: [id number]  Unit/Bed#: -01 I Date of Admission: 1/19/2024   Date of Service: 1/21/2024 I Hospital Day: 1    Assessment/Plan   * Volume overload  Assessment & Plan  Wt Readings from Last 3 Encounters:   01/21/24 58.6 kg (129 lb 3 oz)   01/04/24 59.9 kg (132 lb)   12/13/23 59.9 kg (132 lb)     Initial BNP of 212 which x-ray findings consistent with mild pulmonary congestion  Troponins have been within normal limits  Currently on IV Lasix  Can likely switch to oral Lasix tomorrow  Placed on no added salt diet  Monitor LISA's Daily weight  Patient does not carry a formal diagnosis of congestive heart failure  Last echo available for review dated 10/11/2021 showed preserved ejection fraction of 60% normal diastolic function  Check echo on Monday  Cardiology input appreciated    Stage 3 chronic kidney disease, unspecified whether stage 3a or 3b CKD ([facility])  Assessment & Plan  Lab Results   Component Value Date    EGFR 65 01/21/2024    EGFR 71 01/20/2024    EGFR 68 01/19/2024    CREATININE 1.06 01/21/2024    CREATININE 0.98 01/20/2024    CREATININE 1.01 01/19/2024     Creatinine appears to be at baseline  We will continue to monitor with repeat labs in a.m.    Mild aortic stenosis  Assessment & Plan  Patient with history of mild aortic stenosis based on previous echo.  Repeat echo to monitor for progression.  Cardiology following.  Continue diuretics as tolerated.    Memory difficulty  Assessment & Plan  Continue prehospital Aricept 10 mg p.o. nightly    Essential hypertension  Assessment & Plan  Continue prehospital lisinopril 5 mg p.o. daily    Mixed hyperlipidemia  Assessment & Plan  Continue prehospital Lipitor 20 mg p.o. daily    Type 2 diabetes mellitus with retinopathy, without long-term current use of insulin ([facility])  Assessment & Plan  Lab Results   Component Value Date    HGBA1C 6.9 (A) 12/01/2023       Recent Labs      24  1101 24  1607 24  2114 24  0713   POCGLU 228* 137 167* 153*         Blood Sugar Average: Last 72 hrs:  (P) 147.5    Placed on [facility] type II diet  Hold prehospital oral antihyperglycemic's  Obtain Accu-Cheks before meals and at bedtime with Humalog correction dose before meals and at bedtime             VTE Pharmacologic Prophylaxis: VTE Score: 3 Moderate Risk (Score 3-4) - Pharmacological DVT Prophylaxis Ordered: heparin.    Mobility:   Basic Mobility Inpatient Raw Score: 11  JH-HLM Goal: 4: Move to chair/commode  JH-HLM Achieved: 6: Walk 10 steps or more  HLM Goal achieved. Continue to encourage appropriate mobility.    Patient Centered Rounds: I performed bedside rounds with nursing staff today.   Discussions with Specialists or Other Care Team Provider: yes    Education and Discussions with Family / Patient: Updated  (granddaughter) at bedside.    Current Length of Stay: 1 day(s)  Current Patient Status: Inpatient   Certification Statement: The patient will continue to require additional inpatient hospital stay due to fluid overload  Discharge Plan: Anticipate discharge in 24-48 hrs to rehab facility.    Code Status: Level 1 - Full Code    Subjective:   No overnight events noted.  Patient states he is feeling better today.    Objective:     Vitals:   Temp (24hrs), Av °F (36.1 °C), Min:96.5 °F (35.8 °C), Max:97.5 °F (36.4 °C)    Temp:  [96.5 °F (35.8 °C)-97.5 °F (36.4 °C)] 96.6 °F (35.9 °C)  HR:  [58-66] 58  Resp:  [18] 18  BP: (112-152)/(60-76) 116/60  SpO2:  [94 %-98 %] 94 %  Body mass index is 23.63 kg/m².     Input and Output Summary (last 24 hours):     Intake/Output Summary (Last 24 hours) at 2024 1053  Last data filed at 2024 0900  Gross per 24 hour   Intake 720 ml   Output 2450 ml   Net -1730 ml       Physical Exam:   Physical Exam  Constitutional:       General: He is not in acute distress.  HENT:      Head: Normocephalic and atraumatic.      Nose:  Nose normal.      Mouth/Throat:      Mouth: Mucous membranes are moist.   Eyes:      Extraocular Movements: Extraocular movements intact.      Conjunctiva/sclera: Conjunctivae normal.   Cardiovascular:      Rate and Rhythm: Normal rate and regular rhythm.   Pulmonary:      Effort: Pulmonary effort is normal. No respiratory distress.   Abdominal:      Palpations: Abdomen is soft.      Tenderness: There is no abdominal tenderness.   Musculoskeletal:         General: Normal range of motion.      Cervical back: Normal range of motion and neck supple.      Comments: Bilateral lower extremity edema, improved from admission   Skin:     General: Skin is warm and dry.   Neurological:      General: No focal deficit present.      Mental Status: He is alert. Mental status is at baseline.      Cranial Nerves: No cranial nerve deficit.   Psychiatric:         Mood and Affect: Mood normal.         Behavior: Behavior normal.      Comments: Baseline dementia          Additional Data:     Labs:  Results from last 7 days   Lab Units 01/21/24  0523 01/20/24  0513 01/19/24  1827   WBC Thousand/uL 5.74   < > 5.81   HEMOGLOBIN g/dL 12.0   < > 10.7*   HEMATOCRIT % 36.0*   < > 33.2*   PLATELETS Thousands/uL 158   < > 146*   NEUTROS PCT %  --   --  63   LYMPHS PCT %  --   --  25   MONOS PCT %  --   --  11   EOS PCT %  --   --  1    < > = values in this interval not displayed.     Results from last 7 days   Lab Units 01/21/24  0523   SODIUM mmol/L 138   POTASSIUM mmol/L 4.0   CHLORIDE mmol/L 100   CO2 mmol/L 30   BUN mg/dL 21   CREATININE mg/dL 1.06   ANION GAP mmol/L 8   CALCIUM mg/dL 9.1   GLUCOSE RANDOM mg/dL 145*         Results from last 7 days   Lab Units 01/21/24  0713 01/20/24  2114 01/20/24  1607 01/20/24  1101 01/20/24  0735 01/19/24  2101   POC GLUCOSE mg/dl 153* 167* 137 228* 119 81               Lines/Drains:  Invasive Devices       Peripheral Intravenous Line  Duration             Peripheral IV 01/19/24 Right;Ventral (anterior)  Forearm 1 day              Drain  Duration             External Urinary Catheter 1 day                          Imaging: No pertinent imaging reviewed.    Recent Cultures (last 7 days):         Last 24 Hours Medication List:   Current Facility-Administered Medications   Medication Dose Route Frequency Provider Last Rate    acetaminophen  650 mg Oral Q4H PRN Marco Antonio Khalil PA-C      aspirin  81 mg Oral Daily Marco Antonio Khalil, PA-LINO      atorvastatin  20 mg Oral Daily KIMBERLY Pinedo-LINO      donepezil  10 mg Oral HS Marco Antonio Khalil PA-C      fish oil  1,000 mg Oral Daily KIMBERLY Pinedo-LINO      FLUoxetine  20 mg Oral QAM Marco Antonio Khalil PA-C      furosemide  40 mg Intravenous Daily Marco Antonio Khalil PA-C      heparin (porcine)  5,000 Units Subcutaneous Q8H ZEINAB Marco Antonio Khalil PA-C      insulin lispro  1-5 Units Subcutaneous TID AC Marco Antonio Khalil PA-C      insulin lispro  1-5 Units Subcutaneous HS Marco Antonio Khalil PA-C      lisinopril  5 mg Oral Daily Marco Antonio Khalil PA-C      ondansetron  4 mg Intravenous Q6H PRN Marco Antonio Khalil PA-C      potassium chloride  20 mEq Oral Daily Marco Antonio Khalil PA-C      sodium chloride (PF)  3 mL Intravenous Q1H PRN Carl Hylton MD      tamsulosin  0.4 mg Oral Daily With Dinner Marco Antonio Khalil PA-C          Today, Patient Was Seen By: Celestino Coelho MD    **Please Note: This note may have been constructed using a voice recognition system.**

## 2024-01-21 NOTE — ASSESSMENT & PLAN NOTE
Wt Readings from Last 3 Encounters:   01/21/24 58.6 kg (129 lb 3 oz)   01/04/24 59.9 kg (132 lb)   12/13/23 59.9 kg (132 lb)     Initial BNP of 212 which x-ray findings consistent with mild pulmonary congestion  Troponins have been within normal limits  Currently on IV Lasix  Can likely switch to oral Lasix tomorrow  Placed on no added salt diet  Monitor LISA's Daily weight  Patient does not carry a formal diagnosis of congestive heart failure  Last echo available for review dated 10/11/2021 showed preserved ejection fraction of 60% normal diastolic function  Check echo on Monday  Cardiology input appreciated

## 2024-01-21 NOTE — ASSESSMENT & PLAN NOTE
Patient with history of mild aortic stenosis based on previous echo.  Repeat echo to monitor for progression.  Cardiology following.  Continue diuretics as tolerated.

## 2024-01-22 ENCOUNTER — APPOINTMENT (INPATIENT)
Dept: NON INVASIVE DIAGNOSTICS | Facility: HOSPITAL | Age: 84
DRG: 307 | End: 2024-01-22
Payer: COMMERCIAL

## 2024-01-22 LAB
ANION GAP SERPL CALCULATED.3IONS-SCNC: 9 MMOL/L
AORTIC VALVE MEAN VELOCITY: 14.8 M/S
APICAL FOUR CHAMBER EJECTION FRACTION: 55 %
AV AREA BY CONTINUOUS VTI: 1.4 CM2
AV AREA PEAK VELOCITY: 1.4 CM2
AV LVOT MEAN GRADIENT: 2 MMHG
AV LVOT PEAK GRADIENT: 3 MMHG
AV MEAN GRADIENT: 10 MMHG
AV PEAK GRADIENT: 19 MMHG
AV VALVE AREA: 1.45 CM2
AV VELOCITY RATIO: 0.39
BSA FOR ECHO PROCEDURE: 1.58 M2
BUN SERPL-MCNC: 21 MG/DL (ref 5–25)
CALCIUM SERPL-MCNC: 9 MG/DL (ref 8.4–10.2)
CHLORIDE SERPL-SCNC: 101 MMOL/L (ref 96–108)
CO2 SERPL-SCNC: 28 MMOL/L (ref 21–32)
CREAT SERPL-MCNC: 1.01 MG/DL (ref 0.6–1.3)
DOP CALC AO PEAK VEL: 2.17 M/S
DOP CALC AO VTI: 50.74 CM
DOP CALC LVOT AREA: 3.46 CM2
DOP CALC LVOT CARDIAC INDEX: 2.85 L/MIN/M2
DOP CALC LVOT CARDIAC OUTPUT: 4.5 L/MIN
DOP CALC LVOT DIAMETER: 2.1 CM
DOP CALC LVOT PEAK VEL VTI: 21.23 CM
DOP CALC LVOT PEAK VEL: 0.85 M/S
DOP CALC LVOT STROKE INDEX: 44.9 ML/M2
DOP CALC LVOT STROKE VOLUME: 73.5
E WAVE DECELERATION TIME: 326 MS
E/A RATIO: 0.7
GFR SERPL CREATININE-BSD FRML MDRD: 68 ML/MIN/1.73SQ M
GLUCOSE SERPL-MCNC: 105 MG/DL (ref 65–140)
GLUCOSE SERPL-MCNC: 180 MG/DL (ref 65–140)
GLUCOSE SERPL-MCNC: 207 MG/DL (ref 65–140)
GLUCOSE SERPL-MCNC: 208 MG/DL (ref 65–140)
GLUCOSE SERPL-MCNC: 322 MG/DL (ref 65–140)
LAAS-AP2: 16 CM2
LAAS-AP4: 12.9 CM2
LEFT ATRIUM VOLUME (MOD BIPLANE): 34 ML
LEFT ATRIUM VOLUME INDEX (MOD BIPLANE): 21.5 ML/M2
MV E'TISSUE VEL-SEP: 6 CM/S
MV PEAK A VEL: 0.91 M/S
MV PEAK E VEL: 64 CM/S
MV STENOSIS PRESSURE HALF TIME: 95 MS
MV VALVE AREA P 1/2 METHOD: 2.32
POTASSIUM SERPL-SCNC: 3.9 MMOL/L (ref 3.5–5.3)
RIGHT ATRIUM AREA SYSTOLE A4C: 13 CM2
RIGHT VENTRICLE ID DIMENSION: 2.6 CM
SL CV LEFT ATRIUM LENGTH A2C: 4.7 CM
SL CV LV EF: 50
SODIUM SERPL-SCNC: 138 MMOL/L (ref 135–147)
TRICUSPID ANNULAR PLANE SYSTOLIC EXCURSION: 1.7 CM

## 2024-01-22 PROCEDURE — 99232 SBSQ HOSP IP/OBS MODERATE 35: CPT | Performed by: NURSE PRACTITIONER

## 2024-01-22 PROCEDURE — 82948 REAGENT STRIP/BLOOD GLUCOSE: CPT

## 2024-01-22 PROCEDURE — 93306 TTE W/DOPPLER COMPLETE: CPT

## 2024-01-22 PROCEDURE — 80048 BASIC METABOLIC PNL TOTAL CA: CPT | Performed by: INTERNAL MEDICINE

## 2024-01-22 PROCEDURE — 97116 GAIT TRAINING THERAPY: CPT

## 2024-01-22 PROCEDURE — 93306 TTE W/DOPPLER COMPLETE: CPT | Performed by: INTERNAL MEDICINE

## 2024-01-22 PROCEDURE — 97167 OT EVAL HIGH COMPLEX 60 MIN: CPT

## 2024-01-22 PROCEDURE — 99232 SBSQ HOSP IP/OBS MODERATE 35: CPT | Performed by: HOSPITALIST

## 2024-01-22 RX ADMIN — HEPARIN SODIUM 5000 UNITS: 5000 INJECTION INTRAVENOUS; SUBCUTANEOUS at 05:55

## 2024-01-22 RX ADMIN — INSULIN LISPRO 1 UNITS: 100 INJECTION, SOLUTION INTRAVENOUS; SUBCUTANEOUS at 22:22

## 2024-01-22 RX ADMIN — OMEGA-3 FATTY ACIDS CAP 1000 MG 1000 MG: 1000 CAP at 08:29

## 2024-01-22 RX ADMIN — INSULIN LISPRO 3 UNITS: 100 INJECTION, SOLUTION INTRAVENOUS; SUBCUTANEOUS at 11:55

## 2024-01-22 RX ADMIN — ASPIRIN 81 MG: 81 TABLET, COATED ORAL at 08:29

## 2024-01-22 RX ADMIN — DONEPEZIL HYDROCHLORIDE 10 MG: 10 TABLET ORAL at 22:21

## 2024-01-22 RX ADMIN — FLUOXETINE 20 MG: 20 CAPSULE ORAL at 08:29

## 2024-01-22 RX ADMIN — INSULIN LISPRO 1 UNITS: 100 INJECTION, SOLUTION INTRAVENOUS; SUBCUTANEOUS at 08:29

## 2024-01-22 RX ADMIN — POTASSIUM CHLORIDE 20 MEQ: 1500 TABLET, EXTENDED RELEASE ORAL at 08:29

## 2024-01-22 RX ADMIN — FUROSEMIDE 40 MG: 40 TABLET ORAL at 08:29

## 2024-01-22 RX ADMIN — LISINOPRIL 5 MG: 5 TABLET ORAL at 08:29

## 2024-01-22 RX ADMIN — TAMSULOSIN HYDROCHLORIDE 0.4 MG: 0.4 CAPSULE ORAL at 17:30

## 2024-01-22 RX ADMIN — HEPARIN SODIUM 5000 UNITS: 5000 INJECTION INTRAVENOUS; SUBCUTANEOUS at 22:22

## 2024-01-22 RX ADMIN — ATORVASTATIN CALCIUM 20 MG: 20 TABLET, FILM COATED ORAL at 08:29

## 2024-01-22 RX ADMIN — HEPARIN SODIUM 5000 UNITS: 5000 INJECTION INTRAVENOUS; SUBCUTANEOUS at 13:14

## 2024-01-22 NOTE — PROGRESS NOTES
"[facility]  Progress Note  Name: Israel Hughes I  MRN: [id number]  Unit/Bed#: -01 I Date of Admission: 1/19/2024   Date of Service: 1/22/2024 I Hospital Day: 2    Assessment/Plan   Stage 3 chronic kidney disease, unspecified whether stage 3a or 3b CKD ([facility])  Assessment & Plan  Lab Results   Component Value Date    EGFR 68 01/22/2024    EGFR 65 01/21/2024    EGFR 71 01/20/2024    CREATININE 1.01 01/22/2024    CREATININE 1.06 01/21/2024    CREATININE 0.98 01/20/2024     Monitor with diuretic use    Mild aortic stenosis  Assessment & Plan  Noted on prior echo  Based on exam, suspect progression  Echocardiogram has been ordered    Essential hypertension  Assessment & Plan  Blood pressure well-controlled  Continue home medications    * Volume overload  Assessment & Plan  This may be related to progression of underlying aortic stenosis  Volume status and symptoms improved following IV diuretics  Transition to Lasix 40 mg daily as needed for weight gain  Echo pending         Outpatient Cardiologist: none      Subjective:   Patient seen and examined.  No significant events overnight.    OOB in chair, no complaints    Summary comments:  Volume status appears improved.  Maintaining appropriate O2 sat on RA, examines euvolemic.  Overall, weight is down 5 lbs and he is net neg nearly 3 L since admit.    Continue lasix 40 mg PO daily prn for weight gain of 3 lbs overnight or 5 lbs in one week.    Echo is pending, likely OK for DC once echo complete.    We will make arrangements for follow up in the outpatient setting.    Telemetry/ECG/Cardiac testing:   Echo pending    Echo 10/11/2021  EF 60%  Mild LVH, mild diastolic dysfunction  Very mild AS, mild MR      Vitals: Blood pressure 122/56, pulse 64, temperature (!) 96.6 °F (35.9 °C), temperature source Axillary, resp. rate 19, height 5' 2\" (1.575 m), weight 57.7 kg (127 lb 5.1 oz), SpO2 96%.,   Orthostatic Blood Pressures      Flowsheet Row Most " Recent Value   Blood Pressure 122/56 filed at 01/22/2024 0739   Patient Position - Orthostatic VS Lying filed at 01/22/2024 0739        ,   Weight (last 2 days)       Date/Time Weight    01/22/24 0525 57.7 (127.32)    01/21/24 0600 58.6 (129.19)    01/21/24 0524 58.6 (129.19)    01/20/24 0558 58.8 (129.63)    01/20/24 0505 58.8 (129.63)            Physical Exam:    General:  Normal appearance in no distress.  Eyes:  Anicteric.  Oral mucosa:  Moist.  Neck:  No JVD. Carotid upstrokes are brisk without bruits.  No masses.  Chest:  Clear to auscultation   Cardiac:  No palpable PMI.  Normal S1 and S2.  2/6 systolic murmur at the base  Abdomen:  Soft and nontender. No palpable organomegaly or aortic enlargement.  Extremities:  No peripheral edema.  Neuro:  Grossly symmetric.  Psych:  Alert and oriented x2      Medications:      Current Facility-Administered Medications:     acetaminophen (TYLENOL) tablet 650 mg, 650 mg, Oral, Q4H PRN, Marco Antonio Khalil PA-C    aspirin (ECOTRIN LOW STRENGTH) EC tablet 81 mg, 81 mg, Oral, Daily, Marco Antonio Khalil PA-C, 81 mg at 01/22/24 0829    atorvastatin (LIPITOR) tablet 20 mg, 20 mg, Oral, Daily, Marco Antonio Khalil PA-C, 20 mg at 01/22/24 0829    donepezil (ARICEPT) tablet 10 mg, 10 mg, Oral, HS, Marco Antonio Khalil PA-C, 10 mg at 01/21/24 2204    fish oil capsule 1,000 mg, 1,000 mg, Oral, Daily, Marco Antonio Khalil PA-C, 1,000 mg at 01/22/24 0829    FLUoxetine (PROzac) capsule 20 mg, 20 mg, Oral, QAM, Marco Antonio Khalil PA-C, 20 mg at 01/22/24 0829    furosemide (LASIX) tablet 40 mg, 40 mg, Oral, Daily, Celestino Coelho MD, 40 mg at 01/22/24 0829    heparin (porcine) subcutaneous injection 5,000 Units, 5,000 Units, Subcutaneous, Q8H ZEINAB, Marco Antonio Khalil PA-C, 5,000 Units at 01/22/24 0555    insulin lispro (HumaLOG) 100 units/mL subcutaneous injection 1-5 Units, 1-5 Units, Subcutaneous, TID AC, 1 Units at 01/22/24 0829 **AND** Fingerstick Glucose (POCT), , , TID AC, Marco Antonio Khalil PA-C    insulin lispro  (HumaLOG) 100 units/mL subcutaneous injection 1-5 Units, 1-5 Units, Subcutaneous, HS, Marco Antonio Khalil PA-C, 1 Units at 01/21/24 2205    lisinopril (ZESTRIL) tablet 5 mg, 5 mg, Oral, Daily, Marco Antonio Khalil PA-C, 5 mg at 01/22/24 0829    ondansetron (ZOFRAN) injection 4 mg, 4 mg, Intravenous, Q6H PRN, Marco Antonio Khalil PA-C    potassium chloride (K-DUR,KLOR-CON) CR tablet 20 mEq, 20 mEq, Oral, Daily, Marco Antonio Khalil PA-C, 20 mEq at 01/22/24 0829    Insert peripheral IV, , , Once **AND** sodium chloride (PF) 0.9 % injection 3 mL, 3 mL, Intravenous, Q1H PRN, Carl Hylton MD    tamsulosin (FLOMAX) capsule 0.4 mg, 0.4 mg, Oral, Daily With Dinner, Marco Antonio Khalil PA-C, 0.4 mg at 01/21/24 1658     Labs & Results:    Troponins:    Results from last 7 days   Lab Units 01/19/24  2342 01/19/24  2045 01/19/24  1827   HS TNI 0HR ng/L  --   --  5   HS TNI 2HR ng/L  --  5  --    HSTNI D2 ng/L  --  0  --    HS TNI 4HR ng/L 5  --   --    HSTNI D4 ng/L 0  --   --         BNP:   Results from last 6 Months   Lab Units 01/19/24  1827   BNP pg/mL 212*     CBC with diff:   Results from last 7 days   Lab Units 01/21/24  0523 01/20/24  0513   WBC Thousand/uL 5.74 6.16   HEMOGLOBIN g/dL 12.0 12.1   HEMATOCRIT % 36.0* 37.6   MCV fL 93 95   PLATELETS Thousands/uL 158 155     TSH:     CMP:   Results from last 7 days   Lab Units 01/22/24  0549 01/21/24  0523   POTASSIUM mmol/L 3.9 4.0   CHLORIDE mmol/L 101 100   CO2 mmol/L 28 30   BUN mg/dL 21 21   CREATININE mg/dL 1.01 1.06   EGFR ml/min/1.73sq m 68 65     Lipid Profile:     Coags:

## 2024-01-22 NOTE — ASSESSMENT & PLAN NOTE
Lab Results   Component Value Date    EGFR 68 01/22/2024    EGFR 65 01/21/2024    EGFR 71 01/20/2024    CREATININE 1.01 01/22/2024    CREATININE 1.06 01/21/2024    CREATININE 0.98 01/20/2024     Creatinine appears to be at baseline  We will continue to monitor with repeat labs in a.m.

## 2024-01-22 NOTE — ED PROVIDER NOTES
History  Chief Complaint   Patient presents with    Edema    Shortness of Breath     Patient is an 83-year-old male with history of diabetes mellitus, hyperlipidemia, dementia the presents for evaluation of episode of chest pain with exertional dyspnea.  He presents with his granddaughter who is his primary caregiver that helps provide history.  Apparently, the patient stood and had significant substernal chest pain and some exertional dyspnea for period of time.  His granddaughter notes that has been having exertional dyspnea over the past several days to weeks that has been worsening along with some lower extremity swelling.  Patient currently denies chest pain or dyspnea.  Denies nausea vomiting or diaphoresis.  He is not currently on a diuretic and does not carry a diagnosis of CHF.  He denies recent infectious symptoms including cough rhinorrhea congestion fever chills rigors.        Prior to Admission Medications   Prescriptions Last Dose Informant Patient Reported? Taking?   B Complex-C (B COMPLEX-B12-C IJ)   Yes No   Sig: Take 1 capsule by mouth if needed   Blood Glucose Monitoring Suppl (OneTouch Verio Reflect) w/Device KIT   No No   Sig: USE  ONCE DAILY   Continuous Blood Gluc  (FreeStyle Petra 2 [place]) JAZLYN   No No   Sig: Check blood sugars multiple times per day   FLUoxetine (PROzac) 20 mg capsule   No No   Sig: Take 1 capsule (20 mg total) by mouth every morning   Misc Natural Products (PUMPKIN SEED OIL PO)   Yes No   Sig: Take by mouth   Patient not taking: Reported on 4/27/2022   Omega-3 Fatty Acids (fish oil) 1,000 mg   Yes No   Sig: Take 1,000 mg by mouth if needed   OneTouch Delica Lancets 33G MISC   No No   Sig: Test once a day   Saw Palmetto, Serenoa repens, (Saw [place] Berries) 540 MG CAPS   Yes No   Sig: Take 1 capsule by mouth in the morning   aspirin (ECOTRIN LOW STRENGTH) 81 mg EC tablet   Yes No   Sig: Take 81 mg by mouth   atorvastatin (LIPITOR) 20 mg tablet   No No   Sig: Take  1 tablet (20 mg total) by mouth daily   donepezil (ARICEPT) 5 mg tablet   No No   Sig: TAKE 2 TABLETS BY MOUTH ONCE DAILY AT BEDTIME   glimepiride (AMARYL) 4 mg tablet   No No   Sig: Take 2 tablets (8 mg total) by mouth daily with breakfast   glucose blood (OneTouch Verio) test strip   No No   Sig: USE 1 STRIP TO CHECK GLUCOSE ONCE DAILY   lidocaine (Lidoderm) 5 %   No No   Sig: Apply 1 patch topically over 12 hours daily Remove & Discard patch within 12 hours or as directed by MD   Patient not taking: Reported on 1/4/2024   linaGLIPtin 5 MG TABS   No No   Sig: Take 5 mg by mouth daily with or without food   lisinopril (ZESTRIL) 5 mg tablet   No No   Sig: Take 1 tablet (5 mg total) by mouth daily   metFORMIN (GLUCOPHAGE) 1000 MG tablet   No No   Sig: Take 1 tablet (1,000 mg total) by mouth 2 (two) times a day with meals   oxyCODONE (Roxicodone) 5 immediate release tablet   No No   Sig: Take 1 tablet (5 mg total) by mouth every 6 (six) hours as needed for severe pain for up to 8 doses Max Daily Amount: 20 mg   tamsulosin (FLOMAX) 0.4 mg   No No   Sig: Take 1 capsule (0.4 mg total) by mouth daily with dinner      Facility-Administered Medications: None       Past Medical History:   Diagnosis Date    Diabetes mellitus (HCC)     Hyperlipidemia        Past Surgical History:   Procedure Laterality Date    CATARACT EXTRACTION      COLONOSCOPY      DENTAL SURGERY      EYE SURGERY      TONSILLECTOMY      VASECTOMY         History reviewed. No pertinent family history.  I have reviewed and agree with the history as documented.    E-Cigarette/Vaping    E-Cigarette Use Never User      E-Cigarette/Vaping Substances    Nicotine No     THC No     CBD No     Flavoring No     Other No     Unknown No      Social History     Tobacco Use    Smoking status: Never     Passive exposure: Never    Smokeless tobacco: Never   Vaping Use    Vaping status: Never Used   Substance Use Topics    Alcohol use: Not Currently    Drug use: Never        Review of Systems   Constitutional:  Negative for fever.   HENT:  Negative for sore throat.    Eyes:  Negative for photophobia.   Respiratory:  Positive for shortness of breath.    Cardiovascular:  Positive for chest pain.   Gastrointestinal:  Negative for abdominal pain.   Genitourinary:  Negative for dysuria.   Musculoskeletal:  Negative for back pain.   Skin:  Negative for rash.   Neurological:  Negative for light-headedness.   Hematological:  Negative for adenopathy.   Psychiatric/Behavioral:  Negative for agitation.    All other systems reviewed and are negative.      Physical Exam  Physical Exam  Vitals reviewed.   Constitutional:       General: He is not in acute distress.     Appearance: He is well-developed.   HENT:      Head: Normocephalic.   Eyes:      Pupils: Pupils are equal, round, and reactive to light.   Cardiovascular:      Rate and Rhythm: Normal rate and regular rhythm.      Heart sounds: Normal heart sounds. No murmur heard.     No friction rub. No gallop.   Pulmonary:      Effort: Pulmonary effort is normal.      Comments: Pain rhonchi, no significant increased work of breathing  Abdominal:      General: Bowel sounds are normal. There is no distension.      Palpations: Abdomen is soft.      Tenderness: There is no abdominal tenderness. There is no guarding.   Musculoskeletal:         General: Normal range of motion.      Cervical back: Normal range of motion and neck supple.      Right lower leg: Edema present.      Left lower leg: Edema present.      Comments: 2+ pitting edema to the midshin bilaterally   Skin:     Capillary Refill: Capillary refill takes less than 2 seconds.   Neurological:      Mental Status: He is alert.      Cranial Nerves: No cranial nerve deficit.      Sensory: No sensory deficit.      Motor: No abnormal muscle tone.   Psychiatric:         Behavior: Behavior normal.         Thought Content: Thought content normal.         Judgment: Judgment normal.         Vital  Signs  ED Triage Vitals [01/19/24 1808]   Temperature Pulse Respirations Blood Pressure SpO2   97.8 °F (36.6 °C) 66 20 142/68 98 %      Temp Source Heart Rate Source Patient Position - Orthostatic VS BP Location FiO2 (%)   Temporal Monitor Lying Left arm --      Pain Score       No Pain           Vitals:    01/21/24 0738 01/21/24 1505 01/21/24 2252 01/22/24 0739   BP: 116/60 115/60 114/70 122/56   Pulse: 58 61 58 64   Patient Position - Orthostatic VS:             Visual Acuity      ED Medications  Medications   sodium chloride (PF) 0.9 % injection 3 mL (has no administration in time range)   aspirin (ECOTRIN LOW STRENGTH) EC tablet 81 mg (81 mg Oral Given 1/21/24 0821)   atorvastatin (LIPITOR) tablet 20 mg (20 mg Oral Given 1/21/24 0821)   donepezil (ARICEPT) tablet 10 mg (10 mg Oral Given 1/21/24 2204)   FLUoxetine (PROzac) capsule 20 mg (20 mg Oral Given 1/21/24 0821)   lisinopril (ZESTRIL) tablet 5 mg (5 mg Oral Given 1/21/24 0821)   fish oil capsule 1,000 mg (1,000 mg Oral Given 1/21/24 0821)   tamsulosin (FLOMAX) capsule 0.4 mg (0.4 mg Oral Given 1/21/24 1658)   ondansetron (ZOFRAN) injection 4 mg (has no administration in time range)   heparin (porcine) subcutaneous injection 5,000 Units (5,000 Units Subcutaneous Given 1/22/24 0555)   insulin lispro (HumaLOG) 100 units/mL subcutaneous injection 1-5 Units (0 Units Subcutaneous Not Given 1/21/24 1658)   insulin lispro (HumaLOG) 100 units/mL subcutaneous injection 1-5 Units (1 Units Subcutaneous Given 1/21/24 2205)   acetaminophen (TYLENOL) tablet 650 mg (has no administration in time range)   potassium chloride (K-DUR,KLOR-CON) CR tablet 20 mEq (20 mEq Oral Given 1/21/24 0821)   furosemide (LASIX) tablet 40 mg (has no administration in time range)   furosemide (LASIX) injection 40 mg (40 mg Intravenous Given 1/19/24 1924)       Diagnostic Studies  Results Reviewed       Procedure Component Value Units Date/Time    Basic metabolic panel [[id number]] Collected:  01/20/24 0513    Lab Status: Final result Specimen: Blood from Arm, Right Updated: 01/20/24 0545     Sodium 140 mmol/L      Potassium 3.7 mmol/L      Chloride 102 mmol/L      CO2 30 mmol/L      ANION GAP 8 mmol/L      BUN 17 mg/dL      Creatinine 0.98 mg/dL      Glucose 108 mg/dL      Calcium 9.4 mg/dL      eGFR 71 ml/min/1.73sq m     Narrative:      National Kidney Disease Foundation guidelines for Chronic Kidney Disease (CKD):     Stage 1 with normal or high GFR (GFR > 90 mL/min/1.73 square meters)    Stage 2 Mild CKD (GFR = 60-89 mL/min/1.73 square meters)    Stage 3A Moderate CKD (GFR = 45-59 mL/min/1.73 square meters)    Stage 3B Moderate CKD (GFR = 30-44 mL/min/1.73 square meters)    Stage 4 Severe CKD (GFR = 15-29 mL/min/1.73 square meters)    Stage 5 End Stage CKD (GFR <15 mL/min/1.73 square meters)  Note: GFR calculation is accurate only with a steady state creatinine    CBC (With Platelets) [[id number]]  (Normal) Collected: 01/20/24 0513    Lab Status: Final result Specimen: Blood from Arm, Right Updated: 01/20/24 0527     WBC 6.16 Thousand/uL      RBC 3.98 Million/uL      Hemoglobin 12.1 g/dL      Hematocrit 37.6 %      MCV 95 fL      MCH 30.4 pg      MCHC 32.2 g/dL      RDW 13.0 %      Platelets 155 Thousands/uL      MPV 9.5 fL     HS Troponin I 4hr [[id number]]  (Normal) Collected: 01/19/24 2342    Lab Status: Final result Specimen: Blood from Hand, Left Updated: 01/20/24 0012     hs TnI 4hr 5 ng/L      Delta 4hr hsTnI 0 ng/L     HS Troponin I 2hr [[id number]]  (Normal) Collected: 01/19/24 2045    Lab Status: Final result Specimen: Blood from Arm, Left Updated: 01/19/24 2140     hs TnI 2hr 5 ng/L      Delta 2hr hsTnI 0 ng/L     HS Troponin 0hr (reflex protocol) [[id number]]  (Normal) Collected: 01/19/24 1827    Lab Status: Final result Specimen: Blood from Arm, Right Updated: 01/19/24 1859     hs TnI 0hr 5 ng/L     B-Type Natriuretic Peptide(BNP) [[id number]]  (Abnormal) Collected: 01/19/24 1827    Lab  Status: Final result Specimen: Blood from Arm, Right Updated: 01/19/24 1858      pg/mL     Basic metabolic panel [[id number]] Collected: 01/19/24 1827    Lab Status: Final result Specimen: Blood from Arm, Right Updated: 01/19/24 1854     Sodium 142 mmol/L      Potassium 3.8 mmol/L      Chloride 107 mmol/L      CO2 26 mmol/L      ANION GAP 9 mmol/L      BUN 21 mg/dL      Creatinine 1.01 mg/dL      Glucose 69 mg/dL      Calcium 8.9 mg/dL      eGFR 68 ml/min/1.73sq m     Narrative:      National Kidney Disease Foundation guidelines for Chronic Kidney Disease (CKD):     Stage 1 with normal or high GFR (GFR > 90 mL/min/1.73 square meters)    Stage 2 Mild CKD (GFR = 60-89 mL/min/1.73 square meters)    Stage 3A Moderate CKD (GFR = 45-59 mL/min/1.73 square meters)    Stage 3B Moderate CKD (GFR = 30-44 mL/min/1.73 square meters)    Stage 4 Severe CKD (GFR = 15-29 mL/min/1.73 square meters)    Stage 5 End Stage CKD (GFR <15 mL/min/1.73 square meters)  Note: GFR calculation is accurate only with a steady state creatinine    CBC and differential [[id number]]  (Abnormal) Collected: 01/19/24 1827    Lab Status: Final result Specimen: Blood from Arm, Right Updated: 01/19/24 1833     WBC 5.81 Thousand/uL      RBC 3.45 Million/uL      Hemoglobin 10.7 g/dL      Hematocrit 33.2 %      MCV 96 fL      MCH 31.0 pg      MCHC 32.2 g/dL      RDW 13.2 %      MPV 9.5 fL      Platelets 146 Thousands/uL      nRBC 0 /100 WBCs      Neutrophils Relative 63 %      Immat GRANS % 0 %      Lymphocytes Relative 25 %      Monocytes Relative 11 %      Eosinophils Relative 1 %      Basophils Relative 0 %      Neutrophils Absolute 3.62 Thousands/µL      Immature Grans Absolute 0.02 Thousand/uL      Lymphocytes Absolute 1.46 Thousands/µL      Monocytes Absolute 0.62 Thousand/µL      Eosinophils Absolute 0.07 Thousand/µL      Basophils Absolute 0.02 Thousands/µL                    X-ray chest 1 view portable   Final Result by Shawn Liu DO  (01/20 0815)      No acute cardiopulmonary disease.                  Workstation performed: [id number]                    Procedures  ECG 12 Lead Documentation Only    Date/Time: 1/22/2024 8:03 AM    Performed by: Carl Hylton MD  Authorized by: Carl Hylton MD    ECG reviewed by me, the ED Provider: yes    Patient location:  ED  Previous ECG:     Previous ECG:  Compared to current    Comparison to cardiac monitor: Yes    Interpretation:     Interpretation: non-specific    Rate:     ECG rate assessment: normal    Rhythm:     Rhythm: sinus rhythm    Ectopy:     Ectopy: none    QRS:     QRS axis:  Normal    QRS intervals:  Normal  Conduction:     Conduction: normal    ST segments:     ST segments:  Normal  T waves:     T waves: flattening      Flattening:  II, III, aVF, V5, V6 and V4           ED Course                               SBIRT 20yo+      Flowsheet Row Most Recent Value   Initial Alcohol Screen: US AUDIT-C     1. How often do you have a drink containing alcohol? 0 Filed at: 01/19/2024 1832   2. How many drinks containing alcohol do you have on a typical day you are drinking?  0 Filed at: 01/19/2024 1832   3a. Male UNDER 65: How often do you have five or more drinks on one occasion? 0 Filed at: 01/19/2024 1832   3b. FEMALE Any Age, or MALE 65+: How often do you have 4 or more drinks on one occassion? 0 Filed at: 01/19/2024 1832   Audit-C Score 0 Filed at: 01/19/2024 1832   KATINA: How many times in the past year have you...    Used an illegal drug or used a prescription medication for non-medical reasons? Never Filed at: 01/19/2024 1832                      Medical Decision Making  Patient is an 83-year-old male who presents for evaluation of an episode of chest pain and exertional dyspnea with lower extremity swelling.  Consistent with CHF exacerbation.  Chest x-ray revealed some mild pulmonary edema.  EKG is unremarkable, BNP is slightly elevated.  Imaging and blood work reviewed.  Started with  IV diuresis with Lasix here plan to admit to the hospital for further workup and management of likely new onset CHF.    Amount and/or Complexity of Data Reviewed  Labs: ordered.  Radiology: ordered.    Risk  Prescription drug management.  Decision regarding hospitalization.             Disposition  Final diagnoses:   CHF exacerbation (HCC)     Time reflects when diagnosis was documented in both MDM as applicable and the Disposition within this note       Time User Action Codes Description Comment    1/19/2024  7:14 PM Carl Hylton Add [I50.9] CHF exacerbation (HCC)           ED Disposition       ED Disposition   Admit    Condition   Stable    Date/Time   Fri Jan 19, 2024 1914    Comment   Case was discussed with JACQUES and the patient's admission status was agreed to be Admission Status: inpatient status to the service of Dr. Coelho .               Follow-up Information    None         Current Discharge Medication List        CONTINUE these medications which have NOT CHANGED    Details   aspirin (ECOTRIN LOW STRENGTH) 81 mg EC tablet Take 81 mg by mouth      atorvastatin (LIPITOR) 20 mg tablet Take 1 tablet (20 mg total) by mouth daily  Qty: 90 tablet, Refills: 1    Associated Diagnoses: Type 2 diabetes mellitus with retinopathy of both eyes, without long-term current use of insulin, macular edema presence unspecified, unspecified retinopathy severity (HCC); Mixed hyperlipidemia      B Complex-C (B COMPLEX-B12-C IJ) Take 1 capsule by mouth if needed      Blood Glucose Monitoring Suppl (OneTouch Verio Reflect) w/Device KIT USE  ONCE DAILY  Qty: 1 kit, Refills: 0    Associated Diagnoses: Type 2 diabetes mellitus with retinopathy of both eyes, without long-term current use of insulin, macular edema presence unspecified, unspecified retinopathy severity (HCC)      Continuous Blood Gluc  (FreeStyle Petra 2 [place]) JAZLYN Check blood sugars multiple times per day  Qty: 1 each, Refills: 0    Associated Diagnoses:  Type 2 diabetes mellitus with retinopathy of both eyes, without long-term current use of insulin, macular edema presence unspecified, unspecified retinopathy severity (HCC)      donepezil (ARICEPT) 5 mg tablet TAKE 2 TABLETS BY MOUTH ONCE DAILY AT BEDTIME  Qty: 90 tablet, Refills: 0    Associated Diagnoses: Memory difficulty      FLUoxetine (PROzac) 20 mg capsule Take 1 capsule (20 mg total) by mouth every morning  Qty: 90 capsule, Refills: 1    Associated Diagnoses: Agitation      glimepiride (AMARYL) 4 mg tablet Take 2 tablets (8 mg total) by mouth daily with breakfast  Qty: 180 tablet, Refills: 3    Associated Diagnoses: Type 2 diabetes mellitus with retinopathy of both eyes, without long-term current use of insulin, macular edema presence unspecified, unspecified retinopathy severity (HCC)      glucose blood (OneTouch Verio) test strip USE 1 STRIP TO CHECK GLUCOSE ONCE DAILY  Qty: 50 each, Refills: 0    Associated Diagnoses: Type 2 diabetes mellitus with retinopathy of both eyes, without long-term current use of insulin, macular edema presence unspecified, unspecified retinopathy severity (HCC)      lidocaine (Lidoderm) 5 % Apply 1 patch topically over 12 hours daily Remove & Discard patch within 12 hours or as directed by MD  Qty: 14 patch, Refills: 0    Associated Diagnoses: Rib fractures      linaGLIPtin 5 MG TABS Take 5 mg by mouth daily with or without food  Qty: 90 tablet, Refills: 3    Associated Diagnoses: Type 2 diabetes mellitus with retinopathy of both eyes, without long-term current use of insulin, macular edema presence unspecified, unspecified retinopathy severity (HCC)      lisinopril (ZESTRIL) 5 mg tablet Take 1 tablet (5 mg total) by mouth daily  Qty: 90 tablet, Refills: 3    Associated Diagnoses: Type 2 diabetes mellitus with retinopathy of both eyes, without long-term current use of insulin, macular edema presence unspecified, unspecified retinopathy severity (HCC)      metFORMIN (GLUCOPHAGE)  1000 MG tablet Take 1 tablet (1,000 mg total) by mouth 2 (two) times a day with meals  Qty: 180 tablet, Refills: 1    Associated Diagnoses: Type 2 diabetes mellitus with retinopathy of both eyes, without long-term current use of insulin, macular edema presence unspecified, unspecified retinopathy severity (HCC)      Misc Natural Products (PUMPKIN SEED OIL PO) Take by mouth      Omega-3 Fatty Acids (fish oil) 1,000 mg Take 1,000 mg by mouth if needed      OneTouch Delica Lancets 33G MISC Test once a day  Qty: 100 each, Refills: 5    Associated Diagnoses: Type 2 diabetes mellitus with retinopathy of both eyes, without long-term current use of insulin, macular edema presence unspecified, unspecified retinopathy severity (HCC)      oxyCODONE (Roxicodone) 5 immediate release tablet Take 1 tablet (5 mg total) by mouth every 6 (six) hours as needed for severe pain for up to 8 doses Max Daily Amount: 20 mg  Qty: 8 tablet, Refills: 0    Associated Diagnoses: Rib fractures      Saw Palmetto, Serenoa repens, (Saw [place] Berries) 540 MG CAPS Take 1 capsule by mouth in the morning      tamsulosin (FLOMAX) 0.4 mg Take 1 capsule (0.4 mg total) by mouth daily with dinner  Qty: 90 capsule, Refills: 1    Associated Diagnoses: Urinary incontinence, unspecified type             No discharge procedures on file.    PDMP Review       None            ED Provider  Electronically Signed by             Carl Hylton MD  01/22/24 0866

## 2024-01-22 NOTE — ASSESSMENT & PLAN NOTE
Lab Results   Component Value Date    EGFR 68 01/22/2024    EGFR 65 01/21/2024    EGFR 71 01/20/2024    CREATININE 1.01 01/22/2024    CREATININE 1.06 01/21/2024    CREATININE 0.98 01/20/2024     Monitor with diuretic use

## 2024-01-22 NOTE — ASSESSMENT & PLAN NOTE
Lab Results   Component Value Date    HGBA1C 6.9 (A) 12/01/2023       Recent Labs     01/21/24  1655 01/21/24  2104 01/22/24  0720 01/22/24  1131   POCGLU 130 154* 180* 322*         Blood Sugar Average: Last 72 hrs:  (P) [id number]    Target blood sugar for the hospital is 140-180  The most part blood sugars are stable  We will continue to monitor closely over the next 24 hours  Continue insulin, Accu-Cheks before meals and at bedtime with sliding scale coverage for now  We will add Lantus if necessary

## 2024-01-22 NOTE — ASSESSMENT & PLAN NOTE
Wt Readings from Last 3 Encounters:   01/22/24 57.6 kg (127 lb)   01/04/24 59.9 kg (132 lb)   12/13/23 59.9 kg (132 lb)     Patient has diuresed 2.95 L of fluids since arrival  Status post treatment with IV Lasix  Appreciate cardiology input  Echocardiogram revealed an EF of 50%, mild systolic dysfunction  Await further input from cardiology regarding echo findings  In the interim, continue current cardiac based medications which include aspirin, Lipitor, lisinopril, and Lasix which has been transitioned to p.o. form from  IV by cardiology earlier today  Patient is also status post a PT and OT evaluation-they recommend postacute rehabilitation services  Case management is working on placement options

## 2024-01-22 NOTE — PLAN OF CARE
Problem: Potential for Falls  Goal: Patient will remain free of falls  Description: INTERVENTIONS:  - Educate patient/family on patient safety including physical limitations  - Instruct patient to call for assistance with activity   - Consult OT/PT to assist with strengthening/mobility   - Keep Call bell within reach  - Keep bed low and locked with side rails adjusted as appropriate  - Keep care items and personal belongings within reach  - Initiate and maintain comfort rounds  - Make Fall Risk Sign visible to staff  - Offer Toileting every 2 Hours, in advance of need  - Initiate/Maintain bed alarm  - Obtain necessary fall risk management equipment: bed alarm   - Apply yellow socks and bracelet for high fall risk patients  - Consider moving patient to room near nurses station  Outcome: Progressing     Problem: Prexisting or High Potential for Compromised Skin Integrity  Goal: Skin integrity is maintained or improved  Description: INTERVENTIONS:  - Identify patients at risk for skin breakdown  - Assess and monitor skin integrity  - Assess and monitor nutrition and hydration status  - Monitor labs   - Assess for incontinence   - Turn and reposition patient  - Assist with mobility/ambulation  - Relieve pressure over bony prominences  - Avoid friction and shearing  - Provide appropriate hygiene as needed including keeping skin clean and dry  - Evaluate need for skin moisturizer/barrier cream  - Collaborate with interdisciplinary team   - Patient/family teaching  - Consider wound care consult   Outcome: Progressing     Problem: PAIN - ADULT  Goal: Verbalizes/displays adequate comfort level or baseline comfort level  Description: Interventions:  - Encourage patient to monitor pain and request assistance  - Assess pain using appropriate pain scale  - Administer analgesics based on type and severity of pain and evaluate response  - Implement non-pharmacological measures as appropriate and evaluate response  - Consider  cultural and social influences on pain and pain management  - Notify physician/advanced practitioner if interventions unsuccessful or patient reports new pain  Outcome: Progressing     Problem: INFECTION - ADULT  Goal: Absence or prevention of progression during hospitalization  Description: INTERVENTIONS:  - Assess and monitor for signs and symptoms of infection  - Monitor lab/diagnostic results  - Monitor all insertion sites, i.e. indwelling lines, tubes, and drains  - Monitor endotracheal if appropriate and nasal secretions for changes in amount and color  - [place] appropriate cooling/warming therapies per order  - Administer medications as ordered  - Instruct and encourage patient and family to use good hand hygiene technique  - Identify and instruct in appropriate isolation precautions for identified infection/condition  Outcome: Progressing  Goal: Absence of fever/infection during neutropenic period  Description: INTERVENTIONS:  - Monitor WBC    Outcome: Progressing     Problem: SAFETY ADULT  Goal: Patient will remain free of falls  Description: INTERVENTIONS:  - Educate patient/family on patient safety including physical limitations  - Instruct patient to call for assistance with activity   - Consult OT/PT to assist with strengthening/mobility   - Keep Call bell within reach  - Keep bed low and locked with side rails adjusted as appropriate  - Keep care items and personal belongings within reach  - Initiate and maintain comfort rounds  - Make Fall Risk Sign visible to staff  - Offer Toileting every 2 Hours, in advance of need  - Initiate/Maintain bed alarm  - Obtain necessary fall risk management equipment: bed alarm   - Apply yellow socks and bracelet for high fall risk patients  - Consider moving patient to room near nurses station  Outcome: Progressing  Goal: Maintain or return to baseline ADL function  Description: INTERVENTIONS:  -  Assess patient's ability to carry out ADLs; assess patient's baseline  for ADL function and identify physical deficits which impact ability to perform ADLs (bathing, care of mouth/teeth, toileting, grooming, dressing, etc.)  - Assess/evaluate cause of self-care deficits   - Assess range of motion  - Assess patient's mobility; develop plan if impaired  - Assess patient's need for assistive devices and provide as appropriate  - Encourage maximum independence but intervene and supervise when necessary  - Involve family in performance of ADLs  - Assess for home care needs following discharge   - Consider OT consult to assist with ADL evaluation and planning for discharge  - Provide patient education as appropriate  Outcome: Progressing  Goal: Maintains/Returns to pre admission functional level  Description: INTERVENTIONS:  - Perform AM-PAC 6 Click Basic Mobility/ Daily Activity assessment daily.  - Set and communicate daily mobility goal to care team and patient/family/caregiver.   - Collaborate with rehabilitation services on mobility goals if consulted  - Perform Range of Motion 3 times a day.  - Reposition patient every 2 hours.  - Dangle patient 3 times a day  - Stand patient 3 times a day  - Ambulate patient 3 times a day  - Out of bed to chair 3 times a day   - Out of bed for meals 3 times a day  - Out of bed for toileting  - Record patient progress and toleration of activity level   Outcome: Progressing     Problem: DISCHARGE PLANNING  Goal: Discharge to home or other facility with appropriate resources  Description: INTERVENTIONS:  - Identify barriers to discharge w/patient and caregiver  - Arrange for needed discharge resources and transportation as appropriate  - Identify discharge learning needs (meds, wound care, etc.)  - Arrange for interpretive services to assist at discharge as needed  - Refer to Case Management Department for coordinating discharge planning if the patient needs post-hospital services based on physician/advanced practitioner order or complex needs related to  functional status, cognitive ability, or social support system  Outcome: Progressing     Problem: Knowledge Deficit  Goal: Patient/family/caregiver demonstrates understanding of disease process, treatment plan, medications, and discharge instructions  Description: Complete learning assessment and assess knowledge base.  Interventions:  - Provide teaching at level of understanding  - Provide teaching via preferred learning methods  Outcome: Progressing

## 2024-01-22 NOTE — PLAN OF CARE
Problem: PHYSICAL THERAPY ADULT  Goal: Performs mobility at highest level of function for planned discharge setting.  See evaluation for individualized goals.  Description: Treatment/Interventions: Functional transfer training, LE strengthening/ROM, Elevations, Therapeutic exercise, Cognitive reorientation, Endurance training, Patient/family training, Equipment eval/education, Bed mobility, Gait training, Spoke to nursing, Spoke to case management, Spoke to MD  Equipment Recommended: Walker       See flowsheet documentation for full assessment, interventions and recommendations.  Outcome: Progressing  Note: Prognosis: Fair  Problem List: Decreased strength, Decreased endurance, Impaired balance, Decreased mobility, Decreased coordination, Decreased cognition, Impaired judgement, Decreased safety awareness, Impaired hearing  Assessment: Pt seen for PT treatment session this date with interventions consisting of gait training to advance toward previous level of function and reduce the risk of medical complications w/ emphasis on improving pt's ability to ambulate level surfaces x 6 feet x 2 with mod A provided by therapist with RW. Pt agreeable to PT treatment session upon arrival, pt found supine in bed w/ HOB elevated, A&O x 2 . In comparison to previous session, pt with improvements in ambulatory distance . Post session: pt returned back to recliner, chair alarm engaged, all needs in reach, and RN notified of session findings/recommendations. Continued recommendation at moderate resource intensity at time of d/c in order to maximize pt's functional independence and safety w/ mobility. Pt continues to be functioning below baseline level, and remains limited 2* factors listed above and including weakness, impaired balance, activity intolerance, decreased endurance, impaired cognition with limited insight, gait deviations. PT will continue to see pt during current hospitalization in order to address the deficits  listed above and provide interventions consistent w/ POC in effort to achieve LTGs.        Rehab Resource Intensity Level, PT: II (Moderate Resource Intensity)    See flowsheet documentation for full assessment.

## 2024-01-22 NOTE — CASE MANAGEMENT
[facility] received request for authorization from Care Manager.  Authorization request for: SNF  Facility Name:  [facility] NPI: [id number]  Facility MD: fJ Macedo NPI: [id number]  Authorization initiated by contacting insurance: [employer]    Via: [employer]   Clinicals submitted via: [employer] attachment   Pending Reference #: [id number]

## 2024-01-22 NOTE — ASSESSMENT & PLAN NOTE
Patient with history of mild aortic stenosis based on previous echo.  Repeat echo reveals the same mild aortic stenosis.  Cardiology following.  Continue diuretics as tolerated.

## 2024-01-22 NOTE — ASSESSMENT & PLAN NOTE
This may be related to progression of underlying aortic stenosis  Volume status and symptoms improved following IV diuretics  Transition to Lasix 40 mg daily as needed for weight gain  Echo pending

## 2024-01-22 NOTE — CASE MANAGEMENT
Case Management Discharge Planning Note    Patient name Israel Hughes  Location /-01 MRN [id number]  : 1940 Date 2024       Current Admission Date: 2024  Current Admission Diagnosis:Volume overload   Patient Active Problem List    Diagnosis Date Noted    Volume overload 2024    Stage 3 chronic kidney disease, unspecified whether stage 3a or 3b CKD ([facility]) 2023    Mild protein-calorie malnutrition ([facility]) 2022    Dysphagia 2022    Mild aortic stenosis 10/22/2021    Mild mitral regurgitation 10/22/2021    Diastolic dysfunction 10/22/2021    Mild concentric left ventricular hypertrophy (LVH) 10/22/2021    Type 2 diabetes mellitus with retinopathy, without long-term current use of insulin ([facility]) 09/15/2021    Myasthenia gravis without exacerbation ([facility]) 09/15/2021    Mixed hyperlipidemia 09/15/2021    Retinopathy 09/15/2021    Primary open angle glaucoma (POAG) of both eyes, mild stage 09/15/2021    Ptosis of both eyelids 09/15/2021    Benign prostatic hyperplasia without lower urinary tract symptoms 09/15/2021    Essential hypertension 09/15/2021    Other age-related cataract 09/15/2021    Memory difficulty 09/15/2021      LOS (days): 2  Geometric Mean LOS (GMLOS) (days): 2.2  Days to GMLOS:0     OBJECTIVE:  Risk of Unplanned Readmission Score: 11.24         Current admission status: Inpatient   Preferred Pharmacy:   [facility] [address]  [id number]  [address] [address]  Phone: [phone] Fax: [phone]    Primary Care Provider: Giovanni Reyez DO    Primary Insurance: [employer]  Secondary Insurance:     DISCHARGE DETAILS:    Discharge planning discussed with:: maame & Bianca at the bedside  Freedom of Choice: Yes  Comments - Freedom of Choice: recommendation is for rehab- permission was givne to send referrals via amy- pt & family were given the list of accepting facilities- family &  No protocol for requested medication     Last office visit date: 10/28/22  Last refill-05/30/23  Preferred pharmacy: loaded    Order pended, routed to clinician for review.      pt's choice is [facility]- sent for auth  CM contacted family/caregiver?: Yes             Contacts  Patient Contacts: Bianca Rehrig  Relationship to Patient:: Family (Granddaughter)  Contact Method: In Person  Reason/Outcome: Discharge Planning    Requested Home Health Care         Is the patient interested in C at discharge?: No    DME Referral Provided  Referral made for DME?: No    Other Referral/Resources/Interventions Provided:  Interventions: Short Term Rehab  Referral Comments: pt accepted to [facility]- sent for auth via amy to cm support    Would you like to participate in our [facility] service program?  : No - Declined    Treatment Team Recommendation: Short Term Rehab ([facility] auth is needed- tbd)                                   IMM Given (Date):: 01/22/24  IMM Given to:: Family (granddaughter)

## 2024-01-22 NOTE — PROGRESS NOTES
[facility]  Progress Note  Name: Israel Hughes I  MRN: [id number]  Unit/Bed#: -01 I Date of Admission: 1/19/2024   Date of Service: 1/22/2024 I Hospital Day: 2    Assessment/Plan   * Volume overload  Assessment & Plan  Wt Readings from Last 3 Encounters:   01/22/24 57.6 kg (127 lb)   01/04/24 59.9 kg (132 lb)   12/13/23 59.9 kg (132 lb)     Patient has diuresed 2.95 L of fluids since arrival  Status post treatment with IV Lasix  Appreciate cardiology input  Echocardiogram revealed an EF of 50%, mild systolic dysfunction  Await further input from cardiology regarding echo findings  In the interim, continue current cardiac based medications which include aspirin, Lipitor, lisinopril, and Lasix which has been transitioned to p.o. form from  IV by cardiology earlier today  Patient is also status post a PT and OT evaluation-they recommend postacute rehabilitation services  Case management is working on placement options    Type 2 diabetes mellitus with retinopathy, without long-term current use of insulin (HCC)  Assessment & Plan  Lab Results   Component Value Date    HGBA1C 6.9 (A) 12/01/2023       Recent Labs     01/21/24  1655 01/21/24  2104 01/22/24  0720 01/22/24  1131   POCGLU 130 154* 180* 322*         Blood Sugar Average: Last 72 hrs:  (P) [id number]    Target blood sugar for the hospital is 140-180  The most part blood sugars are stable  We will continue to monitor closely over the next 24 hours  Continue insulin, Accu-Cheks before meals and at bedtime with sliding scale coverage for now  We will add Lantus if necessary    Mixed hyperlipidemia  Assessment & Plan  Continue Lipitor    Essential hypertension  Assessment & Plan  Continue lisinopril  Blood pressure is stable    Mild aortic stenosis  Assessment & Plan  Patient with history of mild aortic stenosis based on previous echo.  Repeat echo reveals the same mild aortic stenosis.  Cardiology following.  Continue  diuretics as tolerated.    Stage 3 chronic kidney disease, unspecified whether stage 3a or 3b CKD ([facility])  Assessment & Plan  Lab Results   Component Value Date    EGFR 68 01/22/2024    EGFR 65 01/21/2024    EGFR 71 01/20/2024    CREATININE 1.01 01/22/2024    CREATININE 1.06 01/21/2024    CREATININE 0.98 01/20/2024     Creatinine appears to be at baseline  We will continue to monitor with repeat labs in a.m.    Memory difficulty  Assessment & Plan  Continue prehospital Aricept 10 mg p.o. nightly             VTE Pharmacologic Prophylaxis: VTE Score: 3 Moderate Risk (Score 3-4) - Pharmacological DVT Prophylaxis Ordered: heparin.    Mobility:   Basic Mobility Inpatient Raw Score: 13  -HLM Goal: 4: Move to chair/commode  -HLM Achieved: 6: Walk 10 steps or more  HLM Goal NOT achieved. Continue with multidisciplinary rounding and encourage appropriate mobility to improve upon HLM goals.    Patient Centered Rounds: I performed bedside rounds with nursing staff today.   Discussions with Specialists or Other Care Team Provider: Cardiology    Education and Discussions with Family / Patient: Updated  (patient's granddaughter-I have been) at bedside.    Total Time Spent on Date of Encounter in care of patient: 45 mins. This time was spent on one or more of the following: performing physical exam; counseling and coordination of care; obtaining or reviewing history; documenting in the medical record; reviewing/ordering tests, medications or procedures; communicating with other healthcare professionals and discussing with patient's family/caregivers.    Current Length of Stay: 2 day(s)  Current Patient Status: Inpatient   Certification Statement: The patient will continue to require additional inpatient hospital stay due to the need for placement  Discharge Plan: Anticipate discharge in 24-48 hrs to rehab facility.    Code Status: Level 1 - Full Code    Subjective:   Patient seen, sitting up in bed, finishing  lunch, no new complaints    Objective:     Vitals:   Temp (24hrs), Av.2 °F (36.2 °C), Min:96.6 °F (35.9 °C), Max:97.5 °F (36.4 °C)    Temp:  [96.6 °F (35.9 °C)-97.5 °F (36.4 °C)] 96.6 °F (35.9 °C)  HR:  [58-80] 80  Resp:  [18-19] 19  BP: (114-122)/(56-70) 122/56  SpO2:  [96 %-97 %] 96 %  Body mass index is 23.23 kg/m².     Input and Output Summary (last 24 hours):     Intake/Output Summary (Last 24 hours) at 2024 1306  Last data filed at 2024 0858  Gross per 24 hour   Intake 460 ml   Output 1100 ml   Net -640 ml       Physical Exam:   Physical Exam  Vitals and nursing note reviewed.   Constitutional:       General: He is not in acute distress.     Appearance: Normal appearance. He is not ill-appearing.   HENT:      Head: Normocephalic and atraumatic.      Nose: Nose normal.   Eyes:      Extraocular Movements: Extraocular movements intact.      Pupils: Pupils are equal, round, and reactive to light.   Cardiovascular:      Rate and Rhythm: Normal rate and regular rhythm.      Pulses: Normal pulses.      Heart sounds: Normal heart sounds. No murmur heard.     No friction rub. No gallop.   Pulmonary:      Effort: Pulmonary effort is normal.      Breath sounds: Normal breath sounds.   Abdominal:      General: There is no distension.      Palpations: Abdomen is soft. There is no mass.      Tenderness: There is no abdominal tenderness. There is no guarding or rebound.   Musculoskeletal:         General: No swelling or tenderness. Normal range of motion.      Cervical back: Normal range of motion and neck supple. No rigidity. No muscular tenderness.      Right lower leg: No edema.      Left lower leg: No edema.   Skin:     General: Skin is warm.      Capillary Refill: Capillary refill takes less than 2 seconds.      Findings: No erythema or rash.   Neurological:      General: No focal deficit present.      Mental Status: He is alert. Mental status is at baseline.      Comments: Forgetful pleasant    Psychiatric:         Mood and Affect: Mood normal.         Behavior: Behavior normal.          Additional Data:     Labs:  Results from last 7 days   Lab Units 01/21/24  0523 01/20/24  0513 01/19/24  1827   WBC Thousand/uL 5.74   < > 5.81   HEMOGLOBIN g/dL 12.0   < > 10.7*   HEMATOCRIT % 36.0*   < > 33.2*   PLATELETS Thousands/uL 158   < > 146*   NEUTROS PCT %  --   --  63   LYMPHS PCT %  --   --  25   MONOS PCT %  --   --  11   EOS PCT %  --   --  1    < > = values in this interval not displayed.     Results from last 7 days   Lab Units 01/22/24  0549   SODIUM mmol/L 138   POTASSIUM mmol/L 3.9   CHLORIDE mmol/L 101   CO2 mmol/L 28   BUN mg/dL 21   CREATININE mg/dL 1.01   ANION GAP mmol/L 9   CALCIUM mg/dL 9.0   GLUCOSE RANDOM mg/dL 207*         Results from last 7 days   Lab Units 01/22/24  1131 01/22/24  0720 01/21/24  2104 01/21/24  1655 01/21/24  1103 01/21/24  0713 01/20/24  2114 01/20/24  1607 01/20/24  1101 01/20/24  0735 01/19/24  2101   POC GLUCOSE mg/dl 322* 180* 154* 130 334* 153* 167* 137 228* 119 81               Lines/Drains:  Invasive Devices       Peripheral Intravenous Line  Duration             Peripheral IV 01/19/24 Right;Ventral (anterior) Forearm 2 days              Drain  Duration             External Urinary Catheter 2 days                          Imaging: Reviewed radiology reports from this admission including: ECHO    Recent Cultures (last 7 days):         Last 24 Hours Medication List:   Current Facility-Administered Medications   Medication Dose Route Frequency Provider Last Rate    acetaminophen  650 mg Oral Q4H PRN Marco Antonio HARJIT Khalil      aspirin  81 mg Oral Daily Marco Antonio Khalil PA-C      atorvastatin  20 mg Oral Daily Marco AntonioKIMBERLY Fernandez-LINO      donepezil  10 mg Oral HS Marco AntonioKIMBERLY Fernandez-LINO      fish oil  1,000 mg Oral Daily Marco Antonio KIMBERLY Khalil-C      FLUoxetine  20 mg Oral QAM KIMBERLY Pinedo-LINO      furosemide  40 mg Oral Daily Celestino Coelho MD      heparin (porcine)  5,000 Units  Subcutaneous Q8H ZEINAB Marco Antonio Khalil PA-C      insulin lispro  1-5 Units Subcutaneous TID AC Marco Antonio Khalil PA-C      insulin lispro  1-5 Units Subcutaneous HS Marco Antonio Khalil PA-C      lisinopril  5 mg Oral Daily Marco Antonio Khalil PA-C      ondansetron  4 mg Intravenous Q6H PRN Marco Antonio Khalil PA-C      potassium chloride  20 mEq Oral Daily Marco Antonio Khalil PA-C      sodium chloride (PF)  3 mL Intravenous Q1H PRN Carl Hylton MD      tamsulosin  0.4 mg Oral Daily With Dinner Marco Antonio Khalil PA-C          Today, Patient Was Seen By: Mao Maxwell MD    **Please Note: This note may have been constructed using a voice recognition system.**

## 2024-01-22 NOTE — PHYSICAL THERAPY NOTE
Physical Therapy Treatment Session Note    Patient's Name: Israel Hughes    Admitting Diagnosis  Chest pain [R07.9]  CHF exacerbation (HCC) [I50.9]    Problem List  Patient Active Problem List   Diagnosis    Type 2 diabetes mellitus with retinopathy, without long-term current use of insulin (HCC)    Myasthenia gravis without exacerbation (HCC)    Mixed hyperlipidemia    Retinopathy    Primary open angle glaucoma (POAG) of both eyes, mild stage    Ptosis of both eyelids    Benign prostatic hyperplasia without lower urinary tract symptoms    Essential hypertension    Other age-related cataract    Memory difficulty    Mild aortic stenosis    Mild mitral regurgitation    Diastolic dysfunction    Mild concentric left ventricular hypertrophy (LVH)    Mild protein-calorie malnutrition (HCC)    Dysphagia    Stage 3 chronic kidney disease, unspecified whether stage 3a or 3b CKD (HCC)    Volume overload       Past Medical History  Past Medical History:   Diagnosis Date    Diabetes mellitus (HCC)     Hyperlipidemia        Past Surgical History  Past Surgical History:   Procedure Laterality Date    CATARACT EXTRACTION      COLONOSCOPY      DENTAL SURGERY      EYE SURGERY      TONSILLECTOMY      VASECTOMY          01/22/24 0758   PT Last Visit   PT Visit Date 01/22/24   Note Type   Note Type Treatment   Pain Assessment   Pain Assessment Tool 0-10   Pain Score No Pain  (denies)   Restrictions/Precautions   Weight Bearing Precautions Per Order No   Other Precautions Cognitive;Chair Alarm;Bed Alarm;Fall Risk;Hard of hearing  (condom catheter)   General   Chart Reviewed Yes   Additional Pertinent History Myrisa OT present for co-treatment due to medical complexity, required skilled interventions of 2 clinicians for care delivery.   Response to Previous Treatment Patient unable to report, no changes reported from family or staff   Family/Caregiver Present No   Cognition   Overall Cognitive Status Impaired   Arousal/Participation  "Alert   Attention Attends with cues to redirect   Orientation Level Oriented to person;Oriented to place;Disoriented to time;Disoriented to situation  (could not provide current month)   Memory Decreased short term memory;Decreased recall of recent events;Decreased recall of precautions   Following Commands Follows one step commands with increased time or repetition   Comments Israel was agreeable to PT treatment session.   Subjective   Subjective 'I'm going to drink my coffee black today\"   Bed Mobility   Supine to Sit 3  Moderate assistance   Additional items Assist x 1;HOB elevated;Bedrails;Increased time required;Verbal cues;LE management   Sit to Supine   (DNT as Israel was sitting out of bed on the recliner upon conclusion.)   Additional Comments Verbal cues for bedrail utilization and postural stabilization upon establishing static edge of bed sitting.   Transfers   Sit to Stand 3  Moderate assistance   Additional items Assist x 1;Bedrails;Increased time required;Other  (SBA of 2nd staff member)   Stand to Sit 3  Moderate assistance   Additional items Assist x 1;Bedrails;Increased time required;Verbal cues  (RW utilization)   Stand pivot 3  Moderate assistance   Additional items Assist x 1;Increased time required;Verbal cues;Impulsive   Additional Comments Verbal cues for proper BUE placement with transitional movements, safety while turning.   Ambulation/Elevation   Gait pattern Improper Weight shift;Narrow AMBERLY;Forward Flexion;Decreased foot clearance;Short stride;Excessively slow  (posterior lean requiring external correction)   Gait Assistance 3  Moderate assist   Additional items Assist x 1;Verbal cues;Tactile cues   Assistive Device Rolling walker   Distance 6 feet x 2   Stair Management Assistance Not tested   Ambulation/Elevation Additional Comments Verbal cues for proper AD utilization, environmental awareness, and base of support widening.   Balance   Static Sitting Fair   Dynamic Sitting Poor + "   Static Standing Poor +   Dynamic Standing Poor   Ambulatory Poor   Endurance Deficit   Endurance Deficit Yes   Activity Tolerance   Activity Tolerance Patient limited by fatigue;Other (Comment)  (limited insight with cognitive impairment)   Medical Staff Made Aware Yes, CM was informed of maintained d/c disposition recommendation.   Nurse Made Aware Yes, nursing staff was informed of treatment outcome.   Assessment   Prognosis Fair   Problem List Decreased strength;Decreased endurance;Impaired balance;Decreased mobility;Decreased coordination;Decreased cognition;Impaired judgement;Decreased safety awareness;Impaired hearing   Assessment Pt seen for PT treatment session this date with interventions consisting of gait training to advance toward previous level of function and reduce the risk of medical complications w/ emphasis on improving pt's ability to ambulate level surfaces x 6 feet x 2 with mod A provided by therapist with RW. Pt agreeable to PT treatment session upon arrival, pt found supine in bed w/ HOB elevated, A&O x 2 . In comparison to previous session, pt with improvements in ambulatory distance . Post session: pt returned back to recliner, chair alarm engaged, all needs in reach, and RN notified of session findings/recommendations. Continued recommendation at moderate resource intensity at time of d/c in order to maximize pt's functional independence and safety w/ mobility. Pt continues to be functioning below baseline level, and remains limited 2* factors listed above and including weakness, impaired balance, activity intolerance, decreased endurance, impaired cognition with limited insight, gait deviations. PT will continue to see pt during current hospitalization in order to address the deficits listed above and provide interventions consistent w/ POC in effort to achieve LTGs.   Goals   Patient Goals to eat breakfast  (setup upon conclusion)   LTG Expiration Date 01/30/24   Long Term Goal #1 LTGs  remain appropriate   PT Treatment Day 1   Plan   Treatment/Interventions Functional transfer training;Elevations;Endurance training;Cognitive reorientation;Patient/family training;Bed mobility;Equipment eval/education;Gait training;Spoke to nursing;Spoke to case management   Progress Slow progress, cognitive deficits   PT Frequency 3-5x/wk   Discharge Recommendation   Rehab Resource Intensity Level, PT II (Moderate Resource Intensity)   Equipment Recommended Walker   Walker Package Recommended Wheeled walker   Change/add to Walker Package? No   Additional Comments Upon conclusion, Israel was resting out of bed on the recliner. The chair alarm was engaged and all needs within reach.   AM-PAC Basic Mobility Inpatient   Turning in Flat Bed Without Bedrails 3   Lying on Back to Sitting on Edge of Flat Bed Without Bedrails 2   Moving Bed to Chair 2   Standing Up From Chair Using Arms 2   Walk in Room 2   Climb 3-5 Stairs With Railing 2   Basic Mobility Inpatient Raw Score 13   Basic Mobility Standardized Score 33.99   Highest Level Of Mobility   JH-HLM Goal 4: Move to chair/commode   JH-HLM Achieved 6: Walk 10 steps or more     Treatment Time: 0803-0276    Julianne Tejada, PT

## 2024-01-22 NOTE — NUTRITION
01/22/24 1332   Biochemical Data,Medical Tests, and Procedures   Biochemical Data/Medical Tests/Procedures Lab values reviewed;Meds reviewed   Labs (Comment) 1/22/2024 glucose 322   Meds (Comment) Aspirin, atorvastatin, fish oil, Lasix, heparin, insulin, lisinopril, potassium chloride   Nutrition-Focused Physical Exam   Nutrition-Focused Physical Exam Findings RN skin assessment reviewed;Edema;No skin issues documented  (+1 bilateral lower extremity edema)   Nutrition-Focused Physical Exam Findings Appears adequately nourished   Medical-Related Concerns type 2 diabetes, hyperlipidemia, hypertension, stage III CKD, memory difficulty, malnutrition, dysphagia   Current PO Intake   Current Diet Order CCD 2, 4 g sodium, 1800 mL fluid restricted diet, thin liquids   Current Meal Intake %   Estimated calorie intake compared to estimated need Nutrient needs are met   Recommendations/Interventions   Malnutrition/BMI Present No  (2 criteria not met at this time)   Summary Consult-CHF; weight loss, poor p.o.-MST 2; appearance of muscle/adipose loss; RN consult.  Presents with edema, SOB.  Past medical history significant for type 2 diabetes, hyperlipidemia, hypertension, stage III CKD, memory difficulty, malnutrition, dysphagia.  Weight history reviewed.  No significant changes.  +1 bilateral lower extremity edema.  No pressure areas.  Prescribed a CCD 2, 4 g sodium, 1800 mL fluid restricted diet, thin liquids.  Meal completion consistently 100%.  Nutrient needs are met.  Met with patient and his granddaughter at bedside.  Patient reports intact appetite.  Usually has 3 meals daily.  Avoids sugary and salty food items.  NKFA.  Consumes boost occasionally.  Noted broken teeth.  Opts for softer foods.  Denies difficulty swallowing.  Patient's sister-in-law and his granddaughter cook and grocery shop with patient.  Reports weight is stable 128#-122#.  Discussed diet as prescribed.  Discussed nutrition supplements to  prevent future weight loss.  Agreeable to Glucerna chocolate once daily.  RD to monitor tolerance at follow-up.   Interventions/Recommendations Continue current diet order;Supplement initiate;Monitor I & O's   Education Assessment   Education Education not indicated at this time   Patient Nutrition Goals   Goal Avoid weight loss;Adequate intake

## 2024-01-22 NOTE — OCCUPATIONAL THERAPY NOTE
Occupational Therapy Evaluation      Israel Ngert    1/22/2024    Principal Problem:    Volume overload  Active Problems:    Type 2 diabetes mellitus with retinopathy, without long-term current use of insulin (HCC)    Mixed hyperlipidemia    Essential hypertension    Memory difficulty    Mild aortic stenosis    Stage 3 chronic kidney disease, unspecified whether stage 3a or 3b CKD (HCC)      Past Medical History:   Diagnosis Date    Diabetes mellitus (HCC)     Hyperlipidemia        Past Surgical History:   Procedure Laterality Date    CATARACT EXTRACTION      COLONOSCOPY      DENTAL SURGERY      EYE SURGERY      TONSILLECTOMY      VASECTOMY          01/22/24 0807   OT Last Visit   OT Visit Date 01/22/24   Note Type   Note type Evaluation   Pain Assessment   Pain Assessment Tool 0-10   Pain Score No Pain   Restrictions/Precautions   Weight Bearing Precautions Per Order No   Other Precautions Cognitive;Chair Alarm;Bed Alarm;Fall Risk;Hard of hearing  (condom catheter)   Home Living   Type of Home House   Home Layout Performs ADLs on one level;Able to live on main level with bedroom/bathroom;Stairs to enter with rails  (2+6 BIENVENIDO)   Bathroom Shower/Tub Tub/shower unit  (has both)   Bathroom Toilet Standard   Bathroom Equipment Grab bars in shower   Bathroom Accessibility Accessible   Home Equipment Walker;Cane  (denies prior usage)   Prior Function   Level of [place] Independent with functional mobility;Needs assistance with IADLS;Needs assistance with ADLs   Lives With Family   Receives Help From Family   IADLs Family/Friend/Other provides transportation;Family/Friend/Other provides meals;Family/Friend/Other provides medication management   Falls in the last 6 months 1 to 4  (x 3)   Vocational Retired   ADL   UB Bathing Assistance 4  Minimal Assistance   LB Bathing Assistance 3  Moderate Assistance   UB Dressing Assistance 4  Minimal Assistance   LB Dressing Assistance 3  Moderate Assistance   Bed Mobility    Supine to Sit 3  Moderate assistance   Additional items Assist x 1;HOB elevated;Bedrails;Increased time required;Verbal cues;LE management   Additional Comments Pt remained OOB in recliner upon conclusion   Transfers   Sit to Stand 3  Moderate assistance   Additional items Assist x 1;Bedrails;Increased time required;Other  (SBA of 2nd staff member)   Stand to Sit 3  Moderate assistance   Additional items Assist x 1;Increased time required;Verbal cues;Armrests   Stand pivot 3  Moderate assistance   Additional items Assist x 1;Increased time required;Verbal cues;Impulsive  (RW)   Balance   Static Sitting Fair   Dynamic Sitting Poor +   Static Standing Poor +   Dynamic Standing Poor   Ambulatory Poor   Activity Tolerance   Activity Tolerance Patient limited by fatigue;Other (Comment)  (decreased insight)   Medical Staff Made Aware CM notified   RUE Assessment   RUE Assessment X  (AROM WFL)   RUE Strength   RUE Overall Strength Deficits  (3+/5)   LUE Assessment   LUE Assessment X  (AROM WFL)   LUE Strength   LUE Overall Strength Deficits  (3+/5)   Vision-Basic Assessment   Current Vision Wears glasses all the time   Cognition   Overall Cognitive Status Impaired   Arousal/Participation Alert;Responsive   Attention Attends with cues to redirect   Orientation Level Oriented to person;Disoriented to time;Disoriented to situation;Oriented to place   Memory Decreased short term memory;Decreased recall of recent events;Decreased recall of precautions   Following Commands Follows one step commands with increased time or repetition   Assessment   Limitation Decreased ADL status;Decreased UE strength;Decreased Safe judgement during ADL;Decreased endurance;Decreased self-care trans;Decreased high-level ADLs;Decreased cognition   Prognosis Good   Assessment Pt is a 83 y.o. male seen for OT evaluation s/p admit to [facility] on 1/19/2024 w/ Volume overload.  Comorbidities affecting pt's functional performance at time of assessment  include:  DM II, HLD, HTN, Memory difficulty, CKD . Personal factors affecting pt at time of IE include:steps to enter environment, difficulty performing ADLS, limited insight into deficits, and decreased initiation and engagement . Prior to admission, pt required A with ADLs. Upon evaluation: the following deficits impact occupational performance: decreased strength, decreased balance, decreased tolerance, impaired attention, impaired initiation, impaired memory, impaired sequencing, impaired problem solving, impulsivity, and decreased safety awareness. Pt to benefit from continued skilled OT tx while in the hospital to address deficits as defined above and maximize level of functional independence w ADL's and functional mobility. Occupational Performance areas to address include: bathing/shower, toilet hygiene, dressing, functional mobility, and clothing management. From OT standpoint, recommendation at time of d/c would be Level II (Moderate Resource Intensity.   Goals   Patient Goals to eat breakfast  (setup upon conclusion)   Plan   Treatment Interventions ADL retraining;Functional transfer training;UE strengthening/ROM;Endurance training;Patient/family training;Equipment evaluation/education;Compensatory technique education;Energy conservation;Activityengagement;Cognitive reorientation   Goal Expiration Date 02/01/24   OT Treatment Day 0   OT Frequency 3-5x/wk   Discharge Recommendation   Rehab Resource Intensity Level, OT II (Moderate Resource Intensity)   Additional Comments  Pt seen as a co-eval with PT due to the patient's co-morbidities, clinically unstable presentation, and present impairments which are a regression from the patient's baseline.   Additional Comments 2 The patient's raw score on the AM-PAC Daily Activity Inpatient Short Form is 17. A raw score of less than 19 suggests the patient may benefit from discharge to post-acute rehabilitation services. Please refer to the recommendation of the  Occupational Therapist for safe discharge planning.   AM-PAC Daily Activity Inpatient   Lower Body Dressing 2   Bathing 2   Toileting 3   Upper Body Dressing 3   Grooming 3   Eating 4   Daily Activity Raw Score 17   Daily Activity Standardized Score (Calc for Raw Score >=11) 37.26   AM-PAC Applied Cognition Inpatient   Following a Speech/Presentation 3   Understanding Ordinary Conversation 3   Taking Medications 1   Remembering Where Things Are Placed or Put Away 1   Remembering List of 4-5 Errands 1   Taking Care of Complicated Tasks 1   Applied Cognition Raw Score 10   Applied Cognition Standardized Score 24.98     GOALS:    Pt will achieve the following within specified time frame: STG  Pt will achieve the following goals within 5 days    *ADL transfers with CGA for inc'd independence with ADLs/purposeful tasks    *Self Feeding- (S) for inc'd independence with providing self nourishment    *UB ADL with (S) for inc'd independence with self cares    *LB ADL with Min (A) using AE prn for inc'd independence with self cares    *Toileting with Min (A) for clothing management and hygiene for return to PLOF with personal care    *Increase static stand balance to F- and dyn stand balance to P+ for inc'd safety with standing purposeful tasks    *Increase stand tolerance x3 m for inc'd tolerance with standing purposeful tasks    *Participate in 10m UE therex to increase overall stamina/activity tolerance for purposeful tasks    *Bed mobility- Min (A) for inc'd independence to manage own comfort and initiate EOB & OOB purposeful tasks    Pt will achieve the following within specified time frame: LTG  Pt will achieve the following goals within 10 days    *ADL transfers with (S) for inc'd independence with ADLs/purposeful tasks    *Self Feeding- (I) for inc'd independence with providing self nourishment    *UB ADL with (I) for inc'd independence with self cares    *LB ADL with CGA using AE prn for inc'd independence with self  cares    *Toileting with CGA for clothing management and hygiene for return to PLOF with personal care    *Increase static stand balance to F and dyn stand balance to F- for inc'd safety with standing purposeful tasks    *Increase stand tolerance x5 m for inc'd tolerance with standing purposeful tasks    *Bed mobility- CGA for inc'd independence to manage own comfort and initiate EOB & OOB purposeful tasks        Navjot Mcdaniel MS, OTR/L

## 2024-01-22 NOTE — PLAN OF CARE
Problem: OCCUPATIONAL THERAPY ADULT  Goal: Performs self-care activities at highest level of function for planned discharge setting.  See evaluation for individualized goals.  Description: Treatment Interventions: ADL retraining, Functional transfer training, UE strengthening/ROM, Endurance training, Patient/family training, Equipment evaluation/education, Compensatory technique education, Energy conservation, Activityengagement, Cognitive reorientation    See flowsheet documentation for full assessment, interventions and recommendations.   Note: Limitation: Decreased ADL status, Decreased UE strength, Decreased Safe judgement during ADL, Decreased endurance, Decreased self-care trans, Decreased high-level ADLs, Decreased cognition  Prognosis: Good  Assessment: Pt is a 83 y.o. male seen for OT evaluation s/p admit to [facility] on 1/19/2024 w/ Volume overload.  Comorbidities affecting pt's functional performance at time of assessment include:  DM II, HLD, HTN, Memory difficulty, CKD . Personal factors affecting pt at time of IE include:steps to enter environment, difficulty performing ADLS, limited insight into deficits, and decreased initiation and engagement . Prior to admission, pt required A with ADLs. Upon evaluation: the following deficits impact occupational performance: decreased strength, decreased balance, decreased tolerance, impaired attention, impaired initiation, impaired memory, impaired sequencing, impaired problem solving, impulsivity, and decreased safety awareness. Pt to benefit from continued skilled OT tx while in the hospital to address deficits as defined above and maximize level of functional independence w ADL's and functional mobility. Occupational Performance areas to address include: bathing/shower, toilet hygiene, dressing, functional mobility, and clothing management. From OT standpoint, recommendation at time of d/c would be Level II (Moderate Resource Intensity.     Rehab Resource  Intensity Level, OT: II (Moderate Resource Intensity)     Navjot Mcdaniel MS, OTR/L

## 2024-01-23 ENCOUNTER — TELEPHONE (OUTPATIENT)
Dept: INTERNAL MEDICINE CLINIC | Facility: CLINIC | Age: 84
End: 2024-01-23

## 2024-01-23 DIAGNOSIS — G70.00 MYASTHENIA GRAVIS WITHOUT EXACERBATION (HCC): Primary | ICD-10-CM

## 2024-01-23 DIAGNOSIS — R41.3 MEMORY DIFFICULTY: ICD-10-CM

## 2024-01-23 LAB
ANION GAP SERPL CALCULATED.3IONS-SCNC: 8 MMOL/L
BASOPHILS # BLD AUTO: 0.03 THOUSANDS/ÂΜL (ref 0–0.1)
BASOPHILS NFR BLD AUTO: 0 % (ref 0–1)
BUN SERPL-MCNC: 15 MG/DL (ref 5–25)
CALCIUM SERPL-MCNC: 8.9 MG/DL (ref 8.4–10.2)
CHLORIDE SERPL-SCNC: 102 MMOL/L (ref 96–108)
CO2 SERPL-SCNC: 29 MMOL/L (ref 21–32)
CREAT SERPL-MCNC: 0.96 MG/DL (ref 0.6–1.3)
EOSINOPHIL # BLD AUTO: 0.16 THOUSAND/ÂΜL (ref 0–0.61)
EOSINOPHIL NFR BLD AUTO: 2 % (ref 0–6)
ERYTHROCYTE [DISTWIDTH] IN BLOOD BY AUTOMATED COUNT: 12.9 % (ref 11.6–15.1)
GFR SERPL CREATININE-BSD FRML MDRD: 73 ML/MIN/1.73SQ M
GLUCOSE SERPL-MCNC: 150 MG/DL (ref 65–140)
GLUCOSE SERPL-MCNC: 150 MG/DL (ref 65–140)
GLUCOSE SERPL-MCNC: 183 MG/DL (ref 65–140)
GLUCOSE SERPL-MCNC: 188 MG/DL (ref 65–140)
GLUCOSE SERPL-MCNC: 257 MG/DL (ref 65–140)
HCT VFR BLD AUTO: 36.8 % (ref 36.5–49.3)
HGB BLD-MCNC: 12.3 G/DL (ref 12–17)
IMM GRANULOCYTES # BLD AUTO: 0.03 THOUSAND/UL (ref 0–0.2)
IMM GRANULOCYTES NFR BLD AUTO: 0 % (ref 0–2)
LYMPHOCYTES # BLD AUTO: 1.65 THOUSANDS/ÂΜL (ref 0.6–4.47)
LYMPHOCYTES NFR BLD AUTO: 22 % (ref 14–44)
MCH RBC QN AUTO: 31.4 PG (ref 26.8–34.3)
MCHC RBC AUTO-ENTMCNC: 33.4 G/DL (ref 31.4–37.4)
MCV RBC AUTO: 94 FL (ref 82–98)
MONOCYTES # BLD AUTO: 0.63 THOUSAND/ÂΜL (ref 0.17–1.22)
MONOCYTES NFR BLD AUTO: 9 % (ref 4–12)
NEUTROPHILS # BLD AUTO: 4.89 THOUSANDS/ÂΜL (ref 1.85–7.62)
NEUTS SEG NFR BLD AUTO: 67 % (ref 43–75)
NRBC BLD AUTO-RTO: 0 /100 WBCS
PLATELET # BLD AUTO: 161 THOUSANDS/UL (ref 149–390)
PMV BLD AUTO: 9.9 FL (ref 8.9–12.7)
POTASSIUM SERPL-SCNC: 4 MMOL/L (ref 3.5–5.3)
RBC # BLD AUTO: 3.92 MILLION/UL (ref 3.88–5.62)
SODIUM SERPL-SCNC: 139 MMOL/L (ref 135–147)
WBC # BLD AUTO: 7.39 THOUSAND/UL (ref 4.31–10.16)

## 2024-01-23 PROCEDURE — 85025 COMPLETE CBC W/AUTO DIFF WBC: CPT | Performed by: HOSPITALIST

## 2024-01-23 PROCEDURE — 82948 REAGENT STRIP/BLOOD GLUCOSE: CPT

## 2024-01-23 PROCEDURE — 80048 BASIC METABOLIC PNL TOTAL CA: CPT | Performed by: HOSPITALIST

## 2024-01-23 PROCEDURE — 99232 SBSQ HOSP IP/OBS MODERATE 35: CPT | Performed by: NURSE PRACTITIONER

## 2024-01-23 PROCEDURE — 99232 SBSQ HOSP IP/OBS MODERATE 35: CPT | Performed by: HOSPITALIST

## 2024-01-23 RX ADMIN — ASPIRIN 81 MG: 81 TABLET, COATED ORAL at 08:09

## 2024-01-23 RX ADMIN — LISINOPRIL 5 MG: 5 TABLET ORAL at 08:09

## 2024-01-23 RX ADMIN — ATORVASTATIN CALCIUM 20 MG: 20 TABLET, FILM COATED ORAL at 08:09

## 2024-01-23 RX ADMIN — POTASSIUM CHLORIDE 20 MEQ: 1500 TABLET, EXTENDED RELEASE ORAL at 08:09

## 2024-01-23 RX ADMIN — OMEGA-3 FATTY ACIDS CAP 1000 MG 1000 MG: 1000 CAP at 08:09

## 2024-01-23 RX ADMIN — DONEPEZIL HYDROCHLORIDE 10 MG: 10 TABLET ORAL at 21:44

## 2024-01-23 RX ADMIN — TAMSULOSIN HYDROCHLORIDE 0.4 MG: 0.4 CAPSULE ORAL at 16:55

## 2024-01-23 RX ADMIN — HEPARIN SODIUM 5000 UNITS: 5000 INJECTION INTRAVENOUS; SUBCUTANEOUS at 21:44

## 2024-01-23 RX ADMIN — INSULIN LISPRO 1 UNITS: 100 INJECTION, SOLUTION INTRAVENOUS; SUBCUTANEOUS at 12:45

## 2024-01-23 RX ADMIN — FLUOXETINE 20 MG: 20 CAPSULE ORAL at 08:09

## 2024-01-23 RX ADMIN — FUROSEMIDE 40 MG: 40 TABLET ORAL at 08:09

## 2024-01-23 RX ADMIN — INSULIN LISPRO 1 UNITS: 100 INJECTION, SOLUTION INTRAVENOUS; SUBCUTANEOUS at 08:08

## 2024-01-23 RX ADMIN — HEPARIN SODIUM 5000 UNITS: 5000 INJECTION INTRAVENOUS; SUBCUTANEOUS at 14:25

## 2024-01-23 RX ADMIN — HEPARIN SODIUM 5000 UNITS: 5000 INJECTION INTRAVENOUS; SUBCUTANEOUS at 05:13

## 2024-01-23 RX ADMIN — INSULIN LISPRO 2 UNITS: 100 INJECTION, SOLUTION INTRAVENOUS; SUBCUTANEOUS at 16:55

## 2024-01-23 RX ADMIN — INSULIN LISPRO 1 UNITS: 100 INJECTION, SOLUTION INTRAVENOUS; SUBCUTANEOUS at 21:45

## 2024-01-23 NOTE — ASSESSMENT & PLAN NOTE
Lab Results   Component Value Date    EGFR 73 01/23/2024    EGFR 68 01/22/2024    EGFR 65 01/21/2024    CREATININE 0.96 01/23/2024    CREATININE 1.01 01/22/2024    CREATININE 1.06 01/21/2024     Creatinine appears to be at baseline  We will continue to monitor with repeat labs in a.m.

## 2024-01-23 NOTE — TELEPHONE ENCOUNTER
I'm calling for Israel Hughes July 3rd, 1940. And I am calling to get an authorization from Doctor Tejada to get him a hospital bed. If you could call me back at [phone]. And this is Chiqui. Thank you.

## 2024-01-23 NOTE — CASE MANAGEMENT
[facility] has received approved authorization from insurance: [employer]       Called in by Rep: Maribel STARR#  [phone]  Authorization received for: SNF  Facility: [facility]   Authorization #: [id number]   Start of Care: 01/23  Next Review Date: 01/26  Continued Stay Care Coordinator: Sandra STARR#: [phone]  Submit next review to F#: [phone]  Care Manager notified: Chiqui Pak

## 2024-01-23 NOTE — ASSESSMENT & PLAN NOTE
Likely due to mild diastolic dysfunction, EF preserved  Volume status and symptoms improved following IV diuretics  Transition to Lasix 40 mg daily as needed for weight gain

## 2024-01-23 NOTE — ASSESSMENT & PLAN NOTE
Lab Results   Component Value Date    EGFR 73 01/23/2024    EGFR 68 01/22/2024    EGFR 65 01/21/2024    CREATININE 0.96 01/23/2024    CREATININE 1.01 01/22/2024    CREATININE 1.06 01/21/2024     Monitor with diuretic use

## 2024-01-23 NOTE — PROGRESS NOTES
[facility]  Progress Note  Name: Israel Hughes I  MRN: [id number]  Unit/Bed#: -01 I Date of Admission: 1/19/2024   Date of Service: 1/23/2024 I Hospital Day: 3    Assessment/Plan   * Volume overload  Assessment & Plan  Wt Readings from Last 3 Encounters:   01/23/24 56.3 kg (124 lb 0.1 oz)   01/04/24 59.9 kg (132 lb)   12/13/23 59.9 kg (132 lb)     Patient has diuresed over 4 L of fluid since arrival  Status post treatment with IV Lasix  Appreciate cardiology input  Echocardiogram revealed an EF of 50%, mild systolic dysfunction  Continue current cardiac based medications which include aspirin, Lipitor, lisinopril, and Lasix   Patient is also status post a PT and OT evaluation-they recommend postacute rehabilitation services  Case management is working on placement options  Patient is medically stable for discharge  Discharge is scheduled for 1/24/2024 once a bed has been made available at the [facility] -no beds today    Type 2 diabetes mellitus with retinopathy, without long-term current use of insulin (HCC)  Assessment & Plan  Lab Results   Component Value Date    HGBA1C 6.9 (A) 12/01/2023       Recent Labs     01/22/24  1650 01/22/24  2126 01/23/24  0745 01/23/24  1111   POCGLU 105 208* 150* 183*         Blood Sugar Average: Last 72 hrs:  (P) [id number]    Target blood sugar for the hospital is 140-180  The most part blood sugars are stable  We will continue to monitor closely over the next 24 hours  Continue insulin, Accu-Cheks before meals and at bedtime with sliding scale coverage for now  Stable    Mixed hyperlipidemia  Assessment & Plan  Continue Lipitor    Essential hypertension  Assessment & Plan  Continue lisinopril  Blood pressure is stable    Mild aortic stenosis  Assessment & Plan  Patient with history of mild aortic stenosis based on previous echo.  Repeat echo reveals the same mild aortic stenosis.  Cardiology following.  Continue  diuretics as tolerated.    Stage 3 chronic kidney disease, unspecified whether stage 3a or 3b CKD ([facility])  Assessment & Plan  Lab Results   Component Value Date    EGFR 73 01/23/2024    EGFR 68 01/22/2024    EGFR 65 01/21/2024    CREATININE 0.96 01/23/2024    CREATININE 1.01 01/22/2024    CREATININE 1.06 01/21/2024     Creatinine appears to be at baseline  We will continue to monitor with repeat labs in a.m.    Memory difficulty  Assessment & Plan  Continue prehospital Aricept 10 mg p.o. nightly               VTE Pharmacologic Prophylaxis: VTE Score: 3 Moderate Risk (Score 3-4) - Pharmacological DVT Prophylaxis Ordered: heparin.    Mobility:   Basic Mobility Inpatient Raw Score: 13  -HLM Goal: 4: Move to chair/commode  -HLM Achieved: 6: Walk 10 steps or more  HLM Goal NOT achieved. Continue with multidisciplinary rounding and encourage appropriate mobility to improve upon HLM goals.    Patient Centered Rounds: I performed bedside rounds with nursing staff today.   Discussions with Specialists or Other Care Team Provider: Cardiology    Education and Discussions with Family / Patient: Updated  (granddaughter-Angelita) at bedside.    Total Time Spent on Date of Encounter in care of patient: 35 mins. This time was spent on one or more of the following: performing physical exam; counseling and coordination of care; obtaining or reviewing history; documenting in the medical record; reviewing/ordering tests, medications or procedures; communicating with other healthcare professionals and discussing with patient's family/caregivers.    Current Length of Stay: 3 day(s)  Current Patient Status: Inpatient   Certification Statement: The patient will continue to require additional inpatient hospital stay due to the need for placement  Discharge Plan: Anticipate discharge tomorrow to rehab facility.    Code Status: Level 1 - Full Code    Subjective:   Patient seen, sitting up in a chair, looks and feels well, no new  complaints    Objective:     Vitals:   Temp (24hrs), Av °F (36.7 °C), Min:97.6 °F (36.4 °C), Max:98.3 °F (36.8 °C)    Temp:  [97.6 °F (36.4 °C)-98.3 °F (36.8 °C)] 97.6 °F (36.4 °C)  HR:  [56-64] 56  Resp:  [18-20] 18  BP: (140-159)/(72-76) 140/76  SpO2:  [95 %-97 %] 95 %  Body mass index is 22.68 kg/m².     Input and Output Summary (last 24 hours):     Intake/Output Summary (Last 24 hours) at 2024 1429  Last data filed at 2024 0901  Gross per 24 hour   Intake 720 ml   Output 1900 ml   Net -1180 ml       Physical Exam:   Physical Exam  Vitals and nursing note reviewed.   Constitutional:       General: He is not in acute distress.     Appearance: Normal appearance. He is not ill-appearing.   HENT:      Head: Normocephalic and atraumatic.      Nose: Nose normal.   Eyes:      Extraocular Movements: Extraocular movements intact.      Pupils: Pupils are equal, round, and reactive to light.   Cardiovascular:      Rate and Rhythm: Normal rate and regular rhythm.      Pulses: Normal pulses.      Heart sounds: Normal heart sounds. No murmur heard.     No friction rub. No gallop.   Pulmonary:      Effort: Pulmonary effort is normal.      Breath sounds: Normal breath sounds.   Abdominal:      General: There is no distension.      Palpations: Abdomen is soft. There is no mass.      Tenderness: There is no abdominal tenderness. There is no guarding or rebound.   Musculoskeletal:         General: No swelling or tenderness. Normal range of motion.      Cervical back: Normal range of motion and neck supple. No rigidity. No muscular tenderness.      Right lower leg: No edema.      Left lower leg: No edema.   Skin:     General: Skin is warm.      Capillary Refill: Capillary refill takes less than 2 seconds.      Findings: No erythema or rash.   Neurological:      General: No focal deficit present.      Mental Status: He is alert and oriented to person, place, and time. Mental status is at baseline.      Comments:  Forgetful   Psychiatric:         Mood and Affect: Mood normal.         Behavior: Behavior normal.          Additional Data:     Labs:  Results from last 7 days   Lab Units 01/23/24  0509   WBC Thousand/uL 7.39   HEMOGLOBIN g/dL 12.3   HEMATOCRIT % 36.8   PLATELETS Thousands/uL 161   NEUTROS PCT % 67   LYMPHS PCT % 22   MONOS PCT % 9   EOS PCT % 2     Results from last 7 days   Lab Units 01/23/24  0509   SODIUM mmol/L 139   POTASSIUM mmol/L 4.0   CHLORIDE mmol/L 102   CO2 mmol/L 29   BUN mg/dL 15   CREATININE mg/dL 0.96   ANION GAP mmol/L 8   CALCIUM mg/dL 8.9   GLUCOSE RANDOM mg/dL 150*         Results from last 7 days   Lab Units 01/23/24  1111 01/23/24  0745 01/22/24  2126 01/22/24  1650 01/22/24  1131 01/22/24  0720 01/21/24  2104 01/21/24  1655 01/21/24  1103 01/21/24  0713 01/20/24  2114 01/20/24  1607   POC GLUCOSE mg/dl 183* 150* 208* 105 322* 180* 154* 130 334* 153* 167* 137               Lines/Drains:  Invasive Devices       Peripheral Intravenous Line  Duration             Peripheral IV 01/23/24 Dorsal (posterior);Right Forearm <1 day                          Imaging: No pertinent imaging reviewed.    Recent Cultures (last 7 days):         Last 24 Hours Medication List:   Current Facility-Administered Medications   Medication Dose Route Frequency Provider Last Rate    acetaminophen  650 mg Oral Q4H PRN Marco Antonio Khalil PA-C      aspirin  81 mg Oral Daily Marco Antonio Khalil PA-C      atorvastatin  20 mg Oral Daily Marco Antonio Khalil PA-C      donepezil  10 mg Oral HS Marco Antonio Khalil PA-C      fish oil  1,000 mg Oral Daily Marco Antonio Khalil PA-C      FLUoxetine  20 mg Oral QAM Marco Antonio Khalil PA-C      furosemide  40 mg Oral Daily Celestino Coelho MD      heparin (porcine)  5,000 Units Subcutaneous Q8H ZEINAB Marco Antonio Khalil PA-C      insulin lispro  1-5 Units Subcutaneous TID AC Marco Antonio Khalil PA-C      insulin lispro  1-5 Units Subcutaneous HS Marco Antonio Khalil PA-C      lisinopril  5 mg Oral Daily Marco Antonio Khalil PA-C       ondansetron  4 mg Intravenous Q6H PRN Marco Antonio Khalil PA-C      potassium chloride  20 mEq Oral Daily Marco Antonio Khalil PA-C      sodium chloride (PF)  3 mL Intravenous Q1H PRN Carl Hylton MD      tamsulosin  0.4 mg Oral Daily With Dinner Marco Antonio Khalil PA-C          Today, Patient Was Seen By: Mao Maxwell MD    **Please Note: This note may have been constructed using a voice recognition system.**

## 2024-01-23 NOTE — CASE MANAGEMENT
Case Management Discharge Planning Note    Patient name Israel Hughes  Location /-01 MRN [id number]  : 1940 Date 2024       Current Admission Date: 2024  Current Admission Diagnosis:Volume overload   Patient Active Problem List    Diagnosis Date Noted    Volume overload 2024    Stage 3 chronic kidney disease, unspecified whether stage 3a or 3b CKD ([facility]) 2023    Mild protein-calorie malnutrition ([facility]) 2022    Dysphagia 2022    Mild aortic stenosis 10/22/2021    Mild mitral regurgitation 10/22/2021    Diastolic dysfunction 10/22/2021    Mild concentric left ventricular hypertrophy (LVH) 10/22/2021    Type 2 diabetes mellitus with retinopathy, without long-term current use of insulin ([facility]) 09/15/2021    Myasthenia gravis without exacerbation ([facility]) 09/15/2021    Mixed hyperlipidemia 09/15/2021    Retinopathy 09/15/2021    Primary open angle glaucoma (POAG) of both eyes, mild stage 09/15/2021    Ptosis of both eyelids 09/15/2021    Benign prostatic hyperplasia without lower urinary tract symptoms 09/15/2021    Essential hypertension 09/15/2021    Other age-related cataract 09/15/2021    Memory difficulty 09/15/2021      LOS (days): 3  Geometric Mean LOS (GMLOS) (days): 2.2  Days to GMLOS:-0.8     OBJECTIVE:  Risk of Unplanned Readmission Score: 11.39         Current admission status: Inpatient   Preferred Pharmacy:   [facility] [address]  [address]  [address] [address]  Phone: [phone] Fax: [phone]    Primary Care Provider: Giovanni Reyez DO    Primary Insurance: [employer]  Secondary Insurance:     DISCHARGE DETAILS:                                                                                                               Facility Insurance Auth Number: [id number]

## 2024-01-23 NOTE — PLAN OF CARE
Problem: Potential for Falls  Goal: Patient will remain free of falls  Description: INTERVENTIONS:  - Educate patient/family on patient safety including physical limitations  - Instruct patient to call for assistance with activity   - Consult OT/PT to assist with strengthening/mobility   - Keep Call bell within reach  - Keep bed low and locked with side rails adjusted as appropriate  - Keep care items and personal belongings within reach  - Initiate and maintain comfort rounds  - Make Fall Risk Sign visible to staff  - Offer Toileting every 2 Hours, in advance of need  - Initiate/Maintain BED alarm  - Obtain necessary fall risk management equipment: WALKER   - Apply yellow socks and bracelet for high fall risk patients  - Consider moving patient to room near nurses station  Outcome: Progressing     Problem: Prexisting or High Potential for Compromised Skin Integrity  Goal: Skin integrity is maintained or improved  Description: INTERVENTIONS:  - Identify patients at risk for skin breakdown  - Assess and monitor skin integrity  - Assess and monitor nutrition and hydration status  - Monitor labs   - Assess for incontinence   - Turn and reposition patient  - Assist with mobility/ambulation  - Relieve pressure over bony prominences  - Avoid friction and shearing  - Provide appropriate hygiene as needed including keeping skin clean and dry  - Evaluate need for skin moisturizer/barrier cream  - Collaborate with interdisciplinary team   - Patient/family teaching  - Consider wound care consult   Outcome: Progressing     Problem: PAIN - ADULT  Goal: Verbalizes/displays adequate comfort level or baseline comfort level  Description: Interventions:  - Encourage patient to monitor pain and request assistance  - Assess pain using appropriate pain scale  - Administer analgesics based on type and severity of pain and evaluate response  - Implement non-pharmacological measures as appropriate and evaluate response  - Consider  cultural and social influences on pain and pain management  - Notify physician/advanced practitioner if interventions unsuccessful or patient reports new pain  Outcome: Progressing     Problem: INFECTION - ADULT  Goal: Absence or prevention of progression during hospitalization  Description: INTERVENTIONS:  - Assess and monitor for signs and symptoms of infection  - Monitor lab/diagnostic results  - Monitor all insertion sites, i.e. indwelling lines, tubes, and drains  - Monitor endotracheal if appropriate and nasal secretions for changes in amount and color  - [place] appropriate cooling/warming therapies per order  - Administer medications as ordered  - Instruct and encourage patient and family to use good hand hygiene technique  - Identify and instruct in appropriate isolation precautions for identified infection/condition  Outcome: Progressing  Goal: Absence of fever/infection during neutropenic period  Description: INTERVENTIONS:  - Monitor WBC    Outcome: Progressing     Problem: SAFETY ADULT  Goal: Patient will remain free of falls  Description: INTERVENTIONS:  - Educate patient/family on patient safety including physical limitations  - Instruct patient to call for assistance with activity   - Consult OT/PT to assist with strengthening/mobility   - Keep Call bell within reach  - Keep bed low and locked with side rails adjusted as appropriate  - Keep care items and personal belongings within reach  - Initiate and maintain comfort rounds  - Make Fall Risk Sign visible to staff  - Offer Toileting every 2 Hours, in advance of need  - Initiate/Maintain BED alarm  - Obtain necessary fall risk management equipment: WALKER   - Apply yellow socks and bracelet for high fall risk patients  - Consider moving patient to room near nurses station  Outcome: Progressing  Goal: Maintain or return to baseline ADL function  Description: INTERVENTIONS:  -  Assess patient's ability to carry out ADLs; assess patient's baseline  for ADL function and identify physical deficits which impact ability to perform ADLs (bathing, care of mouth/teeth, toileting, grooming, dressing, etc.)  - Assess/evaluate cause of self-care deficits   - Assess range of motion  - Assess patient's mobility; develop plan if impaired  - Assess patient's need for assistive devices and provide as appropriate  - Encourage maximum independence but intervene and supervise when necessary  - Involve family in performance of ADLs  - Assess for home care needs following discharge   - Consider OT consult to assist with ADL evaluation and planning for discharge  - Provide patient education as appropriate  Outcome: Progressing  Goal: Maintains/Returns to pre admission functional level  Description: INTERVENTIONS:  - Perform AM-PAC 6 Click Basic Mobility/ Daily Activity assessment daily.  - Set and communicate daily mobility goal to care team and patient/family/caregiver.   - Collaborate with rehabilitation services on mobility goals if consulted  - Perform Range of Motion 3 times a day.  - Reposition patient every 2 hours.  - Dangle patient 3 times a day  - Stand patient 3 times a day  - Ambulate patient 3 times a day  - Out of bed to chair 3 times a day   - Out of bed for meals 3 times a day  - Out of bed for toileting  - Record patient progress and toleration of activity level   Outcome: Progressing     Problem: DISCHARGE PLANNING  Goal: Discharge to home or other facility with appropriate resources  Description: INTERVENTIONS:  - Identify barriers to discharge w/patient and caregiver  - Arrange for needed discharge resources and transportation as appropriate  - Identify discharge learning needs (meds, wound care, etc.)  - Arrange for interpretive services to assist at discharge as needed  - Refer to Case Management Department for coordinating discharge planning if the patient needs post-hospital services based on physician/advanced practitioner order or complex needs related to  functional status, cognitive ability, or social support system  Outcome: Progressing     Problem: Knowledge Deficit  Goal: Patient/family/caregiver demonstrates understanding of disease process, treatment plan, medications, and discharge instructions  Description: Complete learning assessment and assess knowledge base.  Interventions:  - Provide teaching at level of understanding  - Provide teaching via preferred learning methods  Outcome: Progressing

## 2024-01-23 NOTE — PLAN OF CARE
Problem: Potential for Falls  Goal: Patient will remain free of falls  Description: INTERVENTIONS:  - Educate patient/family on patient safety including physical limitations  - Instruct patient to call for assistance with activity   - Consult OT/PT to assist with strengthening/mobility   - Keep Call bell within reach  - Keep bed low and locked with side rails adjusted as appropriate  - Keep care items and personal belongings within reach  - Initiate and maintain comfort rounds  - Make Fall Risk Sign visible to staff  - Offer Toileting every 1 Hours, in advance of need  - Initiate/Maintain bed alarm  - Obtain necessary fall risk management equipment: bed   - Apply yellow socks and bracelet for high fall risk patients  - Consider moving patient to room near nurses station  Outcome: Progressing     Problem: Prexisting or High Potential for Compromised Skin Integrity  Goal: Skin integrity is maintained or improved  Description: INTERVENTIONS:  - Identify patients at risk for skin breakdown  - Assess and monitor skin integrity  - Assess and monitor nutrition and hydration status  - Monitor labs   - Assess for incontinence   - Turn and reposition patient  - Assist with mobility/ambulation  - Relieve pressure over bony prominences  - Avoid friction and shearing  - Provide appropriate hygiene as needed including keeping skin clean and dry  - Evaluate need for skin moisturizer/barrier cream  - Collaborate with interdisciplinary team   - Patient/family teaching  - Consider wound care consult   Outcome: Progressing     Problem: PAIN - ADULT  Goal: Verbalizes/displays adequate comfort level or baseline comfort level  Description: Interventions:  - Encourage patient to monitor pain and request assistance  - Assess pain using appropriate pain scale  - Administer analgesics based on type and severity of pain and evaluate response  - Implement non-pharmacological measures as appropriate and evaluate response  - Consider cultural  and social influences on pain and pain management  - Notify physician/advanced practitioner if interventions unsuccessful or patient reports new pain  Outcome: Progressing     Problem: INFECTION - ADULT  Goal: Absence or prevention of progression during hospitalization  Description: INTERVENTIONS:  - Assess and monitor for signs and symptoms of infection  - Monitor lab/diagnostic results  - Monitor all insertion sites, i.e. indwelling lines, tubes, and drains  - Monitor endotracheal if appropriate and nasal secretions for changes in amount and color  - [place] appropriate cooling/warming therapies per order  - Administer medications as ordered  - Instruct and encourage patient and family to use good hand hygiene technique  - Identify and instruct in appropriate isolation precautions for identified infection/condition  Outcome: Progressing  Goal: Absence of fever/infection during neutropenic period  Description: INTERVENTIONS:  - Monitor WBC    Outcome: Progressing     Problem: SAFETY ADULT  Goal: Patient will remain free of falls  Description: INTERVENTIONS:  - Educate patient/family on patient safety including physical limitations  - Instruct patient to call for assistance with activity   - Consult OT/PT to assist with strengthening/mobility   - Keep Call bell within reach  - Keep bed low and locked with side rails adjusted as appropriate  - Keep care items and personal belongings within reach  - Initiate and maintain comfort rounds  - Make Fall Risk Sign visible to staff  - Offer Toileting every 1 Hours, in advance of need  - Initiate/Maintain bed alarm  - Obtain necessary fall risk management equipment: bed   - Apply yellow socks and bracelet for high fall risk patients  - Consider moving patient to room near nurses station  Outcome: Progressing  Goal: Maintain or return to baseline ADL function  Description: INTERVENTIONS:  -  Assess patient's ability to carry out ADLs; assess patient's baseline for ADL  function and identify physical deficits which impact ability to perform ADLs (bathing, care of mouth/teeth, toileting, grooming, dressing, etc.)  - Assess/evaluate cause of self-care deficits   - Assess range of motion  - Assess patient's mobility; develop plan if impaired  - Assess patient's need for assistive devices and provide as appropriate  - Encourage maximum independence but intervene and supervise when necessary  - Involve family in performance of ADLs  - Assess for home care needs following discharge   - Consider OT consult to assist with ADL evaluation and planning for discharge  - Provide patient education as appropriate  Outcome: Progressing  Goal: Maintains/Returns to pre admission functional level  Description: INTERVENTIONS:  - Perform AM-PAC 6 Click Basic Mobility/ Daily Activity assessment daily.  - Set and communicate daily mobility goal to care team and patient/family/caregiver.   - Collaborate with rehabilitation services on mobility goals if consulted  - Perform Range of Motion 3 times a day.  - Reposition patient every 2 hours.  - Dangle patient 3 times a day  - Stand patient 3 times a day  - Ambulate patient 3 times a day  - Out of bed to chair 3 times a day   - Out of bed for meals 3 times a day  - Out of bed for toileting  - Record patient progress and toleration of activity level   Outcome: Progressing

## 2024-01-23 NOTE — ASSESSMENT & PLAN NOTE
Noted on prior echo  Initially, I suspected progression based on exam, however, echo confirmed no significant change compared to prior

## 2024-01-23 NOTE — ASSESSMENT & PLAN NOTE
Wt Readings from Last 3 Encounters:   01/23/24 56.3 kg (124 lb 0.1 oz)   01/04/24 59.9 kg (132 lb)   12/13/23 59.9 kg (132 lb)     Patient has diuresed over 4 L of fluid since arrival  Status post treatment with IV Lasix  Appreciate cardiology input  Echocardiogram revealed an EF of 50%, mild systolic dysfunction  Continue current cardiac based medications which include aspirin, Lipitor, lisinopril, and Lasix   Patient is also status post a PT and OT evaluation-they recommend postacute rehabilitation services  Case management is working on placement options  Patient is medically stable for discharge  Discharge is scheduled for 1/24/2024 once a bed has been made available at the [facility] nursing [facility] -no beds today

## 2024-01-23 NOTE — PROGRESS NOTES
"[facility]  Progress Note  Name: Israel Hughes I  MRN: [id number]  Unit/Bed#: -01 I Date of Admission: 1/19/2024   Date of Service: 1/23/2024 I Hospital Day: 3    Assessment/Plan   Stage 3 chronic kidney disease, unspecified whether stage 3a or 3b CKD (HCC)  Assessment & Plan  Lab Results   Component Value Date    EGFR 73 01/23/2024    EGFR 68 01/22/2024    EGFR 65 01/21/2024    CREATININE 0.96 01/23/2024    CREATININE 1.01 01/22/2024    CREATININE 1.06 01/21/2024     Monitor with diuretic use    Mild aortic stenosis  Assessment & Plan  Noted on prior echo  Initially, I suspected progression based on exam, however, echo confirmed no significant change compared to prior    Essential hypertension  Assessment & Plan  Blood pressure well-controlled  Continue home medications    * Volume overload  Assessment & Plan  Likely due to mild diastolic dysfunction, EF preserved  Volume status and symptoms improved following IV diuretics  Transition to Lasix 40 mg daily as needed for weight gain           Outpatient Cardiologist: none      Subjective:   Patient seen and examined.  No significant events overnight.    Denies CP, SOB  Ambulating with walker and assist of 1 to bathroom for AM care    Summary comments:  Volume status improved, sat appropriate on room air.  Euvolemic on exam.    Recommend lasix 40 mg daily as needed for weight gain of 3 lbs overnight or 5 lbs in one week.     Echo results reviewed-AS similar to prior exam, preserved EF     Stable for DC from a cardiac perspective.  Cardiology will sign off.  We will remain available if needed.    Telemetry/ECG/Cardiac testing:   Echo 1/22/2024  EF 50%  Mild AS, peak velocity 2.17 m/s  Similar to prior study    Echo 10/11/2021  EF 60%  Mild LVH, mild diastolic dysfunction  Very mild AS, mild MR      Vitals: Blood pressure 140/76, pulse 56, temperature 97.6 °F (36.4 °C), resp. rate 18, height 5' 2\" (1.575 m), weight 56.3 kg (124 lb 0.1 " oz), SpO2 95%.,   Orthostatic Blood Pressures      Flowsheet Row Most Recent Value   Blood Pressure 140/76 filed at 01/23/2024 0746   Patient Position - Orthostatic VS Lying filed at 01/22/2024 0739        ,   Weight (last 2 days)       Date/Time Weight    01/23/24 0600 56.3 (124.01)    01/22/24 1054 57.6 (127)    01/22/24 0525 57.7 (127.32)    01/21/24 0600 58.6 (129.19)    01/21/24 0524 58.6 (129.19)            Physical Exam:    General:  Normal appearance in no distress.  Eyes:  Anicteric.  Oral mucosa:  Moist.  Neck:  No JVD. Carotid upstrokes are brisk without bruits.  No masses.  Chest:  Clear to auscultation  Cardiac:  No palpable PMI.  Normal S1 and S2.  No murmur gallop or rub.  Abdomen:  Soft and nontender. No palpable organomegaly or aortic enlargement.  Extremities:  Trace bilat LE edema  Neuro:  Grossly symmetric.  Psych:  Alert and oriented x3.      Medications:      Current Facility-Administered Medications:     acetaminophen (TYLENOL) tablet 650 mg, 650 mg, Oral, Q4H PRN, Marco Antonio Khalil PA-C    aspirin (ECOTRIN LOW STRENGTH) EC tablet 81 mg, 81 mg, Oral, Daily, Marco Antonio Khalil PA-C, 81 mg at 01/23/24 0809    atorvastatin (LIPITOR) tablet 20 mg, 20 mg, Oral, Daily, Marco Antonio Khalil PA-C, 20 mg at 01/23/24 0809    donepezil (ARICEPT) tablet 10 mg, 10 mg, Oral, HS, KIMBERLY Pinedo-C, 10 mg at 01/22/24 2221    fish oil capsule 1,000 mg, 1,000 mg, Oral, Daily, Marco Antonio Khalil PA-C, 1,000 mg at 01/23/24 0809    FLUoxetine (PROzac) capsule 20 mg, 20 mg, Oral, QAM, KIMBERLY Pinedo-C, 20 mg at 01/23/24 0809    furosemide (LASIX) tablet 40 mg, 40 mg, Oral, Daily, Celestino Coelho MD, 40 mg at 01/23/24 0809    heparin (porcine) subcutaneous injection 5,000 Units, 5,000 Units, Subcutaneous, Q8H ZEINAB, Marco Antonio Khalil PA-C, 5,000 Units at 01/23/24 0513    insulin lispro (HumaLOG) 100 units/mL subcutaneous injection 1-5 Units, 1-5 Units, Subcutaneous, TID AC, 1 Units at 01/23/24 0808 **AND** Fingerstick Glucose  (POCT), , , TID AC, Marco Antonio Khalil PA-C    insulin lispro (HumaLOG) 100 units/mL subcutaneous injection 1-5 Units, 1-5 Units, Subcutaneous, HS, Marco Antonio Khalil PA-C, 1 Units at 01/22/24 2222    lisinopril (ZESTRIL) tablet 5 mg, 5 mg, Oral, Daily, Marco Antonio Khalil PA-C, 5 mg at 01/23/24 0809    ondansetron (ZOFRAN) injection 4 mg, 4 mg, Intravenous, Q6H PRN, Marco Antonio Khalil PA-C    potassium chloride (K-DUR,KLOR-CON) CR tablet 20 mEq, 20 mEq, Oral, Daily, Marco Antonio Khalil PA-C, 20 mEq at 01/23/24 0809    Insert peripheral IV, , , Once **AND** sodium chloride (PF) 0.9 % injection 3 mL, 3 mL, Intravenous, Q1H PRN, Carl Hylton MD    tamsulosin (FLOMAX) capsule 0.4 mg, 0.4 mg, Oral, Daily With Dinner, Marco Antonio Khalil PA-C, 0.4 mg at 01/22/24 1730     Labs & Results:    Troponins:    Results from last 7 days   Lab Units 01/19/24  2342 01/19/24  2045 01/19/24  1827   HS TNI 0HR ng/L  --   --  5   HS TNI 2HR ng/L  --  5  --    HSTNI D2 ng/L  --  0  --    HS TNI 4HR ng/L 5  --   --    HSTNI D4 ng/L 0  --   --         BNP:   Results from last 6 Months   Lab Units 01/19/24  1827   BNP pg/mL 212*     CBC with diff:   Results from last 7 days   Lab Units 01/23/24  0509 01/21/24  0523   WBC Thousand/uL 7.39 5.74   HEMOGLOBIN g/dL 12.3 12.0   HEMATOCRIT % 36.8 36.0*   MCV fL 94 93   PLATELETS Thousands/uL 161 158     TSH:     CMP:   Results from last 7 days   Lab Units 01/23/24  0509 01/22/24  0549   POTASSIUM mmol/L 4.0 3.9   CHLORIDE mmol/L 102 101   CO2 mmol/L 29 28   BUN mg/dL 15 21   CREATININE mg/dL 0.96 1.01   EGFR ml/min/1.73sq m 73 68     Lipid Profile:     Coags:

## 2024-01-23 NOTE — ASSESSMENT & PLAN NOTE
Lab Results   Component Value Date    HGBA1C 6.9 (A) 12/01/2023       Recent Labs     01/22/24  1650 01/22/24  2126 01/23/24  0745 01/23/24  1111   POCGLU 105 208* 150* 183*         Blood Sugar Average: Last 72 hrs:  (P) 183.5969476131437303    Target blood sugar for the hospital is 140-180  The most part blood sugars are stable  We will continue to monitor closely over the next 24 hours  Continue insulin, Accu-Cheks before meals and at bedtime with sliding scale coverage for now  Stable

## 2024-01-24 VITALS
TEMPERATURE: 97.3 F | HEIGHT: 62 IN | HEART RATE: 64 BPM | WEIGHT: 129.03 LBS | DIASTOLIC BLOOD PRESSURE: 56 MMHG | BODY MASS INDEX: 23.75 KG/M2 | SYSTOLIC BLOOD PRESSURE: 108 MMHG | RESPIRATION RATE: 20 BRPM | OXYGEN SATURATION: 95 %

## 2024-01-24 LAB
FLUAV RNA RESP QL NAA+PROBE: NEGATIVE
FLUBV RNA RESP QL NAA+PROBE: NEGATIVE
GLUCOSE SERPL-MCNC: 148 MG/DL (ref 65–140)
GLUCOSE SERPL-MCNC: 207 MG/DL (ref 65–140)
RSV RNA RESP QL NAA+PROBE: NEGATIVE
SARS-COV-2 RNA RESP QL NAA+PROBE: NEGATIVE

## 2024-01-24 PROCEDURE — 82948 REAGENT STRIP/BLOOD GLUCOSE: CPT

## 2024-01-24 PROCEDURE — 99239 HOSP IP/OBS DSCHRG MGMT >30: CPT | Performed by: HOSPITALIST

## 2024-01-24 PROCEDURE — 0241U HB NFCT DS VIR RESP RNA 4 TRGT: CPT | Performed by: HOSPITALIST

## 2024-01-24 RX ORDER — POTASSIUM CHLORIDE 20 MEQ/1
20 TABLET, EXTENDED RELEASE ORAL DAILY
Qty: 30 TABLET | Refills: 0 | Status: SHIPPED | OUTPATIENT
Start: 2024-01-25 | End: 2024-02-12

## 2024-01-24 RX ORDER — OXYCODONE HYDROCHLORIDE 5 MG/1
5 TABLET ORAL EVERY 6 HOURS PRN
Qty: 8 TABLET | Refills: 0 | Status: ON HOLD | OUTPATIENT
Start: 2024-01-24 | End: 2024-01-30 | Stop reason: ALTCHOICE

## 2024-01-24 RX ORDER — FUROSEMIDE 40 MG/1
40 TABLET ORAL DAILY
Qty: 30 TABLET | Refills: 0 | Status: ON HOLD | OUTPATIENT
Start: 2024-01-25 | End: 2024-01-29

## 2024-01-24 RX ADMIN — FLUOXETINE 20 MG: 20 CAPSULE ORAL at 08:45

## 2024-01-24 RX ADMIN — OMEGA-3 FATTY ACIDS CAP 1000 MG 1000 MG: 1000 CAP at 08:44

## 2024-01-24 RX ADMIN — HEPARIN SODIUM 5000 UNITS: 5000 INJECTION INTRAVENOUS; SUBCUTANEOUS at 05:13

## 2024-01-24 RX ADMIN — INSULIN LISPRO 1 UNITS: 100 INJECTION, SOLUTION INTRAVENOUS; SUBCUTANEOUS at 11:59

## 2024-01-24 RX ADMIN — ATORVASTATIN CALCIUM 20 MG: 20 TABLET, FILM COATED ORAL at 08:44

## 2024-01-24 RX ADMIN — LISINOPRIL 5 MG: 5 TABLET ORAL at 08:45

## 2024-01-24 RX ADMIN — ASPIRIN 81 MG: 81 TABLET, COATED ORAL at 08:45

## 2024-01-24 RX ADMIN — POTASSIUM CHLORIDE 20 MEQ: 1500 TABLET, EXTENDED RELEASE ORAL at 08:45

## 2024-01-24 RX ADMIN — FUROSEMIDE 40 MG: 40 TABLET ORAL at 08:44

## 2024-01-24 NOTE — PLAN OF CARE
Problem: Potential for Falls  Goal: Patient will remain free of falls  Description: INTERVENTIONS:  - Educate patient/family on patient safety including physical limitations  - Instruct patient to call for assistance with activity   - Consult OT/PT to assist with strengthening/mobility   - Keep Call bell within reach  - Keep bed low and locked with side rails adjusted as appropriate  - Keep care items and personal belongings within reach  - Initiate and maintain comfort rounds  - Make Fall Risk Sign visible to staff  - Offer Toileting every 1 Hours, in advance of need  - Initiate/Maintain bed alarm  - Obtain necessary fall risk management equipment: bed   - Apply yellow socks and bracelet for high fall risk patients  - Consider moving patient to room near nurses station  Outcome: Progressing     Problem: Prexisting or High Potential for Compromised Skin Integrity  Goal: Skin integrity is maintained or improved  Description: INTERVENTIONS:  - Identify patients at risk for skin breakdown  - Assess and monitor skin integrity  - Assess and monitor nutrition and hydration status  - Monitor labs   - Assess for incontinence   - Turn and reposition patient  - Assist with mobility/ambulation  - Relieve pressure over bony prominences  - Avoid friction and shearing  - Provide appropriate hygiene as needed including keeping skin clean and dry  - Evaluate need for skin moisturizer/barrier cream  - Collaborate with interdisciplinary team   - Patient/family teaching  - Consider wound care consult   Outcome: Progressing     Problem: PAIN - ADULT  Goal: Verbalizes/displays adequate comfort level or baseline comfort level  Description: Interventions:  - Encourage patient to monitor pain and request assistance  - Assess pain using appropriate pain scale  - Administer analgesics based on type and severity of pain and evaluate response  - Implement non-pharmacological measures as appropriate and evaluate response  - Consider cultural  and social influences on pain and pain management  - Notify physician/advanced practitioner if interventions unsuccessful or patient reports new pain  Outcome: Progressing     Problem: INFECTION - ADULT  Goal: Absence or prevention of progression during hospitalization  Description: INTERVENTIONS:  - Assess and monitor for signs and symptoms of infection  - Monitor lab/diagnostic results  - Monitor all insertion sites, i.e. indwelling lines, tubes, and drains  - Monitor endotracheal if appropriate and nasal secretions for changes in amount and color  - Yoder appropriate cooling/warming therapies per order  - Administer medications as ordered  - Instruct and encourage patient and family to use good hand hygiene technique  - Identify and instruct in appropriate isolation precautions for identified infection/condition  Outcome: Progressing  Goal: Absence of fever/infection during neutropenic period  Description: INTERVENTIONS:  - Monitor WBC    Outcome: Progressing     Problem: SAFETY ADULT  Goal: Patient will remain free of falls  Description: INTERVENTIONS:  - Educate patient/family on patient safety including physical limitations  - Instruct patient to call for assistance with activity   - Consult OT/PT to assist with strengthening/mobility   - Keep Call bell within reach  - Keep bed low and locked with side rails adjusted as appropriate  - Keep care items and personal belongings within reach  - Initiate and maintain comfort rounds  - Make Fall Risk Sign visible to staff  - Offer Toileting every 1 Hours, in advance of need  - Initiate/Maintain bed alarm  - Obtain necessary fall risk management equipment: bed   - Apply yellow socks and bracelet for high fall risk patients  - Consider moving patient to room near nurses station  Outcome: Progressing  Goal: Maintain or return to baseline ADL function  Description: INTERVENTIONS:  -  Assess patient's ability to carry out ADLs; assess patient's baseline for ADL  function and identify physical deficits which impact ability to perform ADLs (bathing, care of mouth/teeth, toileting, grooming, dressing, etc.)  - Assess/evaluate cause of self-care deficits   - Assess range of motion  - Assess patient's mobility; develop plan if impaired  - Assess patient's need for assistive devices and provide as appropriate  - Encourage maximum independence but intervene and supervise when necessary  - Involve family in performance of ADLs  - Assess for home care needs following discharge   - Consider OT consult to assist with ADL evaluation and planning for discharge  - Provide patient education as appropriate  Outcome: Progressing  Goal: Maintains/Returns to pre admission functional level  Description: INTERVENTIONS:  - Perform AM-PAC 6 Click Basic Mobility/ Daily Activity assessment daily.  - Set and communicate daily mobility goal to care team and patient/family/caregiver.   - Collaborate with rehabilitation services on mobility goals if consulted  - Perform Range of Motion 3 times a day.  - Reposition patient every 2 hours.  - Dangle patient 3 times a day  - Stand patient 3 times a day  - Ambulate patient 3 times a day  - Out of bed to chair 3 times a day   - Out of bed for meals 3 times a day  - Out of bed for toileting  - Record patient progress and toleration of activity level   Outcome: Progressing     Problem: DISCHARGE PLANNING  Goal: Discharge to home or other facility with appropriate resources  Description: INTERVENTIONS:  - Identify barriers to discharge w/patient and caregiver  - Arrange for needed discharge resources and transportation as appropriate  - Identify discharge learning needs (meds, wound care, etc.)  - Arrange for interpretive services to assist at discharge as needed  - Refer to Case Management Department for coordinating discharge planning if the patient needs post-hospital services based on physician/advanced practitioner order or complex needs related to  functional status, cognitive ability, or social support system  Outcome: Progressing     Problem: Knowledge Deficit  Goal: Patient/family/caregiver demonstrates understanding of disease process, treatment plan, medications, and discharge instructions  Description: Complete learning assessment and assess knowledge base.  Interventions:  - Provide teaching at level of understanding  - Provide teaching via preferred learning methods  Outcome: Progressing

## 2024-01-24 NOTE — DISCHARGE SUMMARY
[facility]  Discharge- Israel Hughes 1940, 83 y.o. male MRN: [id number]  Unit/Bed#: -01 Encounter: [id number]  Primary Care Provider: Giovanni Reyez DO   Date and time admitted to hospital: 1/19/2024  6:07 PM    * Volume overload  Assessment & Plan  Wt Readings from Last 3 Encounters:   01/24/24 58.5 kg (129 lb 0.6 oz)   01/04/24 59.9 kg (132 lb)   12/13/23 59.9 kg (132 lb)     Patient has diuresed over 4 L of fluid since arrival  Status post treatment with IV Lasix  Appreciate cardiology input  Echocardiogram revealed an EF of 50%, mild systolic dysfunction  Continue current cardiac based medications which include aspirin, Lipitor, lisinopril, and Lasix   Patient is also status post a PT and OT evaluation-they recommend postacute rehabilitation services  Will discharge to the [facility] today for postacute rehabilitation services  New prescription has been provided for both Lasix, and potassium supplementation  No other changes to any of his preadmit meds, and or to the preadmission doses  Outpatient follow-up with PCP, and cardiology    Type 2 diabetes mellitus with retinopathy, without long-term current use of insulin ([facility])  Assessment & Plan  Lab Results   Component Value Date    HGBA1C 6.9 (A) 12/01/2023       Recent Labs     01/23/24  1623 01/23/24  2131 01/24/24  0716 01/24/24  1111   POCGLU 257* 188* 148* 207*         Blood Sugar Average: Last 72 hrs:  (P) 194.1727711914791137    Okay for discharge on preadmit oral hypoglycemic meds at the same doses    Mixed hyperlipidemia  Assessment & Plan  Continue Lipitor post discharge    Essential hypertension  Assessment & Plan  Continue lisinopril  Blood pressure is stable  Discharged on the same    Mild aortic stenosis  Assessment & Plan  Patient with history of mild aortic stenosis based on previous echo.  Repeat echo reveals the same mild aortic stenosis.  Cardiology following.  Continue diuretics as  tolerated.  Outpatient follow-up with cardiology    Stage 3 chronic kidney disease, unspecified whether stage 3a or 3b CKD (HCC)  Assessment & Plan  Lab Results   Component Value Date    EGFR 73 01/23/2024    EGFR 68 01/22/2024    EGFR 65 01/21/2024    CREATININE 0.96 01/23/2024    CREATININE 1.01 01/22/2024    CREATININE 1.06 01/21/2024     Creatinine appears to be at baseline  Patient will need continued periodic outpatient BMP monitoring via his PCP    Memory difficulty  Assessment & Plan  Continue prehospital Aricept 10 mg p.o. nightly post discharge        Medical Problems       Resolved Problems  Date Reviewed: 1/23/2024   None       Discharging Physician / Practitioner: Mao Maxwell MD  PCP: Giovanni Reyez DO  Admission Date:   Admission Orders (From admission, onward)       Ordered        01/20/24 1016  Inpatient Admission  Once            01/19/24 1920  Place in Observation  Once                          Discharge Date: 01/24/24    Consultations During Hospital Stay:  Cardiology    Procedures Performed:   None    Significant Findings / Test Results:   X-ray chest-no acute cardiopulmonary disease  2D echocardiogram-EF of 50%, see the full report for additional details    Incidental Findings:   None      Test Results Pending at Discharge (will require follow up):   None     Outpatient Tests Requested:  None    Complications: None    Reason for Admission: Volume overload, need for IV diuretics    Hospital Course:   Israel Hughes is a 83 y.o. male patient who originally presented to the hospital on 1/19/2024 due to bilateral lower extremity edema.  Please refer to the initial treatment physical examination completed by Marco Antonio Khalil PA-C on initial presenting features and complaints.  In brief, the patient is an 83-year-old male, who was admitted to the hospital after he came in with bilateral lower extremity swelling.  He was treated with IV Lasix.  Echocardiogram was performed which revealed an EF  "of 50%.  Cardiology followed along throughout.  He was treated with IV diuretics, and then was successfully transition to oral diuretics.  He diuresed over 4 L of fluid while in house.  He was seen by PT and OT, they recommended postacute rehabilitation services.  Patient was ultimately discharged to to the [facility] [place].  After release from his rehab, he will follow-up in the outpatient setting with his PCP, and with cardiology.  New prescriptions included Lasix and K-Dur.  Please refer to the assessment/plan portion of this discharge summary as outlined above for additional details regarding his stay.        Please see above list of diagnoses and related plan for additional information.     Condition at Discharge: good    Discharge Day Visit / Exam:   Subjective: Patient seen, looks and feels well, is ready to go  Vitals: Blood Pressure: 126/71 (01/24/24 0718)  Pulse: 62 (01/24/24 0718)  Temperature: 97.9 °F (36.6 °C) (01/24/24 0718)  Temp Source: Oral (01/24/24 0718)  Respirations: 18 (01/24/24 0718)  Height: 5' 2\" (157.5 cm) (01/22/24 1054)  Weight - Scale: 58.5 kg (129 lb 0.6 oz) (01/24/24 0600)  SpO2: 96 % (01/24/24 0718)  Exam:   Physical Exam  Vitals and nursing note reviewed.   Constitutional:       General: He is not in acute distress.     Appearance: Normal appearance. He is not ill-appearing.   HENT:      Head: Normocephalic and atraumatic.      Nose: Nose normal.   Eyes:      Extraocular Movements: Extraocular movements intact.      Pupils: Pupils are equal, round, and reactive to light.   Cardiovascular:      Rate and Rhythm: Normal rate and regular rhythm.      Pulses: Normal pulses.      Heart sounds: Normal heart sounds. No murmur heard.     No friction rub. No gallop.   Pulmonary:      Effort: Pulmonary effort is normal.      Breath sounds: Normal breath sounds.   Abdominal:      General: There is no distension.      Palpations: Abdomen is soft. There is no mass.      Tenderness: There is no " abdominal tenderness. There is no guarding or rebound.   Musculoskeletal:         General: No swelling or tenderness. Normal range of motion.      Cervical back: Normal range of motion and neck supple. No rigidity. No muscular tenderness.      Right lower leg: No edema.      Left lower leg: No edema.   Skin:     General: Skin is warm.      Capillary Refill: Capillary refill takes less than 2 seconds.      Findings: No erythema or rash.   Neurological:      General: No focal deficit present.      Mental Status: He is alert and oriented to person, place, and time. Mental status is at baseline.      Comments: Forgetful   Psychiatric:         Mood and Affect: Mood normal.         Behavior: Behavior normal.          Discussion with Family: Updated  (patient's granddaughter Angelita was brought up to 5) at bedside.    Discharge instructions/Information to patient and family:   See after visit summary for information provided to patient and family.      Provisions for Follow-Up Care:  See after visit summary for information related to follow-up care and any pertinent home health orders.      Mobility at time of Discharge:   Basic Mobility Inpatient Raw Score: 13  -HLM Goal: 4: Move to chair/commode  JH-HLM Achieved: 6: Walk 10 steps or more  HLM Goal NOT achieved. Continue to encourage mobility in post discharge setting.     Disposition:   Acute Rehab at the [facility] [place]    Planned Readmission: None     Discharge Statement:  I spent 45 minutes discharging the patient. This time was spent on the day of discharge. I had direct contact with the patient on the day of discharge. Greater than 50% of the total time was spent examining patient, answering all patient questions, arranging and discussing plan of care with patient as well as directly providing post-discharge instructions.  Additional time then spent on discharge activities.    Discharge Medications:  See after visit summary for reconciled discharge  medications provided to patient and/or family.      **Please Note: This note may have been constructed using a voice recognition system**

## 2024-01-24 NOTE — ASSESSMENT & PLAN NOTE
Wt Readings from Last 3 Encounters:   01/24/24 58.5 kg (129 lb 0.6 oz)   01/04/24 59.9 kg (132 lb)   12/13/23 59.9 kg (132 lb)     Patient has diuresed over 4 L of fluid since arrival  Status post treatment with IV Lasix  Appreciate cardiology input  Echocardiogram revealed an EF of 50%, mild systolic dysfunction  Continue current cardiac based medications which include aspirin, Lipitor, lisinopril, and Lasix   Patient is also status post a PT and OT evaluation-they recommend postacute rehabilitation services  Will discharge to the [facility] today for postacute rehabilitation services  New prescription has been provided for both Lasix, and potassium supplementation  No other changes to any of his preadmit meds, and or to the preadmission doses  Outpatient follow-up with PCP, and cardiology

## 2024-01-24 NOTE — ASSESSMENT & PLAN NOTE
Lab Results   Component Value Date    HGBA1C 6.9 (A) 12/01/2023       Recent Labs     01/23/24  1623 01/23/24  2131 01/24/24  0716 01/24/24  1111   POCGLU 257* 188* 148* 207*         Blood Sugar Average: Last 72 hrs:  (P) 194.4204657069881798    Okay for discharge on preadmit oral hypoglycemic meds at the same doses

## 2024-01-24 NOTE — CASE MANAGEMENT
Case Management Discharge Planning Note    Patient name Israel Hughes  Location /-01 MRN [id number]  : 1940 Date 2024       Current Admission Date: 2024  Current Admission Diagnosis:Volume overload   Patient Active Problem List    Diagnosis Date Noted    Volume overload 2024    Stage 3 chronic kidney disease, unspecified whether stage 3a or 3b CKD ([facility]) 2023    Mild protein-calorie malnutrition ([facility]) 2022    Dysphagia 2022    Mild aortic stenosis 10/22/2021    Mild mitral regurgitation 10/22/2021    Diastolic dysfunction 10/22/2021    Mild concentric left ventricular hypertrophy (LVH) 10/22/2021    Type 2 diabetes mellitus with retinopathy, without long-term current use of insulin ([facility]) 09/15/2021    Myasthenia gravis without exacerbation ([facility]) 09/15/2021    Mixed hyperlipidemia 09/15/2021    Retinopathy 09/15/2021    Primary open angle glaucoma (POAG) of both eyes, mild stage 09/15/2021    Ptosis of both eyelids 09/15/2021    Benign prostatic hyperplasia without lower urinary tract symptoms 09/15/2021    Essential hypertension 09/15/2021    Other age-related cataract 09/15/2021    Memory difficulty 09/15/2021      LOS (days): 3  Geometric Mean LOS (GMLOS) (days): 2.2  Days to GMLOS:-1.2     OBJECTIVE:  Risk of Unplanned Readmission Score: 11.45         Current admission status: Inpatient   Preferred Pharmacy:   [facility] [address] - [address]  [address] [address]  Phone: [phone] Fax: [phone]    Primary Care Provider: Giovanni Reyez DO    Primary Insurance: [employer]  Secondary Insurance:     DISCHARGE DETAILS:    Discharge planning discussed with:: patient & Bianca was called at 19:03pm     Comments - Freedom of Choice: recommendation is fro rehab - pt was accepted to [facility]- family & pt's choice- auth was received - [facility] will have an open bed tomorrow after  2pm  CM contacted family/caregiver?: Yes             Contacts  Patient Contacts: Bianca Colbert  Relationship to Patient:: Family (granddaughter)  Contact Method: Phone  Phone Number: [phone]  Reason/Outcome: Discharge Planning    Requested Home Health Care         Is the patient interested in HHC at discharge?: No    DME Referral Provided  Referral made for DME?: No    Other Referral/Resources/Interventions Provided:  Interventions: Short Term Rehab  Referral Comments: pt accepted to [facility]  auth receieved- 15:oo pm w/c porsche set up - pt & family are aware of the fee- granddaughter is able to transport if therapy is in agreement- cm will ask therapy tomorrow if she is able to transport then transport can be cx- Bianca wants to keep  the transport as a back in case she is not able to drive him- covid/rsv/flu test is needed    Would you like to participate in our [facility] service program?  : No - Declined    Treatment Team Recommendation: Short Term Rehab ([facility]- 15:00pm w/c porsche Garcia)                                         Family notified:: Bianca was called

## 2024-01-24 NOTE — ASSESSMENT & PLAN NOTE
Lab Results   Component Value Date    EGFR 73 01/23/2024    EGFR 68 01/22/2024    EGFR 65 01/21/2024    CREATININE 0.96 01/23/2024    CREATININE 1.01 01/22/2024    CREATININE 1.06 01/21/2024     Creatinine appears to be at baseline  Patient will need continued periodic outpatient BMP monitoring via his PCP

## 2024-01-24 NOTE — CASE MANAGEMENT
Case Management Discharge Planning Note    Patient name Israel Hughes  Location /-01 MRN [id number]  : 1940 Date 2024       Current Admission Date: 2024  Current Admission Diagnosis:Volume overload   Patient Active Problem List    Diagnosis Date Noted    Volume overload 2024    Stage 3 chronic kidney disease, unspecified whether stage 3a or 3b CKD ([facility]) 2023    Mild protein-calorie malnutrition ([facility]) 2022    Dysphagia 2022    Mild aortic stenosis 10/22/2021    Mild mitral regurgitation 10/22/2021    Diastolic dysfunction 10/22/2021    Mild concentric left ventricular hypertrophy (LVH) 10/22/2021    Type 2 diabetes mellitus with retinopathy, without long-term current use of insulin ([facility]) 09/15/2021    Myasthenia gravis without exacerbation ([facility]) 09/15/2021    Mixed hyperlipidemia 09/15/2021    Retinopathy 09/15/2021    Primary open angle glaucoma (POAG) of both eyes, mild stage 09/15/2021    Ptosis of both eyelids 09/15/2021    Benign prostatic hyperplasia without lower urinary tract symptoms 09/15/2021    Essential hypertension 09/15/2021    Other age-related cataract 09/15/2021    Memory difficulty 09/15/2021      LOS (days): 4  Geometric Mean LOS (GMLOS) (days): 2.2  Days to GMLOS:-2     OBJECTIVE:  Risk of Unplanned Readmission Score: 11.86         Current admission status: Inpatient   Preferred Pharmacy:   [facility] [address]  [address]  [address] [address]  Phone: [phone] Fax: [phone]    Primary Care Provider: Giovanni Reyez DO    Primary Insurance: [email or web address]  Secondary Insurance:     DISCHARGE DETAILS:    Discharge planning discussed with:: patient & Bianca at the bedside  Freedom of Choice: Yes  Comments - Freedom of Choice: recommendation is for rehab- pt was accepted to [facility]-  family & pt';s choice-  auth received  CM contacted family/caregiver?:  Yes  Were Treatment Team discharge recommendations reviewed with patient/caregiver?: Yes  Did patient/caregiver verbalize understanding of patient care needs?: Yes  Were patient/caregiver advised of the risks associated with not following Treatment Team discharge recommendations?: Yes    Contacts  Patient Contacts: Bianca Colbert  Relationship to Patient:: Family (granddaughter)  Contact Method: Phone  Phone Number: [phone]  Reason/Outcome: Discharge Planning    Requested Home Health Care         Is the patient interested in HHC at discharge?: No    DME Referral Provided  Referral made for DME?: No    Other Referral/Resources/Interventions Provided:  Interventions: Short Term Rehab  Referral Comments: pt accepted to [facility]-  auth received - rn, pt, doctor, snf  & family are aware of the transport time - bls 15:45 pm no auth is needed  therapy in agreement wiht mode of transport- rn was given the report & fax #- covid/rsv.flu completed    Would you like to participate in our [facility] service program?  : No - Declined    Treatment Team Recommendation: Short Term Rehab ([facility] auth received- 15:45 pm [facility])  Discharge Destination Plan:: Short Term Rehab ([facility] auth received - 15:45 pm [facility])         Pt & family are in agreement with the d/c & d/c plan.      Report # [phone]  fax# [phone]                       Family notified:: Agigail at the bedside

## 2024-01-24 NOTE — DISCHARGE INSTR - AVS FIRST PAGE
Dear Israel Hughes,     [facility] was [facility]  It is our hope that we were always able to exceed the expected standards for your care during your stay.  You were hospitalized due to congestive heart failure.  You were cared for on the medical/surgical floor by Mao Maxwell MD with the [facility] who covers for your primary care physician (PCP), Giovanni Reyez DO, while you were hospitalized.  You were also seen by the [facility]-Dr. Dillon Hutton.  If you have any questions or concerns related to this hospitalization, you may contact us at .  For follow up as well as any medication refills, we recommend that you follow up with your primary care physician.  A registered nurse will reach out to you by phone within a few days after your discharge to answer any additional questions that you may have after going home.  However, at this time we provide for you here, the most important instructions / recommendations at discharge:     Notable Medication Adjustments -   New prescription Lasix 40 mg take 1 tablet daily by mouth  New prescription potassium chloride 20 mEq take 1 tablet daily by mouth  Okay to resume all other preadmission medications at the preadmission doses  Testing Required after Discharge -   To be further determined in the outpatient setting by your PCP and/or cardiology  Important follow up information -   Please follow-up with the providers as outlined in this discharge packet  Other Instructions -   Please maintain a healthy low-sodium diet  Please continue to monitor your daily weight -any change in weight of greater than 3 to 5 pounds over 24-hour time span should be reported to cardiology  Please review this entire after visit summary as additional general instructions including medication list, appointments, activity, diet, any pertinent wound care, and other  additional recommendations from your care team that may be provided for you.      Sincerely,     Mao Maxwell MD

## 2024-01-24 NOTE — NURSING NOTE
Hospitalist was in to see and eval the pt and he is ok for dc to home, iv site removed with the tip intact , report called and given to rolanda , all questions answered to her satisfaction, report to [facility] transport, assisted onto the stretcher and the pt was dcd from the hospital

## 2024-01-24 NOTE — PLAN OF CARE
Problem: Potential for Falls  Goal: Patient will remain free of falls  Description: INTERVENTIONS:  - Educate patient/family on patient safety including physical limitations  - Instruct patient to call for assistance with activity   - Consult OT/PT to assist with strengthening/mobility   - Keep Call bell within reach  - Keep bed low and locked with side rails adjusted as appropriate  - Keep care items and personal belongings within reach  - Initiate and maintain comfort rounds  - Make Fall Risk Sign visible to staff  - Offer Toileting every 2 Hours, in advance of need  - Initiate/Maintain BED alarm  - Obtain necessary fall risk management equipment: WALKER   - Apply yellow socks and bracelet for high fall risk patients  - Consider moving patient to room near nurses station  Outcome: Progressing     Problem: Prexisting or High Potential for Compromised Skin Integrity  Goal: Skin integrity is maintained or improved  Description: INTERVENTIONS:  - Identify patients at risk for skin breakdown  - Assess and monitor skin integrity  - Assess and monitor nutrition and hydration status  - Monitor labs   - Assess for incontinence   - Turn and reposition patient  - Assist with mobility/ambulation  - Relieve pressure over bony prominences  - Avoid friction and shearing  - Provide appropriate hygiene as needed including keeping skin clean and dry  - Evaluate need for skin moisturizer/barrier cream  - Collaborate with interdisciplinary team   - Patient/family teaching  - Consider wound care consult   Outcome: Progressing     Problem: PAIN - ADULT  Goal: Verbalizes/displays adequate comfort level or baseline comfort level  Description: Interventions:  - Encourage patient to monitor pain and request assistance  - Assess pain using appropriate pain scale  - Administer analgesics based on type and severity of pain and evaluate response  - Implement non-pharmacological measures as appropriate and evaluate response  - Consider  cultural and social influences on pain and pain management  - Notify physician/advanced practitioner if interventions unsuccessful or patient reports new pain  Outcome: Progressing     Problem: INFECTION - ADULT  Goal: Absence or prevention of progression during hospitalization  Description: INTERVENTIONS:  - Assess and monitor for signs and symptoms of infection  - Monitor lab/diagnostic results  - Monitor all insertion sites, i.e. indwelling lines, tubes, and drains  - Monitor endotracheal if appropriate and nasal secretions for changes in amount and color  - [place] appropriate cooling/warming therapies per order  - Administer medications as ordered  - Instruct and encourage patient and family to use good hand hygiene technique  - Identify and instruct in appropriate isolation precautions for identified infection/condition  Outcome: Progressing  Goal: Absence of fever/infection during neutropenic period  Description: INTERVENTIONS:  - Monitor WBC    Outcome: Progressing     Problem: SAFETY ADULT  Goal: Patient will remain free of falls  Description: INTERVENTIONS:  - Educate patient/family on patient safety including physical limitations  - Instruct patient to call for assistance with activity   - Consult OT/PT to assist with strengthening/mobility   - Keep Call bell within reach  - Keep bed low and locked with side rails adjusted as appropriate  - Keep care items and personal belongings within reach  - Initiate and maintain comfort rounds  - Make Fall Risk Sign visible to staff  - Offer Toileting every 2 Hours, in advance of need  - Initiate/Maintain BED alarm  - Obtain necessary fall risk management equipment: WALKER   - Apply yellow socks and bracelet for high fall risk patients  - Consider moving patient to room near nurses station  Outcome: Progressing  Goal: Maintain or return to baseline ADL function  Description: INTERVENTIONS:  -  Assess patient's ability to carry out ADLs; assess patient's baseline  for ADL function and identify physical deficits which impact ability to perform ADLs (bathing, care of mouth/teeth, toileting, grooming, dressing, etc.)  - Assess/evaluate cause of self-care deficits   - Assess range of motion  - Assess patient's mobility; develop plan if impaired  - Assess patient's need for assistive devices and provide as appropriate  - Encourage maximum independence but intervene and supervise when necessary  - Involve family in performance of ADLs  - Assess for home care needs following discharge   - Consider OT consult to assist with ADL evaluation and planning for discharge  - Provide patient education as appropriate  Outcome: Progressing  Goal: Maintains/Returns to pre admission functional level  Description: INTERVENTIONS:  - Perform AM-PAC 6 Click Basic Mobility/ Daily Activity assessment daily.  - Set and communicate daily mobility goal to care team and patient/family/caregiver.   - Collaborate with rehabilitation services on mobility goals if consulted  - Perform Range of Motion 3 times a day.  - Reposition patient every 2 hours.  - Dangle patient 3 times a day  - Stand patient 3 times a day  - Ambulate patient 3 times a day  - Out of bed to chair 3 times a day   - Out of bed for meals 3 times a day  - Out of bed for toileting  - Record patient progress and toleration of activity level   Outcome: Progressing     Problem: DISCHARGE PLANNING  Goal: Discharge to home or other facility with appropriate resources  Description: INTERVENTIONS:  - Identify barriers to discharge w/patient and caregiver  - Arrange for needed discharge resources and transportation as appropriate  - Identify discharge learning needs (meds, wound care, etc.)  - Arrange for interpretive services to assist at discharge as needed  - Refer to Case Management Department for coordinating discharge planning if the patient needs post-hospital services based on physician/advanced practitioner order or complex needs related to  functional status, cognitive ability, or social support system  Outcome: Progressing     Problem: Knowledge Deficit  Goal: Patient/family/caregiver demonstrates understanding of disease process, treatment plan, medications, and discharge instructions  Description: Complete learning assessment and assess knowledge base.  Interventions:  - Provide teaching at level of understanding  - Provide teaching via preferred learning methods  Outcome: Progressing

## 2024-01-24 NOTE — PLAN OF CARE
Problem: Potential for Falls  Goal: Patient will remain free of falls  Description: INTERVENTIONS:  - Educate patient/family on patient safety including physical limitations  - Instruct patient to call for assistance with activity   - Consult OT/PT to assist with strengthening/mobility   - Keep Call bell within reach  - Keep bed low and locked with side rails adjusted as appropriate  - Keep care items and personal belongings within reach  - Initiate and maintain comfort rounds  - Make Fall Risk Sign visible to staff  - Offer Toileting every  Hours, in advance of need  - Initiate/Maintain alarm  - Obtain necessary fall risk management equipment:   - Apply yellow socks and bracelet for high fall risk patients  - Consider moving patient to room near nurses station  1/24/2024 1358 by Obdulio Tony, RN  Outcome: Adequate for Discharge  1/24/2024 0714 by Obdulio Tony, RN  Outcome: Progressing     Problem: Prexisting or High Potential for Compromised Skin Integrity  Goal: Skin integrity is maintained or improved  Description: INTERVENTIONS:  - Identify patients at risk for skin breakdown  - Assess and monitor skin integrity  - Assess and monitor nutrition and hydration status  - Monitor labs   - Assess for incontinence   - Turn and reposition patient  - Assist with mobility/ambulation  - Relieve pressure over bony prominences  - Avoid friction and shearing  - Provide appropriate hygiene as needed including keeping skin clean and dry  - Evaluate need for skin moisturizer/barrier cream  - Collaborate with interdisciplinary team   - Patient/family teaching  - Consider wound care consult   1/24/2024 1358 by Obdulio Tony, RN  Outcome: Adequate for Discharge  1/24/2024 0714 by Obdulio Tony, RN  Outcome: Progressing     Problem: PAIN - ADULT  Goal: Verbalizes/displays adequate comfort level or baseline comfort level  Description: Interventions:  - Encourage patient to monitor pain and request assistance  - Assess  pain using appropriate pain scale  - Administer analgesics based on type and severity of pain and evaluate response  - Implement non-pharmacological measures as appropriate and evaluate response  - Consider cultural and social influences on pain and pain management  - Notify physician/advanced practitioner if interventions unsuccessful or patient reports new pain  1/24/2024 1358 by Obdulio Tony RN  Outcome: Adequate for Discharge  1/24/2024 0714 by Obdulio Tony RN  Outcome: Progressing     Problem: INFECTION - ADULT  Goal: Absence or prevention of progression during hospitalization  Description: INTERVENTIONS:  - Assess and monitor for signs and symptoms of infection  - Monitor lab/diagnostic results  - Monitor all insertion sites, i.e. indwelling lines, tubes, and drains  - Monitor endotracheal if appropriate and nasal secretions for changes in amount and color  - [place] appropriate cooling/warming therapies per order  - Administer medications as ordered  - Instruct and encourage patient and family to use good hand hygiene technique  - Identify and instruct in appropriate isolation precautions for identified infection/condition  1/24/2024 1358 by Obdulio Tony RN  Outcome: Adequate for Discharge  1/24/2024 0714 by Obdulio Tony RN  Outcome: Progressing  Goal: Absence of fever/infection during neutropenic period  Description: INTERVENTIONS:  - Monitor WBC    1/24/2024 1358 by Obdulio Tony RN  Outcome: Adequate for Discharge  1/24/2024 0714 by Obdulio Tony RN  Outcome: Progressing     Problem: SAFETY ADULT  Goal: Patient will remain free of falls  Description: INTERVENTIONS:  - Educate patient/family on patient safety including physical limitations  - Instruct patient to call for assistance with activity   - Consult OT/PT to assist with strengthening/mobility   - Keep Call bell within reach  - Keep bed low and locked with side rails adjusted as appropriate  - Keep care items and  personal belongings within reach  - Initiate and maintain comfort rounds  - Make Fall Risk Sign visible to staff  - Offer Toileting every  Hours, in advance of need  - Initiate/Maintain alarm  - Obtain necessary fall risk management equipment:   - Apply yellow socks and bracelet for high fall risk patients  - Consider moving patient to room near nurses station  1/24/2024 1358 by Obdulio Tony, RN  Outcome: Adequate for Discharge  1/24/2024 0714 by Obdulio Tony, RN  Outcome: Progressing  Goal: Maintain or return to baseline ADL function  Description: INTERVENTIONS:  -  Assess patient's ability to carry out ADLs; assess patient's baseline for ADL function and identify physical deficits which impact ability to perform ADLs (bathing, care of mouth/teeth, toileting, grooming, dressing, etc.)  - Assess/evaluate cause of self-care deficits   - Assess range of motion  - Assess patient's mobility; develop plan if impaired  - Assess patient's need for assistive devices and provide as appropriate  - Encourage maximum independence but intervene and supervise when necessary  - Involve family in performance of ADLs  - Assess for home care needs following discharge   - Consider OT consult to assist with ADL evaluation and planning for discharge  - Provide patient education as appropriate  1/24/2024 1358 by Obdulio Tony, RN  Outcome: Adequate for Discharge  1/24/2024 0714 by Obdulio Tony, RN  Outcome: Progressing  Goal: Maintains/Returns to pre admission functional level  Description: INTERVENTIONS:  - Perform AM-PAC 6 Click Basic Mobility/ Daily Activity assessment daily.  - Set and communicate daily mobility goal to care team and patient/family/caregiver.   - Collaborate with rehabilitation services on mobility goals if consulted  - Perform Range of Motion  times a day.  - Reposition patient every  hours.  - Dangle patient  times a day  - Stand patient  times a day  - Ambulate patient  times a day  - Out of bed to  chair  times a day   - Out of bed for meals  times a day  - Out of bed for toileting  - Record patient progress and toleration of activity level   1/24/2024 1358 by Obdulio Tony, RN  Outcome: Adequate for Discharge  1/24/2024 0714 by Obdulio Tony, RN  Outcome: Progressing

## 2024-01-24 NOTE — ASSESSMENT & PLAN NOTE
Patient with history of mild aortic stenosis based on previous echo.  Repeat echo reveals the same mild aortic stenosis.  Cardiology following.  Continue diuretics as tolerated.  Outpatient follow-up with cardiology

## 2024-01-25 ENCOUNTER — NURSING HOME VISIT (OUTPATIENT)
Dept: GERIATRICS | Facility: OTHER | Age: 84
End: 2024-01-25
Payer: COMMERCIAL

## 2024-01-25 ENCOUNTER — PATIENT OUTREACH (OUTPATIENT)
Dept: CASE MANAGEMENT | Facility: OTHER | Age: 84
End: 2024-01-25

## 2024-01-25 DIAGNOSIS — Z91.89 AT RISK FOR CONSTIPATION: ICD-10-CM

## 2024-01-25 DIAGNOSIS — Z91.89 AT RISK FOR DELIRIUM: ICD-10-CM

## 2024-01-25 DIAGNOSIS — F02.A11 MILD LATE ONSET ALZHEIMER'S DEMENTIA WITH AGITATION (HCC): ICD-10-CM

## 2024-01-25 DIAGNOSIS — G70.00 MYASTHENIA GRAVIS WITHOUT EXACERBATION (HCC): ICD-10-CM

## 2024-01-25 DIAGNOSIS — I35.0 MILD AORTIC STENOSIS: ICD-10-CM

## 2024-01-25 DIAGNOSIS — I10 ESSENTIAL HYPERTENSION: ICD-10-CM

## 2024-01-25 DIAGNOSIS — H40.1131 PRIMARY OPEN ANGLE GLAUCOMA (POAG) OF BOTH EYES, MILD STAGE: ICD-10-CM

## 2024-01-25 DIAGNOSIS — G30.1 MILD LATE ONSET ALZHEIMER'S DEMENTIA WITH AGITATION (HCC): ICD-10-CM

## 2024-01-25 DIAGNOSIS — R42 ORTHOSTATIC LIGHTHEADEDNESS: ICD-10-CM

## 2024-01-25 DIAGNOSIS — E78.2 MIXED HYPERLIPIDEMIA: ICD-10-CM

## 2024-01-25 DIAGNOSIS — Z71.89 ADVANCE CARE PLANNING: ICD-10-CM

## 2024-01-25 DIAGNOSIS — Z74.2 HOME HELP NEEDED: ICD-10-CM

## 2024-01-25 DIAGNOSIS — E11.319 TYPE 2 DIABETES MELLITUS WITH RETINOPATHY OF BOTH EYES, WITHOUT LONG-TERM CURRENT USE OF INSULIN, MACULAR EDEMA PRESENCE UNSPECIFIED, UNSPECIFIED RETINOPATHY SEVERITY (HCC): ICD-10-CM

## 2024-01-25 DIAGNOSIS — N18.30 STAGE 3 CHRONIC KIDNEY DISEASE, UNSPECIFIED WHETHER STAGE 3A OR 3B CKD (HCC): ICD-10-CM

## 2024-01-25 DIAGNOSIS — R00.1 BRADYCARDIA: ICD-10-CM

## 2024-01-25 DIAGNOSIS — I50.43 ACUTE ON CHRONIC COMBINED SYSTOLIC AND DIASTOLIC CHF (CONGESTIVE HEART FAILURE) (HCC): Primary | ICD-10-CM

## 2024-01-25 DIAGNOSIS — N40.0 BENIGN PROSTATIC HYPERPLASIA WITHOUT LOWER URINARY TRACT SYMPTOMS: ICD-10-CM

## 2024-01-25 PROCEDURE — 99306 1ST NF CARE HIGH MDM 50: CPT | Performed by: STUDENT IN AN ORGANIZED HEALTH CARE EDUCATION/TRAINING PROGRAM

## 2024-01-25 NOTE — ASSESSMENT & PLAN NOTE
"Reported hx of dementia per PCP notes  Per exam and history likely consistent with mild dementia  Does seem to also have behavioral disturbance including sundowning  Requiring frequent redirection and sitter for supervision  May need to consider antipsychotic therapy for behaviors  As per recent PCP visit 1/4/24 for anger/agitation issues \"if s/s worsen, consider increasing the prozac, adding seroquel bid, rexulti if covered or depakote if anger becomes more of an issue \"  Is on donepezil per PCP (note can contribute to bradycardia)  Supportive care  SW to follow for safe discharge planning/homecare services as appropriate    "

## 2024-01-25 NOTE — ASSESSMENT & PLAN NOTE
No apparent acute/associated symptoms  Follow up with Cardiology, consider continued outpatient surveillance

## 2024-01-25 NOTE — ASSESSMENT & PLAN NOTE
Delirium precautions  -Patient is high risk of delirium due to age, comorbidities, hospitalization/unfamiliar environment  -delirium precautions  -maintain normal sleep/wake cycle  -minimize overnight interruptions, group overnight vitals/labs/nursing checks as possible  -dim lights, close blinds and turn off tv to minimize stimulation and encourage sleep environment in evenings  -ensure that pain is well controlled  -monitor for fecal and urinary retention which may precipitate delirium  -encourage early mobilization and ambulation  -provide frequent reorientation and redirection  -encourage family and friends at the bedside to help help calm patient if anxious  -avoid medications which may precipitate or worsen delirium such as tramadol, benzodiazepine, anticholinergics, and benadryl  -encourage hydration and nutrition   -redirect unwanted behaviors as first line, avoid physical restraints, use chemical restraint only if all other attempts have been unsuccessful

## 2024-01-25 NOTE — ASSESSMENT & PLAN NOTE
Mild/borderline noted on recent EKG  Seems asymptomatic  Note that he is on donepezil which could be contributory  Continue to monitor vital  Consider further workup, medication adjustment as appropriate if persistent/worsening or becomes symptomatic

## 2024-01-25 NOTE — ASSESSMENT & PLAN NOTE
Stable  Continue tamsulosin  Monitor for retention, bladder scan PRN  Follow up with PCP, Urology as appropriate

## 2024-01-25 NOTE — ASSESSMENT & PLAN NOTE
In setting of apparent progressive dementia, patient needing increased help at home and seeming to be a safety risk if left alone at home  Family does not want him placed in another facility at this time, still want him to be at home  -SW to follow for safe discharge planning/homecare services as appropriate

## 2024-01-25 NOTE — PROGRESS NOTES
Outpatient Care Management DOUGIE/SNF Pathway. Discharged 1/24/24 to [facility]  SNF/STR Surveillance started 1/25/2024. Confirmed  patient is admitted through chart review.  Admin coordinator added to the care team and will follow.

## 2024-01-25 NOTE — ASSESSMENT & PLAN NOTE
Patient endorses chronically having some lightheadedness on rising and per family has had at least one fall seemingly related to this  Monitor orthostatic vitals  Encourage PO intake/hydration respecting volume status  Stopped lisinopril due to soft BP  -PT/OT  -fall precautions  -encourage appropriate DME use

## 2024-01-25 NOTE — ASSESSMENT & PLAN NOTE
Reported history noted in chart  No apparent acute/associated symptoms  Follow up with PCP, specialist as appropriate

## 2024-01-25 NOTE — UTILIZATION REVIEW
NOTIFICATION OF ADMISSION DISCHARGE   This is a Notification of Discharge from [facility]. Please be advised that this patient has been discharge from our facility. Below you will find the admission and discharge date and time including the patient’s disposition.   UTILIZATION REVIEW CONTACT:  Zonia Rivas  Utilization   Network Utilization Review Department  Phone: [phone] carefully listen to the prompts. All voicemails are confidential.  Email: [email or web address]     ADMISSION INFORMATION  PRESENTATION DATE: 1/19/2024  6:07 PM  OBERVATION ADMISSION DATE:   INPATIENT ADMISSION DATE: 1/20/24 10:16 AM   DISCHARGE DATE: 1/24/2024  3:16 PM   DISPOSITION:Non [facility] SNF/TCU/SNU    Network Utilization Review Department  ATTENTION: Please call with any questions or concerns to [phone] and carefully listen to the prompts so that you are directed to the right person. All voicemails are confidential.   For Discharge needs, contact Care Management DC Support Team at [phone] opt. [phone]  Send all requests for admission clinical reviews, approved or denied determinations and any other requests to dedicated fax number below belonging to the campus where the patient is receiving treatment. List of dedicated fax numbers for the Facilities:  FACILITY NAME UR FAX NUMBER   ADMISSION DENIALS (Administrative/Medical Necessity) [phone]   DISCHARGE SUPPORT TEAM ([facility]) [phone]   PARENT CHILD HEALTH (Maternity/NICU/Pediatrics) [phone]   [facility] [phone]   [facility] [phone]   [facility] [phone]   [facility] [phone]   [facility] [phone]   [facility] [phone]   [facility] [phone]   [facility]  [phone]   [facility] [phone]   [facility] [phone]   [facility] [phone]

## 2024-01-25 NOTE — PROGRESS NOTES
[facility] Care  Facility: [facility]    HISTORY AND PHYSICAL  Nursing Home Place of Service: nursing home place of service: POS 31 Skilled Care-Part A Coverage    NAME: Israel Hughes  : 1940 AGE: 83 y.o. SEX: male MRN: [id number]  DATE OF ENCOUNTER: 2024    Records Reviewed include: Hospital records    Chief Complaint/ Reason for Admission:   CHF exacerbation    History of Present Illness:     Israel Hughes is a 83 y.o. male with PMH including DM2, myasthenia gravis, HLD, glaucoma/retinopathy, BPH, HTN, CHF, CKD3, memory impairment/dementia  Patient was hospitalized at [facility] from  to 24  For details of hospitalization, see hospital records including discharge documentation from 24  Briefly, patient hospitalized with bilateral lower extremity edema, treated with diuresis (IV transitioned to oral), evaluated by Cardiology.    Patient seen and examined in room following morning therapy session (spoke with therapist Angelita, per discussion patient seemed to do well with min assist with therapy this morning but safety risk concerns including that he lets go of his walker at inappropriate times)  Others present: adult granddaughter Tejal (and infant great-grandson Khanh)  Patient seated in chair eating lunch  Appears comfortable, awake, alert, oriented to situation, able to converse appropriately  Patient polite and in good spirits. Makes frequent jokes which are mostly appropriate to the situation. He is Aox3 at this time and is not acutely agitated or combative presently. Virtual sitter in place. Patient reports he feels fine overall with no acute complaints. He seems to be forgetful of details including why he was in hospital and what was done there. He notes his leg edema looks much better and normal to him now. Feels he is breathing fine. No acute pain anywhere. Appetite good, no swallowing issues. Last BM yesterday was normal. No acute  cardiopulmonary, abdominal, or urinary symptoms; see ROS for more details.    Subsequently I separately spoke to granddaughter Tejal for collateral information. Tejal reports she is a  in a nursing home and has familiarity with dementia. She shares that family has noticed patient has had progressively worsening cognition/memory issues particularly in the last 6 months, not sure if he has had formal outpatient memory testing as yet but is following with PCP. Patient has been getting worsening sundowning and agitation-type behavioral symptoms; generally worse symptoms in evening hours and agitation can be triggered if he cannot find something he was looking for. We discussed continuation of sitter/observer for the moment, encouraged family visits; discussed potential risks vs benefits of antipsychotic treatment including increased risk of death - discussed that for now we will try to avoid antipsychotic unless acutely necessary if patient becomes danger to self or others and as a last resort; also discussed that given what seems to be progressive Alzheimers with behavioral disturbance he may ultimately benefit from something like nightly seroquel and that we can explore this further with the family if it becomes pertinent. Tejal also shared patient has had around 3 falls in the past year, one was falling out of bed, at least one was related to lightheadedness when standing quickly. Discussed plan of care and reviewed medications including stopping lisinopril at this time due to low BP and monitoring heart rate as donepezil can contribute to bradycardia. Also discussed if family needs further homecare for patient as per Tejal usually someone is at home but not always, Cat would be amenable to exploring homecare but family for now still wants patient at home and not placed in a facility. Tejal appreciative, no further concerns at this time.    No further questions or acute concerns identified.    Lab  "Review:   1/25: BMP generally non-actionable, Cr 1.11 (baseline around 1-1.1), eGFR 61 (baseline around 50s-60s); recent CBC generally stable/non-actionable; last TSH WNL; recent A1c 6.9      CXR 1/19 \"No acute cardiopulmonary disease \"    EKG 1/19: Sinus, 59bpm, Qtc 425  Echo 2021: EF 60, normal systolic function, grade 1 diastolic dysfunction  Echo 1/22/24: EF 50, mildly reduced systolic function, grade 1 diastolic dysfunction, mild AS    Social: Patient lives in a 3 story home with granddaughter Angelita and son-in-law; sister-in-law Chiqui Burns frequently visits to help as well. He has no HHA at this time. Family assists with housekeeping, meal prep, medication management. At baseline he ambulates without DME generally though has a cane and walker.    Lives: Home,  Social Support: family  Fall in the past 12 months: around 3 per granddaughter  Use of assistance Device: None, Cane, and Walker    Allergies    Allergies   Allergen Reactions    Bactrim [Sulfamethoxazole-Trimethoprim] GI Intolerance    Simvastatin Myalgia and Other (See Comments)       Past Medical History  Past Medical History:   Diagnosis Date    Diabetes mellitus (HCC)     Hyperlipidemia         Past Surgical History:   Procedure Laterality Date    CATARACT EXTRACTION      COLONOSCOPY      DENTAL SURGERY      EYE SURGERY      TONSILLECTOMY      VASECTOMY         Family History  History reviewed. No pertinent family history.    Social History  Social History     Tobacco Use   Smoking Status Never    Passive exposure: Never   Smokeless Tobacco Never      Social History     Substance and Sexual Activity   Alcohol Use Not Currently      Social History     Substance and Sexual Activity   Drug Use Never            Physical Exam    Vital Signs  There were no vitals filed for this visit.  BP: 106/52 mmHg  1/25/2024 07:11   Temp:96.6 °F  1/25/2024 07:11 Pulse:66 bpm  1/25/2024 07:11 Weight:129.8 Lbs  1/25/2024 05:01   Resp:18 Breaths/min  1/25/2024 07:11 BS:143 " mg/dL  1/25/2024 05:37 O2:95 %  1/25/2024 07:15 Pain:0  1/25/2024 08:09         Physical Exam  Vitals reviewed.   Constitutional:       General: He is not in acute distress.     Appearance: He is not toxic-appearing or diaphoretic.   HENT:      Head: Normocephalic and atraumatic.      Right Ear: External ear normal.      Left Ear: External ear normal.      Nose: Nose normal. No rhinorrhea.      Mouth/Throat:      Mouth: Mucous membranes are moist.      Pharynx: Oropharynx is clear. No oropharyngeal exudate or posterior oropharyngeal erythema.   Eyes:      General: No scleral icterus.        Right eye: No discharge.         Left eye: No discharge.      Extraocular Movements: Extraocular movements intact.      Conjunctiva/sclera: Conjunctivae normal.      Pupils: Pupils are equal, round, and reactive to light.   Cardiovascular:      Rate and Rhythm: Normal rate and regular rhythm.   Pulmonary:      Effort: Pulmonary effort is normal. No respiratory distress.      Breath sounds: No wheezing or rales.   Chest:      Chest wall: No tenderness.   Abdominal:      General: Bowel sounds are normal.      Palpations: Abdomen is soft.      Tenderness: There is no abdominal tenderness. There is no guarding.   Musculoskeletal:         General: No tenderness.      Cervical back: No rigidity.      Comments: Trace bilateral LE edema (much improved per patient)   Skin:     General: Skin is warm and dry.      Coloration: Skin is not jaundiced.   Neurological:      General: No focal deficit present.      Mental Status: He is alert. Mental status is at baseline.      Comments: Oriented x2-3 (to self, to location [facility] and [place], and to year 2024, but thought it was February rather than January)   Psychiatric:         Mood and Affect: Mood normal.         Behavior: Behavior normal.         Thought Content: Thought content normal.         Review of Systems:  Review of Systems   Constitutional:  Negative for appetite change, chills,  diaphoresis, fatigue and fever.   HENT:  Negative for drooling, ear pain, hearing loss, rhinorrhea, sore throat and trouble swallowing.    Eyes:  Negative for photophobia, pain, discharge, redness, itching and visual disturbance.   Respiratory:  Negative for cough, chest tightness, shortness of breath and wheezing.    Cardiovascular:  Positive for leg swelling (much improved). Negative for chest pain and palpitations.   Gastrointestinal:  Negative for abdominal pain, blood in stool, constipation, diarrhea, nausea and vomiting.   Genitourinary:  Negative for difficulty urinating, dysuria, flank pain and hematuria.   Musculoskeletal:  Positive for gait problem. Negative for arthralgias, back pain and neck pain.   Skin:  Negative for color change.   Neurological:  Negative for tremors, seizures, facial asymmetry, weakness and light-headedness (none acute but notes chronically can get lightheaded on rising).   Psychiatric/Behavioral:  Positive for behavioral problems. Negative for agitation and confusion. The patient is not nervous/anxious and is not hyperactive.        List of Current Medications:  Current Outpatient Medications   Medication Instructions    aspirin (ECOTRIN LOW STRENGTH) 81 mg, Oral    atorvastatin (LIPITOR) 20 mg, Oral, Daily    B Complex-C (B COMPLEX-B12-C IJ) 1 capsule, Oral, As needed    Blood Glucose Monitoring Suppl (OneTouch Verio Reflect) w/Device KIT USE  ONCE DAILY    Continuous Blood Gluc  (FreeStyle Petra 2 [place]) JAZLYN Check blood sugars multiple times per day    donepezil (ARICEPT) 10 mg, Oral, Daily at bedtime    fish oil 1,000 mg, Oral, As needed    FLUoxetine (PROZAC) 20 mg, Oral, Every morning    furosemide (LASIX) 40 mg, Oral, Daily    glimepiride (AMARYL) 8 mg, Oral, Daily with breakfast    glucose blood (OneTouch Verio) test strip USE 1 STRIP TO CHECK GLUCOSE ONCE DAILY    linaGLIPtin 5 mg, Oral, Daily, with or without food    lisinopril (ZESTRIL) 5 mg, Oral, Daily     metFORMIN (GLUCOPHAGE) 1,000 mg, Oral, 2 times daily with meals    OneTouch Delica Lancets 33G MISC Test once a day    oxyCODONE (ROXICODONE) 5 mg, Oral, Every 6 hours PRN    potassium chloride (K-DUR,KLOR-CON) 20 mEq tablet 20 mEq, Oral, Daily    Saw Palmetto, Serenoa repens, (Saw Scott Berries) 540 MG CAPS 1 capsule, Oral, Daily    tamsulosin (FLOMAX) 0.4 mg, Oral, Daily with dinner         Medication reviewed. All orders signed. Complete list is in the paper chart.     Allergies    Allergies   Allergen Reactions    Bactrim [Sulfamethoxazole-Trimethoprim] GI Intolerance    Simvastatin Myalgia and Other (See Comments)       Labs/Diagnostics (reviewed by this provider):     I personally reviewed lab results and imaging studies. Full reports are in the paper chart.     Assessment/Plan:    Type 2 diabetes mellitus with retinopathy, without long-term current use of insulin (HCC)    Lab Results   Component Value Date    HGBA1C 6.9 (A) 12/01/2023     Home regimen appears to include metformin 1000mg BID, glimiperide 8mg daily, tradjenta 5mg daily  Monitor daily glucose - with limited data so far generally sugars seem in mid-100s  Avoid hypoglycemia  A1c well controlled for age, reasonable goal would be <7.5 to avoid hypoglycemia and fall risk in this elderly patient  Could consider weaning of regimen if sugars remain well controlled or are low    Essential hypertension  Monitor BP - has been stable but borderline low generally 100s-110s systolic  Avoid hypotension  Continue lasix  Has been on lisinopril 5mg daily - discontinue as of 1/25/ due to stable/soft BP    Mild aortic stenosis  No apparent acute/associated symptoms  Follow up with Cardiology, consider continued outpatient surveillance    Myasthenia gravis without exacerbation (HCC)  Reported history noted in chart  No apparent acute/associated symptoms  Follow up with PCP, specialist as appropriate    Benign prostatic hyperplasia without lower urinary tract  symptoms  Stable  Continue tamsulosin  Monitor for retention, bladder scan PRN  Follow up with PCP, Urology as appropriate    Stage 3 chronic kidney disease, unspecified whether stage 3a or 3b CKD ([facility])  Lab Results   Component Value Date    EGFR 61 01/25/2024    EGFR 73 01/23/2024    EGFR 68 01/22/2024    CREATININE 1.11 01/25/2024    CREATININE 0.96 01/23/2024    CREATININE 1.01 01/22/2024       Seems consistent with CKD3a  Monitor renal function on routine labs - seems stable  Avoid nephrotoxins, NSAIDs as able  Encourage PO hydration, respecting volume status      Mixed hyperlipidemia  Continue statin    Primary open angle glaucoma (POAG) of both eyes, mild stage  Seems stable  Follow up with PCP, Ophthalmology as appropriate    Acute on chronic combined systolic and diastolic CHF (congestive heart failure) ([facility])  Wt Readings from Last 3 Encounters:   01/24/24 58.5 kg (129 lb 0.6 oz)   01/04/24 59.9 kg (132 lb)   12/13/23 59.9 kg (132 lb)     Echo 2021: EF 60, normal systolic function, grade 1 diastolic dysfunction  Echo 1/22/24: EF 50, mildly reduced systolic function, grade 1 diastolic dysfunction, mild AS    strike-out   1/25/2024 05:01 129.8 Lbs Standing shazzard (Manual)    strike-out   1/24/2024 17:00 128.0 Lbs Standing jcarr (Manual)        -hospitalized for worsening bilateral LE edema (per Echo seems he has had some element of CHF chronically but has not had acute exacerbation like this before per family)  -Cardiology followed in hospital  -treated with IV diuresis, now on oral diuretic  -Monitor daily weight  Monitor volume status clinically  Follow up with PCP, Cardiology      At risk for constipation  At risk due to hospitalization, relative immobility, comorbidities  Monitor stool output  Bowel regimen at facility: miralax and senna as appropriate; consider bisacodyl suppository PRN if no BM in 2-3 days  Encourage mobility as tolerated, PO hydration as appropriate, high fiber diet/prune juice (in  "outpatient setting as appropriate)  Goal is for 1 easy BM every 1-2 days      At risk for delirium  Delirium precautions  -Patient is high risk of delirium due to age, comorbidities, hospitalization/unfamiliar environment  -delirium precautions  -maintain normal sleep/wake cycle  -minimize overnight interruptions, group overnight vitals/labs/nursing checks as possible  -dim lights, close blinds and turn off tv to minimize stimulation and encourage sleep environment in evenings  -ensure that pain is well controlled  -monitor for fecal and urinary retention which may precipitate delirium  -encourage early mobilization and ambulation  -provide frequent reorientation and redirection  -encourage family and friends at the bedside to help help calm patient if anxious  -avoid medications which may precipitate or worsen delirium such as tramadol, benzodiazepine, anticholinergics, and benadryl  -encourage hydration and nutrition   -redirect unwanted behaviors as first line, avoid physical restraints, use chemical restraint only if all other attempts have been unsuccessful    Advance care planning  HCP and code discussion as below    Bradycardia  Mild/borderline noted on recent EKG  Seems asymptomatic  Note that he is on donepezil which could be contributory  Continue to monitor vital  Consider further workup, medication adjustment as appropriate if persistent/worsening or becomes symptomatic    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)  Reported hx of dementia per PCP notes  Per exam and history and family discussion 1/25 likely consistent with mild dementia  Does seem to also have behavioral disturbance including sundowning  No significant hallucinations, personality change, or rigidity per family  Requiring frequent redirection and sitter for supervision  May need to consider antipsychotic therapy for behaviors  As per recent PCP visit 1/4/24 for anger/agitation issues \"if s/s worsen, consider increasing the " "prozac, adding seroquel bid, rexulti if covered or depakote if anger becomes more of an issue \"  Is on donepezil per PCP (note can contribute to bradycardia)  Supportive care  SW to follow for safe discharge planning/homecare services as appropriate      Orthostatic lightheadedness  Patient endorses chronically having some lightheadedness on rising and per family has had at least one fall seemingly related to this  Monitor orthostatic vitals  Encourage PO intake/hydration respecting volume status  Stopped lisinopril due to soft BP  -PT/OT  -fall precautions  -encourage appropriate DME use      Home help needed  In setting of apparent progressive dementia, patient needing increased help at home and seeming to be a safety risk if left alone at home  Family does not want him placed in another facility at this time, still want him to be at home  -SW to follow for safe discharge planning/homecare services as appropriate         Pain: none  Rehab Potential:Good  Patient Informed of Medical Condition: yes   Patient is Capable of Understanding Their Right: yes, appears to be able to at this time   Discharge Plan: home  Vaccination:   Immunization History   Administered Date(s) Administered    COVID-19 Moderna mRNA Vaccine 12 Yr+ 50 mcg/0.5 mL (Spikevax) 10/14/2023    COVID-19 PFIZER VACCINE 0.3 ML IM 04/01/2021, 04/22/2021, 10/25/2021    COVID-19 Pfizer vac (Joe-sucrose, gray cap) 12 yr+ IM 04/20/2022    H1N1, All Formulations 12/31/2009    INFLUENZA 01/01/2002, 08/04/2003, 09/15/2003, 12/10/2004, 10/25/2005, 10/31/2006, 10/11/2007, 11/16/2007, 10/07/2008, 10/13/2009, 09/23/2010, 10/17/2011, 10/16/2012, 10/01/2016, 10/01/2017, 10/13/2018, 10/01/2019, 09/07/2020, 10/31/2022    Influenza, high dose seasonal 0.7 mL 09/15/2021, 10/31/2022, 12/01/2023    Pneumococcal 12/10/2004, 12/31/2009    Pneumococcal Conjugate 13-Valent 06/01/2016, 09/15/2021    Pneumococcal Conjugate Vaccine 20-valent (Pcv20), Polysace 08/09/2023    Tdap " 05/19/2023    Zoster 09/18/2014       DVT ppx: heparin (anticipate stopping on discharge)    Advanced Directives: patient verbalizes HCP responsibility can be given to sister-in-law Chiqui Burns, granddaughter Angelita Colbert, and granddaughter Tejal Milligan  Code status:Full Code Extensive discussion/education with patient today regarding their wishes with respect to resuscitative measures; discussed potential risks vs benefits of resuscitative measures; patient verbalizes understanding, appears to have capacity to make this decision presently, and clearly verbalizes a desire for Full Code, would want to be revived if possible, and states he would defer to his medical team at the time.  PCP: Aissatou      -Total time spent on this encounter today including documentation and workup review, face to face time, history and exam, and documentation/orders, and separate discussion with granddaughter was approximately 70 minutes.  -This note will be copied to [facility] EMR where vitals and medication orders are placed.    Jf Macedo D.O.  Geriatric Medicine  1/25/2024 12:46 PM

## 2024-01-25 NOTE — ASSESSMENT & PLAN NOTE
"Reported hx of dementia per PCP notes  Per exam and history and family discussion 1/25 likely consistent with mild dementia  Does seem to also have behavioral disturbance including sundowning  No significant hallucinations, personality change, or rigidity per family  Requiring frequent redirection and sitter for supervision  May need to consider antipsychotic therapy for behaviors  As per recent PCP visit 1/4/24 for anger/agitation issues \"if s/s worsen, consider increasing the prozac, adding seroquel bid, rexulti if covered or depakote if anger becomes more of an issue \"  Is on donepezil per PCP (note can contribute to bradycardia)  Supportive care  SW to follow for safe discharge planning/homecare services as appropriate    "

## 2024-01-25 NOTE — ASSESSMENT & PLAN NOTE
Lab Results   Component Value Date    EGFR 61 01/25/2024    EGFR 73 01/23/2024    EGFR 68 01/22/2024    CREATININE 1.11 01/25/2024    CREATININE 0.96 01/23/2024    CREATININE 1.01 01/22/2024       Seems consistent with CKD3a  Monitor renal function on routine labs - seems stable  Avoid nephrotoxins, NSAIDs as able  Encourage PO hydration, respecting volume status

## 2024-01-25 NOTE — ASSESSMENT & PLAN NOTE
Monitor BP - has been stable but borderline low generally 100s-110s systolic  Avoid hypotension  Continue lasix  Has been on lisinopril 5mg daily - discontinue as of 1/25/ due to stable/soft BP

## 2024-01-25 NOTE — ASSESSMENT & PLAN NOTE
Wt Readings from Last 3 Encounters:   01/24/24 58.5 kg (129 lb 0.6 oz)   01/04/24 59.9 kg (132 lb)   12/13/23 59.9 kg (132 lb)     Echo 2021: EF 60, normal systolic function, grade 1 diastolic dysfunction  Echo 1/22/24: EF 50, mildly reduced systolic function, grade 1 diastolic dysfunction, mild AS    strike-out   1/25/2024 05:01 129.8 Lbs Standing shazzard (Manual)    strike-out   1/24/2024 17:00 128.0 Lbs Standing jcarr (Manual)        -hospitalized for worsening bilateral LE edema (per Echo seems he has had some element of CHF chronically but has not had acute exacerbation like this before per family)  -Cardiology followed in hospital  -treated with IV diuresis, now on oral diuretic  -Monitor daily weight  Monitor volume status clinically  Follow up with PCP, Cardiology

## 2024-01-25 NOTE — ASSESSMENT & PLAN NOTE
Lab Results   Component Value Date    HGBA1C 6.9 (A) 12/01/2023     Home regimen appears to include metformin 1000mg BID, glimiperide 8mg daily, tradjenta 5mg daily  Monitor daily glucose - with limited data so far generally sugars seem in mid-100s  Avoid hypoglycemia  A1c well controlled for age, reasonable goal would be <7.5 to avoid hypoglycemia and fall risk in this elderly patient  Could consider weaning of regimen if sugars remain well controlled or are low

## 2024-01-26 ENCOUNTER — NURSING HOME VISIT (OUTPATIENT)
Dept: GERIATRICS | Facility: OTHER | Age: 84
End: 2024-01-26
Payer: COMMERCIAL

## 2024-01-26 DIAGNOSIS — I50.43 ACUTE ON CHRONIC COMBINED SYSTOLIC AND DIASTOLIC CHF (CONGESTIVE HEART FAILURE) (HCC): ICD-10-CM

## 2024-01-26 DIAGNOSIS — F02.A11 MILD LATE ONSET ALZHEIMER'S DEMENTIA WITH AGITATION (HCC): ICD-10-CM

## 2024-01-26 DIAGNOSIS — I10 ESSENTIAL HYPERTENSION: ICD-10-CM

## 2024-01-26 DIAGNOSIS — E11.319 TYPE 2 DIABETES MELLITUS WITH RETINOPATHY OF BOTH EYES, WITHOUT LONG-TERM CURRENT USE OF INSULIN, MACULAR EDEMA PRESENCE UNSPECIFIED, UNSPECIFIED RETINOPATHY SEVERITY (HCC): Primary | ICD-10-CM

## 2024-01-26 DIAGNOSIS — G30.1 MILD LATE ONSET ALZHEIMER'S DEMENTIA WITH AGITATION (HCC): ICD-10-CM

## 2024-01-26 DIAGNOSIS — R42 ORTHOSTATIC LIGHTHEADEDNESS: ICD-10-CM

## 2024-01-26 LAB
DME PARACHUTE DELIVERY DATE ACTUAL: NORMAL
DME PARACHUTE DELIVERY DATE EXPECTED: NORMAL
DME PARACHUTE DELIVERY DATE REQUESTED: NORMAL
DME PARACHUTE ITEM DESCRIPTION: NORMAL
DME PARACHUTE ORDER STATUS: NORMAL
DME PARACHUTE SUPPLIER NAME: NORMAL
DME PARACHUTE SUPPLIER PHONE: NORMAL

## 2024-01-26 PROCEDURE — 99309 SBSQ NF CARE MODERATE MDM 30: CPT | Performed by: STUDENT IN AN ORGANIZED HEALTH CARE EDUCATION/TRAINING PROGRAM

## 2024-01-26 RX ORDER — GLIMEPIRIDE 4 MG/1
4 TABLET ORAL
Qty: 180 TABLET | Refills: 3 | Status: SHIPPED | OUTPATIENT
Start: 2024-01-26 | End: 2024-01-29 | Stop reason: ALTCHOICE

## 2024-01-26 NOTE — ASSESSMENT & PLAN NOTE
Patient endorses chronically having some lightheadedness on rising and per family has had at least one fall seemingly related to this  Monitor orthostatic vitals - appearing negative  Encourage PO intake/hydration respecting volume status  Stopped lisinopril due to soft BP  Consider reducing lasix dose if he starts appearing dry  -PT/OT  -fall precautions  -encourage appropriate DME use

## 2024-01-26 NOTE — ASSESSMENT & PLAN NOTE
"Reported hx of dementia per PCP notes  Per exam and history and family discussion 1/25 likely consistent with mild dementia  Does seem to also have behavioral disturbance including sundowning  No significant hallucinations, personality change, or rigidity per family  Requiring frequent redirection and sitter for supervision due to concern for wandering/elopement risk and unsafe/impulsive behaviors  May need to consider antipsychotic therapy for behaviors  As per recent PCP visit 1/4/24 for anger/agitation issues \"if s/s worsen, consider increasing the prozac, adding seroquel bid, rexulti if covered or depakote if anger becomes more of an issue \"  Is on donepezil per PCP (note can contribute to bradycardia)  Supportive care  SW to follow for safe discharge planning/homecare services as appropriate  "

## 2024-01-26 NOTE — ASSESSMENT & PLAN NOTE
Wt Readings from Last 3 Encounters:   01/24/24 58.5 kg (129 lb 0.6 oz)   01/04/24 59.9 kg (132 lb)   12/13/23 59.9 kg (132 lb)     strike-out   1/26/2024 05:05 128.7 Lbs Bed dhoward (Manual)    strike-out   1/26/2024 04:30 128.7 Lbs Bed mtrexler (Manual)    strike-out   1/25/2024 05:01 129.8 Lbs Standing shazzard (Manual)    strike-out   1/24/2024 17:00 128.0 Lbs Standing jcarr (Manual)        Echo 2021: EF 60, normal systolic function, grade 1 diastolic dysfunction  Echo 1/22/24: EF 50, mildly reduced systolic function, grade 1 diastolic dysfunction, mild AS     -hospitalized for worsening bilateral LE edema (per Echo seems he has had some element of CHF chronically but has not had acute exacerbation like this before per family)  -Cardiology followed in hospital  -treated with IV diuresis, now on oral diuretic  -Monitor daily weight - seems stable, no alarming uptrend  Monitor volume status clinically - seems euvolemic, no notable peripheral edema, mucus membranes moist  Consider reducing lasix dose if begins to appear more dry  Follow up with PCP, Cardiology

## 2024-01-26 NOTE — ASSESSMENT & PLAN NOTE
Lab Results   Component Value Date    HGBA1C 6.9 (A) 12/01/2023     Home regimen appears to include metformin 1000mg BID, glimiperide 8mg daily, tradjenta 5mg daily  Monitor daily glucose - with limited data so far sugars have been low at times, ranging 50s-140s with concern of hypoglycemic readings  Avoid hypoglycemia  A1c well controlled for age, reasonable goal would be <7.5 to avoid hypoglycemia and fall risk in this elderly patient  As of 1/26 due to hypoglycemic readings, will decrease glimiperide to 4mg daily and continue to monitor  Could consider further weaning of regimen if sugars remain well controlled or low

## 2024-01-26 NOTE — PROGRESS NOTES
[facility]  Facility: [facility]    PROGRESS NOTE  Nursing Home Place of Service: nursing home place of service: [facility] A Coverage    NAME: Israel Hughes  : 1940 AGE: 83 y.o. SEX: male MRN: [id number]  DATE OF ENCOUNTER: 2024    Assessment and Plan     Problem List Items Addressed This Visit       Type 2 diabetes mellitus with retinopathy, without long-term current use of insulin (formerly [facility]) - Primary       Lab Results   Component Value Date    HGBA1C 6.9 (A) 2023     Home regimen appears to include metformin 1000mg BID, glimiperide 8mg daily, tradjenta 5mg daily  Monitor daily glucose - with limited data so far sugars have been low at times, ranging 50s-140s with concern of hypoglycemic readings  Avoid hypoglycemia  A1c well controlled for age, reasonable goal would be <7.5 to avoid hypoglycemia and fall risk in this elderly patient  As of  due to hypoglycemic readings, will decrease glimiperide to 4mg daily and continue to monitor  Could consider further weaning of regimen if sugars remain well controlled or low         Relevant Medications    glimepiride (AMARYL) 4 mg tablet    Essential hypertension     Has been on lisinopril 5mg daily - discussed with family and discontinued as of / due to stable/soft BP  Monitor BP - has been stable but borderline low generally 110s-120s systolic (a bit better after stopping lisinopril)  Avoid hypotension  Continue lasix           Acute on chronic combined systolic and diastolic CHF (congestive heart failure) (formerly [facility])     Wt Readings from Last 3 Encounters:   24 58.5 kg (129 lb 0.6 oz)   24 59.9 kg (132 lb)   23 59.9 kg (132 lb)     strike-out   2024 05:05 128.7 Lbs Bed dhoward (Manual)    strike-out   2024 04:30 128.7 Lbs Bed mtrexler (Manual)    strike-out   2024 05:01 129.8 Lbs Standing shazzard (Manual)    strike-out   2024 17:00 128.0 Lbs Standing  "jcmargareth (Manual)        Echo 2021: EF 60, normal systolic function, grade 1 diastolic dysfunction  Echo 1/22/24: EF 50, mildly reduced systolic function, grade 1 diastolic dysfunction, mild AS     -hospitalized for worsening bilateral LE edema (per Echo seems he has had some element of CHF chronically but has not had acute exacerbation like this before per family)  -Cardiology followed in hospital  -treated with IV diuresis, now on oral diuretic  -Monitor daily weight - seems stable, no alarming uptrend  Monitor volume status clinically - seems euvolemic, no notable peripheral edema, mucus membranes moist  Consider reducing lasix dose if begins to appear more dry  Follow up with PCP, Cardiology           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)     Reported hx of dementia per PCP notes  Per exam and history and family discussion 1/25 likely consistent with mild dementia  Does seem to also have behavioral disturbance including sundowning  No significant hallucinations, personality change, or rigidity per family  Requiring frequent redirection and sitter for supervision due to concern for wandering/elopement risk and unsafe/impulsive behaviors  May need to consider antipsychotic therapy for behaviors  As per recent PCP visit 1/4/24 for anger/agitation issues \"if s/s worsen, consider increasing the prozac, adding seroquel bid, rexulti if covered or depakote if anger becomes more of an issue \"  Is on donepezil per PCP (note can contribute to bradycardia)  Supportive care  SW to follow for safe discharge planning/homecare services as appropriate         Orthostatic lightheadedness     Patient endorses chronically having some lightheadedness on rising and per family has had at least one fall seemingly related to this  Monitor orthostatic vitals - appearing negative  Encourage PO intake/hydration respecting volume status  Stopped lisinopril due to soft BP  Consider reducing lasix dose if he starts appearing " dry  -PT/OT  -fall precautions  -encourage appropriate DME use                Chief Complaint     Follow up behaviors, edema, sugars low, BP low    History of Present Illness     Israel Hughes is a 83 y.o. male who was seen today for follow up. PMH including DM2, myasthenia gravis, HLD, glaucoma/retinopathy, BPH, HTN, CHF, CKD3, memory impairment/dementia     Patient seen and examined in room  Others present: sitter  Patient seated in chair  Appears comfortable, awake, alert, oriented to situation, able to converse appropriately  Patient appears in good spirits, engages in conversation and exam appropriately, frequently makes jokes which are generally appropriate to the situation. Reports feeling well with no new/acute complaints. Has been eating/drinking well without issues. Peripheral edema seems minimal/improved. Feels he is breathing fine. No acute pain anywhere. Last BM earlier today was normal. Has been urinating well, has jug at bedside. Feels therapy has been going well, feeling comfortable ambulating. No acute cardiopulmonary, abdominal, or urinary symptoms; see ROS for more details.    No further questions or acute concerns identified.    Discussed extensively with team including sitter and nursing - concern that patient requires ongoing sitter/monitoring due to concern for safety risk if left alone - generally he is appropriate but with concern for sundowning and intermittent agitation, and at times noted to take unsafe actions such as trying to get up without help or letting go of DME when he should not - concern for fall risk and wandering/elopement risk without supervision. Not yet seeming to require antipsychotics/sedatives but with low threshold to consider such medication for behaviors if persistent/worsening.    Lab Review:   1/25: BMP generally non-actionable, Cr 1.11 (baseline around 1-1.1), eGFR 61 (baseline around 50s-60s); recent CBC generally stable/non-actionable; last TSH WNL; recent A1c  "6.9        CXR 1/19 \"No acute cardiopulmonary disease \"     EKG 1/19: Sinus, 59bpm, Qtc 425  Echo 2021: EF 60, normal systolic function, grade 1 diastolic dysfunction  Echo 1/22/24: EF 50, mildly reduced systolic function, grade 1 diastolic dysfunction, mild AS    The following portions of the patient's history were reviewed and updated as appropriate: allergies, current medications, past family history, past medical history, past social history, past surgical history and problem list.    Review of Systems     Review of Systems   Constitutional:  Negative for appetite change, chills, diaphoresis, fatigue and fever.   HENT:  Negative for drooling, ear pain, hearing loss, rhinorrhea, sore throat and trouble swallowing.    Eyes:  Negative for photophobia, pain, discharge, redness, itching and visual disturbance.   Respiratory:  Negative for cough, chest tightness, shortness of breath and wheezing.    Cardiovascular:  Positive for leg swelling (much improved). Negative for chest pain and palpitations.   Gastrointestinal:  Negative for abdominal pain, blood in stool, constipation, diarrhea, nausea and vomiting.   Genitourinary:  Negative for difficulty urinating, dysuria, flank pain and hematuria.   Musculoskeletal:  Positive for gait problem. Negative for arthralgias, back pain and neck pain.   Skin:  Negative for color change.   Neurological:  Negative for tremors, seizures, facial asymmetry, weakness and light-headedness (none acute but notes chronically can get lightheaded on rising).   Psychiatric/Behavioral:  Positive for behavioral problems. Negative for agitation and confusion. The patient is not nervous/anxious and is not hyperactive.        Active Problem List     Patient Active Problem List   Diagnosis    Type 2 diabetes mellitus with retinopathy, without long-term current use of insulin (HCC)    Myasthenia gravis without exacerbation (HCC)    Mixed hyperlipidemia    Retinopathy    Primary open angle glaucoma " (POAG) of both eyes, mild stage    Ptosis of both eyelids    Benign prostatic hyperplasia without lower urinary tract symptoms    Essential hypertension    Other age-related cataract    Memory difficulty    Mild aortic stenosis    Mild mitral regurgitation    Diastolic dysfunction    Mild concentric left ventricular hypertrophy (LVH)    Mild protein-calorie malnutrition (HCC)    Dysphagia    Stage 3 chronic kidney disease, unspecified whether stage 3a or 3b CKD (HCC)    Volume overload    Acute on chronic combined systolic and diastolic CHF (congestive heart failure) (HCC)    At risk for constipation    At risk for delirium    Advance care planning    Bradycardia    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   Constitutional:       General: He is not in acute distress.     Appearance: He is not toxic-appearing or diaphoretic.   HENT:      Head: Normocephalic and atraumatic.      Right Ear: External ear normal.      Left Ear: External ear normal.      Nose: Nose normal. No rhinorrhea.      Mouth/Throat:      Mouth: Mucous membranes are moist.      Pharynx: Oropharynx is clear. No oropharyngeal exudate or posterior oropharyngeal erythema.   Eyes:      General: No scleral icterus.        Right eye: No discharge.         Left eye: No discharge.      Extraocular Movements: Extraocular movements intact.      Conjunctiva/sclera: Conjunctivae normal.      Pupils: Pupils are equal, round, and reactive to light.   Cardiovascular:      Rate and Rhythm: Normal rate and regular rhythm.   Pulmonary:      Effort: Pulmonary effort is normal. No  respiratory distress.      Breath sounds: No wheezing or rales.   Chest:      Chest wall: No tenderness.   Abdominal:      General: Bowel sounds are normal.      Palpations: Abdomen is soft.      Tenderness: There is no abdominal tenderness. There is no guarding.   Musculoskeletal:         General: No tenderness.      Cervical back: No rigidity.      Comments: No notable peripheral edema (seems stable/improved)   Skin:     General: Skin is warm and dry.      Coloration: Skin is not jaundiced.   Neurological:      General: No focal deficit present.      Mental Status: He is alert. Mental status is at baseline.      Comments: Oriented x2-3 (to self, to location [facility] [place], and to year 2024, but not month   Psychiatric:         Mood and Affect: Mood normal.         Behavior: Behavior normal.         Thought Content: Thought content normal.         Pertinent Laboratory/Diagnostic Studies:  Laboratory and Imaging studies reviewed. Full report in the paper chart.     Current Medications   Medications reviewed and updated in facility chart.      -Total time spent on this encounter today including documentation and workup review, face to face time, history and exam, and documentation/orders was approximately 35 minutes.  -This note will be copied to [facility] EMR where vitals and medication orders are placed.    Jf Macedo D.O.  Geriatric Medicine  1/26/2024 11:50 AM

## 2024-01-26 NOTE — ASSESSMENT & PLAN NOTE
Has been on lisinopril 5mg daily - discussed with family and discontinued as of 1/25/ due to stable/soft BP  Monitor BP - has been stable but borderline low generally 110s-120s systolic (a bit better after stopping lisinopril)  Avoid hypotension  Continue lasix

## 2024-01-29 ENCOUNTER — NURSING HOME VISIT (OUTPATIENT)
Dept: GERIATRICS | Facility: OTHER | Age: 84
End: 2024-01-29
Payer: COMMERCIAL

## 2024-01-29 DIAGNOSIS — I50.43 ACUTE ON CHRONIC COMBINED SYSTOLIC AND DIASTOLIC CHF (CONGESTIVE HEART FAILURE) (HCC): Primary | ICD-10-CM

## 2024-01-29 DIAGNOSIS — G30.1 MILD LATE ONSET ALZHEIMER'S DEMENTIA WITH AGITATION (HCC): ICD-10-CM

## 2024-01-29 DIAGNOSIS — E11.319 TYPE 2 DIABETES MELLITUS WITH RETINOPATHY OF BOTH EYES, WITHOUT LONG-TERM CURRENT USE OF INSULIN, MACULAR EDEMA PRESENCE UNSPECIFIED, UNSPECIFIED RETINOPATHY SEVERITY (HCC): ICD-10-CM

## 2024-01-29 DIAGNOSIS — Z20.822 EXPOSURE TO CONFIRMED CASE OF COVID-19: ICD-10-CM

## 2024-01-29 DIAGNOSIS — I50.9 CHF EXACERBATION (HCC): ICD-10-CM

## 2024-01-29 DIAGNOSIS — F02.A11 MILD LATE ONSET ALZHEIMER'S DEMENTIA WITH AGITATION (HCC): ICD-10-CM

## 2024-01-29 DIAGNOSIS — I10 ESSENTIAL HYPERTENSION: ICD-10-CM

## 2024-01-29 PROCEDURE — 99309 SBSQ NF CARE MODERATE MDM 30: CPT | Performed by: STUDENT IN AN ORGANIZED HEALTH CARE EDUCATION/TRAINING PROGRAM

## 2024-01-29 RX ORDER — FUROSEMIDE 40 MG/1
20 TABLET ORAL DAILY
Qty: 15 TABLET | Refills: 0 | Status: SHIPPED | OUTPATIENT
Start: 2024-01-29 | End: 2024-02-06 | Stop reason: SDUPTHER

## 2024-01-29 NOTE — ASSESSMENT & PLAN NOTE
Lab Results   Component Value Date    HGBA1C 6.9 (A) 12/01/2023       Home regimen appears to include metformin 1000mg BID, glimiperide 8mg daily, tradjenta 5mg daily  Monitor daily glucose - sugars have been low frequently <100  Avoid hypoglycemia  A1c well controlled for age, reasonable goal would be <7.5 to avoid hypoglycemia and fall risk in this elderly patient  As of 1/26 due to hypoglycemic readings, decreased glimiperide to 4mg daily  As of 1/29 due to hypoglycemic readings - discontinue glimiperide  Could consider further weaning of regimen if sugars remain well controlled or low

## 2024-01-29 NOTE — ASSESSMENT & PLAN NOTE
Roommate incidentally tested positive for COVID today and was moved today to a different room  patient denies any acute COVID symptoms  Isolation as per facility protocol  Patient tested negative on follow-up COVID test 1/29  Continue to monitor closely

## 2024-01-29 NOTE — ASSESSMENT & PLAN NOTE
"Reported hx of dementia per PCP notes  Per exam and history and family discussion 1/25 likely consistent with mild dementia  Does seem to also have behavioral disturbance including sundowning  No significant hallucinations, personality change, or rigidity per family  Requiring frequent redirection and sitter for supervision due to concern for wandering/elopement risk and unsafe/impulsive behaviors and fall risk  May need to consider antipsychotic therapy for behaviors - does not seem necessary at this time  As per recent PCP visit 1/4/24 for anger/agitation issues \"if s/s worsen, consider increasing the prozac, adding seroquel bid, rexulti if covered or depakote if anger becomes more of an issue \"  Is on donepezil per PCP (note can contribute to bradycardia)  Supportive care  SW to follow for safe discharge planning/homecare services as appropriate  "

## 2024-01-29 NOTE — ASSESSMENT & PLAN NOTE
Wt Readings from Last 3 Encounters:   01/24/24 58.5 kg (129 lb 0.6 oz)   01/04/24 59.9 kg (132 lb)   12/13/23 59.9 kg (132 lb)     strike-out   1/29/2024 05:24 126.4 Lbs Bed shazzard (Manual)    strike-out   1/28/2024 05:00 126.2 Lbs Bed shazzard (Manual)    strike-out   1/27/2024 05:33 125.6 Lbs Bed dhoward (Manual)    strike-out   1/26/2024 05:05 128.7 Lbs Bed dhoward (Manual)    strike-out   1/26/2024 04:30 128.7 Lbs Bed mtrexler (Manual)    strike-out   1/25/2024 05:01 129.8 Lbs Standing shazzard (Manual)    strike-out   1/24/2024 17:00 128.0 Lbs Standing jcarr (Manual)        Echo 2021: EF 60, normal systolic function, grade 1 diastolic dysfunction  Echo 1/22/24: EF 50, mildly reduced systolic function, grade 1 diastolic dysfunction, mild AS     -hospitalized for worsening bilateral LE edema (per Echo seems he has had some element of CHF chronically but has not had acute exacerbation like this before per family)  -Cardiology followed in hospital  -treated with IV diuresis, now on oral diuretic  -Monitor daily weight - seems stable, no alarming uptrend  Monitor volume status clinically - seems euvolemic, no notable peripheral edema, seems slightly dry  As of 1/29 reduce lasix to 20mg daily due to stable/decreased weight and seeming a bit dry  Follow up with PCP, Cardiology

## 2024-01-29 NOTE — ASSESSMENT & PLAN NOTE
Had been on lisinopril 5mg daily - discussed with family and discontinued as of 1/25/ due to stable/soft BP  Monitor BP - has been stable but borderline low generally 110s-120s systolic (a bit better after stopping lisinopril)  Avoid hypotension  Continue lasix

## 2024-01-29 NOTE — PROGRESS NOTES
[facility]  Facility: [facility]    PROGRESS NOTE  Nursing Home Place of Service: nursing home place of service: POS 31 Skilled Care-Part A Coverage    NAME: Israel Hughes  : 1940 AGE: 83 y.o. SEX: male MRN: [id number]  DATE OF ENCOUNTER: 2024    Assessment and Plan     Problem List Items Addressed This Visit       Type 2 diabetes mellitus with retinopathy, without long-term current use of insulin ([facility])       Lab Results   Component Value Date    HGBA1C 6.9 (A) 2023       Home regimen appears to include metformin 1000mg BID, glimiperide 8mg daily, tradjenta 5mg daily  Monitor daily glucose - sugars have been low frequently <100  Avoid hypoglycemia  A1c well controlled for age, reasonable goal would be <7.5 to avoid hypoglycemia and fall risk in this elderly patient  As of  due to hypoglycemic readings, decreased glimiperide to 4mg daily  As of  due to hypoglycemic readings - discontinue glimiperide  Could consider further weaning of regimen if sugars remain well controlled or low         Essential hypertension     Had been on lisinopril 5mg daily - discussed with family and discontinued as of / due to stable/soft BP  Monitor BP - has been stable but borderline low generally 110s-120s systolic (a bit better after stopping lisinopril)  Avoid hypotension  Continue lasix         Relevant Medications    furosemide (LASIX) 40 mg tablet    Acute on chronic combined systolic and diastolic CHF (congestive heart failure) ([facility]) - Primary     Wt Readings from Last 3 Encounters:   24 58.5 kg (129 lb 0.6 oz)   24 59.9 kg (132 lb)   23 59.9 kg (132 lb)     strike-out   2024 05:24 126.4 Lbs Bed shazzard (Manual)    strike-out   2024 05:00 126.2 Lbs Bed shazzard (Manual)    strike-out   2024 05:33 125.6 Lbs Bed dhoward (Manual)    strike-out   2024 05:05 128.7 Lbs Bed dhoward (Manual)    strike-out   2024 04:30  "128.7 Lbs Bed mtrexler (Manual)    strike-out   1/25/2024 05:01 129.8 Lbs Standing shazzard (Manual)    strike-out   1/24/2024 17:00 128.0 Lbs Standing jcarr (Manual)        Echo 2021: EF 60, normal systolic function, grade 1 diastolic dysfunction  Echo 1/22/24: EF 50, mildly reduced systolic function, grade 1 diastolic dysfunction, mild AS     -hospitalized for worsening bilateral LE edema (per Echo seems he has had some element of CHF chronically but has not had acute exacerbation like this before per family)  -Cardiology followed in hospital  -treated with IV diuresis, now on oral diuretic  -Monitor daily weight - seems stable, no alarming uptrend  Monitor volume status clinically - seems euvolemic, no notable peripheral edema, seems slightly dry  As of 1/29 reduce lasix to 20mg daily due to stable/decreased weight and seeming a bit dry  Follow up with PCP, Cardiology           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)     Reported hx of dementia per PCP notes  Per exam and history and family discussion 1/25 likely consistent with mild dementia  Does seem to also have behavioral disturbance including sundowning  No significant hallucinations, personality change, or rigidity per family  Requiring frequent redirection and sitter for supervision due to concern for wandering/elopement risk and unsafe/impulsive behaviors and fall risk  May need to consider antipsychotic therapy for behaviors - does not seem necessary at this time  As per recent PCP visit 1/4/24 for anger/agitation issues \"if s/s worsen, consider increasing the prozac, adding seroquel bid, rexulti if covered or depakote if anger becomes more of an issue \"  Is on donepezil per PCP (note can contribute to bradycardia)  Supportive care  SW to follow for safe discharge planning/homecare services as appropriate         Exposure to confirmed case of COVID-19     Roommate incidentally tested positive for COVID today and was moved today to a " different room  patient denies any acute COVID symptoms  Isolation as per facility protocol  Patient tested negative on follow-up COVID test 1/29  Continue to monitor closely          Other Visit Diagnoses       CHF exacerbation (HCC)        Relevant Medications    furosemide (LASIX) 40 mg tablet                  Chief Complaint     Follow up behaviors, CHF, sugars low    History of Present Illness     Israel Hughes is a 83 y.o. male who was seen today for follow up. PMH including DM2, myasthenia gravis, HLD, glaucoma/retinopathy, BPH, HTN, CHF, CKD3, memory impairment/dementia     Patient seen and examined in room  Others present: sitter  Patient seated in chair eating lunch  Appears comfortable, awake, alert, oriented to situation, able to converse appropriately  Patient appears in good spirits, engages in conversation and exam appropriately, frequently makes jokes which are generally appropriate to the situation. Reports feeling well again with no new/acute complaints. Roommate incidentally tested positive for COVID and was moved today, patient denies any acute COVID symptoms. Has been eating/drinking well without issues. Peripheral edema seems minimal/improved overall. Feels he is breathing fine. No acute pain anywhere. Last BM earlier today was normal. Has been urinating well, has jug at bedside. Feels therapy has been going well, feeling comfortable ambulating. No acute cardiopulmonary, abdominal, or urinary symptoms; see ROS for more details.  Per sitter patient generally has not had any acute agitation or wandering behaviors in the last day or so but concern that he is unsafe if unsupervised, impulsive with his behaviors at times and does not always follow safety commands and therefore significant fall risk if unsupervised.  No further questions or acute concerns identified.    Lab Review:   1/25: BMP generally non-actionable, Cr 1.11 (baseline around 1-1.1), eGFR 61 (baseline around 50s-60s); recent CBC  "generally stable/non-actionable; last TSH WNL; recent A1c 6.9  1/29: BMP generally stable, Cr 1.14, eGFR 59; CBC generally stable/non-actionable; COVID negative        CXR 1/19 \"No acute cardiopulmonary disease \"     EKG 1/19: Sinus, 59bpm, Qtc 425  Echo 2021: EF 60, normal systolic function, grade 1 diastolic dysfunction  Echo 1/22/24: EF 50, mildly reduced systolic function, grade 1 diastolic dysfunction, mild AS    The following portions of the patient's history were reviewed and updated as appropriate: allergies, current medications, past family history, past medical history, past social history, past surgical history and problem list.    Review of Systems     Review of Systems   Constitutional:  Negative for appetite change, chills, diaphoresis, fatigue and fever.   HENT:  Negative for drooling, ear pain, hearing loss, rhinorrhea, sore throat and trouble swallowing.    Eyes:  Negative for photophobia, pain, discharge, redness, itching and visual disturbance.   Respiratory:  Negative for cough, chest tightness, shortness of breath and wheezing.    Cardiovascular:  Negative for chest pain, palpitations and leg swelling (much improved).   Gastrointestinal:  Negative for abdominal pain, blood in stool, constipation, diarrhea, nausea and vomiting.   Genitourinary:  Negative for difficulty urinating, dysuria, flank pain and hematuria.   Musculoskeletal:  Positive for gait problem. Negative for arthralgias, back pain and neck pain.   Skin:  Negative for color change.   Neurological:  Negative for tremors, seizures, facial asymmetry, weakness and light-headedness (none acute but notes chronically can get lightheaded on rising).   Psychiatric/Behavioral:  Positive for behavioral problems. Negative for agitation and confusion. The patient is not nervous/anxious and is not hyperactive.        Active Problem List     Patient Active Problem List   Diagnosis    Type 2 diabetes mellitus with retinopathy, without long-term " current use of insulin (HCC)    Myasthenia gravis without exacerbation (HCC)    Mixed hyperlipidemia    Retinopathy    Primary open angle glaucoma (POAG) of both eyes, mild stage    Ptosis of both eyelids    Benign prostatic hyperplasia without lower urinary tract symptoms    Essential hypertension    Other age-related cataract    Memory difficulty    Mild aortic stenosis    Mild mitral regurgitation    Diastolic dysfunction    Mild concentric left ventricular hypertrophy (LVH)    Mild protein-calorie malnutrition (HCC)    Dysphagia    Stage 3 chronic kidney disease, unspecified whether stage 3a or 3b CKD ([facility])    Volume overload    Acute on chronic combined systolic and diastolic CHF (congestive heart failure) ([facility])    At risk for constipation    At risk for delirium    Advance care planning    Bradycardia    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   Constitutional:       General: He is not in acute distress.     Appearance: He is not toxic-appearing or diaphoretic.   HENT:      Head: Normocephalic and atraumatic.      Right Ear: External ear normal.      Left Ear: External ear normal.      Nose: Nose normal. No rhinorrhea.      Mouth/Throat:      Mouth: Mucous membranes are moist.      Pharynx: Oropharynx is clear. No oropharyngeal exudate or posterior oropharyngeal erythema.      Comments: Slightly dry  Eyes:      General: No scleral icterus.        Right eye: No discharge.         Left eye: No discharge.      Extraocular Movements: Extraocular movements intact.      Conjunctiva/sclera:  Conjunctivae normal.      Pupils: Pupils are equal, round, and reactive to light.   Cardiovascular:      Rate and Rhythm: Normal rate and regular rhythm.   Pulmonary:      Effort: Pulmonary effort is normal. No respiratory distress.      Breath sounds: No wheezing or rales.   Chest:      Chest wall: No tenderness.   Abdominal:      General: Bowel sounds are normal.      Palpations: Abdomen is soft.      Tenderness: There is no abdominal tenderness. There is no guarding.   Musculoskeletal:         General: No swelling or tenderness.      Cervical back: No rigidity.      Comments: No notable peripheral edema (seems stable/improved)   Skin:     General: Skin is warm and dry.      Coloration: Skin is not jaundiced.   Neurological:      General: No focal deficit present.      Mental Status: He is alert. Mental status is at baseline.      Comments: Oriented x2-3 (to self, to location [facility] and [place], and to year 2024, but not month   Psychiatric:         Mood and Affect: Mood normal.         Behavior: Behavior normal.         Thought Content: Thought content normal.         Pertinent Laboratory/Diagnostic Studies:  Laboratory and Imaging studies reviewed. Full report in the paper chart.     Current Medications   Medications reviewed and updated in facility chart.      -Total time spent on this encounter today including documentation and workup review, face to face time, history and exam, and documentation/orders was approximately 30 minutes.  -This note will be copied to [facility] EMR where vitals and medication orders are placed.    Jf Macedo D.O.  Geriatric Medicine  1/29/2024 1:54 PM

## 2024-01-30 ENCOUNTER — NURSING HOME VISIT (OUTPATIENT)
Dept: GERIATRICS | Facility: OTHER | Age: 84
End: 2024-01-30
Payer: COMMERCIAL

## 2024-01-30 DIAGNOSIS — Z20.822 EXPOSURE TO CONFIRMED CASE OF COVID-19: ICD-10-CM

## 2024-01-30 DIAGNOSIS — I50.43 ACUTE ON CHRONIC COMBINED SYSTOLIC AND DIASTOLIC CHF (CONGESTIVE HEART FAILURE) (HCC): ICD-10-CM

## 2024-01-30 DIAGNOSIS — E11.319 TYPE 2 DIABETES MELLITUS WITH RETINOPATHY OF BOTH EYES, WITHOUT LONG-TERM CURRENT USE OF INSULIN, MACULAR EDEMA PRESENCE UNSPECIFIED, UNSPECIFIED RETINOPATHY SEVERITY (HCC): Primary | ICD-10-CM

## 2024-01-30 DIAGNOSIS — I10 ESSENTIAL HYPERTENSION: ICD-10-CM

## 2024-01-30 PROCEDURE — 99309 SBSQ NF CARE MODERATE MDM 30: CPT | Performed by: STUDENT IN AN ORGANIZED HEALTH CARE EDUCATION/TRAINING PROGRAM

## 2024-01-30 NOTE — ASSESSMENT & PLAN NOTE
Roommate incidentally tested positive for COVID 1/29 and was moved to a different room  patient denies any acute COVID symptoms and appears to remain asymptomatic  Patient tested negative on COVID test 1/29  Isolation as per facility protocol pending updated COVID test  Continue to monitor closely

## 2024-01-30 NOTE — ASSESSMENT & PLAN NOTE
Lab Results   Component Value Date    HGBA1C 6.9 (A) 12/01/2023     Home regimen appears to include metformin 1000mg BID, glimiperide 8mg daily, tradjenta 5mg daily  Monitor daily glucose - sugars have been low frequently <100  Avoid hypoglycemia  A1c well controlled for age, reasonable goal would be <7.5 to avoid hypoglycemia and fall risk in this elderly patient  As of 1/26 due to hypoglycemic readings, decreased glimiperide to 4mg daily  As of 1/29 due to hypoglycemic readings - discontinue glimiperide  As of 1/30 due to hypoglycemic readings - discontinue Tradjenta  Could consider further weaning of regimen if sugars remain well controlled or low

## 2024-01-30 NOTE — PROGRESS NOTES
[facility]  Facility: [facility]    PROGRESS NOTE  Nursing Home Place of Service: nursing home place of service: POS 31 Skilled Care-Part A Coverage    NAME: Israel Hughes  : 1940 AGE: 83 y.o. SEX: male MRN: [id number]  DATE OF ENCOUNTER: 2024    Assessment and Plan     Problem List Items Addressed This Visit       Type 2 diabetes mellitus with retinopathy, without long-term current use of insulin ([facility]) - Primary       Lab Results   Component Value Date    HGBA1C 6.9 (A) 2023     Home regimen appears to include metformin 1000mg BID, glimiperide 8mg daily, tradjenta 5mg daily  Monitor daily glucose - sugars have been low frequently <100  Avoid hypoglycemia  A1c well controlled for age, reasonable goal would be <7.5 to avoid hypoglycemia and fall risk in this elderly patient  As of  due to hypoglycemic readings, decreased glimiperide to 4mg daily  As of  due to hypoglycemic readings - discontinue glimiperide  As of  due to hypoglycemic readings - discontinue Tradjenta  Could consider further weaning of regimen if sugars remain well controlled or low         Essential hypertension     Had been on lisinopril 5mg daily - discussed with family and discontinued as of / due to stable/soft BP  Monitor BP - has been stable but borderline low generally 110s-120s systolic (a bit better after stopping lisinopril)  Avoid hypotension  Continue lasix - reduced dose as of  to 20mg daily as above         Acute on chronic combined systolic and diastolic CHF (congestive heart failure) ([facility])     Wt Readings from Last 3 Encounters:   24 58.5 kg (129 lb 0.6 oz)   24 59.9 kg (132 lb)   23 59.9 kg (132 lb)       strike-out   2024 05:31 120.1 Lbs Bed dhoward (Manual)    strike-out   2024 05:24 126.4 Lbs Bed shazzard (Manual)    strike-out   2024 05:00 126.2 Lbs Bed shazzard (Manual)    strike-out   2024 05:33 125.6 Lbs  Bed dhoward (Manual)    strike-out   1/26/2024 05:05 128.7 Lbs Bed dhoward (Manual)    strike-out   1/26/2024 04:30 128.7 Lbs Bed mtrexler (Manual)    strike-out   1/25/2024 05:01 129.8 Lbs Standing shazzard (Manual)    strike-out   1/24/2024 17:00 128.0 Lbs Standing jcarr (Manual)        Echo 2021: EF 60, normal systolic function, grade 1 diastolic dysfunction  Echo 1/22/24: EF 50, mildly reduced systolic function, grade 1 diastolic dysfunction, mild AS     -hospitalized for worsening bilateral LE edema (per Echo seems he has had some element of CHF chronically but has not had acute exacerbation like this before per family)  -Cardiology followed in hospital  -treated with IV diuresis, now on oral diuretic  -Monitor daily weight - seems stable, no alarming uptrend; sudden drop on 1/30 likely inaccurate, will continue to monitor trend daily  Monitor volume status clinically - seems euvolemic, no notable peripheral edema  As of 1/29 reduced lasix to 20mg daily due to stable/decreased weight and seeming a bit dry  Consider weaning lasix further if remaining euvolemic or appearing more dry  Follow up with PCP, Cardiology             Exposure to confirmed case of COVID-19     Roommate incidentally tested positive for COVID 1/29 and was moved to a different room  patient denies any acute COVID symptoms and appears to remain asymptomatic  Patient tested negative on COVID test 1/29  Isolation as per facility protocol pending updated COVID test  Continue to monitor closely                    Chief Complaint     Follow up CHF, sugars low    History of Present Illness     Israel Hughes is a 83 y.o. male who was seen today for follow up. PMH including DM2, myasthenia gravis, HLD, glaucoma/retinopathy, BPH, HTN, CHF, CKD3, memory impairment/dementia     Continues to have hypoglycemic readings which are symptomatic  Patient seen and examined in room  Others present: sitter  Patient seated in chair watching TV  Appears  "comfortable, awake, alert, oriented to situation, able to converse appropriately  Patient appears in good spirits, engages in conversation and exam appropriately, frequently makes jokes which are generally appropriate to the situation. Reports feeling well again with no new/acute complaints. Roommate incidentally tested positive for COVID and was moved yesterday, patient denies any acute COVID symptoms. He does note some hypoglycemic symptoms such as weakness and jitteriness when his sugars are low which has been an ongoing concern, weaning his diabetic regimen to which patient is amenable. Has been eating/drinking well without issues. Peripheral edema seems resolved/improved overall. Feels he is breathing fine. No acute pain anywhere. Last BM yesterday was normal. Has been urinating well, has jug at bedside. Feels therapy has been going well, feeling comfortable ambulating. No acute cardiopulmonary, abdominal, or urinary symptoms; see ROS for more details.  No further questions or acute concerns identified.    Lab Review:   1/25: BMP generally non-actionable, Cr 1.11 (baseline around 1-1.1), eGFR 61 (baseline around 50s-60s); recent CBC generally stable/non-actionable; last TSH WNL; recent A1c 6.9  1/29: BMP generally stable, Cr 1.14, eGFR 59; CBC generally stable/non-actionable; COVID negative        CXR 1/19 \"No acute cardiopulmonary disease \"     EKG 1/19: Sinus, 59bpm, Qtc 425  Echo 2021: EF 60, normal systolic function, grade 1 diastolic dysfunction  Echo 1/22/24: EF 50, mildly reduced systolic function, grade 1 diastolic dysfunction, mild AS    The following portions of the patient's history were reviewed and updated as appropriate: allergies, current medications, past family history, past medical history, past social history, past surgical history and problem list.    Review of Systems     Review of Systems   Constitutional:  Negative for appetite change, chills, diaphoresis, fatigue and fever.   HENT:  " Negative for drooling, ear pain, hearing loss, rhinorrhea, sore throat and trouble swallowing.    Eyes:  Negative for photophobia, pain, discharge, redness, itching and visual disturbance.   Respiratory:  Negative for cough, chest tightness, shortness of breath and wheezing.    Cardiovascular:  Negative for chest pain, palpitations and leg swelling (much improved).   Gastrointestinal:  Negative for abdominal pain, blood in stool, constipation, diarrhea, nausea and vomiting.   Genitourinary:  Negative for difficulty urinating, dysuria, flank pain and hematuria.   Musculoskeletal:  Positive for gait problem. Negative for arthralgias, back pain and neck pain.   Skin:  Negative for color change.   Neurological:  Negative for tremors, seizures, facial asymmetry, weakness and light-headedness (none acute but notes chronically can get lightheaded on rising).   Psychiatric/Behavioral:  Positive for behavioral problems. Negative for agitation and confusion. The patient is not nervous/anxious and is not hyperactive.        Active Problem List     Patient Active Problem List   Diagnosis    Type 2 diabetes mellitus with retinopathy, without long-term current use of insulin ([facility])    Myasthenia gravis without exacerbation ([facility])    Mixed hyperlipidemia    Retinopathy    Primary open angle glaucoma (POAG) of both eyes, mild stage    Ptosis of both eyelids    Benign prostatic hyperplasia without lower urinary tract symptoms    Essential hypertension    Other age-related cataract    Memory difficulty    Mild aortic stenosis    Mild mitral regurgitation    Diastolic dysfunction    Mild concentric left ventricular hypertrophy (LVH)    Mild protein-calorie malnutrition ([facility])    Dysphagia    Stage 3 chronic kidney disease, unspecified whether stage 3a or 3b CKD ([facility])    Volume overload    Acute on chronic combined systolic and diastolic CHF (congestive heart failure) ([facility])    At risk for constipation    At risk for delirium    Advance  care planning    Bradycardia    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   Constitutional:       General: He is not in acute distress.     Appearance: He is not toxic-appearing or diaphoretic.   HENT:      Head: Normocephalic and atraumatic.      Right Ear: External ear normal.      Left Ear: External ear normal.      Nose: Nose normal. No rhinorrhea.      Mouth/Throat:      Mouth: Mucous membranes are moist.      Pharynx: Oropharynx is clear. No oropharyngeal exudate or posterior oropharyngeal erythema.      Comments: Slightly dry  Eyes:      General: No scleral icterus.        Right eye: No discharge.         Left eye: No discharge.      Extraocular Movements: Extraocular movements intact.      Conjunctiva/sclera: Conjunctivae normal.      Pupils: Pupils are equal, round, and reactive to light.   Cardiovascular:      Rate and Rhythm: Normal rate and regular rhythm.   Pulmonary:      Effort: Pulmonary effort is normal. No respiratory distress.      Breath sounds: No wheezing or rales.   Chest:      Chest wall: No tenderness.   Abdominal:      General: Bowel sounds are normal.      Palpations: Abdomen is soft.      Tenderness: There is no abdominal tenderness. There is no guarding.   Musculoskeletal:         General: No swelling or tenderness.      Cervical back: No rigidity.      Comments: No notable peripheral edema (seems stable/improved)   Skin:     General: Skin is warm and dry.      Coloration: Skin is not jaundiced.   Neurological:      General: No focal deficit present.      Mental  Status: He is alert. Mental status is at baseline.      Comments: Oriented x2-3 (to self, to location [facility] and [place], and to year 2024, but not month   Psychiatric:         Mood and Affect: Mood normal.         Behavior: Behavior normal.         Thought Content: Thought content normal.         Pertinent Laboratory/Diagnostic Studies:  Laboratory and Imaging studies reviewed. Full report in the paper chart.     Current Medications   Medications reviewed and updated in facility chart.      -Total time spent on this encounter today including documentation and workup review, face to face time, history and exam, and documentation/orders was approximately 30 minutes.  -This note will be copied to [facility] EMR where vitals and medication orders are placed.    Jf Macedo D.O.  Geriatric Medicine  1/30/2024 2:33 PM

## 2024-01-30 NOTE — ASSESSMENT & PLAN NOTE
Wt Readings from Last 3 Encounters:   01/24/24 58.5 kg (129 lb 0.6 oz)   01/04/24 59.9 kg (132 lb)   12/13/23 59.9 kg (132 lb)       strike-out   1/30/2024 05:31 120.1 Lbs Bed dhoward (Manual)    strike-out   1/29/2024 05:24 126.4 Lbs Bed shazzard (Manual)    strike-out   1/28/2024 05:00 126.2 Lbs Bed shazzard (Manual)    strike-out   1/27/2024 05:33 125.6 Lbs Bed dhoward (Manual)    strike-out   1/26/2024 05:05 128.7 Lbs Bed dhoward (Manual)    strike-out   1/26/2024 04:30 128.7 Lbs Bed mtrexler (Manual)    strike-out   1/25/2024 05:01 129.8 Lbs Standing shazzard (Manual)    strike-out   1/24/2024 17:00 128.0 Lbs Standing jcarr (Manual)        Echo 2021: EF 60, normal systolic function, grade 1 diastolic dysfunction  Echo 1/22/24: EF 50, mildly reduced systolic function, grade 1 diastolic dysfunction, mild AS     -hospitalized for worsening bilateral LE edema (per Echo seems he has had some element of CHF chronically but has not had acute exacerbation like this before per family)  -Cardiology followed in hospital  -treated with IV diuresis, now on oral diuretic  -Monitor daily weight - seems stable, no alarming uptrend; sudden drop on 1/30 likely inaccurate, will continue to monitor trend daily  Monitor volume status clinically - seems euvolemic, no notable peripheral edema  As of 1/29 reduced lasix to 20mg daily due to stable/decreased weight and seeming a bit dry  Consider weaning lasix further if remaining euvolemic or appearing more dry  Follow up with PCP, Cardiology

## 2024-01-30 NOTE — ASSESSMENT & PLAN NOTE
Had been on lisinopril 5mg daily - discussed with family and discontinued as of 1/25/ due to stable/soft BP  Monitor BP - has been stable but borderline low generally 110s-120s systolic (a bit better after stopping lisinopril)  Avoid hypotension  Continue lasix - reduced dose as of 1/29 to 20mg daily as above

## 2024-02-02 ENCOUNTER — PATIENT OUTREACH (OUTPATIENT)
Dept: CASE MANAGEMENT | Facility: OTHER | Age: 84
End: 2024-02-02

## 2024-02-05 ENCOUNTER — HOME HEALTH ADMISSION (OUTPATIENT)
Dept: HOME HEALTH SERVICES | Facility: HOME HEALTHCARE | Age: 84
End: 2024-02-05
Payer: COMMERCIAL

## 2024-02-05 ENCOUNTER — NURSING HOME VISIT (OUTPATIENT)
Dept: GERIATRICS | Facility: OTHER | Age: 84
End: 2024-02-05
Payer: COMMERCIAL

## 2024-02-05 VITALS
TEMPERATURE: 98.1 F | DIASTOLIC BLOOD PRESSURE: 64 MMHG | OXYGEN SATURATION: 96 % | HEART RATE: 66 BPM | RESPIRATION RATE: 19 BRPM | SYSTOLIC BLOOD PRESSURE: 128 MMHG

## 2024-02-05 DIAGNOSIS — I50.43 ACUTE ON CHRONIC COMBINED SYSTOLIC AND DIASTOLIC CHF (CONGESTIVE HEART FAILURE) (HCC): Primary | ICD-10-CM

## 2024-02-05 DIAGNOSIS — H40.1131 PRIMARY OPEN ANGLE GLAUCOMA (POAG) OF BOTH EYES, MILD STAGE: ICD-10-CM

## 2024-02-05 DIAGNOSIS — N40.0 BENIGN PROSTATIC HYPERPLASIA WITHOUT LOWER URINARY TRACT SYMPTOMS: ICD-10-CM

## 2024-02-05 DIAGNOSIS — E78.2 MIXED HYPERLIPIDEMIA: ICD-10-CM

## 2024-02-05 DIAGNOSIS — E11.319 TYPE 2 DIABETES MELLITUS WITH RETINOPATHY OF BOTH EYES, WITHOUT LONG-TERM CURRENT USE OF INSULIN, MACULAR EDEMA PRESENCE UNSPECIFIED, UNSPECIFIED RETINOPATHY SEVERITY (HCC): ICD-10-CM

## 2024-02-05 DIAGNOSIS — G30.1 MILD LATE ONSET ALZHEIMER'S DEMENTIA WITH AGITATION (HCC): ICD-10-CM

## 2024-02-05 DIAGNOSIS — F02.A11 MILD LATE ONSET ALZHEIMER'S DEMENTIA WITH AGITATION (HCC): ICD-10-CM

## 2024-02-05 DIAGNOSIS — I10 ESSENTIAL HYPERTENSION: ICD-10-CM

## 2024-02-05 DIAGNOSIS — N18.30 STAGE 3 CHRONIC KIDNEY DISEASE, UNSPECIFIED WHETHER STAGE 3A OR 3B CKD (HCC): ICD-10-CM

## 2024-02-05 PROCEDURE — 99316 NF DSCHRG MGMT 30 MIN+: CPT

## 2024-02-05 NOTE — ASSESSMENT & PLAN NOTE
"Reported history of dementia by PCP  No formal cognitive testing done as per chart review.  Family reports worsening cognition and behaviors over the last 6 months  Per records no significant hallucinations or behaviors but family reports increased agitation at night.   Throughout STR course patient required sitter supervision.   As per recent PCP visit 1/4/24 for anger/agitation issues \"if s/s worsen, consider increasing the prozac, adding seroquel bid, rexulti if covered or depakote if anger becomes more of an issue \"  Continue Donepezil 10 mg at bedtime  Continue to monitor for acute changes in condition  Follow up with PCP    "

## 2024-02-05 NOTE — ASSESSMENT & PLAN NOTE
Wt Readings from Last 3 Encounters:   01/24/24 58.5 kg (129 lb 0.6 oz)   01/04/24 59.9 kg (132 lb)   12/13/23 59.9 kg (132 lb)   Patient is euvolemic  Clear lung sounds and absent bilateral edema  Patient remains on fluid restriction of 1800mL and low sodium diet  Continue to monitor weights and LE edema  Follow up with cardiology as scheduled.

## 2024-02-05 NOTE — PROGRESS NOTES
[facility]  [address] [place] [address]  [phone]  FACILITY: [facility]  Code 31 (STR)  Discharge Note        NAME: Israel Hughes  AGE: 83 y.o. SEX: male CODE STATUS: CPR    DATE OF ENCOUNTER: 2/5/2024    Assessment and Plan     1. Acute on chronic combined systolic and diastolic CHF (congestive heart failure) (HCC)  Assessment & Plan:  Wt Readings from Last 3 Encounters:   01/24/24 58.5 kg (129 lb 0.6 oz)   01/04/24 59.9 kg (132 lb)   12/13/23 59.9 kg (132 lb)   Patient is euvolemic  Clear lung sounds and absent bilateral edema  Patient remains on fluid restriction of 1800mL and low sodium diet  Continue to monitor weights and LE edema  Follow up with cardiology as scheduled.             2. Type 2 diabetes mellitus with retinopathy of both eyes, without long-term current use of insulin, macular edema presence unspecified, unspecified retinopathy severity (HCC)  Assessment & Plan:    Lab Results   Component Value Date    HGBA1C 6.9 (A) 12/01/2023   Patient A1C is well controlled for his age.   Tradjenta and glimepiride were discontinued due to risk of hypoglycemia and fall risk.   Continue Metformin 1000 mg BID  Continue to monitor blood sugar  Follow up with PCP      3. Essential hypertension  Assessment & Plan:  BP stable  During STR course patients SBP were stable/soft in 110s-120's.   Due to risk of hypotension Lisinopril was discontinued.   Continue Lasix 20 mg daily  Avoid hypotension.   Continue to monitor blood pressure  Follow up with cardiology as scheduled       4. 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  Family reports worsening cognition and behaviors over the last 6 months  Per records no significant hallucinations or behaviors but family reports increased agitation at night.   Throughout STR course patient required sitter  "supervision.   As per recent PCP visit 1/4/24 for anger/agitation issues \"if s/s worsen, consider increasing the prozac, adding seroquel bid, rexulti if covered or depakote if anger becomes more of an issue \"  Continue Donepezil 10 mg at bedtime  Continue to monitor for acute changes in condition  Follow up with PCP        5. Benign prostatic hyperplasia without lower urinary tract symptoms  Assessment & Plan:  Stable   Patient voiding without difficulty  Continue Tamsulosin 0.4 mg daily  Continue to monitor  Follow up with PCP      6. Stage 3 chronic kidney disease, unspecified whether stage 3a or 3b CKD (HCC)  Assessment & Plan:  Lab Results   Component Value Date    EGFR 60 02/01/2024    EGFR 59 01/29/2024    EGFR 61 01/25/2024    CREATININE 1.13 02/01/2024    CREATININE 1.14 01/29/2024    CREATININE 1.11 01/25/2024   Patient currently stable  Continue to monitor renal function periodically  Avoid nephrotoxins, NSAIDs   Continue to encourage PO hydration. Fluid restrictions 1800 mL  Follow up with PCP      7. Primary open angle glaucoma (POAG) of both eyes, mild stage  Assessment & Plan:  Stable  Follow up with PCP, ophthalmology as appropriate.       8. Mixed hyperlipidemia  Assessment & Plan:  Stable   Continue Atorvastatin 20 mg daily   Follow up with PCP           Discharge Statement:   I spent 45 minutes discharging the patient. This time was spent on the day of discharge. I had direct contact with the patient on the day of discharge. Greater than 50% of the total time was spent examining patient, answering all patient questions, arranging and discussing plan of care with patient as well as directly providing post-discharge instructions. Additional time then spent on discharge activities.  All medications and routine orders were reviewed and updated as needed.    Chief Complaint     STR Discharge Note    Past Medical and Surgica History      Past Medical History:   Diagnosis Date    Diabetes mellitus (HCC)  "    Hyperlipidemia      Past Surgical History:   Procedure Laterality Date    CATARACT EXTRACTION      COLONOSCOPY      DENTAL SURGERY      EYE SURGERY      TONSILLECTOMY      VASECTOMY       Allergies   Allergen Reactions    Bactrim [Sulfamethoxazole-Trimethoprim] GI Intolerance    Simvastatin Myalgia and Other (See Comments)          History of Present Illness     Patient being seen for short-term rehab discharge.  He has past medical history of but not limited DM2, myasthenia gravis, HLD, glaucoma/retinopathy, BPH, HTN, CHF, CKD3 and cognitive impairment/dementia.     Hospital course:  Patient is an 83 year old male who originally presented to [facility] on 1/19/24 with bilateral lower extremity edema. Patient was treated with IV lasix. Echo revealed EF of 50%. During hospitalization PT/OT recommended post acute rehab and patient was discharged to [facility]. Patient was followed by cardiology throughout hospitalization.      Rehab course:  Dr. Chavez and patients granddadeandre Toure discussed patients plan of care regarding patient cognitive decline and families options for care but at this time prefers to bring patient home.   During rehab course patient blood sugars remained in the mid 100's and due to risk of hypoglycemia and fall risk patients glimepiride and tradjenta were discontinued. Patient remains on Metformin 1000 mg BID. Patient BP remained stable/soft borderline low SBP 110s-120s, Lisinopril was discontinued. Patient continues with lasix 20 mg daily. Patients LE edema currently stable and maintained with Lasix 20 mg daily.      Patient was seen and examined at bedside today he is in good spirits. Patient reports he is feeling well and ready to return home. Patient being discharged today home where he resides with his with granddaughter (Bianca) and her . Reviewed medication changes with patient, he is unable to repeat changes back to me. Patient making appropriate jokes but  challenging to keep him focused on task/conversation. Patient is in no acute distress. Edema absent bilaterally LE, clear lung sounds. Denies SOB, chest pain, abdominal pain, N/V/D/C. Patient reports his appetite is good and has regular BM. Patient will continue fluid restriction of 1800 mL and low sodium diet. I asked patient if he checks his weights at home and I was unable to get a clear answer. Patient received comprehensive rehabilitation services with overall progression in functional status. Patient ambulating with walker. Patient has a cardiology follow up schedule as well as podiatry.     The patient's allergies, past medical, surgical, social and family history were reviewed and unchanged.    Review of Systems     Review of Systems   Constitutional:  Negative for activity change, chills, fatigue and fever.   HENT:  Negative for congestion, rhinorrhea and sore throat.    Respiratory:  Negative for cough, chest tightness and shortness of breath.    Cardiovascular:  Negative for chest pain, palpitations and leg swelling.   Gastrointestinal:  Negative for abdominal distention, abdominal pain, blood in stool, constipation, diarrhea, nausea and vomiting.   Genitourinary:  Negative for difficulty urinating, dysuria and hematuria.   Musculoskeletal:  Negative for arthralgias.   Neurological:  Positive for weakness. Negative for dizziness, light-headedness and headaches.   Psychiatric/Behavioral:  Positive for confusion.    All other systems reviewed and are negative.      Objective     Vitals:   Vitals:    02/05/24 1445   BP: 128/64   Pulse: 66   Resp: 19   Temp: 98.1 °F (36.7 °C)   SpO2: 96%       Labs Reviewed  CBC:   Results from Last 12 Months   Lab Units 01/29/24  0500 01/23/24  0509   WBC Thousand/uL 8.53 7.39   RBC Million/uL 3.93 3.92   HEMOGLOBIN g/dL 12.3 12.3   HEMATOCRIT % 36.4* 36.8   MCV fL 93 94   MCH pg 31.3 31.4   MCHC g/dL 33.8 33.4   RDW % 12.8 12.9   MPV fL 10.2 9.9   PLATELETS Thousands/uL 186  161   NRBC AUTO /100 WBCs  --  0   NEUTROS PCT %  --  67   LYMPHS PCT %  --  22   MONOS PCT %  --  9   EOS PCT %  --  2   BASOS PCT %  --  0   NEUTROS ABS Thousands/µL  --  4.89   LYMPHS ABS Thousands/µL  --  1.65   MONOS ABS Thousand/µL  --  0.63   EOS ABS Thousand/µL  --  0.16     Chemistry Profile:   Results from Last 12 Months   Lab Units 02/01/24  0711 01/19/24  1827 08/17/23  0948   POTASSIUM mmol/L 4.2   < > 4.9   CHLORIDE mmol/L 97   < > 108   CO2 mmol/L 32   < > 30   BUN mg/dL 27*   < > 23   CREATININE mg/dL 1.13   < > 1.15   GLUCOSE FASTING mg/dL  --   --  70   GLUCOSE RANDOM mg/dL 72   < >  --    CALCIUM mg/dL 9.1   < > 9.6   AST U/L  --   --  26   ALT U/L  --   --  22   ALK PHOS U/L  --   --  108   EGFR ml/min/1.73sq m 60   < > 58    < > = values in this interval not displayed.     Cardiac Studies:   Results from Last 12 Months   Lab Units 01/19/24  1827   BNP pg/mL 212*         Physical Exam  Vitals and nursing note reviewed.   Constitutional:       General: He is not in acute distress.     Appearance: Normal appearance. He is not ill-appearing.   HENT:      Head: Normocephalic and atraumatic.      Nose: Nose normal. No congestion or rhinorrhea.   Eyes:      General: No scleral icterus.        Right eye: No discharge.         Left eye: No discharge.      Conjunctiva/sclera: Conjunctivae normal.   Cardiovascular:      Rate and Rhythm: Normal rate and regular rhythm.      Heart sounds: Normal heart sounds.   Pulmonary:      Effort: Pulmonary effort is normal. No respiratory distress.      Breath sounds: Normal breath sounds. No wheezing or rhonchi.   Abdominal:      General: Abdomen is flat. Bowel sounds are normal. There is no distension.      Tenderness: There is no abdominal tenderness. There is no guarding or rebound.   Musculoskeletal:      Right lower leg: No edema.      Left lower leg: No edema.   Skin:     General: Skin is warm and dry.   Neurological:      Mental Status: He is alert. Mental  "status is at baseline.      Motor: Weakness present.      Gait: Gait abnormal.   Psychiatric:         Attention and Perception: Attention normal.         Mood and Affect: Mood normal.         Speech: Speech normal.         Cognition and Memory: Cognition is impaired. Memory is impaired.         Pertinent Laboratory/Diagnostic Studies:   Reviewed in facility chart-stable      Current Medications   Medications reviewed and updated see facility MAR for details.      Current Outpatient Medications:     furosemide (LASIX) 40 mg tablet, Take 0.5 tablets (20 mg total) by mouth daily, Disp: 15 tablet, Rfl: 0       Please note:  Voice-recognition software may have been used in the preparation of this document.  Occasional wrong word or \"sound-alike\" substitutions may have occurred due to the inherent limitations of voice recognition software.  Interpretation should be guided by context.         RUSH Wong   "

## 2024-02-05 NOTE — ASSESSMENT & PLAN NOTE
Lab Results   Component Value Date    HGBA1C 6.9 (A) 12/01/2023   Patient A1C is well controlled for his age.   Tradjenta and glimepiride were discontinued due to risk of hypoglycemia and fall risk.   Continue Metformin 1000 mg BID  Continue to monitor blood sugar  Follow up with PCP

## 2024-02-05 NOTE — ASSESSMENT & PLAN NOTE
BP stable  During STR course patients SBP were stable/soft in 110s-120's.   Due to risk of hypotension Lisinopril was discontinued.   Continue Lasix 20 mg daily  Avoid hypotension.   Continue to monitor blood pressure  Follow up with cardiology as scheduled

## 2024-02-05 NOTE — ASSESSMENT & PLAN NOTE
Lab Results   Component Value Date    EGFR 60 02/01/2024    EGFR 59 01/29/2024    EGFR 61 01/25/2024    CREATININE 1.13 02/01/2024    CREATININE 1.14 01/29/2024    CREATININE 1.11 01/25/2024   Patient currently stable  Continue to monitor renal function periodically  Avoid nephrotoxins, NSAIDs   Continue to encourage PO hydration. Fluid restrictions 1800 mL  Follow up with PCP

## 2024-02-05 NOTE — ASSESSMENT & PLAN NOTE
Stable   Patient voiding without difficulty  Continue Tamsulosin 0.4 mg daily  Continue to monitor  Follow up with PCP

## 2024-02-06 ENCOUNTER — PATIENT OUTREACH (OUTPATIENT)
Dept: CASE MANAGEMENT | Facility: OTHER | Age: 84
End: 2024-02-06

## 2024-02-06 ENCOUNTER — HOME CARE VISIT (OUTPATIENT)
Dept: HOME HEALTH SERVICES | Facility: HOME HEALTHCARE | Age: 84
End: 2024-02-06

## 2024-02-06 DIAGNOSIS — N18.30 STAGE 3 CHRONIC KIDNEY DISEASE, UNSPECIFIED WHETHER STAGE 3A OR 3B CKD (HCC): Primary | ICD-10-CM

## 2024-02-06 DIAGNOSIS — I50.9 CHF EXACERBATION (HCC): ICD-10-CM

## 2024-02-06 RX ORDER — FUROSEMIDE 40 MG/1
20 TABLET ORAL DAILY
Qty: 15 TABLET | Refills: 0 | Status: SHIPPED | OUTPATIENT
Start: 2024-02-06 | End: 2024-03-07

## 2024-02-06 NOTE — PROGRESS NOTES
ADT alert received the patient discharged 2/5/24 to Home with [facility].  I have removed myself off of the care team, added the CM to the care team who will follow the patient through the episode, sent the care manager an inbasket notifying them of the DOUGIE/SNF Pathway.  Ambulatory referral placed for complex care management.     PCC discharge form attached to this encounter.

## 2024-02-07 ENCOUNTER — HOME CARE VISIT (OUTPATIENT)
Dept: HOME HEALTH SERVICES | Facility: HOME HEALTHCARE | Age: 84
End: 2024-02-07

## 2024-02-07 ENCOUNTER — TRANSITIONAL CARE MANAGEMENT (OUTPATIENT)
Dept: INTERNAL MEDICINE CLINIC | Facility: CLINIC | Age: 84
End: 2024-02-07

## 2024-02-07 ENCOUNTER — HOME CARE VISIT (OUTPATIENT)
Dept: HOME HEALTH SERVICES | Facility: HOME HEALTHCARE | Age: 84
End: 2024-02-07
Payer: COMMERCIAL

## 2024-02-07 ENCOUNTER — PATIENT OUTREACH (OUTPATIENT)
Dept: CASE MANAGEMENT | Facility: OTHER | Age: 84
End: 2024-02-07

## 2024-02-07 VITALS — HEART RATE: 84 BPM | OXYGEN SATURATION: 96 % | SYSTOLIC BLOOD PRESSURE: 110 MMHG | DIASTOLIC BLOOD PRESSURE: 62 MMHG

## 2024-02-07 DIAGNOSIS — N18.30 STAGE 3 CHRONIC KIDNEY DISEASE, UNSPECIFIED WHETHER STAGE 3A OR 3B CKD (HCC): Primary | ICD-10-CM

## 2024-02-07 DIAGNOSIS — E11.319 TYPE 2 DIABETES MELLITUS WITH RETINOPATHY OF BOTH EYES, WITHOUT LONG-TERM CURRENT USE OF INSULIN, MACULAR EDEMA PRESENCE UNSPECIFIED, UNSPECIFIED RETINOPATHY SEVERITY (HCC): ICD-10-CM

## 2024-02-07 PROCEDURE — G0299 HHS/HOSPICE OF RN EA 15 MIN: HCPCS

## 2024-02-07 PROCEDURE — G0151 HHCP-SERV OF PT,EA 15 MIN: HCPCS

## 2024-02-07 PROCEDURE — 400013 VN SOC

## 2024-02-07 RX ORDER — BLOOD SUGAR DIAGNOSTIC
STRIP MISCELLANEOUS
Qty: 50 EACH | Refills: 0 | Status: SHIPPED | OUTPATIENT
Start: 2024-02-07

## 2024-02-07 RX ORDER — LANCETS 33 GAUGE
EACH MISCELLANEOUS
Qty: 100 EACH | Refills: 3 | Status: SHIPPED | OUTPATIENT
Start: 2024-02-07

## 2024-02-07 NOTE — PROGRESS NOTES
Identified for SNF/DOUGIE pathway.  Chart review completed.  Discharged from [facility] on 2/5/2024.    Spoke with daughter-in-law, Chiqui.  She will make PCP follow up appointment.      Israel is eating and drinking okay.  Verified he is on low sodium/1800 ml diet.    Recommended that Israel be weighed every day and call to cardiologist if he gains 3 pounds overnight and 5 pounds in a week.  Also to check for lower extremity edema.      Medication review .  Some changes noted.  Further chart review done and call back to Chiqui with information that lisinipril was discontinued (2/5)  due to low BP and scripts have been called in to [facility] for K-dur and lasix..  Vitamin B12 is over the counter. [facility] nurse is scheduled today and Chiqui will also have her review medications.  Israel had been on tradjenta, 5 mg, preadmission, and I will also send a message to Dr. Reyez to see if he wants this continued.      When called back, Chiqui said that Israel had gotten out of bed himself and she has concerns about his safety.  Granddaughter lives upstairs and they do have a camera to monitor him.  However,  she feels he should have an aide with him. I gave her numbers for [facility] and [facility] to call to see if they can help or have suggestions of other agencies she can call since they aren't local.  She will also check with visiting nurse.      BMP ordered and Dr. Reyez notified.  Message also sent to ABEL for lab draw.

## 2024-02-09 ENCOUNTER — APPOINTMENT (OUTPATIENT)
Dept: LAB | Facility: CLINIC | Age: 84
End: 2024-02-09
Payer: COMMERCIAL

## 2024-02-09 ENCOUNTER — HOME CARE VISIT (OUTPATIENT)
Dept: HOME HEALTH SERVICES | Facility: HOME HEALTHCARE | Age: 84
End: 2024-02-09
Payer: COMMERCIAL

## 2024-02-09 DIAGNOSIS — N18.30 STAGE 3 CHRONIC KIDNEY DISEASE, UNSPECIFIED WHETHER STAGE 3A OR 3B CKD (HCC): ICD-10-CM

## 2024-02-09 LAB
ANION GAP SERPL CALCULATED.3IONS-SCNC: 11 MMOL/L
BUN SERPL-MCNC: 28 MG/DL (ref 5–25)
CALCIUM SERPL-MCNC: 9.7 MG/DL (ref 8.4–10.2)
CHLORIDE SERPL-SCNC: 100 MMOL/L (ref 96–108)
CO2 SERPL-SCNC: 27 MMOL/L (ref 21–32)
CREAT SERPL-MCNC: 1.11 MG/DL (ref 0.6–1.3)
GFR SERPL CREATININE-BSD FRML MDRD: 61 ML/MIN/1.73SQ M
GLUCOSE SERPL-MCNC: 316 MG/DL (ref 65–140)
POTASSIUM SERPL-SCNC: 5 MMOL/L (ref 3.5–5.3)
SODIUM SERPL-SCNC: 138 MMOL/L (ref 135–147)

## 2024-02-09 PROCEDURE — 80048 BASIC METABOLIC PNL TOTAL CA: CPT

## 2024-02-09 PROCEDURE — 36415 COLL VENOUS BLD VENIPUNCTURE: CPT

## 2024-02-09 PROCEDURE — G0299 HHS/HOSPICE OF RN EA 15 MIN: HCPCS

## 2024-02-09 NOTE — CASE COMMUNICATION
[facility] has admitted your patient to Home Health service with the following disciplines:      PT, OT and SN.  Declined HHA at SOC  This report is informational only, no response is needed  Primary focus of home health care  CHF  Patient stated goals of care   take less pills  Anticipated visit pattern and next visit date 2w6 1w3   See medication list - meds in home as per Rehab AVS  Significant clinical findings Hadn't started ch ecking daily weights and BS  Potential barriers to goal achievement  dementia  fall risk    Thank you for allowing us to participate in the care of your patient.

## 2024-02-10 VITALS
OXYGEN SATURATION: 98 % | SYSTOLIC BLOOD PRESSURE: 104 MMHG | WEIGHT: 122 LBS | BODY MASS INDEX: 22.31 KG/M2 | DIASTOLIC BLOOD PRESSURE: 60 MMHG | RESPIRATION RATE: 16 BRPM | HEART RATE: 80 BPM | TEMPERATURE: 97.9 F

## 2024-02-10 VITALS
SYSTOLIC BLOOD PRESSURE: 110 MMHG | TEMPERATURE: 97.4 F | RESPIRATION RATE: 16 BRPM | BODY MASS INDEX: 22.31 KG/M2 | OXYGEN SATURATION: 99 % | WEIGHT: 122 LBS | DIASTOLIC BLOOD PRESSURE: 58 MMHG | HEART RATE: 70 BPM

## 2024-02-12 ENCOUNTER — OFFICE VISIT (OUTPATIENT)
Dept: INTERNAL MEDICINE CLINIC | Facility: CLINIC | Age: 84
End: 2024-02-12
Payer: COMMERCIAL

## 2024-02-12 ENCOUNTER — HOME CARE VISIT (OUTPATIENT)
Dept: HOME HEALTH SERVICES | Facility: HOME HEALTHCARE | Age: 84
End: 2024-02-12
Payer: COMMERCIAL

## 2024-02-12 VITALS
OXYGEN SATURATION: 97 % | HEART RATE: 73 BPM | WEIGHT: 126 LBS | TEMPERATURE: 97.2 F | SYSTOLIC BLOOD PRESSURE: 124 MMHG | BODY MASS INDEX: 23.19 KG/M2 | DIASTOLIC BLOOD PRESSURE: 60 MMHG | HEIGHT: 62 IN

## 2024-02-12 DIAGNOSIS — I50.43 ACUTE ON CHRONIC COMBINED SYSTOLIC (CONGESTIVE) AND DIASTOLIC (CONGESTIVE) HEART FAILURE (HCC): Primary | ICD-10-CM

## 2024-02-12 DIAGNOSIS — I35.0 MILD AORTIC STENOSIS: ICD-10-CM

## 2024-02-12 DIAGNOSIS — I10 ESSENTIAL HYPERTENSION: ICD-10-CM

## 2024-02-12 DIAGNOSIS — R53.81 PHYSICAL DECONDITIONING: ICD-10-CM

## 2024-02-12 DIAGNOSIS — G30.1 MILD LATE ONSET ALZHEIMER'S DEMENTIA WITH AGITATION (HCC): ICD-10-CM

## 2024-02-12 DIAGNOSIS — E11.319 TYPE 2 DIABETES MELLITUS WITH RETINOPATHY OF BOTH EYES, WITHOUT LONG-TERM CURRENT USE OF INSULIN, MACULAR EDEMA PRESENCE UNSPECIFIED, UNSPECIFIED RETINOPATHY SEVERITY (HCC): ICD-10-CM

## 2024-02-12 DIAGNOSIS — I50.32 CHRONIC DIASTOLIC CONGESTIVE HEART FAILURE (HCC): ICD-10-CM

## 2024-02-12 DIAGNOSIS — F02.A11 MILD LATE ONSET ALZHEIMER'S DEMENTIA WITH AGITATION (HCC): ICD-10-CM

## 2024-02-12 PROBLEM — R00.1 BRADYCARDIA: Status: RESOLVED | Noted: 2024-01-25 | Resolved: 2024-02-12

## 2024-02-12 PROBLEM — Z74.2 HOME HELP NEEDED: Status: RESOLVED | Noted: 2024-01-25 | Resolved: 2024-02-12

## 2024-02-12 PROBLEM — Z91.89 AT RISK FOR CONSTIPATION: Status: RESOLVED | Noted: 2024-01-25 | Resolved: 2024-02-12

## 2024-02-12 PROBLEM — Z20.822 EXPOSURE TO CONFIRMED CASE OF COVID-19: Status: RESOLVED | Noted: 2024-01-29 | Resolved: 2024-02-12

## 2024-02-12 PROBLEM — I73.9 PERIPHERAL ARTERIAL DISEASE (HCC): Status: ACTIVE | Noted: 2024-02-12

## 2024-02-12 PROBLEM — Z91.89 AT RISK FOR DELIRIUM: Status: RESOLVED | Noted: 2024-01-25 | Resolved: 2024-02-12

## 2024-02-12 PROBLEM — R41.3 MEMORY DIFFICULTY: Status: RESOLVED | Noted: 2021-09-15 | Resolved: 2024-02-12

## 2024-02-12 PROBLEM — Z71.89 ADVANCE CARE PLANNING: Status: RESOLVED | Noted: 2024-01-25 | Resolved: 2024-02-12

## 2024-02-12 PROBLEM — E87.70 VOLUME OVERLOAD: Status: RESOLVED | Noted: 2024-01-20 | Resolved: 2024-02-12

## 2024-02-12 PROCEDURE — G0152 HHCP-SERV OF OT,EA 15 MIN: HCPCS | Performed by: OCCUPATIONAL THERAPIST

## 2024-02-12 PROCEDURE — 99496 TRANSJ CARE MGMT HIGH F2F 7D: CPT | Performed by: INTERNAL MEDICINE

## 2024-02-12 NOTE — PROGRESS NOTES
Assessment & Plan     1. Acute on chronic combined systolic (congestive) and diastolic (congestive) heart failure (HCC)  -     Basic metabolic panel; Future  -     Hemoglobin and hematocrit, blood; Future    2. Physical deconditioning    3. Type 2 diabetes mellitus with retinopathy of both eyes, without long-term current use of insulin, macular edema presence unspecified, unspecified retinopathy severity (HCC)    4. Essential hypertension  -     Basic metabolic panel; Future  -     Hemoglobin and hematocrit, blood; Future    5. Mild aortic stenosis    6. 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 Chronic diastolic congestive heart failure (HCC)    A/P: Stable. Tolerating diet, meds and activity improving. Sugars ok. Memory stable, but pt is a handful for his care takers. Will recheck labs. Continue current treatment and RTC four weeks for routine. Consider adding namenda.       Subjective     Transitional Care Management Review:   Israel Hughes is a 83 y.o. male here for TCM follow up.     During the TCM phone call patient stated:  TCM Call     Date and time call was made  2/7/2024 10:56 AM    Hospital care reviewed  Records reviewed    Patient [facility] hospitial[facility] at  [facility]    Date of Admission  01/24/24    Date of discharge  02/05/24    Diagnosis  volume overload    Disposition  Home    Were the patients medications reviewed and updated  Yes    Current Symptoms  None      TCM Call     Post hospital issues  None    Should patient be enrolled in anticoag monitoring?  No    Scheduled for follow up?  Yes    Did you obtain your prescribed medications  Yes    Do you need help managing your prescriptions or medications  No    Is transportation to your appointment needed  No    I have advised the patient to call PCP with any new or worsening symptoms  al SEQUEIRA presents for a short hospital admission followed by a short inpt rehab for LE edema. Pt admitted and r/o for  "cardiac ischemia. W/u revealed acute on chronic CHF with new EF 50% and unchanged AS. Diuresed with IV and than po diuretics with good results. Course uncomplicated except for gait abnormality and weakness. Pt sent to rehab. Did well and d/c'd to home. Since d/c, doing ok. No fever for chills. Tolerating meds. Activity level increasing. Denies CP, SOB, palpitations, worsening edema, orthopnea or PND. Appetite ok. No new issues. Memory no worse. Sugars ok. D/c labs ok.       Review of Systems   Constitutional:  Negative for activity change, chills, diaphoresis, fatigue and fever.   HENT: Negative.     Eyes:  Negative for visual disturbance.   Respiratory:  Negative for cough, chest tightness, shortness of breath and wheezing.    Cardiovascular:  Positive for leg swelling. Negative for chest pain and palpitations.   Gastrointestinal:  Negative for abdominal pain, constipation, diarrhea, nausea and vomiting.   Endocrine: Negative for cold intolerance and heat intolerance.   Genitourinary:  Negative for difficulty urinating, dysuria and frequency.   Musculoskeletal:  Positive for gait problem. Negative for arthralgias and myalgias.   Neurological:  Negative for dizziness, seizures, syncope, weakness, light-headedness and headaches.   Psychiatric/Behavioral:  Negative for confusion and sleep disturbance. The patient is not nervous/anxious.        Objective     /60   Pulse 73   Temp (!) 97.2 °F (36.2 °C)   Ht 5' 2\" (1.575 m)   Wt 57.2 kg (126 lb)   SpO2 97%   BMI 23.05 kg/m²      Physical Exam  Vitals and nursing note reviewed.   Constitutional:       General: He is not in acute distress.     Appearance: Normal appearance. He is not ill-appearing.   HENT:      Head: Normocephalic and atraumatic.      Mouth/Throat:      Mouth: Mucous membranes are moist.   Eyes:      Extraocular Movements: Extraocular movements intact.      Conjunctiva/sclera: Conjunctivae normal.      Pupils: Pupils are equal, round, and " reactive to light.   Neck:      Vascular: No carotid bruit.   Cardiovascular:      Rate and Rhythm: Normal rate and regular rhythm.      Heart sounds: Normal heart sounds. No murmur heard.  Pulmonary:      Effort: Pulmonary effort is normal. No respiratory distress.      Breath sounds: Normal breath sounds. No wheezing, rhonchi or rales.   Abdominal:      General: Bowel sounds are normal. There is no distension.      Palpations: Abdomen is soft.      Tenderness: There is no abdominal tenderness.   Musculoskeletal:      Cervical back: Neck supple.      Right lower leg: No edema.      Left lower leg: No edema.   Neurological:      General: No focal deficit present.      Mental Status: He is alert and oriented to person, place, and time. Mental status is at baseline.   Psychiatric:         Mood and Affect: Mood normal.         Behavior: Behavior normal.         Thought Content: Thought content normal.         Judgment: Judgment normal.       Medications have been reviewed by provider in current encounter    Giovanni Reyez DO

## 2024-02-13 ENCOUNTER — HOME CARE VISIT (OUTPATIENT)
Dept: HOME HEALTH SERVICES | Facility: HOME HEALTHCARE | Age: 84
End: 2024-02-13
Payer: COMMERCIAL

## 2024-02-14 ENCOUNTER — HOME CARE VISIT (OUTPATIENT)
Dept: HOME HEALTH SERVICES | Facility: HOME HEALTHCARE | Age: 84
End: 2024-02-14
Payer: COMMERCIAL

## 2024-02-14 ENCOUNTER — PATIENT OUTREACH (OUTPATIENT)
Dept: CASE MANAGEMENT | Facility: OTHER | Age: 84
End: 2024-02-14

## 2024-02-14 VITALS — DIASTOLIC BLOOD PRESSURE: 72 MMHG | SYSTOLIC BLOOD PRESSURE: 124 MMHG | OXYGEN SATURATION: 97 % | HEART RATE: 80 BPM

## 2024-02-14 VITALS — OXYGEN SATURATION: 98 % | DIASTOLIC BLOOD PRESSURE: 70 MMHG | SYSTOLIC BLOOD PRESSURE: 122 MMHG | HEART RATE: 66 BPM

## 2024-02-14 PROCEDURE — G0151 HHCP-SERV OF PT,EA 15 MIN: HCPCS

## 2024-02-14 NOTE — PROGRESS NOTES
"Chiqui reports that Israel is doing pretty good.  He is getting around well, but does need assistance with meals and medication administration.  She plans to get assistance with a home health aide. She asked for recommendations of home health agencies in the area.  Will look for this information anc all her back.   He is still getting home PT.      Seen by Dr. Reyez 2/9.  BUN/CRT remain stable.  Further follow up labs ordered.      Checked on \"Find Help\" and call back with two suggestions for home care.    "

## 2024-02-15 PROCEDURE — G0180 MD CERTIFICATION HHA PATIENT: HCPCS | Performed by: STUDENT IN AN ORGANIZED HEALTH CARE EDUCATION/TRAINING PROGRAM

## 2024-02-16 ENCOUNTER — HOME CARE VISIT (OUTPATIENT)
Dept: HOME HEALTH SERVICES | Facility: HOME HEALTHCARE | Age: 84
End: 2024-02-16
Payer: COMMERCIAL

## 2024-02-16 ENCOUNTER — LAB REQUISITION (OUTPATIENT)
Dept: LAB | Facility: HOSPITAL | Age: 84
End: 2024-02-16
Payer: COMMERCIAL

## 2024-02-16 VITALS — DIASTOLIC BLOOD PRESSURE: 62 MMHG | SYSTOLIC BLOOD PRESSURE: 110 MMHG | OXYGEN SATURATION: 97 % | HEART RATE: 77 BPM

## 2024-02-16 DIAGNOSIS — I50.43 ACUTE ON CHRONIC COMBINED SYSTOLIC (CONGESTIVE) AND DIASTOLIC (CONGESTIVE) HEART FAILURE (HCC): ICD-10-CM

## 2024-02-16 LAB
ANION GAP SERPL CALCULATED.3IONS-SCNC: 8 MMOL/L
BUN SERPL-MCNC: 27 MG/DL (ref 5–25)
CALCIUM SERPL-MCNC: 9.7 MG/DL (ref 8.4–10.2)
CHLORIDE SERPL-SCNC: 99 MMOL/L (ref 96–108)
CO2 SERPL-SCNC: 30 MMOL/L (ref 21–32)
CREAT SERPL-MCNC: 1.24 MG/DL (ref 0.6–1.3)
GFR SERPL CREATININE-BSD FRML MDRD: 53 ML/MIN/1.73SQ M
GLUCOSE SERPL-MCNC: 174 MG/DL (ref 65–140)
HCT VFR BLD AUTO: 36 % (ref 36.5–49.3)
HGB BLD-MCNC: 11.9 G/DL (ref 12–17)
POTASSIUM SERPL-SCNC: 4.6 MMOL/L (ref 3.5–5.3)
SODIUM SERPL-SCNC: 137 MMOL/L (ref 135–147)

## 2024-02-16 PROCEDURE — G0299 HHS/HOSPICE OF RN EA 15 MIN: HCPCS

## 2024-02-16 PROCEDURE — 85018 HEMOGLOBIN: CPT | Performed by: INTERNAL MEDICINE

## 2024-02-16 PROCEDURE — 80048 BASIC METABOLIC PNL TOTAL CA: CPT | Performed by: INTERNAL MEDICINE

## 2024-02-16 PROCEDURE — G0151 HHCP-SERV OF PT,EA 15 MIN: HCPCS

## 2024-02-16 PROCEDURE — 85014 HEMATOCRIT: CPT | Performed by: INTERNAL MEDICINE

## 2024-02-16 NOTE — CASE COMMUNICATION
[facility] OT evaluation 2/12/24    No further OT services or planned this time as patient is independent with performance of daily activities with exception of ambulation for which patients SBA due to limitations with balance.  Patient notes feeling comfortable with upper extremity strength and  current function with self care / assistance from his caregivers for walking and bathing.  Patient denies need for OT services for upper extremity st rengthening or ADL training and patient has appropriate DME for safety with performance of daily activities

## 2024-02-17 VITALS
SYSTOLIC BLOOD PRESSURE: 120 MMHG | OXYGEN SATURATION: 95 % | HEART RATE: 84 BPM | RESPIRATION RATE: 20 BRPM | DIASTOLIC BLOOD PRESSURE: 70 MMHG | TEMPERATURE: 98.2 F

## 2024-02-20 ENCOUNTER — HOME CARE VISIT (OUTPATIENT)
Dept: HOME HEALTH SERVICES | Facility: HOME HEALTHCARE | Age: 84
End: 2024-02-20
Payer: COMMERCIAL

## 2024-02-20 VITALS
DIASTOLIC BLOOD PRESSURE: 78 MMHG | OXYGEN SATURATION: 98 % | TEMPERATURE: 98.3 F | WEIGHT: 118 LBS | BODY MASS INDEX: 21.58 KG/M2 | HEART RATE: 78 BPM | RESPIRATION RATE: 18 BRPM | SYSTOLIC BLOOD PRESSURE: 126 MMHG

## 2024-02-20 PROCEDURE — G0299 HHS/HOSPICE OF RN EA 15 MIN: HCPCS

## 2024-02-21 ENCOUNTER — HOME CARE VISIT (OUTPATIENT)
Dept: HOME HEALTH SERVICES | Facility: HOME HEALTHCARE | Age: 84
End: 2024-02-21
Payer: COMMERCIAL

## 2024-02-21 ENCOUNTER — PATIENT OUTREACH (OUTPATIENT)
Dept: CASE MANAGEMENT | Facility: OTHER | Age: 84
End: 2024-02-21

## 2024-02-21 VITALS — DIASTOLIC BLOOD PRESSURE: 78 MMHG | HEART RATE: 77 BPM | OXYGEN SATURATION: 98 % | SYSTOLIC BLOOD PRESSURE: 118 MMHG

## 2024-02-21 PROCEDURE — G0151 HHCP-SERV OF PT,EA 15 MIN: HCPCS

## 2024-02-21 NOTE — PROGRESS NOTES
Sister-in-law, Chiqui, says Israel has had some issues and she will be calling PCP office for an appointment for evaluation.    She did look into a home health aide, but the agencies she called were not financially feasible for them at this time.

## 2024-02-21 NOTE — CASE COMMUNICATION
This is the message I got back from Aissatou.  i just called the caregiver and told her his response to make an appt.      Lindsey  ----- Message -----  From: Giovanni Reyez DO  Sent: 2/21/2024   6:15 AM EST  To: Lindsey Arellano RN      This is a lot going on. Needs a half hour appt.   ----- Message -----  From: Lindsey Arellano RN  Sent: 2/20/2024   8:16 PM EST  To: Kristi Gamez RN; Giovanni Reyez DO; *    [facility] and [facility] was having a severe leg cramp in the left leg.  Caregiver states he is getting them a lot lately and concerned it is dehydration or low potassium.  Requesting potassium level.      Patient is getting very nasty to caregivers.  SN witnessed him with the grand daughter and screaming.  Educated caregiver on support groups for demetia family members.      Patient has started hallucinating and seeing people that are not t here and thinks people are outside of the house.  This is new behavior for him.     Also, his AM blood sugars are on the higher side than his normal.  Between 150-250 most of the time.      Patient had a fall on 2/18 at 2am.  he was standing on the side of the bed and fell on the right side.  no injuries and caregiver has a video of it.      Patient has been complaining of a sore throat and his daughter tested + for COVID recently and c aregiver worried he may have it.  Requesting COVID swab.    Patient is also not taking his afternoon medications and not eating much anymore.  Caregivers are concerned.      Thank you,    Lindsey Arellano RN

## 2024-02-21 NOTE — CASE COMMUNICATION
Sn arrived at patients home and he was having a severe leg cramp in the left leg.  Caregiver states he is getting them a lot lately and concerned it is dehydration or low potassium.  Requesting potassium level.      Patient is getting very nasty to caregivers.  SN witnessed him with the grand daughter and screaming.  Educated caregiver on support groups for demetia family members.      Patient has started hallucinating and seeing people  that are not there and thinks people are outside of the house.  This is new behavior for him.     Also, his AM blood sugars are on the higher side than his normal.  Between 150-250 most of the time.      Patient had a fall on 2/18 at 2am.  he was standing on the side of the bed and fell on the right side.  no injuries and caregiver has a video of it.      Patient has been complaining of a sore throat and his daughter tested + for COVID  recently and caregiver worried he may have it.  Requesting COVID swab.    Patient is also not taking his afternoon medications and not eating much anymore.  Caregivers are concerned.      Thank you,    Lindsey Arellano RN

## 2024-02-23 ENCOUNTER — HOME CARE VISIT (OUTPATIENT)
Dept: HOME HEALTH SERVICES | Facility: HOME HEALTHCARE | Age: 84
End: 2024-02-23
Payer: COMMERCIAL

## 2024-02-23 ENCOUNTER — APPOINTMENT (EMERGENCY)
Dept: RADIOLOGY | Facility: HOSPITAL | Age: 84
DRG: 884 | End: 2024-02-23
Payer: COMMERCIAL

## 2024-02-23 ENCOUNTER — HOSPITAL ENCOUNTER (INPATIENT)
Facility: HOSPITAL | Age: 84
LOS: 1 days | Discharge: HOME | DRG: 884 | End: 2024-02-24
Attending: EMERGENCY MEDICINE | Admitting: HOSPITALIST
Payer: COMMERCIAL

## 2024-02-23 ENCOUNTER — TELEPHONE (OUTPATIENT)
Dept: INTERNAL MEDICINE CLINIC | Facility: CLINIC | Age: 84
End: 2024-02-23

## 2024-02-23 ENCOUNTER — TELEPHONE (OUTPATIENT)
Dept: LAB | Facility: HOSPITAL | Age: 84
End: 2024-02-23

## 2024-02-23 VITALS — SYSTOLIC BLOOD PRESSURE: 126 MMHG | OXYGEN SATURATION: 95 % | HEART RATE: 80 BPM | DIASTOLIC BLOOD PRESSURE: 64 MMHG

## 2024-02-23 VITALS
HEART RATE: 92 BPM | WEIGHT: 120 LBS | TEMPERATURE: 97.4 F | SYSTOLIC BLOOD PRESSURE: 114 MMHG | RESPIRATION RATE: 18 BRPM | OXYGEN SATURATION: 98 % | DIASTOLIC BLOOD PRESSURE: 60 MMHG | BODY MASS INDEX: 21.95 KG/M2

## 2024-02-23 DIAGNOSIS — F03.A18: ICD-10-CM

## 2024-02-23 DIAGNOSIS — F03.B11 MODERATE DEMENTIA WITH AGITATION, UNSPECIFIED DEMENTIA TYPE (HCC): ICD-10-CM

## 2024-02-23 DIAGNOSIS — F03.90 DEMENTIA (HCC): Primary | ICD-10-CM

## 2024-02-23 DIAGNOSIS — R45.851 SUICIDAL IDEATION: ICD-10-CM

## 2024-02-23 DIAGNOSIS — R44.3 HALLUCINATIONS: ICD-10-CM

## 2024-02-23 DIAGNOSIS — R41.0 DELIRIUM: Primary | ICD-10-CM

## 2024-02-23 LAB
ALBUMIN SERPL BCP-MCNC: 3.9 G/DL (ref 3.5–5)
ALP SERPL-CCNC: 81 U/L (ref 34–104)
ALT SERPL W P-5'-P-CCNC: 18 U/L (ref 7–52)
ANION GAP SERPL CALCULATED.3IONS-SCNC: 9 MMOL/L
AST SERPL W P-5'-P-CCNC: 28 U/L (ref 13–39)
BASOPHILS # BLD AUTO: 0.04 THOUSANDS/ÂΜL (ref 0–0.1)
BASOPHILS NFR BLD AUTO: 1 % (ref 0–1)
BILIRUB SERPL-MCNC: 0.43 MG/DL (ref 0.2–1)
BUN SERPL-MCNC: 28 MG/DL (ref 5–25)
CALCIUM SERPL-MCNC: 9.2 MG/DL (ref 8.4–10.2)
CHLORIDE SERPL-SCNC: 101 MMOL/L (ref 96–108)
CO2 SERPL-SCNC: 27 MMOL/L (ref 21–32)
CREAT SERPL-MCNC: 1.17 MG/DL (ref 0.6–1.3)
EOSINOPHIL # BLD AUTO: 0.26 THOUSAND/ÂΜL (ref 0–0.61)
EOSINOPHIL NFR BLD AUTO: 4 % (ref 0–6)
ERYTHROCYTE [DISTWIDTH] IN BLOOD BY AUTOMATED COUNT: 12.2 % (ref 11.6–15.1)
ETHANOL SERPL-MCNC: <10 MG/DL
FLUAV RNA RESP QL NAA+PROBE: NEGATIVE
FLUBV RNA RESP QL NAA+PROBE: NEGATIVE
GFR SERPL CREATININE-BSD FRML MDRD: 57 ML/MIN/1.73SQ M
GLUCOSE SERPL-MCNC: 153 MG/DL (ref 65–140)
HCT VFR BLD AUTO: 36.6 % (ref 36.5–49.3)
HGB BLD-MCNC: 12.2 G/DL (ref 12–17)
IMM GRANULOCYTES # BLD AUTO: 0.04 THOUSAND/UL (ref 0–0.2)
IMM GRANULOCYTES NFR BLD AUTO: 1 % (ref 0–2)
LYMPHOCYTES # BLD AUTO: 1.73 THOUSANDS/ÂΜL (ref 0.6–4.47)
LYMPHOCYTES NFR BLD AUTO: 27 % (ref 14–44)
MCH RBC QN AUTO: 30.7 PG (ref 26.8–34.3)
MCHC RBC AUTO-ENTMCNC: 33.3 G/DL (ref 31.4–37.4)
MCV RBC AUTO: 92 FL (ref 82–98)
MONOCYTES # BLD AUTO: 0.62 THOUSAND/ÂΜL (ref 0.17–1.22)
MONOCYTES NFR BLD AUTO: 10 % (ref 4–12)
NEUTROPHILS # BLD AUTO: 3.82 THOUSANDS/ÂΜL (ref 1.85–7.62)
NEUTS SEG NFR BLD AUTO: 57 % (ref 43–75)
NRBC BLD AUTO-RTO: 0 /100 WBCS
PLATELET # BLD AUTO: 178 THOUSANDS/UL (ref 149–390)
PMV BLD AUTO: 9.5 FL (ref 8.9–12.7)
POTASSIUM SERPL-SCNC: 4.7 MMOL/L (ref 3.5–5.3)
PROT SERPL-MCNC: 7.3 G/DL (ref 6.4–8.4)
RBC # BLD AUTO: 3.98 MILLION/UL (ref 3.88–5.62)
RSV RNA RESP QL NAA+PROBE: NEGATIVE
SARS-COV-2 RNA RESP QL NAA+PROBE: NEGATIVE
SODIUM SERPL-SCNC: 137 MMOL/L (ref 135–147)
TSH SERPL DL<=0.05 MIU/L-ACNC: 2.69 UIU/ML (ref 0.45–4.5)
WBC # BLD AUTO: 6.51 THOUSAND/UL (ref 4.31–10.16)

## 2024-02-23 PROCEDURE — 99285 EMERGENCY DEPT VISIT HI MDM: CPT | Performed by: EMERGENCY MEDICINE

## 2024-02-23 PROCEDURE — 80053 COMPREHEN METABOLIC PANEL: CPT | Performed by: EMERGENCY MEDICINE

## 2024-02-23 PROCEDURE — 0241U HB NFCT DS VIR RESP RNA 4 TRGT: CPT | Performed by: EMERGENCY MEDICINE

## 2024-02-23 PROCEDURE — 93005 ELECTROCARDIOGRAM TRACING: CPT

## 2024-02-23 PROCEDURE — G0299 HHS/HOSPICE OF RN EA 15 MIN: HCPCS

## 2024-02-23 PROCEDURE — 71045 X-RAY EXAM CHEST 1 VIEW: CPT

## 2024-02-23 PROCEDURE — 36415 COLL VENOUS BLD VENIPUNCTURE: CPT | Performed by: EMERGENCY MEDICINE

## 2024-02-23 PROCEDURE — 85025 COMPLETE CBC W/AUTO DIFF WBC: CPT | Performed by: EMERGENCY MEDICINE

## 2024-02-23 PROCEDURE — G0151 HHCP-SERV OF PT,EA 15 MIN: HCPCS

## 2024-02-23 PROCEDURE — 82077 ASSAY SPEC XCP UR&BREATH IA: CPT | Performed by: EMERGENCY MEDICINE

## 2024-02-23 PROCEDURE — 84443 ASSAY THYROID STIM HORMONE: CPT | Performed by: EMERGENCY MEDICINE

## 2024-02-23 PROCEDURE — 99285 EMERGENCY DEPT VISIT HI MDM: CPT

## 2024-02-23 RX ORDER — QUETIAPINE FUMARATE 25 MG/1
25 TABLET, FILM COATED ORAL 2 TIMES DAILY
Qty: 180 TABLET | Refills: 1 | Status: SHIPPED | OUTPATIENT
Start: 2024-02-23

## 2024-02-23 NOTE — TELEPHONE ENCOUNTER
Spoke to daughter  she said visiting nurse is supposed to draw his blood today .  She will call us back if mobile is needed

## 2024-02-23 NOTE — TELEPHONE ENCOUNTER
Visiting Nurse called stating Israel is starting to get a little nasty and wont come out of the bathroom, been in there for an hour, Having a hard time getting him to go to the lab.  I called mobile lab, they will reach out to denny to get an appointment scheduled for his blood work and urine tests.

## 2024-02-23 NOTE — TELEPHONE ENCOUNTER
Can you place the orders for all of this, Chiqui stated yes she would like to have this done, and will start him on the medication.
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
## 2024-02-24 ENCOUNTER — APPOINTMENT (EMERGENCY)
Dept: CT IMAGING | Facility: HOSPITAL | Age: 84
DRG: 884 | End: 2024-02-24
Payer: COMMERCIAL

## 2024-02-24 VITALS
SYSTOLIC BLOOD PRESSURE: 167 MMHG | RESPIRATION RATE: 18 BRPM | DIASTOLIC BLOOD PRESSURE: 75 MMHG | OXYGEN SATURATION: 98 % | TEMPERATURE: 98 F | HEART RATE: 61 BPM

## 2024-02-24 LAB
AMPHETAMINES SERPL QL SCN: NEGATIVE
BARBITURATES UR QL: NEGATIVE
BENZODIAZ UR QL: NEGATIVE
BILIRUB UR QL STRIP: NEGATIVE
CLARITY UR: CLEAR
COCAINE UR QL: NEGATIVE
COLOR UR: YELLOW
GLUCOSE UR STRIP-MCNC: NEGATIVE MG/DL
HGB UR QL STRIP.AUTO: NEGATIVE
KETONES UR STRIP-MCNC: NEGATIVE MG/DL
LEUKOCYTE ESTERASE UR QL STRIP: NEGATIVE
METHADONE UR QL: NEGATIVE
NITRITE UR QL STRIP: NEGATIVE
OPIATES UR QL SCN: NEGATIVE
OXYCODONE+OXYMORPHONE UR QL SCN: NEGATIVE
PCP UR QL: NEGATIVE
PH UR STRIP.AUTO: 7 [PH]
PROT UR STRIP-MCNC: NEGATIVE MG/DL
SP GR UR STRIP.AUTO: 1.01
THC UR QL: NEGATIVE
UROBILINOGEN UR QL STRIP.AUTO: 0.2 E.U./DL

## 2024-02-24 PROCEDURE — 80307 DRUG TEST PRSMV CHEM ANLYZR: CPT | Performed by: EMERGENCY MEDICINE

## 2024-02-24 PROCEDURE — 74176 CT ABD & PELVIS W/O CONTRAST: CPT

## 2024-02-24 PROCEDURE — 70450 CT HEAD/BRAIN W/O DYE: CPT

## 2024-02-24 PROCEDURE — 71250 CT THORAX DX C-: CPT

## 2024-02-24 PROCEDURE — 81003 URINALYSIS AUTO W/O SCOPE: CPT | Performed by: EMERGENCY MEDICINE

## 2024-02-24 RX ORDER — AMPICILLIN TRIHYDRATE 500 MG
1000 CAPSULE ORAL DAILY
COMMUNITY
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
## 2024-02-24 NOTE — CASE MANAGEMENT
Case Management Discharge Planning Note    Patient name Israel Hughes  Location ED 20/ED 20 MRN [id number]  : 1940 Date 2024       Current Admission Date: 2024  Current Admission Diagnosis:Altered mental status   Patient Active Problem List    Diagnosis Date Noted    Peripheral arterial disease ([facility]) 2024    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 ([facility]) 2024    Orthostatic lightheadedness 2024    Chronic diastolic congestive heart failure ([facility]) 2024    Stage 3 chronic kidney disease, unspecified whether stage 3a or 3b CKD ([facility]) 2023    Mild protein-calorie malnutrition ([facility]) 2022    Dysphagia 2022    Mild aortic stenosis 10/22/2021    Mild mitral regurgitation 10/22/2021    Diastolic dysfunction 10/22/2021    Mild concentric left ventricular hypertrophy (LVH) 10/22/2021    Type 2 diabetes mellitus with retinopathy, without long-term current use of insulin ([facility]) 09/15/2021    Myasthenia gravis without exacerbation ([facility]) 09/15/2021    Mixed hyperlipidemia 09/15/2021    Retinopathy 09/15/2021    Primary open angle glaucoma (POAG) of both eyes, mild stage 09/15/2021    Ptosis of both eyelids 09/15/2021    Benign prostatic hyperplasia without lower urinary tract symptoms 09/15/2021    Essential hypertension 09/15/2021    Other age-related cataract 09/15/2021      LOS (days): 0  Geometric Mean LOS (GMLOS) (days):   Days to GMLOS:     OBJECTIVE:            Current admission status: Inpatient   Preferred Pharmacy:   [facility] [address]  [address]  [address] [address]  Phone: [phone] Fax: [phone]    Primary Care Provider: Giovanni Reyez DO    Primary Insurance: [facility]  Secondary Insurance:     DISCHARGE DETAILS:  Spoke to pt granddaughter Irais who states she will take pt home.  Did provide the phone numbers for [facility] who both have  dementia units to see if they would have any male beds.  [facility] states she would call.

## 2024-02-24 NOTE — ED NOTES
Writer was notified that the patient was psychiatrically cleared by Dr. Bynum.  Call was placed to the patient's granddaughter, Bianca, to inform her of the above.  She was told that, if she did not feel she was able to care for her grandfather, that case management could attempt to find placement.  Bianca declined case management's assistance, saying that she wanted to take the patient home.    It is not appropriate to complete a safety plan with the patient.
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
## 2024-02-24 NOTE — ED NOTES
Spoke to Bianca his grandaughter about transportation for PT. The Sister-in Law is coming in an hour for him.      Erinn Valencia RN  02/24/24 2968
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
## 2024-02-25 LAB
ATRIAL RATE: 67 BPM
P AXIS: 46 DEGREES
PR INTERVAL: 166 MS
QRS AXIS: 55 DEGREES
QRSD INTERVAL: 84 MS
QT INTERVAL: 410 MS
QTC INTERVAL: 433 MS
T WAVE AXIS: 64 DEGREES
VENTRICULAR RATE: 67 BPM

## 2024-02-25 PROCEDURE — 93010 ELECTROCARDIOGRAM REPORT: CPT | Performed by: INTERNAL MEDICINE

## 2024-02-26 ENCOUNTER — TELEPHONE (OUTPATIENT)
Dept: INTERNAL MEDICINE CLINIC | Facility: CLINIC | Age: 84
End: 2024-02-26

## 2024-02-26 ENCOUNTER — RA CDI HCC (OUTPATIENT)
Dept: OTHER | Facility: HOSPITAL | Age: 84
End: 2024-02-26

## 2024-02-26 ENCOUNTER — TRANSITIONAL CARE MANAGEMENT (OUTPATIENT)
Dept: INTERNAL MEDICINE CLINIC | Facility: CLINIC | Age: 84
End: 2024-02-26

## 2024-02-26 ENCOUNTER — PATIENT OUTREACH (OUTPATIENT)
Dept: CASE MANAGEMENT | Facility: OTHER | Age: 84
End: 2024-02-26

## 2024-02-26 DIAGNOSIS — N18.30 STAGE 3 CHRONIC KIDNEY DISEASE, UNSPECIFIED WHETHER STAGE 3A OR 3B CKD (HCC): Primary | ICD-10-CM

## 2024-02-26 NOTE — PROGRESS NOTES
E11.22, E11.65, E11.51, I13.0  HCC coding opportunities          Chart Reviewed number of suggestions sent to Provider: 4     Patients Insurance     Medicare Insurance: Medicare

## 2024-02-26 NOTE — PROGRESS NOTES
ADT notification that Israel had been in the ER.    Follow up telemedicine visit with psychiatry on 2/24.  Medication adjusted. Israel was cleared for discharge home.      Granddaughter, Bianca and sister-in-law, Chiqui are primary care givers and decline placement at this time.  However, numbers to [facility] were given to Bianca by inpatient case management.  She did say she would call for info.    I spoke to Chiqui and let her know that I would make a referral for the outpatient  to call for further information on follow up care.

## 2024-02-26 NOTE — TELEPHONE ENCOUNTER
----- Message from Neto Diaz MA sent at 2/26/2024  9:07 AM EST -----  Regarding: ED Visit  02/26/24 9:07 AM    Patient has a recent ED visit but report states a Sensitive Diagnosis. Please follow-up with the patient.    Thank you.  Neto Patton MA  [facility]

## 2024-02-27 ENCOUNTER — HOME CARE VISIT (OUTPATIENT)
Dept: HOME HEALTH SERVICES | Facility: HOME HEALTHCARE | Age: 84
End: 2024-02-27
Payer: COMMERCIAL

## 2024-02-27 VITALS — OXYGEN SATURATION: 97 % | HEART RATE: 84 BPM | DIASTOLIC BLOOD PRESSURE: 72 MMHG | SYSTOLIC BLOOD PRESSURE: 120 MMHG

## 2024-02-27 PROCEDURE — G0151 HHCP-SERV OF PT,EA 15 MIN: HCPCS

## 2024-02-28 ENCOUNTER — VBI (OUTPATIENT)
Dept: ADMINISTRATIVE | Facility: OTHER | Age: 84
End: 2024-02-28

## 2024-02-28 ENCOUNTER — HOME CARE VISIT (OUTPATIENT)
Dept: HOME HEALTH SERVICES | Facility: HOME HEALTHCARE | Age: 84
End: 2024-02-28
Payer: COMMERCIAL

## 2024-02-28 ENCOUNTER — PATIENT OUTREACH (OUTPATIENT)
Dept: INTERNAL MEDICINE CLINIC | Facility: CLINIC | Age: 84
End: 2024-02-28

## 2024-02-28 VITALS
TEMPERATURE: 97.8 F | HEART RATE: 84 BPM | DIASTOLIC BLOOD PRESSURE: 60 MMHG | OXYGEN SATURATION: 98 % | WEIGHT: 119 LBS | RESPIRATION RATE: 16 BRPM | BODY MASS INDEX: 21.77 KG/M2 | SYSTOLIC BLOOD PRESSURE: 116 MMHG

## 2024-02-28 PROCEDURE — G0299 HHS/HOSPICE OF RN EA 15 MIN: HCPCS

## 2024-02-28 NOTE — TELEPHONE ENCOUNTER
02/28/24 10:33 AM    Patient contacted (spoke with patient) to bring Advance Directive, POLST, or Living Will document to next scheduled pcp visit.    Thank you.  Julianne Means [facility]

## 2024-02-28 NOTE — PROGRESS NOTES
RANI DUCKWORTH received referral from OPCM RN-Jeff for assistance at home.  Patient was provided with [facility] information, however the cost is more than what the family is hoping to spend at this time.    OPCJOSY DUCKWORTH spoke with patient's sister in law-Chiqui whom is currently his primary caregiver.  Chiqui reports that patient is fairly independent and his income also would not meet the waiver criteria at this time.      Sister in law reports that she may call the [employer] to see if anyone there is looking to make some extra money while enrolled.  RANI DUCKWORTH also informed her that some other patient's have found assistance on [employer].    Patient's granddaughter in [place] is a  as well and working on getting patient on a waitlist at Assisted Living facilities in that area, however patient is not currently ready or willing to move.    Sister in law appreciative of information.  No further need for SW involvement at this time.  Referral closed.

## 2024-03-01 ENCOUNTER — HOME CARE VISIT (OUTPATIENT)
Dept: HOME HEALTH SERVICES | Facility: HOME HEALTHCARE | Age: 84
End: 2024-03-01
Payer: COMMERCIAL

## 2024-03-01 ENCOUNTER — TELEPHONE (OUTPATIENT)
Dept: LAB | Facility: HOSPITAL | Age: 84
End: 2024-03-01

## 2024-03-01 VITALS
RESPIRATION RATE: 18 BRPM | OXYGEN SATURATION: 97 % | TEMPERATURE: 97.8 F | HEART RATE: 80 BPM | DIASTOLIC BLOOD PRESSURE: 66 MMHG | SYSTOLIC BLOOD PRESSURE: 120 MMHG

## 2024-03-01 PROCEDURE — G0299 HHS/HOSPICE OF RN EA 15 MIN: HCPCS

## 2024-03-04 ENCOUNTER — PATIENT OUTREACH (OUTPATIENT)
Dept: CASE MANAGEMENT | Facility: OTHER | Age: 84
End: 2024-03-04

## 2024-03-04 ENCOUNTER — OFFICE VISIT (OUTPATIENT)
Dept: INTERNAL MEDICINE CLINIC | Facility: CLINIC | Age: 84
End: 2024-03-04
Payer: COMMERCIAL

## 2024-03-04 VITALS
DIASTOLIC BLOOD PRESSURE: 60 MMHG | WEIGHT: 122 LBS | HEART RATE: 105 BPM | SYSTOLIC BLOOD PRESSURE: 140 MMHG | BODY MASS INDEX: 22.45 KG/M2 | HEIGHT: 62 IN | OXYGEN SATURATION: 97 % | TEMPERATURE: 97.6 F

## 2024-03-04 DIAGNOSIS — R45.1 AGITATION: ICD-10-CM

## 2024-03-04 DIAGNOSIS — F02.B11 MODERATE LATE ONSET ALZHEIMER'S DEMENTIA WITH AGITATION (HCC): ICD-10-CM

## 2024-03-04 DIAGNOSIS — R32 URINARY INCONTINENCE, UNSPECIFIED TYPE: ICD-10-CM

## 2024-03-04 DIAGNOSIS — G70.00 MYASTHENIA GRAVIS WITHOUT EXACERBATION (HCC): ICD-10-CM

## 2024-03-04 DIAGNOSIS — I50.32 CHRONIC DIASTOLIC CONGESTIVE HEART FAILURE (HCC): Primary | ICD-10-CM

## 2024-03-04 DIAGNOSIS — E78.2 MIXED HYPERLIPIDEMIA: ICD-10-CM

## 2024-03-04 DIAGNOSIS — Z02.89 ENCOUNTER FOR COMPLETION OF FORM WITH PATIENT: ICD-10-CM

## 2024-03-04 DIAGNOSIS — R41.3 MEMORY DIFFICULTY: ICD-10-CM

## 2024-03-04 DIAGNOSIS — N18.30 STAGE 3 CHRONIC KIDNEY DISEASE, UNSPECIFIED WHETHER STAGE 3A OR 3B CKD (HCC): ICD-10-CM

## 2024-03-04 DIAGNOSIS — G30.1 MODERATE LATE ONSET ALZHEIMER'S DEMENTIA WITH AGITATION (HCC): ICD-10-CM

## 2024-03-04 DIAGNOSIS — R41.0 DELIRIUM: ICD-10-CM

## 2024-03-04 DIAGNOSIS — I73.9 PERIPHERAL ARTERIAL DISEASE (HCC): ICD-10-CM

## 2024-03-04 DIAGNOSIS — E44.1 MILD PROTEIN-CALORIE MALNUTRITION (HCC): ICD-10-CM

## 2024-03-04 DIAGNOSIS — E55.9 VITAMIN D DEFICIENCY: ICD-10-CM

## 2024-03-04 DIAGNOSIS — I10 ESSENTIAL HYPERTENSION: ICD-10-CM

## 2024-03-04 DIAGNOSIS — I35.0 MILD AORTIC STENOSIS: ICD-10-CM

## 2024-03-04 DIAGNOSIS — E11.319 TYPE 2 DIABETES MELLITUS WITH RETINOPATHY OF BOTH EYES, WITHOUT LONG-TERM CURRENT USE OF INSULIN, MACULAR EDEMA PRESENCE UNSPECIFIED, UNSPECIFIED RETINOPATHY SEVERITY (HCC): ICD-10-CM

## 2024-03-04 DIAGNOSIS — I50.9 CHF EXACERBATION (HCC): ICD-10-CM

## 2024-03-04 DIAGNOSIS — Z13.5 SCREENING FOR DIABETIC RETINOPATHY: ICD-10-CM

## 2024-03-04 PROCEDURE — 99495 TRANSJ CARE MGMT MOD F2F 14D: CPT | Performed by: INTERNAL MEDICINE

## 2024-03-04 RX ORDER — DONEPEZIL HYDROCHLORIDE 5 MG/1
10 TABLET, FILM COATED ORAL
Qty: 90 TABLET | Refills: 0 | Status: SHIPPED | OUTPATIENT
Start: 2024-03-04

## 2024-03-04 RX ORDER — FUROSEMIDE 40 MG/1
20 TABLET ORAL DAILY
Qty: 15 TABLET | Refills: 0 | Status: SHIPPED | OUTPATIENT
Start: 2024-03-04 | End: 2024-04-03

## 2024-03-04 RX ORDER — MEMANTINE HYDROCHLORIDE 5 MG-10 MG
KIT ORAL
Qty: 38 TABLET | Refills: 0 | Status: SHIPPED | OUTPATIENT
Start: 2024-03-04 | End: 2024-03-04

## 2024-03-04 RX ORDER — ATORVASTATIN CALCIUM 20 MG/1
20 TABLET, FILM COATED ORAL DAILY
Qty: 90 TABLET | Refills: 1 | Status: SHIPPED | OUTPATIENT
Start: 2024-03-04

## 2024-03-04 RX ORDER — MEMANTINE HYDROCHLORIDE 5 MG-10 MG
KIT ORAL
Qty: 49 TABLET | Refills: 0 | Status: SHIPPED | OUTPATIENT
Start: 2024-03-04

## 2024-03-04 RX ORDER — FLUOXETINE HYDROCHLORIDE 20 MG/1
20 CAPSULE ORAL EVERY MORNING
Qty: 90 CAPSULE | Refills: 1 | Status: SHIPPED | OUTPATIENT
Start: 2024-03-04

## 2024-03-04 RX ORDER — AMPICILLIN TRIHYDRATE 500 MG
1000 CAPSULE ORAL DAILY
Qty: 90 CAPSULE | Refills: 1 | Status: SHIPPED | OUTPATIENT
Start: 2024-03-04

## 2024-03-04 RX ORDER — TAMSULOSIN HYDROCHLORIDE 0.4 MG/1
0.4 CAPSULE ORAL DAILY
Qty: 90 CAPSULE | Refills: 1 | Status: SHIPPED | OUTPATIENT
Start: 2024-03-04

## 2024-03-04 RX ORDER — CHLORAL HYDRATE 500 MG
1000 CAPSULE ORAL DAILY
Qty: 90 CAPSULE | Refills: 1 | Status: SHIPPED | OUTPATIENT
Start: 2024-03-04

## 2024-03-04 RX ORDER — QUETIAPINE FUMARATE 25 MG/1
25 TABLET, FILM COATED ORAL 2 TIMES DAILY
Qty: 180 TABLET | Refills: 1 | Status: SHIPPED | OUTPATIENT
Start: 2024-03-04

## 2024-03-04 NOTE — PROGRESS NOTES
Assessment & Plan     1. Chronic diastolic congestive heart failure (HCC)    2. Screening for diabetic retinopathy    3. Essential hypertension    4. Mild aortic stenosis    5. Type 2 diabetes mellitus with retinopathy of both eyes, without long-term current use of insulin, macular edema presence unspecified, unspecified retinopathy severity (HCC)  -     atorvastatin (LIPITOR) 20 mg tablet; Take 1 tablet (20 mg total) by mouth daily  -     metFORMIN (GLUCOPHAGE) 1000 MG tablet; Take 1 tablet (1,000 mg total) by mouth 2 (two) times a day with meals    6. Stage 3 chronic kidney disease, unspecified whether stage 3a or 3b CKD ([facility])    7. Moderate late onset Alzheimer's dementia with agitation ([facility])  -     memantine (NAMENDA TITRATION PACK); Follow package directions.    8. Encounter for completion of form with patient    9. Mixed hyperlipidemia  -     atorvastatin (LIPITOR) 20 mg tablet; Take 1 tablet (20 mg total) by mouth daily  -     Omega-3 Fatty Acids (fish oil) 1,000 mg; Take 1 capsule (1,000 mg total) by mouth daily    10. Memory difficulty  -     memantine (NAMENDA TITRATION PACK); Follow package directions.  -     donepezil (ARICEPT) 5 mg tablet; Take 2 tablets (10 mg total) by mouth daily at bedtime    11. Agitation  -     memantine (NAMENDA TITRATION PACK); Follow package directions.  -     FLUoxetine (PROzac) 20 mg capsule; Take 1 capsule (20 mg total) by mouth every morning    12. CHF exacerbation ([facility])  -     furosemide (LASIX) 40 mg tablet; Take 0.5 tablets (20 mg total) by mouth daily    13. Delirium  -     QUEtiapine (SEROquel) 25 mg tablet; Take 1 tablet (25 mg total) by mouth 2 (two) times a day    14. Urinary incontinence, unspecified type  -     tamsulosin (FLOMAX) 0.4 mg; Take 1 capsule (0.4 mg total) by mouth in the morning    15. Vitamin D deficiency  -     Cholecalciferol (D 1000) 25 MCG (1000 UT) capsule; Take 1 capsule (1,000 Units total) by mouth daily    16. Myasthenia gravis without  exacerbation (HCC)    17. Mild protein-calorie malnutrition (HCC)    18. Peripheral arterial disease (HCC)    A/P: From a physical standpoint, pt is stable and on s/s of CHF at this time. Tolerating diet, activity, and meds. Mentally, dementia is worsening and pt with agitation at times,but seroqel helping. . Family unable to care for pt and is looking into [facility] benefits.  involved. Will fill out required documentation. Continue with Aricept and will add namenda. Continue seroquel current dose.. Consider Rexulti for agitation. RTC four weeks for f/u.      Subjective     Transitional Care Management Review:   Israel Hughes is a 83 y.o. male here for TCM follow up.     During the TCM phone call patient stated:  TCM Call       Date and time call was made  2/26/2024  4:49 PM    Hospital care reviewed  Records reviewed    Patient was hospitial[facility] at  [facility]    Date of Admission  02/23/24    Date of discharge  02/24/24    Diagnosis  dementia    Disposition  Home    Were the patients medications reviewed and updated  Yes    Current Symptoms  None          TCM Call       Post hospital issues  None    Should patient be enrolled in anticoag monitoring?  No    Scheduled for follow up?  Yes    Did you obtain your prescribed medications  Yes    Do you need help managing your prescriptions or medications  No    Is transportation to your appointment needed  No    I have advised the patient to call PCP with any new or worsening symptoms  al bernstein ma           RTC for f/u short admission, rehab admission, and then an ER observation for LE edema and changes in MS. Admitted seen by cards. [place] to have dCHF flare and diuresed. Lost over 4 l of fluids. Echo with EF 50% and same AS. Labs ok. Coarse complicated by worsening dementia with agitations. D/c'd to rehab and did ok. D/c'd to home, but pt with worsening MS with agitation, hallucinations, and suicidal threat. Seen by behavioral med and deemed not an  "appropriate behavioral in pt person. Declined NH placement at that time. Since d/c, doing ok, but mental status no better, but POA reports behavior is better and no hallucinations since we started seroquel. POA are seeking assistance from the [facility]. Pt denies any pain, CP, SOB, etc.       Review of Systems   Constitutional:  Positive for activity change. Negative for appetite change, chills, diaphoresis, fatigue and fever.   HENT: Negative.     Eyes:  Negative for visual disturbance.   Respiratory:  Negative for cough, chest tightness, shortness of breath and wheezing.    Cardiovascular:  Negative for chest pain, palpitations and leg swelling.   Gastrointestinal:  Negative for abdominal pain, constipation, diarrhea, nausea and vomiting.   Endocrine: Negative for cold intolerance and heat intolerance.   Genitourinary:  Negative for difficulty urinating, dysuria and frequency.   Musculoskeletal:  Negative for arthralgias, gait problem and myalgias.   Neurological:  Negative for dizziness, seizures, syncope, weakness, light-headedness and headaches.   Psychiatric/Behavioral:  Positive for confusion. Negative for agitation, behavioral problems, decreased concentration, dysphoric mood, hallucinations, self-injury, sleep disturbance and suicidal ideas. The patient is nervous/anxious.        Objective     /60   Pulse 105   Temp 97.6 °F (36.4 °C)   Ht 5' 2\" (1.575 m)   Wt 55.3 kg (122 lb)   SpO2 97%   BMI 22.31 kg/m²      Physical Exam  Vitals and nursing note reviewed.   Constitutional:       General: He is not in acute distress.     Appearance: Normal appearance. He is not ill-appearing.   HENT:      Head: Normocephalic and atraumatic.      Mouth/Throat:      Mouth: Mucous membranes are moist.   Eyes:      Extraocular Movements: Extraocular movements intact.      Conjunctiva/sclera: Conjunctivae normal.      Pupils: Pupils are equal, round, and reactive to light.   Neck:      Vascular: No carotid bruit. "   Cardiovascular:      Rate and Rhythm: Normal rate and regular rhythm.      Heart sounds: Normal heart sounds. No murmur heard.  Pulmonary:      Effort: Pulmonary effort is normal. No respiratory distress.      Breath sounds: Normal breath sounds. No wheezing, rhonchi or rales.   Abdominal:      General: Bowel sounds are normal. There is no distension.      Palpations: Abdomen is soft.      Tenderness: There is no abdominal tenderness.   Musculoskeletal:      Cervical back: Neck supple.      Right lower leg: No edema.      Left lower leg: No edema.   Neurological:      General: No focal deficit present.      Mental Status: He is alert.      Cranial Nerves: No cranial nerve deficit.      Motor: No weakness.      Coordination: Coordination normal.      Gait: Gait abnormal.      Deep Tendon Reflexes: Reflexes normal.   Psychiatric:      Comments: Poor judgement. Rash and flight of ideas at time.        Medications have been reviewed by provider in current encounter    Giovanni Reyez DO

## 2024-03-04 NOTE — PROGRESS NOTES
Spoke with Chiqui, sister-in-law.  She states Israel is doing good today. Continues with home health care.     He has an appointment today with his PCP for further evaluation.
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
## 2024-03-06 ENCOUNTER — HOME CARE VISIT (OUTPATIENT)
Dept: HOME HEALTH SERVICES | Facility: HOME HEALTHCARE | Age: 84
End: 2024-03-06
Payer: COMMERCIAL

## 2024-03-06 ENCOUNTER — PATIENT OUTREACH (OUTPATIENT)
Dept: CASE MANAGEMENT | Facility: OTHER | Age: 84
End: 2024-03-06

## 2024-03-06 VITALS
SYSTOLIC BLOOD PRESSURE: 110 MMHG | HEART RATE: 78 BPM | TEMPERATURE: 97.9 F | OXYGEN SATURATION: 98 % | DIASTOLIC BLOOD PRESSURE: 68 MMHG

## 2024-03-06 PROCEDURE — G0299 HHS/HOSPICE OF RN EA 15 MIN: HCPCS

## 2024-03-12 ENCOUNTER — HOME CARE VISIT (OUTPATIENT)
Dept: HOME HEALTH SERVICES | Facility: HOME HEALTHCARE | Age: 84
End: 2024-03-12
Payer: COMMERCIAL

## 2024-03-12 VITALS
OXYGEN SATURATION: 97 % | RESPIRATION RATE: 16 BRPM | SYSTOLIC BLOOD PRESSURE: 122 MMHG | TEMPERATURE: 97.6 F | DIASTOLIC BLOOD PRESSURE: 60 MMHG | HEART RATE: 66 BPM

## 2024-03-12 PROCEDURE — G0299 HHS/HOSPICE OF RN EA 15 MIN: HCPCS

## 2024-03-19 ENCOUNTER — HOME CARE VISIT (OUTPATIENT)
Dept: HOME HEALTH SERVICES | Facility: HOME HEALTHCARE | Age: 84
End: 2024-03-19
Payer: COMMERCIAL

## 2024-03-19 VITALS
DIASTOLIC BLOOD PRESSURE: 56 MMHG | HEART RATE: 77 BPM | RESPIRATION RATE: 18 BRPM | OXYGEN SATURATION: 97 % | SYSTOLIC BLOOD PRESSURE: 110 MMHG | TEMPERATURE: 97.1 F

## 2024-03-19 PROCEDURE — G0299 HHS/HOSPICE OF RN EA 15 MIN: HCPCS

## 2024-03-25 NOTE — PATIENT INSTRUCTIONS
Basic Carbohydrate Counting   AMBULATORY CARE:   Carbohydrate counting  is a way to plan your meals by counting the amount of carbohydrate in foods. Carbohydrates are the sugars, starches, and fiber found in fruit, grains, vegetables, and milk products. Carbohydrates increase your blood sugar levels. Carbohydrate counting can help you eat the right amount of carbohydrate to keep your blood sugar levels under control.   What you need to know about planning meals using carbohydrate counting:  A dietitian or healthcare provider will help you develop a healthy meal plan that works best for you. You will be taught how much carbohydrate to eat or drink for each meal and snack. Your meal plan will be based on your age, weight, usual food intake, and physical activity level. If you have diabetes, it will also include your blood sugar levels and diabetes medicine. Once you know how much carbohydrate you should eat, you can decide what type of food you want to eat.    You will need to know what foods contain carbohydrate and how much they contain. Keep track of the amount of carbohydrate in meals and snacks in order to follow your meal plan. Do not avoid carbohydrates or skip meals. Your blood sugar may fall too low if you do not eat enough carbohydrate or you skip meals.    Foods that contain carbohydrate:   Breads:  Each serving of food listed below contains about 15 g of carbohydrate .    1 slice of bread (1 ounce) or 1 flour or corn tortilla (6 inch)    ½ of a hamburger bun or ¼ of a large bagel (about 1 ounce)    1 pancake (about 4 inches across and ¼ inch thick)    Cereals and grains:  Serving sizes of ready-to-eat cereals vary. Look at the serving size and the total carbohydrate amount listed on the food label. Each serving of food listed below contains about 15 g of carbohydrate .    ¾ cup of dry, unsweetened, ready-to-eat cereal or ¼ cup of low-fat granola     ½ cup of oatmeal or other cooked cereal     ? cup of  cooked rice or pasta    Starchy vegetables and beans:  Each serving of food listed below contains about 15 g of carbohydrate .    ½ cup of corn, green peas, sweet potatoes, or mashed potatoes    ¼ of a large baked potato    ½ cup of beans, lentils, and peas (garbanzo, krishnan, kidney, white, split, black-eyed)    Crackers and snacks:  Each serving of food listed below contains about 15 g of carbohydrate .    3 miky cracker squares or 8 animal crackers     6 saltine-type crackers    3 cups of popcorn or ¾ ounce of pretzels, potato chips, or tortilla chips    Fruit:  Each serving of food listed below contains about 15 g of carbohydrate .    1 small (4 ounce) piece of fresh fruit or ¾ to 1 cup of fresh fruit    ½ cup of canned or frozen fruit, packed in natural juice    ½ cup (4 ounces) of unsweetened fruit juice    2 tablespoons of dried fruit    Desserts or sugary foods:  Each serving of food listed below contains about 15 g of carbohydrate .    2-inch square unfrosted cake or brownie     2 small cookies    ½ cup of ice cream, frozen yogurt, or nondairy frozen yogurt    ¼ cup of sherbet or sorbet    1 tablespoon of regular syrup, jam, or jelly    2 tablespoons of light syrup    Milk and yogurt:  Foods from the milk group contain about 12 g of carbohydrate per serving.    1 cup of fat-free or low-fat milk    1 cup of soy milk    ? cup of fat-free, yogurt sweetened with artificial sweetener    Non-starchy vegetables:  Each serving contains about 5 g of carbohydrate . Three servings of non-starch vegetables count as 1 carbohydrate serving.     ½ cup of cooked vegetables or 1 cup of raw vegetables. This includes beets, broccoli, cabbage, cauliflower, cucumber, mushrooms, tomatoes, and zucchini    ½ cup of vegetable juice    How to use carbohydrate counting to plan meals:   Count carbohydrate amounts using serving sizes:      Pasta dinner example:  You plan to have pasta, tossed salad, and an 8-ounce glass of milk. Your  healthcare provider tells you that you may have 4 carbohydrate servings for dinner. One carbohydrate serving of pasta is ? cup. One cup of pasta will equal 3 carbohydrate servings. An 8-ounce glass of milk will count as 1 carbohydrate serving. These amounts of food would equal 4 carbohydrate servings. One cup of tossed salad does not count toward your carbohydrate servings.       Count carbohydrate amounts using food labels:  Find the total amount of carbohydrate in a packaged food by reading the food label. Food labels tell you the serving size of the food and the total carbohydrate amount in each serving. Find the serving size on the food label and then decide how many servings you will eat. Multiply the number of servings you plan to eat by the carbohydrate amount per serving.     Granola bar snack example:  Your meal plan allows you to have 2 carbohydrate servings (30 grams) of carbohydrate for a snack. You plan to eat 1 package of granola bars, which contains 2 bars. According to the food label, the serving size of food in this package is 1 bar. Each serving (1 bar) contains 25 grams of carbohydrate. The total amount of carbohydrate in this package of granola bars would be 50 g. Based on your meal plan, you should eat only 1 bar.      Follow up with your doctor as directed:  Write down your questions so you remember to ask them during your visits.  © Copyright [employer] 2023 Information is for End User's use only and may not be sold, redistributed or otherwise used for commercial purposes.  The above information is an  only. It is not intended as medical advice for individual conditions or treatments. Talk to your doctor, nurse or pharmacist before following any medical regimen to see if it is safe and effective for you.

## 2024-03-27 ENCOUNTER — TELEPHONE (OUTPATIENT)
Dept: ADMINISTRATIVE | Facility: OTHER | Age: 84
End: 2024-03-27

## 2024-03-27 NOTE — TELEPHONE ENCOUNTER
03/27/24 10:15 AM    Patient contacted (spoke with patient) to bring Advance Directive, POLST, or Living Will document to next scheduled pcp visit.    Thank you.  Paula Montenegro [facility]

## 2024-03-28 DIAGNOSIS — I50.9 CHF EXACERBATION (HCC): ICD-10-CM

## 2024-03-28 RX ORDER — FUROSEMIDE 40 MG/1
TABLET ORAL
Qty: 15 TABLET | Refills: 3 | Status: SHIPPED | OUTPATIENT
Start: 2024-03-28

## 2024-04-03 ENCOUNTER — OFFICE VISIT (OUTPATIENT)
Dept: INTERNAL MEDICINE CLINIC | Facility: CLINIC | Age: 84
End: 2024-04-03
Payer: COMMERCIAL

## 2024-04-03 VITALS
WEIGHT: 120.38 LBS | BODY MASS INDEX: 22.15 KG/M2 | HEIGHT: 62 IN | SYSTOLIC BLOOD PRESSURE: 132 MMHG | HEART RATE: 67 BPM | DIASTOLIC BLOOD PRESSURE: 60 MMHG | OXYGEN SATURATION: 96 % | TEMPERATURE: 97.5 F

## 2024-04-03 DIAGNOSIS — N18.30 STAGE 3 CHRONIC KIDNEY DISEASE, UNSPECIFIED WHETHER STAGE 3A OR 3B CKD (HCC): ICD-10-CM

## 2024-04-03 DIAGNOSIS — G70.00 MYASTHENIA GRAVIS WITHOUT EXACERBATION (HCC): ICD-10-CM

## 2024-04-03 DIAGNOSIS — F02.A11 MILD LATE ONSET ALZHEIMER'S DEMENTIA WITH AGITATION (HCC): ICD-10-CM

## 2024-04-03 DIAGNOSIS — E11.319 TYPE 2 DIABETES MELLITUS WITH RETINOPATHY OF BOTH EYES, WITHOUT LONG-TERM CURRENT USE OF INSULIN, MACULAR EDEMA PRESENCE UNSPECIFIED, UNSPECIFIED RETINOPATHY SEVERITY (HCC): Primary | ICD-10-CM

## 2024-04-03 DIAGNOSIS — G30.1 MILD LATE ONSET ALZHEIMER'S DEMENTIA WITH AGITATION (HCC): ICD-10-CM

## 2024-04-03 DIAGNOSIS — I50.32 CHRONIC DIASTOLIC CONGESTIVE HEART FAILURE (HCC): ICD-10-CM

## 2024-04-03 DIAGNOSIS — E78.2 MIXED HYPERLIPIDEMIA: ICD-10-CM

## 2024-04-03 DIAGNOSIS — N40.0 BENIGN PROSTATIC HYPERPLASIA WITHOUT LOWER URINARY TRACT SYMPTOMS: ICD-10-CM

## 2024-04-03 DIAGNOSIS — R26.9 GAIT ABNORMALITY: ICD-10-CM

## 2024-04-03 DIAGNOSIS — I73.9 PERIPHERAL ARTERIAL DISEASE (HCC): ICD-10-CM

## 2024-04-03 DIAGNOSIS — I10 ESSENTIAL HYPERTENSION: ICD-10-CM

## 2024-04-03 LAB — SL AMB POCT HEMOGLOBIN AIC: 7.8 (ref ?–6.5)

## 2024-04-03 PROCEDURE — 99214 OFFICE O/P EST MOD 30 MIN: CPT | Performed by: INTERNAL MEDICINE

## 2024-04-03 PROCEDURE — 83036 HEMOGLOBIN GLYCOSYLATED A1C: CPT | Performed by: INTERNAL MEDICINE

## 2024-04-03 RX ORDER — LANCETS 33 GAUGE
EACH MISCELLANEOUS
Qty: 100 EACH | Refills: 5 | Status: SHIPPED | OUTPATIENT
Start: 2024-04-03

## 2024-04-03 RX ORDER — MEMANTINE HYDROCHLORIDE 10 MG/1
10 TABLET ORAL 2 TIMES DAILY
Qty: 180 TABLET | Refills: 1 | Status: SHIPPED | OUTPATIENT
Start: 2024-04-03

## 2024-04-03 RX ORDER — BLOOD SUGAR DIAGNOSTIC
STRIP MISCELLANEOUS
Qty: 50 EACH | Refills: 0 | Status: SHIPPED | OUTPATIENT
Start: 2024-04-03 | End: 2024-04-12

## 2024-04-03 RX ORDER — LANCETS 33 GAUGE
EACH MISCELLANEOUS
Qty: 100 EACH | Refills: 3 | Status: SHIPPED | OUTPATIENT
Start: 2024-04-03 | End: 2024-04-12

## 2024-04-03 NOTE — PROGRESS NOTES
Assessment/Plan:  Problem List Items Addressed This Visit          Cardiovascular and Mediastinum    Peripheral arterial disease (HCC)    Essential hypertension    Relevant Orders    Albumin / creatinine urine ratio    Chronic diastolic congestive heart failure (HCC)       Endocrine    Type 2 diabetes mellitus with retinopathy, without long-term current use of insulin (HCC) - Primary    Relevant Medications    Empagliflozin (JARDIANCE) 10 MG TABS tablet    glucose blood (OneTouch Verio) test strip    OneTouch Delica Lancets 33G MISC    OneTouch Delica Lancets 33G MISC    Other Relevant Orders    POCT hemoglobin A1c (Completed)    Albumin / creatinine urine ratio    Lipid Panel with Direct LDL reflex       Nervous and Auditory    Myasthenia gravis without exacerbation (HCC)    Relevant Orders    Referral to [facility]    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)    Relevant Medications    memantine (Namenda) 10 mg tablet    Other Relevant Orders    Prolactin       Genitourinary    Stage 3 chronic kidney disease, unspecified whether stage 3a or 3b CKD (HCC)    Relevant Orders    POCT hemoglobin A1c (Completed)    Albumin / creatinine urine ratio    Lipid Panel with Direct LDL reflex    Benign prostatic hyperplasia without lower urinary tract symptoms       Other    Mixed hyperlipidemia    Relevant Orders    Lipid Panel with Direct LDL reflex     Other Visit Diagnoses       Gait abnormality        Relevant Orders    Referral to [facility]             Diagnoses and all orders for this visit:    Type 2 diabetes mellitus with retinopathy of both eyes, without long-term current use of insulin, macular edema presence unspecified, unspecified retinopathy severity (HCC)  -     POCT hemoglobin A1c  -     Albumin / creatinine urine ratio; Future  -     Lipid Panel with Direct LDL reflex; Future  -     Empagliflozin (JARDIANCE) 10 MG TABS tablet; Take 1 tablet (10 mg total) by  mouth daily  -     glucose blood (OneTouch Verio) test strip; USE 1 STRIP TO CHECK GLUCOSE ONCE DAILY  -     OneTouch Delica Lancets 33G MISC; Test once a day  -     OneTouch Delica Lancets 33G MISC; Check once a day    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)  -     Prolactin; Future  -     memantine (Namenda) 10 mg tablet; Take 1 tablet (10 mg total) by mouth 2 (two) times a day    Peripheral arterial disease (HCC)    Essential hypertension  -     Albumin / creatinine urine ratio; Future    Chronic diastolic congestive heart failure (HCC)    Myasthenia gravis without exacerbation ([facility])  -     [facility]; Future    Stage 3 chronic kidney disease, unspecified whether stage 3a or 3b CKD ([facility])  -     POCT hemoglobin A1c  -     Albumin / creatinine urine ratio; Future  -     Lipid Panel with Direct LDL reflex; Future    Benign prostatic hyperplasia without lower urinary tract symptoms    Mixed hyperlipidemia  -     Lipid Panel with Direct LDL reflex; Future    Gait abnormality  -     Referral to [facility]; Future        No problem-specific Assessment & Plan notes found for this encounter.    A/P; Doing ok and will check labs. In office HgA1c was elevated at 7.8. Will try SGLT2. Will get [facility] in for PT. . Continue current treatment and RTC three months for routine.     Subjective:      Patient ID: Israel Hughes is a 83 y.o. male.     RTC for f/u DM, HTN, etc. Doing ok and no new issues. Started on namenda and seroquel recently for SDAT and agitation. Reports doing ok and no agitation per family. . Remains active w/o difficulty and no falls. Sugars less than 140 and no low sugar events. Denies CP, SOB, palpitations, edema, orthopnea or PND. No stroke like events. MG is in remission. Due for labs.         The following portions of the patient's history were reviewed and updated as appropriate:   He has a past medical history of Diabetes mellitus (HCC) and  Hyperlipidemia.,  does not have any pertinent problems on file.,   has a past surgical history that includes Tonsillectomy; Eye surgery; Cataract extraction; Dental surgery; Vasectomy; and Colonoscopy.,  family history is not on file.,   reports that he has never smoked. He has never been exposed to tobacco smoke. He has never used smokeless tobacco. He reports that he does not currently use alcohol. He reports that he does not use drugs.,  is allergic to bactrim [sulfamethoxazole-trimethoprim] and simvastatin..  Current Outpatient Medications   Medication Sig Dispense Refill    Empagliflozin (JARDIANCE) 10 MG TABS tablet Take 1 tablet (10 mg total) by mouth daily 90 tablet 3    glucose blood (OneTouch Verio) test strip USE 1 STRIP TO CHECK GLUCOSE ONCE DAILY 50 each 0    memantine (Namenda) 10 mg tablet Take 1 tablet (10 mg total) by mouth 2 (two) times a day 180 tablet 1    OneTouch Delica Lancets 33G MISC Test once a day 100 each 5    OneTouch Delica Lancets 33G MISC Check once a day 100 each 3    aspirin (ECOTRIN LOW STRENGTH) 81 mg EC tablet Take 81 mg by mouth      atorvastatin (LIPITOR) 20 mg tablet Take 1 tablet (20 mg total) by mouth daily 90 tablet 1    Blood Glucose Monitoring Suppl (OneTouch Verio Reflect) w/Device KIT USE  ONCE DAILY 1 kit 0    Cholecalciferol (D 1000) 25 MCG (1000 UT) capsule Take 1 capsule (1,000 Units total) by mouth daily 90 capsule 1    donepezil (ARICEPT) 5 mg tablet Take 2 tablets (10 mg total) by mouth daily at bedtime 90 tablet 0    FLUoxetine (PROzac) 20 mg capsule Take 1 capsule (20 mg total) by mouth every morning 90 capsule 1    furosemide (LASIX) 40 mg tablet Take 1/2 (one-half) tablet by mouth once daily 15 tablet 3    metFORMIN (GLUCOPHAGE) 1000 MG tablet Take 1 tablet (1,000 mg total) by mouth 2 (two) times a day with meals 180 tablet 1    Omega-3 Fatty Acids (fish oil) 1,000 mg Take 1 capsule (1,000 mg total) by mouth daily 90 capsule 1    QUEtiapine (SEROquel) 25 mg  "tablet Take 1 tablet (25 mg total) by mouth 2 (two) times a day 180 tablet 1    tamsulosin (FLOMAX) 0.4 mg Take 1 capsule (0.4 mg total) by mouth in the morning 90 capsule 1     No current facility-administered medications for this visit.       Review of Systems   Constitutional:  Negative for activity change, chills, diaphoresis, fatigue and fever.   HENT: Negative.     Eyes:  Negative for visual disturbance.   Respiratory:  Negative for cough, chest tightness, shortness of breath and wheezing.    Cardiovascular:  Negative for chest pain, palpitations and leg swelling.   Gastrointestinal:  Negative for abdominal pain, constipation, diarrhea, nausea and vomiting.   Endocrine: Negative for cold intolerance and heat intolerance.   Genitourinary:  Negative for difficulty urinating, dysuria and frequency.   Musculoskeletal:  Negative for arthralgias, gait problem and myalgias.   Neurological:  Negative for dizziness, seizures, syncope, weakness, light-headedness and headaches.   Psychiatric/Behavioral:  Negative for agitation, behavioral problems, confusion, decreased concentration, dysphoric mood, hallucinations, self-injury, sleep disturbance and suicidal ideas. The patient is not nervous/anxious and is not hyperactive.        PHQ-2/9 Depression Screening            Objective:  Vitals:    04/03/24 0948   BP: 132/60   Pulse: 67   Temp: 97.5 °F (36.4 °C)   SpO2: 96%   Weight: 54.6 kg (120 lb 6 oz)   Height: 5' 2\" (1.575 m)     Body mass index is 22.02 kg/m².     Physical Exam  Vitals and nursing note reviewed.   Constitutional:       General: He is not in acute distress.     Appearance: Normal appearance. He is not ill-appearing.   HENT:      Head: Normocephalic and atraumatic.      Mouth/Throat:      Mouth: Mucous membranes are moist.   Eyes:      Extraocular Movements: Extraocular movements intact.      Conjunctiva/sclera: Conjunctivae normal.      Pupils: Pupils are equal, round, and reactive to light.   Neck:      " Vascular: No carotid bruit.   Cardiovascular:      Rate and Rhythm: Normal rate and regular rhythm.      Heart sounds: Murmur heard.   Pulmonary:      Effort: Pulmonary effort is normal. No respiratory distress.      Breath sounds: Normal breath sounds. No wheezing, rhonchi or rales.   Abdominal:      General: Bowel sounds are normal. There is no distension.      Palpations: Abdomen is soft.      Tenderness: There is no abdominal tenderness.   Musculoskeletal:      Cervical back: Neck supple.      Right lower leg: No edema.      Left lower leg: No edema.      Comments: Difficulty getting out of chair. Gait unsteady.    Neurological:      General: No focal deficit present.      Mental Status: He is alert and oriented to person, place, and time. Mental status is at baseline.      Gait: Gait abnormal.   Psychiatric:         Mood and Affect: Mood normal.         Behavior: Behavior normal.         Thought Content: Thought content normal.         Judgment: Judgment normal.

## 2024-04-08 ENCOUNTER — TELEPHONE (OUTPATIENT)
Dept: INTERNAL MEDICINE CLINIC | Facility: CLINIC | Age: 84
End: 2024-04-08

## 2024-04-08 DIAGNOSIS — I50.9 CHF EXACERBATION (HCC): ICD-10-CM

## 2024-04-08 RX ORDER — FUROSEMIDE 40 MG/1
40 TABLET ORAL DAILY
Qty: 90 TABLET | Refills: 1 | Status: SHIPPED | OUTPATIENT
Start: 2024-04-08

## 2024-04-08 NOTE — TELEPHONE ENCOUNTER
----- Message from Giovanni Reyez DO sent at 4/3/2024 10:08 AM EDT -----  Call [facility] in several days and see if she was able to get SGLT2

## 2024-04-08 NOTE — TELEPHONE ENCOUNTER
POA states that they did not take the jardiance as it is too expensive. She states that the lancets and monitor they will get today. They also want a 90 day supply of the lasix

## 2024-04-11 DIAGNOSIS — E11.319 TYPE 2 DIABETES MELLITUS WITH RETINOPATHY OF BOTH EYES, WITHOUT LONG-TERM CURRENT USE OF INSULIN, MACULAR EDEMA PRESENCE UNSPECIFIED, UNSPECIFIED RETINOPATHY SEVERITY (HCC): Primary | ICD-10-CM

## 2024-04-11 RX ORDER — LANCETS
EACH MISCELLANEOUS
Qty: 200 EACH | Refills: 1 | Status: SHIPPED | OUTPATIENT
Start: 2024-04-11 | End: 2024-04-11

## 2024-04-11 RX ORDER — LANCETS
EACH MISCELLANEOUS DAILY
Qty: 200 EACH | Refills: 1 | Status: SHIPPED | OUTPATIENT
Start: 2024-04-11 | End: 2024-04-12

## 2024-04-11 RX ORDER — BLOOD-GLUCOSE METER
1 EACH MISCELLANEOUS DAILY
Qty: 1 KIT | Refills: 0 | Status: SHIPPED | OUTPATIENT
Start: 2024-04-11

## 2024-04-11 NOTE — PROGRESS NOTES
Hey this is [facility] pharmacy [phone] How to skip Come our way for Israel DESAI RT Date of birth [id number] Touch Verio test strips are not covered by his HMO that he uses for testing supplies. They say they would prefer Accu Check. I believe the current going Accu check is Accu check. Guide me. So if I could have a script sent over for a meter, some test strips and some Mac check land sets so we can get them going. Again, our number to call back is [phone]. Thank you.

## 2024-04-12 DIAGNOSIS — E11.319 TYPE 2 DIABETES MELLITUS WITH RETINOPATHY OF BOTH EYES, WITHOUT LONG-TERM CURRENT USE OF INSULIN, MACULAR EDEMA PRESENCE UNSPECIFIED, UNSPECIFIED RETINOPATHY SEVERITY (HCC): ICD-10-CM

## 2024-04-12 RX ORDER — LANCETS
EACH MISCELLANEOUS DAILY
Qty: 100 EACH | Refills: 1 | Status: SHIPPED | OUTPATIENT
Start: 2024-04-12

## 2024-04-13 DIAGNOSIS — E11.319 TYPE 2 DIABETES MELLITUS WITH RETINOPATHY OF BOTH EYES, WITHOUT LONG-TERM CURRENT USE OF INSULIN, MACULAR EDEMA PRESENCE UNSPECIFIED, UNSPECIFIED RETINOPATHY SEVERITY (HCC): ICD-10-CM

## 2024-04-15 RX ORDER — LANCETS
EACH MISCELLANEOUS
Qty: 102 EACH | Refills: 1 | Status: ON HOLD | OUTPATIENT
Start: 2024-04-15

## 2024-04-25 ENCOUNTER — TELEPHONE (OUTPATIENT)
Dept: INTERNAL MEDICINE CLINIC | Facility: CLINIC | Age: 84
End: 2024-04-25

## 2024-04-25 ENCOUNTER — APPOINTMENT (EMERGENCY)
Dept: RADIOLOGY | Facility: HOSPITAL | Age: 84
DRG: 057 | End: 2024-04-25
Payer: COMMERCIAL

## 2024-04-25 ENCOUNTER — APPOINTMENT (EMERGENCY)
Dept: CT IMAGING | Facility: HOSPITAL | Age: 84
DRG: 057 | End: 2024-04-25
Payer: COMMERCIAL

## 2024-04-25 ENCOUNTER — HOSPITAL ENCOUNTER (EMERGENCY)
Facility: HOSPITAL | Age: 84
Discharge: HOME | DRG: 057 | End: 2024-04-25
Attending: EMERGENCY MEDICINE
Payer: COMMERCIAL

## 2024-04-25 VITALS
RESPIRATION RATE: 16 BRPM | OXYGEN SATURATION: 96 % | HEART RATE: 66 BPM | DIASTOLIC BLOOD PRESSURE: 71 MMHG | SYSTOLIC BLOOD PRESSURE: 135 MMHG | TEMPERATURE: 97.5 F

## 2024-04-25 DIAGNOSIS — S22.089A CLOSED T12 FRACTURE (HCC): ICD-10-CM

## 2024-04-25 DIAGNOSIS — S01.81XA CHIN LACERATION, INITIAL ENCOUNTER: ICD-10-CM
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
## 2024-04-25 LAB
ABO GROUP BLD: NORMAL
ALBUMIN SERPL BCP-MCNC: 4.1 G/DL (ref 3.5–5)
ALP SERPL-CCNC: 92 U/L (ref 34–104)
ALT SERPL W P-5'-P-CCNC: 15 U/L (ref 7–52)
ANION GAP SERPL CALCULATED.3IONS-SCNC: 11 MMOL/L (ref 4–13)
AST SERPL W P-5'-P-CCNC: 22 U/L (ref 13–39)
BASOPHILS # BLD AUTO: 0.04 THOUSANDS/ÂΜL (ref 0–0.1)
BASOPHILS NFR BLD AUTO: 1 % (ref 0–1)
BILIRUB SERPL-MCNC: 0.35 MG/DL (ref 0.2–1)
BLD GP AB SCN SERPL QL: NEGATIVE
BUN SERPL-MCNC: 26 MG/DL (ref 5–25)
CALCIUM SERPL-MCNC: 9.3 MG/DL (ref 8.4–10.2)
CHLORIDE SERPL-SCNC: 101 MMOL/L (ref 96–108)
CO2 SERPL-SCNC: 27 MMOL/L (ref 21–32)
CREAT SERPL-MCNC: 1.23 MG/DL (ref 0.6–1.3)
EOSINOPHIL # BLD AUTO: 0.08 THOUSAND/ÂΜL (ref 0–0.61)
EOSINOPHIL NFR BLD AUTO: 1 % (ref 0–6)
ERYTHROCYTE [DISTWIDTH] IN BLOOD BY AUTOMATED COUNT: 13.1 % (ref 11.6–15.1)
GFR SERPL CREATININE-BSD FRML MDRD: 54 ML/MIN/1.73SQ M
GLUCOSE SERPL-MCNC: 225 MG/DL (ref 65–140)
HCT VFR BLD AUTO: 35 % (ref 36.5–49.3)
HGB BLD-MCNC: 11.6 G/DL (ref 12–17)
IMM GRANULOCYTES # BLD AUTO: 0.1 THOUSAND/UL (ref 0–0.2)
IMM GRANULOCYTES NFR BLD AUTO: 2 % (ref 0–2)
LYMPHOCYTES # BLD AUTO: 1.64 THOUSANDS/ÂΜL (ref 0.6–4.47)
LYMPHOCYTES NFR BLD AUTO: 25 % (ref 14–44)
MCH RBC QN AUTO: 31 PG (ref 26.8–34.3)
MCHC RBC AUTO-ENTMCNC: 33.1 G/DL (ref 31.4–37.4)
MCV RBC AUTO: 94 FL (ref 82–98)
MONOCYTES # BLD AUTO: 0.5 THOUSAND/ÂΜL (ref 0.17–1.22)
MONOCYTES NFR BLD AUTO: 8 % (ref 4–12)
NEUTROPHILS # BLD AUTO: 4.12 THOUSANDS/ÂΜL (ref 1.85–7.62)
NEUTS SEG NFR BLD AUTO: 63 % (ref 43–75)
NRBC BLD AUTO-RTO: 0 /100 WBCS
PLATELET # BLD AUTO: 174 THOUSANDS/UL (ref 149–390)
PMV BLD AUTO: 9.9 FL (ref 8.9–12.7)
POTASSIUM SERPL-SCNC: 4.8 MMOL/L (ref 3.5–5.3)
PROT SERPL-MCNC: 7 G/DL (ref 6.4–8.4)
RBC # BLD AUTO: 3.74 MILLION/UL (ref 3.88–5.62)
RH BLD: POSITIVE
SODIUM SERPL-SCNC: 139 MMOL/L (ref 135–147)
SPECIMEN EXPIRATION DATE: NORMAL
WBC # BLD AUTO: 6.48 THOUSAND/UL (ref 4.31–10.16)

## 2024-04-25 PROCEDURE — 99285 EMERGENCY DEPT VISIT HI MDM: CPT | Performed by: EMERGENCY MEDICINE

## 2024-04-25 PROCEDURE — 71045 X-RAY EXAM CHEST 1 VIEW: CPT

## 2024-04-25 PROCEDURE — 70450 CT HEAD/BRAIN W/O DYE: CPT

## 2024-04-25 PROCEDURE — 74177 CT ABD & PELVIS W/CONTRAST: CPT

## 2024-04-25 PROCEDURE — 99285 EMERGENCY DEPT VISIT HI MDM: CPT

## 2024-04-25 PROCEDURE — 86850 RBC ANTIBODY SCREEN: CPT | Performed by: EMERGENCY MEDICINE

## 2024-04-25 PROCEDURE — 93005 ELECTROCARDIOGRAM TRACING: CPT

## 2024-04-25 PROCEDURE — 71260 CT THORAX DX C+: CPT

## 2024-04-25 PROCEDURE — 36415 COLL VENOUS BLD VENIPUNCTURE: CPT | Performed by: EMERGENCY MEDICINE

## 2024-04-25 PROCEDURE — 12013 RPR F/E/E/N/L/M 2.6-5.0 CM: CPT | Performed by: EMERGENCY MEDICINE

## 2024-04-25 PROCEDURE — 72072 X-RAY EXAM THORAC SPINE 3VWS: CPT

## 2024-04-25 PROCEDURE — 72125 CT NECK SPINE W/O DYE: CPT

## 2024-04-25 PROCEDURE — 86901 BLOOD TYPING SEROLOGIC RH(D): CPT | Performed by: EMERGENCY MEDICINE

## 2024-04-25 PROCEDURE — 85025 COMPLETE CBC W/AUTO DIFF WBC: CPT | Performed by: EMERGENCY MEDICINE

## 2024-04-25 PROCEDURE — 72170 X-RAY EXAM OF PELVIS: CPT

## 2024-04-25 PROCEDURE — 86900 BLOOD TYPING SEROLOGIC ABO: CPT | Performed by: EMERGENCY MEDICINE

## 2024-04-25 PROCEDURE — 80053 COMPREHEN METABOLIC PANEL: CPT | Performed by: EMERGENCY MEDICINE

## 2024-04-25 RX ORDER — LIDOCAINE HYDROCHLORIDE 20 MG/ML
5 INJECTION, SOLUTION EPIDURAL; INFILTRATION; INTRACAUDAL; PERINEURAL ONCE
Status: COMPLETED | OUTPATIENT
Start: 2024-04-25 | End: 2024-04-25

## 2024-04-25 RX ADMIN — LIDOCAINE HYDROCHLORIDE 5 ML: 20 INJECTION, SOLUTION EPIDURAL; INFILTRATION; INTRACAUDAL; PERINEURAL at 20:34

## 2024-04-25 RX ADMIN — IOHEXOL 85 ML: 350 INJECTION, SOLUTION INTRAVENOUS at 19:31

## 2024-04-25 NOTE — TELEPHONE ENCOUNTER
[facility] faxed to [facility] at fax [phone] as per family request.  This was not faxed originally and patient's family brought in letter where to fax [facility].

## 2024-04-26 ENCOUNTER — VBI (OUTPATIENT)
Dept: INTERNAL MEDICINE CLINIC | Facility: CLINIC | Age: 84
End: 2024-04-26

## 2024-04-26 LAB
ATRIAL RATE: 68 BPM
P AXIS: 18 DEGREES
PR INTERVAL: 152 MS
QRS AXIS: 41 DEGREES
QRSD INTERVAL: 80 MS
QT INTERVAL: 384 MS
QTC INTERVAL: 408 MS
T WAVE AXIS: 64 DEGREES
VENTRICULAR RATE: 68 BPM

## 2024-04-26 PROCEDURE — 93010 ELECTROCARDIOGRAM REPORT: CPT | Performed by: INTERNAL MEDICINE

## 2024-04-26 NOTE — TELEPHONE ENCOUNTER
04/26/24 10:08 AM    Patient contacted post ED visit, [facility] department spoke with patient/caregiver and outreach was successful.    Thank you.  Carol Ann Thakur  [facility]

## 2024-04-26 NOTE — ED PROVIDER NOTES
History  Chief Complaint   Patient presents with    Fall     Pt has dementia, went outside and fell, lives at home with granddaughter. On ASA, lac under chin.      He is unable to give further details because of the dementia.  His grand daughters at his bedside.  She states he is otherwise acting normal.  They were concerned repetitively with the laceration on his chin.  He has frequent falls.  He has been seen for this department for this issue.  States he is otherwise been compliant with his meds with no new medical issues.        Prior to Admission Medications   Prescriptions Last Dose Informant Patient Reported? Taking?   Blood Glucose Monitoring Suppl (Accu-Chek Guide Me) w/Device KIT   No No   Sig: Use 1 kit in the morning   Cholecalciferol (D 1000) 25 MCG (1000 UT) capsule   No No   Sig: Take 1 capsule (1,000 Units total) by mouth daily   Empagliflozin (JARDIANCE) 10 MG TABS tablet   No No   Sig: Take 1 tablet (10 mg total) by mouth daily   FLUoxetine (PROzac) 20 mg capsule   No No   Sig: Take 1 capsule (20 mg total) by mouth every morning   Lancets (accu-chek multiclix) lancets   No No   Sig: USE   TO CHECK GLUCOSE IN THE MORNING AS DIRECTED   Omega-3 Fatty Acids (fish oil) 1,000 mg   No No   Sig: Take 1 capsule (1,000 mg total) by mouth daily   OneTouch Delica Lancets 33G MISC   No No   Sig: Test once a day   QUEtiapine (SEROquel) 25 mg tablet   No No   Sig: Take 1 tablet (25 mg total) by mouth 2 (two) times a day   aspirin (ECOTRIN LOW STRENGTH) 81 mg EC tablet   Yes No   Sig: Take 81 mg by mouth   atorvastatin (LIPITOR) 20 mg tablet   No No   Sig: Take 1 tablet (20 mg total) by mouth daily   donepezil (ARICEPT) 5 mg tablet   No No   Sig: Take 2 tablets (10 mg total) by mouth daily at bedtime   furosemide (LASIX) 40 mg tablet   No No   Sig: Take 1 tablet (40 mg total) by mouth daily   glucose blood test strip   No No   Sig: Use 1 each in the morning Use as instructed   memantine (Namenda) 10 mg tablet   No  No   Sig: Take 1 tablet (10 mg total) by mouth 2 (two) times a day   metFORMIN (GLUCOPHAGE) 1000 MG tablet   No No   Sig: Take 1 tablet (1,000 mg total) by mouth 2 (two) times a day with meals   tamsulosin (FLOMAX) 0.4 mg   No No   Sig: Take 1 capsule (0.4 mg total) by mouth in the morning      Facility-Administered Medications: None       Past Medical History:   Diagnosis Date    Diabetes mellitus (HCC)     Hyperlipidemia        Past Surgical History:   Procedure Laterality Date    CATARACT EXTRACTION      COLONOSCOPY      DENTAL SURGERY      EYE SURGERY      TONSILLECTOMY      VASECTOMY         No family history on file.  I have reviewed and agree with the history as documented.    E-Cigarette/Vaping    E-Cigarette Use Never User      E-Cigarette/Vaping Substances    Nicotine No     THC No     CBD No     Flavoring No     Other No     Unknown No      Social History     Tobacco Use    Smoking status: Never     Passive exposure: Never    Smokeless tobacco: Never   Vaping Use    Vaping status: Never Used   Substance Use Topics    Alcohol use: Not Currently    Drug use: Never       Review of Systems   Unable to perform ROS: Dementia       Physical Exam  Physical Exam  Constitutional:       General: He is not in acute distress.     Appearance: He is well-developed. He is not ill-appearing, toxic-appearing or diaphoretic.   HENT:      Head: Normocephalic and atraumatic.   Eyes:      Conjunctiva/sclera: Conjunctivae normal.      Pupils: Pupils are equal, round, and reactive to light.   Cardiovascular:      Rate and Rhythm: Normal rate and regular rhythm.      Heart sounds: Normal heart sounds.   Pulmonary:      Effort: Pulmonary effort is normal. No respiratory distress.      Breath sounds: Normal breath sounds.   Abdominal:      General: Bowel sounds are normal.      Palpations: Abdomen is soft.   Musculoskeletal:         General: Normal range of motion.      Cervical back: Normal range of motion and neck supple.    Skin:     General: Skin is warm and dry.   Neurological:      General: No focal deficit present.      Mental Status: He is alert. He is disoriented.   Psychiatric:      Comments: Patient acting inappropriate with nurses intermittently.  Seems unaware that he is in a hospital.  States he is not having any complaints despite obvious laceration to his chin with a fair amount of blood.         Vital Signs  ED Triage Vitals   Temperature Pulse Respirations Blood Pressure SpO2   04/25/24 1900 04/25/24 1900 04/25/24 1900 04/25/24 1900 04/25/24 1900   97.5 °F (36.4 °C) 68 16 142/72 94 %      Temp src Heart Rate Source Patient Position - Orthostatic VS BP Location FiO2 (%)   -- 04/25/24 1900 04/25/24 1900 -- --    Monitor Sitting        Pain Score       04/25/24 1857       2           Vitals:    04/25/24 1900 04/25/24 2000 04/25/24 2045 04/25/24 2115   BP: 142/72 128/68 137/68 135/71   Pulse: 68 69 63 66   Patient Position - Orthostatic VS: Sitting Sitting           Visual Acuity  Visual Acuity      Flowsheet Row Most Recent Value   L Pupil Size (mm) 1   R Pupil Size (mm) 1            ED Medications  Medications   iohexol (OMNIPAQUE) 350 MG/ML injection (MULTI-DOSE) 85 mL (85 mL Intravenous Given 4/25/24 1931)   lidocaine (PF) (XYLOCAINE-MPF) 2 % injection 5 mL (5 mL Infiltration Given 4/25/24 2034)       Diagnostic Studies  Results Reviewed       Procedure Component Value Units Date/Time    Comprehensive metabolic panel [[id number]]  (Abnormal) Collected: 04/25/24 1905    Lab Status: Final result Specimen: Blood from Arm, Right Updated: 04/25/24 1927     Sodium 139 mmol/L      Potassium 4.8 mmol/L      Chloride 101 mmol/L      CO2 27 mmol/L      ANION GAP 11 mmol/L      BUN 26 mg/dL      Creatinine 1.23 mg/dL      Glucose 225 mg/dL      Calcium 9.3 mg/dL      AST 22 U/L      ALT 15 U/L      Alkaline Phosphatase 92 U/L      Total Protein 7.0 g/dL      Albumin 4.1 g/dL      Total Bilirubin 0.35 mg/dL      eGFR 54  ml/min/1.73sq m     Narrative:      [employer] guidelines for Chronic Kidney Disease (CKD):     Stage 1 with normal or high GFR (GFR > 90 mL/min/1.73 square meters)    Stage 2 Mild CKD (GFR = 60-89 mL/min/1.73 square meters)    Stage 3A Moderate CKD (GFR = 45-59 mL/min/1.73 square meters)    Stage 3B Moderate CKD (GFR = 30-44 mL/min/1.73 square meters)    Stage 4 Severe CKD (GFR = 15-29 mL/min/1.73 square meters)    Stage 5 End Stage CKD (GFR <15 mL/min/1.73 square meters)  Note: GFR calculation is accurate only with a steady state creatinine    CBC and differential [[id number]]  (Abnormal) Collected: 04/25/24 1905    Lab Status: Final result Specimen: Blood from Arm, Right Updated: 04/25/24 1911     WBC 6.48 Thousand/uL      RBC 3.74 Million/uL      Hemoglobin 11.6 g/dL      Hematocrit 35.0 %      MCV 94 fL      MCH 31.0 pg      MCHC 33.1 g/dL      RDW 13.1 %      MPV 9.9 fL      Platelets 174 Thousands/uL      nRBC 0 /100 WBCs      Segmented % 63 %      Immature Grans % 2 %      Lymphocytes % 25 %      Monocytes % 8 %      Eosinophils Relative 1 %      Basophils Relative 1 %      Absolute Neutrophils 4.12 Thousands/µL      Absolute Immature Grans 0.10 Thousand/uL      Absolute Lymphocytes 1.64 Thousands/µL      Absolute Monocytes 0.50 Thousand/µL      Eosinophils Absolute 0.08 Thousand/µL      Basophils Absolute 0.04 Thousands/µL                    TRAUMA - CT chest abdomen pelvis w contrast   Final Result by Cheyenne Lau MD (04/25 2017)   Mild compression fracture of the superior endplate of T12, not previously evident. 1 mm retropulsion.   No other acute fracture or dislocation suspected.   No acute intrathoracic or intra-abdominal pathology.   Chronic findings, as per the body of the report.            I personally discussed this study with Mr. Remi PA-C on 4/25/2024 8:08 PM.               Workstation performed: [id number]         TRAUMA - CT spine cervical wo contrast   Final  Result by Cheyenne Lau MD (04/25 1946)      No cervical spine fracture.   Multilevel degenerative changes, with likely secondary grade 1 retrolisthesis of C3 on C4 and C4 on C5.   Mild esophageal wall thickening. Correlate with clinical findings.                  Workstation performed: [id number]         TRAUMA - CT head wo contrast   Final Result by Cheyenne Lau MD (1941)      No acute intracranial abnormality.   Chronic unchanged intracranial findings, as above.                  Workstation performed: [id number]         XR Trauma pelvis ap only 1 or 2 vw   Final Result by Cheyenne Lau MD (04/25 1948)      No acute osseous abnormality.      Workstation performed: [id number]         XR Trauma chest portable   Final Result by Cheyenne Lau MD (04/25 1947)      No acute cardiopulmonary disease.            Workstation performed: [id number]         XR spine thoracic 3 views    (Results Pending)              Procedures  Universal Protocol:  Consent: Verbal consent obtained.  Consent given by: patient and power of   Patient understanding: patient states understanding of the procedure being performed  Radiology Images displayed and confirmed. If images not available, report reviewed: imaging studies available  Patient identity confirmed: verbally with patient  Laceration repair    Date/Time: 4/27/2024 4:41 AM    Performed by: Darius Ortega MD  Authorized by: Darius Ortega MD  Body area: head/neck  Location details: chin  Laceration length: 3 cm  Foreign bodies: no foreign bodies  Tendon involvement: none  Nerve involvement: none  Vascular damage: no  Anesthesia: local infiltration    Anesthesia:  Local Anesthetic: lidocaine 2% without epinephrine  Anesthetic total: 3 mL    Wound Dehiscence:  Superficial Wound Dehiscence: simple closure      Procedure Details:  Amount of cleaning: standard  Debridement: none  Degree of undermining: none  Skin closure: 4-0 nylon and glue  Number of sutures:  2  Technique: simple  Approximation: close  Approximation difficulty: simple  Patient tolerance: patient tolerated the procedure well with no immediate complications               ED Course                                             Medical Decision Making  83-year-old male with fall resulting in chin laceration and T12 fracture.  Unclear if this fracture is new.  Patient not complaining of any pain.  Given a brace.  Patient to follow-up outpatient in case there are any lingering issues.  Discussed that it is a minimal amount of retropulsion but that family should keep a close eye for he starts to develop weakness or new complaints of pain in his lower extremities or lower back.  Laceration repaired with good cosmesis and hemostasis.  Patient eager to return home. Discussed warning signs and symptoms with the patient and family as well as when to return to the emergency department versus follow up with [facility]. Patient states understanding and agreement with the plan.  This note was completed using dictation software.       Problems Addressed:  Chin laceration, initial encounter: acute illness or injury  Closed T12 fracture (HCC): acute illness or injury  Fall, initial encounter: acute illness or injury    Amount and/or Complexity of Data Reviewed  Labs: ordered.  Radiology: ordered.  Discussion of management or test interpretation with external provider(s): Case discussed with neurosurgery Dr. Jonathan Sutherland    Risk  OTC drugs.  Prescription drug management.             Disposition  Final diagnoses:   Fall, initial encounter   Chin laceration, initial encounter   Closed T12 fracture (HCC)     Time reflects when diagnosis was documented in both MDM as applicable and the Disposition within this note       Time User Action Codes Description Comment    4/25/2024  9:02 PM Darius Ortega [W19.XXXA] Fall, initial encounter     4/25/2024  9:03 PM Darius Ortega [S01.81XA] Chin laceration, initial encounter      4/25/2024  9:03 PM ShannonDarius Add [S22.089A] Closed T12 fracture (HCC)           ED Disposition       ED Disposition   Discharge    Condition   Stable    Date/Time   Thu Apr 25, 2024  9:02 PM    Comment   Israel Hughes discharge to home/self care.                   Follow-up Information       Follow up With Specialties Details Why Contact Info    Jonathan Sutherland MD Neurosurgery In 1 day  [address] [facility]  [address]  [place] [address]  [phone]              Discharge Medication List as of 4/25/2024 10:10 PM        CONTINUE these medications which have NOT CHANGED    Details   aspirin (ECOTRIN LOW STRENGTH) 81 mg EC tablet Take 81 mg by mouth, Historical Med      atorvastatin (LIPITOR) 20 mg tablet Take 1 tablet (20 mg total) by mouth daily, Starting Mon 3/4/2024, Normal      Blood Glucose Monitoring Suppl (Accu-Chek Guide Me) w/Device KIT Use 1 kit in the morning, Starting Thu 4/11/2024, Normal      Cholecalciferol (D 1000) 25 MCG (1000 UT) capsule Take 1 capsule (1,000 Units total) by mouth daily, Starting Mon 3/4/2024, Normal      donepezil (ARICEPT) 5 mg tablet Take 2 tablets (10 mg total) by mouth daily at bedtime, Starting Mon 3/4/2024, Normal      Empagliflozin (JARDIANCE) 10 MG TABS tablet Take 1 tablet (10 mg total) by mouth daily, Starting Wed 4/3/2024, Until Sat 3/29/2025, Normal      FLUoxetine (PROzac) 20 mg capsule Take 1 capsule (20 mg total) by mouth every morning, Starting Mon 3/4/2024, Normal      furosemide (LASIX) 40 mg tablet Take 1 tablet (40 mg total) by mouth daily, Starting Mon 4/8/2024, Normal      glucose blood test strip Use 1 each in the morning Use as instructed, Starting Fri 4/12/2024, Normal      !! Lancets (accu-chek multiclix) lancets USE   TO CHECK GLUCOSE IN THE MORNING AS DIRECTED, Normal      memantine (Namenda) 10 mg tablet Take 1 tablet (10 mg total) by mouth 2 (two) times a day, Starting Wed 4/3/2024, Normal      metFORMIN (GLUCOPHAGE) 1000 MG tablet Take 1  tablet (1,000 mg total) by mouth 2 (two) times a day with meals, Starting Mon 3/4/2024, Normal      Omega-3 Fatty Acids (fish oil) 1,000 mg Take 1 capsule (1,000 mg total) by mouth daily, Starting Mon 3/4/2024, Normal      !! OneTouch Delica Lancets 33G MISC Test once a day, Normal      QUEtiapine (SEROquel) 25 mg tablet Take 1 tablet (25 mg total) by mouth 2 (two) times a day, Starting Mon 3/4/2024, Normal      tamsulosin (FLOMAX) 0.4 mg Take 1 capsule (0.4 mg total) by mouth in the morning, Starting Mon 3/4/2024, Normal       !! - Potential duplicate medications found. Please discuss with provider.              PDMP Review       None            ED Provider  Electronically Signed by             Darius Ortega MD  04/27/24 9160

## 2024-04-27 ENCOUNTER — HOSPITAL ENCOUNTER (INPATIENT)
Facility: HOSPITAL | Age: 84
LOS: 4 days | Discharge: NURSING, REHAB OR LONG-TERM CARE | DRG: 057 | End: 2024-05-01
Attending: EMERGENCY MEDICINE | Admitting: INTERNAL MEDICINE
Payer: COMMERCIAL

## 2024-04-27 ENCOUNTER — APPOINTMENT (EMERGENCY)
Dept: CT IMAGING | Facility: HOSPITAL | Age: 84
DRG: 057 | End: 2024-04-27
Payer: COMMERCIAL

## 2024-04-27 DIAGNOSIS — R44.3 HALLUCINATIONS: ICD-10-CM

## 2024-04-27 DIAGNOSIS — R26.2 AMBULATORY DYSFUNCTION: ICD-10-CM

## 2024-04-27 DIAGNOSIS — G30.1 MILD LATE ONSET ALZHEIMER'S DEMENTIA WITH AGITATION (HCC): ICD-10-CM

## 2024-04-27 DIAGNOSIS — S22.080A COMPRESSION FRACTURE OF T12 VERTEBRA, INITIAL ENCOUNTER (HCC): Primary | ICD-10-CM

## 2024-04-27 DIAGNOSIS — F02.A11 MILD LATE ONSET ALZHEIMER'S DEMENTIA WITH AGITATION (HCC): ICD-10-CM

## 2024-04-27 DIAGNOSIS — R53.1 WEAKNESS: ICD-10-CM

## 2024-04-27 LAB
ANION GAP SERPL CALCULATED.3IONS-SCNC: 9 MMOL/L (ref 4–13)
ATRIAL RATE: 63 BPM
BASOPHILS # BLD AUTO: 0.03 THOUSANDS/ÂΜL (ref 0–0.1)
BASOPHILS NFR BLD AUTO: 0 % (ref 0–1)
BILIRUB UR QL STRIP: NEGATIVE
BUN SERPL-MCNC: 22 MG/DL (ref 5–25)
CALCIUM SERPL-MCNC: 8.8 MG/DL (ref 8.4–10.2)
CHLORIDE SERPL-SCNC: 103 MMOL/L (ref 96–108)
CLARITY UR: CLEAR
CO2 SERPL-SCNC: 29 MMOL/L (ref 21–32)
COLOR UR: YELLOW
CREAT SERPL-MCNC: 1.06 MG/DL (ref 0.6–1.3)
EOSINOPHIL # BLD AUTO: 0.1 THOUSAND/ÂΜL (ref 0–0.61)
EOSINOPHIL NFR BLD AUTO: 2 % (ref 0–6)
ERYTHROCYTE [DISTWIDTH] IN BLOOD BY AUTOMATED COUNT: 13.2 % (ref 11.6–15.1)
GFR SERPL CREATININE-BSD FRML MDRD: 65 ML/MIN/1.73SQ M
GLUCOSE SERPL-MCNC: 149 MG/DL (ref 65–140)
GLUCOSE SERPL-MCNC: 159 MG/DL (ref 65–140)
GLUCOSE SERPL-MCNC: 162 MG/DL (ref 65–140)
GLUCOSE UR STRIP-MCNC: NEGATIVE MG/DL
HCT VFR BLD AUTO: 33.8 % (ref 36.5–49.3)
HGB BLD-MCNC: 10.9 G/DL (ref 12–17)
HGB UR QL STRIP.AUTO: NEGATIVE
IMM GRANULOCYTES # BLD AUTO: 0.04 THOUSAND/UL (ref 0–0.2)
IMM GRANULOCYTES NFR BLD AUTO: 1 % (ref 0–2)
KETONES UR STRIP-MCNC: NEGATIVE MG/DL
LEUKOCYTE ESTERASE UR QL STRIP: NEGATIVE
LYMPHOCYTES # BLD AUTO: 1.66 THOUSANDS/ÂΜL (ref 0.6–4.47)
LYMPHOCYTES NFR BLD AUTO: 25 % (ref 14–44)
MCH RBC QN AUTO: 30 PG (ref 26.8–34.3)
MCHC RBC AUTO-ENTMCNC: 32.2 G/DL (ref 31.4–37.4)
MCV RBC AUTO: 93 FL (ref 82–98)
MONOCYTES # BLD AUTO: 0.56 THOUSAND/ÂΜL (ref 0.17–1.22)
MONOCYTES NFR BLD AUTO: 8 % (ref 4–12)
NEUTROPHILS # BLD AUTO: 4.28 THOUSANDS/ÂΜL (ref 1.85–7.62)
NEUTS SEG NFR BLD AUTO: 64 % (ref 43–75)
NITRITE UR QL STRIP: NEGATIVE
NRBC BLD AUTO-RTO: 0 /100 WBCS
P AXIS: 44 DEGREES
PH UR STRIP.AUTO: 7 [PH]
PLATELET # BLD AUTO: 159 THOUSANDS/UL (ref 149–390)
PMV BLD AUTO: 9.5 FL (ref 8.9–12.7)
POTASSIUM SERPL-SCNC: 4 MMOL/L (ref 3.5–5.3)
PR INTERVAL: 152 MS
PROT UR STRIP-MCNC: NEGATIVE MG/DL
QRS AXIS: 60 DEGREES
QRSD INTERVAL: 84 MS
QT INTERVAL: 390 MS
QTC INTERVAL: 399 MS
RBC # BLD AUTO: 3.63 MILLION/UL (ref 3.88–5.62)
SODIUM SERPL-SCNC: 141 MMOL/L (ref 135–147)
SP GR UR STRIP.AUTO: 1.01
T WAVE AXIS: 49 DEGREES
TSH SERPL DL<=0.05 MIU/L-ACNC: 3.61 UIU/ML (ref 0.45–4.5)
UROBILINOGEN UR QL STRIP.AUTO: 1 E.U./DL
VENTRICULAR RATE: 63 BPM
WBC # BLD AUTO: 6.67 THOUSAND/UL (ref 4.31–10.16)

## 2024-04-27 PROCEDURE — 70450 CT HEAD/BRAIN W/O DYE: CPT

## 2024-04-27 PROCEDURE — 85025 COMPLETE CBC W/AUTO DIFF WBC: CPT | Performed by: EMERGENCY MEDICINE

## 2024-04-27 PROCEDURE — 80048 BASIC METABOLIC PNL TOTAL CA: CPT | Performed by: EMERGENCY MEDICINE

## 2024-04-27 PROCEDURE — 82948 REAGENT STRIP/BLOOD GLUCOSE: CPT

## 2024-04-27 PROCEDURE — 99222 1ST HOSP IP/OBS MODERATE 55: CPT | Performed by: INTERNAL MEDICINE

## 2024-04-27 PROCEDURE — 93005 ELECTROCARDIOGRAM TRACING: CPT

## 2024-04-27 PROCEDURE — 84443 ASSAY THYROID STIM HORMONE: CPT | Performed by: INTERNAL MEDICINE

## 2024-04-27 PROCEDURE — 81003 URINALYSIS AUTO W/O SCOPE: CPT | Performed by: EMERGENCY MEDICINE

## 2024-04-27 PROCEDURE — 36415 COLL VENOUS BLD VENIPUNCTURE: CPT | Performed by: EMERGENCY MEDICINE

## 2024-04-27 PROCEDURE — 82306 VITAMIN D 25 HYDROXY: CPT | Performed by: INTERNAL MEDICINE

## 2024-04-27 PROCEDURE — 99285 EMERGENCY DEPT VISIT HI MDM: CPT

## 2024-04-27 PROCEDURE — 99285 EMERGENCY DEPT VISIT HI MDM: CPT | Performed by: EMERGENCY MEDICINE

## 2024-04-27 PROCEDURE — 93010 ELECTROCARDIOGRAM REPORT: CPT | Performed by: INTERNAL MEDICINE

## 2024-04-27 RX ORDER — ACETAMINOPHEN 325 MG/1
650 TABLET ORAL EVERY 6 HOURS PRN
Status: DISCONTINUED | OUTPATIENT
Start: 2024-04-27 | End: 2024-05-01 | Stop reason: HOSPADM

## 2024-04-27 RX ORDER — FLUOXETINE HYDROCHLORIDE 20 MG/1
20 CAPSULE ORAL EVERY MORNING
Status: DISCONTINUED | OUTPATIENT
Start: 2024-04-28 | End: 2024-05-01 | Stop reason: HOSPADM

## 2024-04-27 RX ORDER — INSULIN LISPRO 100 [IU]/ML
1-5 INJECTION, SOLUTION INTRAVENOUS; SUBCUTANEOUS
Status: DISCONTINUED | OUTPATIENT
Start: 2024-04-27 | End: 2024-05-01 | Stop reason: HOSPADM

## 2024-04-27 RX ORDER — ENOXAPARIN SODIUM 100 MG/ML
40 INJECTION SUBCUTANEOUS DAILY
Status: DISCONTINUED | OUTPATIENT
Start: 2024-04-28 | End: 2024-05-01 | Stop reason: HOSPADM

## 2024-04-27 RX ORDER — QUETIAPINE FUMARATE 25 MG/1
25 TABLET, FILM COATED ORAL 2 TIMES DAILY
Status: DISCONTINUED | OUTPATIENT
Start: 2024-04-27 | End: 2024-04-30

## 2024-04-27 RX ORDER — FUROSEMIDE 40 MG/1
40 TABLET ORAL DAILY
Status: DISCONTINUED | OUTPATIENT
Start: 2024-04-28 | End: 2024-05-01 | Stop reason: HOSPADM

## 2024-04-27 RX ORDER — SODIUM CHLORIDE 9 MG/ML
100 INJECTION, SOLUTION INTRAVENOUS CONTINUOUS
Status: DISCONTINUED | OUTPATIENT
Start: 2024-04-27 | End: 2024-04-27

## 2024-04-27 RX ORDER — TAMSULOSIN HYDROCHLORIDE 0.4 MG/1
0.4 CAPSULE ORAL DAILY
Status: DISCONTINUED | OUTPATIENT
Start: 2024-04-27 | End: 2024-05-01 | Stop reason: HOSPADM

## 2024-04-27 RX ORDER — MEMANTINE HYDROCHLORIDE 5 MG/1
10 TABLET ORAL 2 TIMES DAILY
Status: DISCONTINUED | OUTPATIENT
Start: 2024-04-27 | End: 2024-05-01 | Stop reason: HOSPADM

## 2024-04-27 RX ORDER — DONEPEZIL HYDROCHLORIDE 10 MG/1
10 TABLET, FILM COATED ORAL
Status: DISCONTINUED | OUTPATIENT
Start: 2024-04-27 | End: 2024-05-01 | Stop reason: HOSPADM

## 2024-04-27 RX ORDER — ATORVASTATIN CALCIUM 20 MG/1
20 TABLET, FILM COATED ORAL DAILY
Status: DISCONTINUED | OUTPATIENT
Start: 2024-04-28 | End: 2024-05-01 | Stop reason: HOSPADM

## 2024-04-27 RX ORDER — ONDANSETRON 2 MG/ML
4 INJECTION INTRAMUSCULAR; INTRAVENOUS EVERY 6 HOURS PRN
Status: DISCONTINUED | OUTPATIENT
Start: 2024-04-27 | End: 2024-05-01 | Stop reason: HOSPADM

## 2024-04-27 RX ADMIN — INSULIN LISPRO 1 UNITS: 100 INJECTION, SOLUTION INTRAVENOUS; SUBCUTANEOUS at 17:19

## 2024-04-27 RX ADMIN — TAMSULOSIN HYDROCHLORIDE 0.4 MG: 0.4 CAPSULE ORAL at 17:18

## 2024-04-27 RX ADMIN — QUETIAPINE FUMARATE 25 MG: 25 TABLET ORAL at 17:18

## 2024-04-27 RX ADMIN — DONEPEZIL HYDROCHLORIDE 10 MG: 10 TABLET ORAL at 22:18

## 2024-04-27 RX ADMIN — ACETAMINOPHEN 650 MG: 325 TABLET ORAL at 18:16

## 2024-04-27 RX ADMIN — MEMANTINE 10 MG: 5 TABLET ORAL at 17:18

## 2024-04-27 NOTE — ASSESSMENT & PLAN NOTE
Patient has history of multiple falls with recent fall around 2 days prior to presentation noting to have a T12 compression fracture managed with conservative treatment.  Presented again after having fall while walking.  CT head was unremarkable.  Mental status at baseline.  No other symptoms except for some back pain and ambulatory difficulty.     PT/OT documented moderate resource intensity  Fall precaution   Aspiration precaution  TSH 3.613  Granddaughter agreeable for rehab.  Patient has advanced dementia unclear whether he can provide appropriate history.  CM consult for discharge planning.

## 2024-04-27 NOTE — ED PROVIDER NOTES
History  Chief Complaint   Patient presents with    Weakness - Generalized     83-year-old male presenting to the ED for reports of worsening generalized weakness and inability to ambulate at home.  Patient had a fall 2 days ago with a new T12 fracture and was given a TLSO brace.  Patient denies worsening pain.  Family numbers with patient states that starting last night he began to lean to the left and they were concerned about generalized weakness.  Patient with no complaints at this time.  Baseline dementia and mentation per family members.  No new reported falls since this fall 2 days ago.        Prior to Admission Medications   Prescriptions Last Dose Informant Patient Reported? Taking?   Blood Glucose Monitoring Suppl (Accu-Chek Guide Me) w/Device KIT   No No   Sig: Use 1 kit in the morning   Cholecalciferol (D 1000) 25 MCG (1000 UT) capsule   No No   Sig: Take 1 capsule (1,000 Units total) by mouth daily   Empagliflozin (JARDIANCE) 10 MG TABS tablet   No No   Sig: Take 1 tablet (10 mg total) by mouth daily   FLUoxetine (PROzac) 20 mg capsule   No No   Sig: Take 1 capsule (20 mg total) by mouth every morning   Lancets (accu-chek multiclix) lancets   No No   Sig: USE   TO CHECK GLUCOSE IN THE MORNING AS DIRECTED   Omega-3 Fatty Acids (fish oil) 1,000 mg   No No   Sig: Take 1 capsule (1,000 mg total) by mouth daily   OneTouch Delica Lancets 33G MISC   No No   Sig: Test once a day   QUEtiapine (SEROquel) 25 mg tablet   No No   Sig: Take 1 tablet (25 mg total) by mouth 2 (two) times a day   aspirin (ECOTRIN LOW STRENGTH) 81 mg EC tablet   Yes No   Sig: Take 81 mg by mouth   atorvastatin (LIPITOR) 20 mg tablet   No No   Sig: Take 1 tablet (20 mg total) by mouth daily   donepezil (ARICEPT) 5 mg tablet   No No   Sig: Take 2 tablets (10 mg total) by mouth daily at bedtime   furosemide (LASIX) 40 mg tablet   No No   Sig: Take 1 tablet (40 mg total) by mouth daily   glucose blood test strip   No No   Sig: Use 1 each in  the morning Use as instructed   memantine (Namenda) 10 mg tablet   No No   Sig: Take 1 tablet (10 mg total) by mouth 2 (two) times a day   metFORMIN (GLUCOPHAGE) 1000 MG tablet   No No   Sig: Take 1 tablet (1,000 mg total) by mouth 2 (two) times a day with meals   tamsulosin (FLOMAX) 0.4 mg   No No   Sig: Take 1 capsule (0.4 mg total) by mouth in the morning      Facility-Administered Medications: None       Past Medical History:   Diagnosis Date    Diabetes mellitus (HCC)     Hyperlipidemia        Past Surgical History:   Procedure Laterality Date    CATARACT EXTRACTION      COLONOSCOPY      DENTAL SURGERY      EYE SURGERY      TONSILLECTOMY      VASECTOMY         History reviewed. No pertinent family history.  I have reviewed and agree with the history as documented.    E-Cigarette/Vaping    E-Cigarette Use Never User      E-Cigarette/Vaping Substances    Nicotine No     THC No     CBD No     Flavoring No     Other No     Unknown No      Social History     Tobacco Use    Smoking status: Never     Passive exposure: Never    Smokeless tobacco: Never   Vaping Use    Vaping status: Never Used   Substance Use Topics    Alcohol use: Not Currently    Drug use: Never       Review of Systems   Musculoskeletal:  Positive for back pain and gait problem.   Neurological:  Positive for weakness.   All other systems reviewed and are negative.      Physical Exam  Physical Exam  Vitals and nursing note reviewed.   Constitutional:       General: He is not in acute distress.     Appearance: He is well-developed. He is not ill-appearing, toxic-appearing or diaphoretic.   HENT:      Head: Normocephalic and atraumatic.      Right Ear: External ear normal.      Left Ear: External ear normal.      Nose: Nose normal.   Eyes:      General: No scleral icterus.        Right eye: No discharge.         Left eye: No discharge.      Conjunctiva/sclera: Conjunctivae normal.   Cardiovascular:      Rate and Rhythm: Normal rate and regular rhythm.       Heart sounds: Normal heart sounds. No murmur heard.     No friction rub. No gallop.   Pulmonary:      Effort: Pulmonary effort is normal. No respiratory distress.      Breath sounds: Normal breath sounds. No wheezing or rales.   Abdominal:      General: Bowel sounds are normal. There is no distension.      Palpations: Abdomen is soft. There is no mass.      Tenderness: There is no abdominal tenderness. There is no guarding.   Musculoskeletal:         General: No tenderness or deformity. Normal range of motion.      Cervical back: Normal range of motion and neck supple.   Skin:     General: Skin is warm and dry.      Coloration: Skin is not pale.      Findings: No erythema or rash.   Neurological:      General: No focal deficit present.      Mental Status: He is alert. Mental status is at baseline.      Comments: 5/5 strength x 4 extremities  Gross sensation intact  CN intact  GCS 15    Oriented to person and place but unsure of the year.    Psychiatric:         Behavior: Behavior normal.         Thought Content: Thought content normal.         Judgment: Judgment normal.         Vital Signs  ED Triage Vitals [04/27/24 1402]   Temperature Pulse Respirations Blood Pressure SpO2   97.6 °F (36.4 °C) 66 20 146/74 96 %      Temp Source Heart Rate Source Patient Position - Orthostatic VS BP Location FiO2 (%)   Temporal Monitor Lying Left arm --      Pain Score       No Pain           Vitals:    04/27/24 1402   BP: 146/74   Pulse: 66   Patient Position - Orthostatic VS: Lying         Visual Acuity      ED Medications  Medications   aspirin (ECOTRIN LOW STRENGTH) EC tablet 81 mg (has no administration in time range)   atorvastatin (LIPITOR) tablet 20 mg (has no administration in time range)   donepezil (ARICEPT) tablet 10 mg (has no administration in time range)   FLUoxetine (PROzac) capsule 20 mg (has no administration in time range)   furosemide (LASIX) tablet 40 mg (has no administration in time range)   memantine  (NAMENDA) tablet 10 mg (has no administration in time range)   QUEtiapine (SEROquel) tablet 25 mg (has no administration in time range)   tamsulosin (FLOMAX) capsule 0.4 mg (has no administration in time range)   insulin lispro (HumALOG/ADMELOG) 100 units/mL subcutaneous injection 1-5 Units (has no administration in time range)       Diagnostic Studies  Results Reviewed       Procedure Component Value Units Date/Time    Basic metabolic panel [[id number]]  (Abnormal) Collected: 04/27/24 1448    Lab Status: Final result Specimen: Blood from Arm, Right Updated: 04/27/24 1509     Sodium 141 mmol/L      Potassium 4.0 mmol/L      Chloride 103 mmol/L      CO2 29 mmol/L      ANION GAP 9 mmol/L      BUN 22 mg/dL      Creatinine 1.06 mg/dL      Glucose 159 mg/dL      Calcium 8.8 mg/dL      eGFR 65 ml/min/1.73sq m     Narrative:      [employer] Kid[employer] guidelines for Chronic Kidney Disease (CKD):     Stage 1 with normal or high GFR (GFR > 90 mL/min/1.73 square meters)    Stage 2 Mild CKD (GFR = 60-89 mL/min/1.73 square meters)    Stage 3A Moderate CKD (GFR = 45-59 mL/min/1.73 square meters)    Stage 3B Moderate CKD (GFR = 30-44 mL/min/1.73 square meters)    Stage 4 Severe CKD (GFR = 15-29 mL/min/1.73 square meters)    Stage 5 End Stage CKD (GFR <15 mL/min/1.73 square meters)  Note: GFR calculation is accurate only with a steady state creatinine    CBC and differential [[id number]]  (Abnormal) Collected: 04/27/24 1448    Lab Status: Final result Specimen: Blood from Arm, Right Updated: 04/27/24 1455     WBC 6.67 Thousand/uL      RBC 3.63 Million/uL      Hemoglobin 10.9 g/dL      Hematocrit 33.8 %      MCV 93 fL      MCH 30.0 pg      MCHC 32.2 g/dL      RDW 13.2 %      MPV 9.5 fL      Platelets 159 Thousands/uL      nRBC 0 /100 WBCs      Segmented % 64 %      Immature Grans % 1 %      Lymphocytes % 25 %      Monocytes % 8 %      Eosinophils Relative 2 %      Basophils Relative 0 %      Absolute Neutrophils 4.28  Thousands/µL      Absolute Immature Grans 0.04 Thousand/uL      Absolute Lymphocytes 1.66 Thousands/µL      Absolute Monocytes 0.56 Thousand/µL      Eosinophils Absolute 0.10 Thousand/µL      Basophils Absolute 0.03 Thousands/µL     UA w Reflex to Microscopic w Reflex to Culture [[id number]]     Lab Status: No result Specimen: Urine                    CT head without contrast   Final Result by Maria De Jesus Riojas MD (04/27 1543)      No acute intracranial abnormality. Stable exam.                  Workstation performed: [id number]                    Procedures  ECG 12 Lead Documentation Only    Date/Time: 4/27/2024 4:00 PM    Performed by: Darius Lester DO  Authorized by: Darius Lester DO    Interpretation:     Interpretation: abnormal    Rate:     ECG rate:  63    ECG rate assessment: normal    Rhythm:     Rhythm: sinus rhythm    Ectopy:     Ectopy: none    QRS:     QRS axis:  Normal    QRS intervals:  Normal  Conduction:     Conduction: normal    ST segments:     ST segments:  Normal  T waves:     T waves: normal    Q waves:     Q waves:  II, III, aVF, V4, V5 and V6           ED Course                                             Medical Decision Making  83-year-old male presenting to the ED for reports of inability to ambulate today and generalized weakness along with reports that since last night he has been leaning to the left.  No new falls.  CT head along with baseline blood work and urinalysis showing no acute abnormalities however patient unable to ambulate which she normally is able to do with a known T12 fracture patient admitted to medicine at this time for likely rehab disposition.    Amount and/or Complexity of Data Reviewed  Labs: ordered.  Radiology: ordered.             Disposition  Final diagnoses:   Compression fracture of T12 vertebra, initial encounter ([facility])   Weakness   Ambulatory dysfunction     Time reflects when diagnosis was documented in both MDM as applicable and the  Disposition within this note       Time User Action Codes Description Comment    4/27/2024  3:55 PM Darius Lester [S22.080A] Compression fracture of T12 vertebra, initial encounter (HCC)     4/27/2024  3:55 PM Darius Lester [R53.1] Weakness     4/27/2024  3:55 PM Darius Lester [R26.2] Ambulatory dysfunction           ED Disposition       ED Disposition   Admit    Condition   Stable    Date/Time   Sat Apr 27, 2024  3:55 PM    Comment   Case was discussed with JACQUES and the patient's admission status was agreed to be Admission Status: inpatient status to the service of Dr. Coelho.               Follow-up Information    None         Patient's Medications   Discharge Prescriptions    No medications on file       No discharge procedures on file.    PDMP Review       None            ED Provider  Electronically Signed by             Darius Lester DO  04/27/24 1604

## 2024-04-27 NOTE — PLAN OF CARE
Problem: PAIN - ADULT  Goal: Verbalizes/displays adequate comfort level or baseline comfort level  Description: Interventions:  - Encourage patient to monitor pain and request assistance  - Assess pain using appropriate pain scale  - Administer analgesics based on type and severity of pain and evaluate response  - Implement non-pharmacological measures as appropriate and evaluate response  - Consider cultural and social influences on pain and pain management  - Notify physician/advanced practitioner if interventions unsuccessful or patient reports new pain  Outcome: Progressing     Problem: INFECTION - ADULT  Goal: Absence or prevention of progression during hospitalization  Description: INTERVENTIONS:  - Assess and monitor for signs and symptoms of infection  - Monitor lab/diagnostic results  - Monitor all insertion sites, i.e. indwelling lines, tubes, and drains  - Monitor endotracheal if appropriate and nasal secretions for changes in amount and color  - [place] appropriate cooling/warming therapies per order  - Administer medications as ordered  - Instruct and encourage patient and family to use good hand hygiene technique  - Identify and instruct in appropriate isolation precautions for identified infection/condition  Outcome: Progressing  Goal: Absence of fever/infection during neutropenic period  Description: INTERVENTIONS:  - Monitor WBC    Outcome: Progressing     Problem: SAFETY ADULT  Goal: Patient will remain free of falls  Description: INTERVENTIONS:  - Educate patient/family on patient safety including physical limitations  - Instruct patient to call for assistance with activity   - Consult OT/PT to assist with strengthening/mobility   - Keep Call bell within reach  - Keep bed low and locked with side rails adjusted as appropriate  - Keep care items and personal belongings within reach  - Initiate and maintain comfort rounds  - Make Fall Risk Sign visible to staff  - Offer Toileting every 2 Hours,  in advance of need  - Initiate/Maintain bed alarm  - Obtain necessary fall risk management equipment: non skid socks  - Apply yellow socks and bracelet for high fall risk patients  - Consider moving patient to room near nurses station  Outcome: Progressing  Goal: Maintain or return to baseline ADL function  Description: INTERVENTIONS:  -  Assess patient's ability to carry out ADLs; assess patient's baseline for ADL function and identify physical deficits which impact ability to perform ADLs (bathing, care of mouth/teeth, toileting, grooming, dressing, etc.)  - Assess/evaluate cause of self-care deficits   - Assess range of motion  - Assess patient's mobility; develop plan if impaired  - Assess patient's need for assistive devices and provide as appropriate  - Encourage maximum independence but intervene and supervise when necessary  - Involve family in performance of ADLs  - Assess for home care needs following discharge   - Consider OT consult to assist with ADL evaluation and planning for discharge  - Provide patient education as appropriate  Outcome: Progressing  Goal: Maintains/Returns to pre admission functional level  Description: INTERVENTIONS:  - Perform AM-PAC 6 Click Basic Mobility/ Daily Activity assessment daily.  - Set and communicate daily mobility goal to care team and patient/family/caregiver.   - Collaborate with rehabilitation services on mobility goals if consulted  - Perform Range of Motion 2 times a day.  - Reposition patient every 2 hours.  - Dangle patient 2 times a day  - Stand patient 2 times a day  - Ambulate patient 3 times a day  - Out of bed to chair 3 times a day   - Out of bed for meals 3 times a day  - Out of bed for toileting  - Record patient progress and toleration of activity level   Outcome: Progressing     Problem: DISCHARGE PLANNING  Goal: Discharge to home or other facility with appropriate resources  Description: INTERVENTIONS:  - Identify barriers to discharge w/patient and  caregiver  - Arrange for needed discharge resources and transportation as appropriate  - Identify discharge learning needs (meds, wound care, etc.)  - Refer to Case Management Department for coordinating discharge planning if the patient needs post-hospital services based on physician/advanced practitioner order or complex needs related to functional status, cognitive ability, or social support system  Outcome: Progressing     Problem: Knowledge Deficit  Goal: Patient/family/caregiver demonstrates understanding of disease process, treatment plan, medications, and discharge instructions  Description: Complete learning assessment and assess knowledge base.  Interventions:  - Provide teaching at level of understanding  - Provide teaching via preferred learning methods  Outcome: Progressing     Problem: METABOLIC, FLUID AND ELECTROLYTES - ADULT  Goal: Electrolytes maintained within normal limits  Description: INTERVENTIONS:  - Monitor labs and assess patient for signs and symptoms of electrolyte imbalances  - Administer electrolyte replacement as ordered  - Monitor response to electrolyte replacements, including repeat lab results as appropriate  - Instruct patient on fluid and nutrition as appropriate  Outcome: Progressing  Goal: Fluid balance maintained  Description: INTERVENTIONS:  - Monitor labs   - Monitor I/O and WT  - Instruct patient on fluid and nutrition as appropriate  - Assess for signs & symptoms of volume excess or deficit  Outcome: Progressing

## 2024-04-27 NOTE — H&P
ADMISSION NOTE   [facility]       Name: Israel Hughes   Age & Sex: 83 y.o. male   MRN: [id number]  Unit/Bed#: -01   Encounter: [id number]  Primary Care Provider: Giovanni Reyez DO      Ambulatory dysfunction  Assessment & Plan  Patient has history of multiple falls with recent fall around 2 days prior to presentation noting to have a T12 compression fracture managed with conservative treatment.  Presented again after having fall while walking.  CT head was unremarkable.  Mental status at baseline.  No other symptoms except for some back pain and ambulatory difficulty.     PT OT  Fall precaution   Aspiration precaution  Granddaughter agreeable for rehab.  Patient has advanced dementia unclear whether he can provide appropriate history.  Check TSH or vitamin D    Peripheral arterial disease (HCC)  Assessment & Plan  Continue aspirin, statin.    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  Continue donepezil, Namenda    Chronic diastolic congestive heart failure (HCC)  Assessment & Plan  Not in acute exacerbation.  On room air.  Continue Lasix, I's and O's, daily weight.    Stage 3 chronic kidney disease, unspecified whether stage 3a or 3b CKD ([facility])  Assessment & Plan  Currently at baseline between 1-1.2.      Benign prostatic hyperplasia without lower urinary tract symptoms  Assessment & Plan  Continue Flomax.    Type 2 diabetes mellitus with retinopathy, without long-term current use of insulin ([facility])  Assessment & Plan  Lab Results   Component Value Date    HGBA1C 7.8 (A) 04/03/2024     Continue sliding scale coverage, hold oral antidiabetic, hypoglycemic protocol, carbohydrate controlled diet        VTE Prophylaxis: Enoxaparin (Lovenox)  / sequential compression device and foot pump applied   Code Status: Level 3 DNR/DNI  POLST: Unknown  Discussion with family: Granddaughter    Anticipated Length of Stay:  Patient will  be admitted on an Inpatient basis with an anticipated length of stay of  > 2 midnights.   Justification for Hospital Stay: Amb Dysfunction    Total Time for Visit, including Counseling / Coordination of Care: 45 minutes.  Greater than 50% of this total time spent on direct patient counseling and coordination of care.    Chief Complaint:   gen weakness, fall since 1 week    History of Present Illness:    Israel Hughes is a 83 y.o. male who presents with episode of fall 2 days prior to presentation where he was found to have T12 compression fracture managed with bracing conservatively.  Again had ambulatory difficulty and granddaughter had difficulty taking care of him.  He stays with granddaughter.  Sister-in-law is power of .  Patient denies any symptoms except for some back pain and walking difficulty.  Walks with a walker.  Denies chest pain, shortness breath, abdominal pain, diarrhea, urinary symptoms, cough, fever, chills..    Review of Systems:    Review of Systems   Constitutional:  Positive for activity change. Negative for appetite change, chills, diaphoresis, fatigue, fever and unexpected weight change.   HENT:  Negative for rhinorrhea and sore throat.    Eyes:  Negative for visual disturbance.   Respiratory:  Negative for cough, chest tightness and shortness of breath.    Cardiovascular:  Negative for chest pain, palpitations and leg swelling.   Gastrointestinal:  Negative for abdominal distention, abdominal pain, blood in stool, constipation, diarrhea, nausea and vomiting.   Genitourinary:  Negative for difficulty urinating.   Musculoskeletal:  Positive for arthralgias, gait problem and myalgias.   Neurological:  Negative for dizziness, light-headedness, numbness and headaches.   Psychiatric/Behavioral:  Negative for behavioral problems and sleep disturbance.        Past Medical and Surgical History:     Past Medical History:   Diagnosis Date   • Diabetes mellitus (HCC)    • Hyperlipidemia         Past Surgical History:   Procedure Laterality Date   • CATARACT EXTRACTION     • COLONOSCOPY     • DENTAL SURGERY     • EYE SURGERY     • TONSILLECTOMY     • VASECTOMY         Meds/Allergies:    Prior to Admission medications    Medication Sig Start Date End Date Taking? Authorizing Provider   aspirin (ECOTRIN LOW STRENGTH) 81 mg EC tablet Take 81 mg by mouth    Historical Provider, MD   atorvastatin (LIPITOR) 20 mg tablet Take 1 tablet (20 mg total) by mouth daily 3/4/24   Giovanni Reyez DO   Blood Glucose Monitoring Suppl (Accu-Chek Guide Me) w/Device KIT Use 1 kit in the morning 4/11/24   Giovanni Reyez DO   Cholecalciferol (D 1000) 25 MCG (1000 UT) capsule Take 1 capsule (1,000 Units total) by mouth daily 3/4/24   Giovanni Reyez DO   donepezil (ARICEPT) 5 mg tablet Take 2 tablets (10 mg total) by mouth daily at bedtime 3/4/24   Giovanni Reyez DO   Empagliflozin (JARDIANCE) 10 MG TABS tablet Take 1 tablet (10 mg total) by mouth daily 4/3/24 3/29/25  Giovanni Reyez DO   FLUoxetine (PROzac) 20 mg capsule Take 1 capsule (20 mg total) by mouth every morning 3/4/24   Giovanni Reyez DO   furosemide (LASIX) 40 mg tablet Take 1 tablet (40 mg total) by mouth daily 4/8/24   Giovanni Reyez DO   glucose blood test strip Use 1 each in the morning Use as instructed 4/12/24   Giovanni Reyez DO   Lancets (accu-chek multiclix) lancets USE   TO CHECK GLUCOSE IN THE MORNING AS DIRECTED 4/15/24   Giovanni Reyez DO   memantine (Namenda) 10 mg tablet Take 1 tablet (10 mg total) by mouth 2 (two) times a day 4/3/24   Giovanni Reyez DO   metFORMIN (GLUCOPHAGE) 1000 MG tablet Take 1 tablet (1,000 mg total) by mouth 2 (two) times a day with meals 3/4/24   Giovanni Reyez DO   Omega-3 Fatty Acids (fish oil) 1,000 mg Take 1 capsule (1,000 mg total) by mouth daily 3/4/24   Giovanni Reyez DO   OneTouch Delica Lancets 33G MISC Test once a day 4/3/24   Giovanni Reyez DO   QUEtiapine (SEROquel) 25 mg tablet Take 1 tablet (25 mg total) by  "mouth 2 (two) times a day 3/4/24   Giovanni Reyez DO   tamsulosin (FLOMAX) 0.4 mg Take 1 capsule (0.4 mg total) by mouth in the morning 3/4/24   Giovanni Reyez DO     I have reviewed home medications with patient personally.    Allergies:   Allergies   Allergen Reactions   • Bactrim [Sulfamethoxazole-Trimethoprim] GI Intolerance   • Simvastatin Myalgia and Other (See Comments)       Social History:     Marital Status:    Substance Use History:   Social History     Substance and Sexual Activity   Alcohol Use Not Currently     Social History     Tobacco Use   Smoking Status Never   • Passive exposure: Never   Smokeless Tobacco Never     Social History     Substance and Sexual Activity   Drug Use Never       Family History:    History reviewed. No pertinent family history.     Physical Exam:     Vitals:   Blood Pressure: 148/73 (04/27/24 1621)  Pulse: 62 (04/27/24 1621)  Temperature: 98.3 °F (36.8 °C) (04/27/24 1621)  Temp Source: Temporal (04/27/24 1402)  Respirations: 18 (04/27/24 1621)  Height: 5' 5\" (165.1 cm) (04/27/24 1500)  Weight - Scale: 53.4 kg (117 lb 11.6 oz) (04/27/24 1500)  SpO2: 97 % (04/27/24 1621)    Physical Exam  Vitals reviewed.   Constitutional:       General: He is not in acute distress.     Appearance: Normal appearance.   HENT:      Head: Normocephalic and atraumatic.   Eyes:      General: No scleral icterus.        Right eye: No discharge.         Left eye: No discharge.   Cardiovascular:      Rate and Rhythm: Normal rate and regular rhythm.      Pulses: Normal pulses.      Heart sounds: Normal heart sounds.   Pulmonary:      Effort: Pulmonary effort is normal. No respiratory distress.      Breath sounds: Normal breath sounds. No wheezing or rales.   Chest:      Chest wall: No tenderness.   Abdominal:      General: Abdomen is flat. Bowel sounds are normal. There is no distension.      Palpations: Abdomen is soft.      Tenderness: There is no abdominal tenderness.   Musculoskeletal:       "   General: No deformity. Normal range of motion.      Cervical back: Normal range of motion and neck supple.      Right lower leg: No edema.      Left lower leg: No edema.   Skin:     General: Skin is warm.      Coloration: Skin is not jaundiced or pale.      Findings: No rash.   Neurological:      General: No focal deficit present.      Mental Status: He is alert and oriented to person, place, and time. Mental status is at baseline.      Cranial Nerves: No cranial nerve deficit.      Motor: No weakness.   Psychiatric:         Mood and Affect: Mood normal.         Behavior: Behavior normal.         Additional Data:     Lab Results: I have personally reviewed pertinent reports.      Results from last 7 days   Lab Units 04/27/24  1448   WBC Thousand/uL 6.67   HEMOGLOBIN g/dL 10.9*   HEMATOCRIT % 33.8*   PLATELETS Thousands/uL 159   SEGS PCT % 64   LYMPHO PCT % 25   MONO PCT % 8   EOS PCT % 2     Results from last 7 days   Lab Units 04/27/24  1448 04/25/24  1905   SODIUM mmol/L 141 139   POTASSIUM mmol/L 4.0 4.8   CHLORIDE mmol/L 103 101   CO2 mmol/L 29 27   BUN mg/dL 22 26*   CREATININE mg/dL 1.06 1.23   ANION GAP mmol/L 9 11   CALCIUM mg/dL 8.8 9.3   ALBUMIN g/dL  --  4.1   TOTAL BILIRUBIN mg/dL  --  0.35   ALK PHOS U/L  --  92   ALT U/L  --  15   AST U/L  --  22   GLUCOSE RANDOM mg/dL 159* 225*         Results from last 7 days   Lab Units 04/27/24  1624   POC GLUCOSE mg/dl 162*               Imaging: I have personally reviewed pertinent reports.      CT head without contrast   Final Result by Maria De Jesus Riojas MD (04/27 1543)      No acute intracranial abnormality. Stable exam.                  Workstation performed: [id number]             EKG, Pathology, and Other Studies Reviewed on Admission:   EKG: NSR    Allscripts / Epic Records Reviewed: Yes     ** Please Note: This note has been constructed using a voice recognition system. **

## 2024-04-27 NOTE — ASSESSMENT & PLAN NOTE
Baseline Creatinine between 1-1.2.    Currently better than baseline  Avoid nephrotoxins and hypotension

## 2024-04-28 PROBLEM — E83.42 HYPOMAGNESEMIA: Status: ACTIVE | Noted: 2024-04-28

## 2024-04-28 LAB
25(OH)D3 SERPL-MCNC: 29.5 NG/ML (ref 30–100)
ALBUMIN SERPL BCP-MCNC: 3.7 G/DL (ref 3.5–5)
ALP SERPL-CCNC: 84 U/L (ref 34–104)
ALT SERPL W P-5'-P-CCNC: 14 U/L (ref 7–52)
ANION GAP SERPL CALCULATED.3IONS-SCNC: 6 MMOL/L (ref 4–13)
AST SERPL W P-5'-P-CCNC: 24 U/L (ref 13–39)
BILIRUB SERPL-MCNC: 0.62 MG/DL (ref 0.2–1)
BUN SERPL-MCNC: 18 MG/DL (ref 5–25)
CALCIUM SERPL-MCNC: 9.1 MG/DL (ref 8.4–10.2)
CHLORIDE SERPL-SCNC: 103 MMOL/L (ref 96–108)
CO2 SERPL-SCNC: 30 MMOL/L (ref 21–32)
CREAT SERPL-MCNC: 0.84 MG/DL (ref 0.6–1.3)
ERYTHROCYTE [DISTWIDTH] IN BLOOD BY AUTOMATED COUNT: 12.9 % (ref 11.6–15.1)
GFR SERPL CREATININE-BSD FRML MDRD: 81 ML/MIN/1.73SQ M
GLUCOSE SERPL-MCNC: 116 MG/DL (ref 65–140)
GLUCOSE SERPL-MCNC: 130 MG/DL (ref 65–140)
GLUCOSE SERPL-MCNC: 183 MG/DL (ref 65–140)
GLUCOSE SERPL-MCNC: 195 MG/DL (ref 65–140)
GLUCOSE SERPL-MCNC: 211 MG/DL (ref 65–140)
HCT VFR BLD AUTO: 33.3 % (ref 36.5–49.3)
HGB BLD-MCNC: 11 G/DL (ref 12–17)
MAGNESIUM SERPL-MCNC: 1.3 MG/DL (ref 1.9–2.7)
MCH RBC QN AUTO: 30.4 PG (ref 26.8–34.3)
MCHC RBC AUTO-ENTMCNC: 33 G/DL (ref 31.4–37.4)
MCV RBC AUTO: 92 FL (ref 82–98)
PLATELET # BLD AUTO: 162 THOUSANDS/UL (ref 149–390)
PMV BLD AUTO: 9.7 FL (ref 8.9–12.7)
POTASSIUM SERPL-SCNC: 3.8 MMOL/L (ref 3.5–5.3)
PROT SERPL-MCNC: 6.5 G/DL (ref 6.4–8.4)
RBC # BLD AUTO: 3.62 MILLION/UL (ref 3.88–5.62)
SODIUM SERPL-SCNC: 139 MMOL/L (ref 135–147)
WBC # BLD AUTO: 5.94 THOUSAND/UL (ref 4.31–10.16)

## 2024-04-28 PROCEDURE — 80053 COMPREHEN METABOLIC PANEL: CPT | Performed by: INTERNAL MEDICINE

## 2024-04-28 PROCEDURE — 82948 REAGENT STRIP/BLOOD GLUCOSE: CPT

## 2024-04-28 PROCEDURE — 83735 ASSAY OF MAGNESIUM: CPT | Performed by: INTERNAL MEDICINE

## 2024-04-28 PROCEDURE — 99232 SBSQ HOSP IP/OBS MODERATE 35: CPT | Performed by: PHYSICIAN ASSISTANT

## 2024-04-28 PROCEDURE — 97163 PT EVAL HIGH COMPLEX 45 MIN: CPT

## 2024-04-28 PROCEDURE — 85027 COMPLETE CBC AUTOMATED: CPT | Performed by: INTERNAL MEDICINE

## 2024-04-28 RX ORDER — ERGOCALCIFEROL 1.25 MG/1
50000 CAPSULE ORAL WEEKLY
Status: DISCONTINUED | OUTPATIENT
Start: 2024-04-28 | End: 2024-05-01 | Stop reason: HOSPADM

## 2024-04-28 RX ORDER — MAGNESIUM SULFATE HEPTAHYDRATE 40 MG/ML
4 INJECTION, SOLUTION INTRAVENOUS ONCE
Status: COMPLETED | OUTPATIENT
Start: 2024-04-28 | End: 2024-04-28

## 2024-04-28 RX ADMIN — ACETAMINOPHEN 650 MG: 325 TABLET ORAL at 17:28

## 2024-04-28 RX ADMIN — INSULIN LISPRO 1 UNITS: 100 INJECTION, SOLUTION INTRAVENOUS; SUBCUTANEOUS at 11:13

## 2024-04-28 RX ADMIN — QUETIAPINE FUMARATE 25 MG: 25 TABLET ORAL at 08:57

## 2024-04-28 RX ADMIN — ASPIRIN 81 MG: 81 TABLET, COATED ORAL at 08:57

## 2024-04-28 RX ADMIN — MEMANTINE 10 MG: 5 TABLET ORAL at 17:21

## 2024-04-28 RX ADMIN — DONEPEZIL HYDROCHLORIDE 10 MG: 10 TABLET ORAL at 21:14

## 2024-04-28 RX ADMIN — INSULIN LISPRO 1 UNITS: 100 INJECTION, SOLUTION INTRAVENOUS; SUBCUTANEOUS at 16:32

## 2024-04-28 RX ADMIN — MAGNESIUM SULFATE HEPTAHYDRATE 4 G: 40 INJECTION, SOLUTION INTRAVENOUS at 08:55

## 2024-04-28 RX ADMIN — TAMSULOSIN HYDROCHLORIDE 0.4 MG: 0.4 CAPSULE ORAL at 08:57

## 2024-04-28 RX ADMIN — MEMANTINE 10 MG: 5 TABLET ORAL at 08:57

## 2024-04-28 RX ADMIN — ERGOCALCIFEROL 50000 UNITS: 1.25 CAPSULE, LIQUID FILLED ORAL at 08:57

## 2024-04-28 RX ADMIN — FLUOXETINE HYDROCHLORIDE 20 MG: 20 CAPSULE ORAL at 08:57

## 2024-04-28 RX ADMIN — FUROSEMIDE 40 MG: 40 TABLET ORAL at 08:57

## 2024-04-28 RX ADMIN — QUETIAPINE FUMARATE 25 MG: 25 TABLET ORAL at 17:21

## 2024-04-28 RX ADMIN — ENOXAPARIN SODIUM 40 MG: 40 INJECTION SUBCUTANEOUS at 08:57

## 2024-04-28 NOTE — PLAN OF CARE
Problem: SAFETY ADULT  Goal: Patient will remain free of falls  Description: INTERVENTIONS:  - Educate patient/family on patient safety including physical limitations  - Instruct patient to call for assistance with activity   - Consult OT/PT to assist with strengthening/mobility   - Keep Call bell within reach  - Keep bed low and locked with side rails adjusted as appropriate  - Keep care items and personal belongings within reach  - Initiate and maintain comfort rounds  - Make Fall Risk Sign visible to staff  - Offer Toileting every 2 Hours, in advance of need  - Initiate/Maintain bed alarm  - Obtain necessary fall risk management equipment: non skid socks  - Apply yellow socks and bracelet for high fall risk patients  - Consider moving patient to room near nurses station  Outcome: Progressing  Goal: Maintain or return to baseline ADL function  Description: INTERVENTIONS:  -  Assess patient's ability to carry out ADLs; assess patient's baseline for ADL function and identify physical deficits which impact ability to perform ADLs (bathing, care of mouth/teeth, toileting, grooming, dressing, etc.)  - Assess/evaluate cause of self-care deficits   - Assess range of motion  - Assess patient's mobility; develop plan if impaired  - Assess patient's need for assistive devices and provide as appropriate  - Encourage maximum independence but intervene and supervise when necessary  - Involve family in performance of ADLs  - Assess for home care needs following discharge   - Consider OT consult to assist with ADL evaluation and planning for discharge  - Provide patient education as appropriate  Outcome: Progressing  Goal: Maintains/Returns to pre admission functional level  Description: INTERVENTIONS:  - Perform AM-PAC 6 Click Basic Mobility/ Daily Activity assessment daily.  - Set and communicate daily mobility goal to care team and patient/family/caregiver.   - Collaborate with rehabilitation services on mobility goals if  consulted  - Perform Range of Motion 2 times a day.  - Reposition patient every 2 hours.  - Dangle patient 2 times a day  - Stand patient 2 times a day  - Ambulate patient 3 times a day  - Out of bed to chair 3 times a day   - Out of bed for meals 3 times a day  - Out of bed for toileting  - Record patient progress and toleration of activity level   Outcome: Progressing

## 2024-04-28 NOTE — PLAN OF CARE
Problem: INFECTION - ADULT  Goal: Absence or prevention of progression during hospitalization  Description: INTERVENTIONS:  - Assess and monitor for signs and symptoms of infection  - Monitor lab/diagnostic results  - Monitor all insertion sites, i.e. indwelling lines, tubes, and drains  - Monitor endotracheal if appropriate and nasal secretions for changes in amount and color  - [place] appropriate cooling/warming therapies per order  - Administer medications as ordered  - Instruct and encourage patient and family to use good hand hygiene technique  - Identify and instruct in appropriate isolation precautions for identified infection/condition  Outcome: Progressing  Goal: Absence of fever/infection during neutropenic period  Description: INTERVENTIONS:  - Monitor WBC    Outcome: Progressing

## 2024-04-28 NOTE — PLAN OF CARE
Problem: PHYSICAL THERAPY ADULT  Goal: Performs mobility at highest level of function for planned discharge setting.  See evaluation for individualized goals.  Description: Treatment/Interventions: Functional transfer training, LE strengthening/ROM, Elevations, Therapeutic exercise, Endurance training, Cognitive reorientation, Patient/family training, Equipment eval/education, Bed mobility, Gait training, Spoke to nursing          See flowsheet documentation for full assessment, interventions and recommendations.  Outcome: Progressing  Note: Prognosis: Fair  Problem List: Decreased endurance, Impaired balance, Decreased mobility, Decreased coordination, Decreased cognition, Impaired judgement, Decreased safety awareness, Pain  Assessment: Pt is 83 y.o. male seen for PT evaluation s/p admit to [facility] on 4/27/2024 w/ Ambulatory dysfunction. PT consulted to assess pt's functional mobility and d/c needs. Order placed for PT eval and tx, w/ up and OOB as tolerated order. Pt agreeable to PT  session upon arrival, pt found supine in bed.  PTA, pt was ambulates household distances, lives w/ granddaughter and her spouse in 2 level home with 10 [address] per EMR, and retired.  Pt to benefit from continued PT tx to address deficits and maximize level of functional independent mobility and consistency. Upon conclusion pt  seated in chair, with all needs in reach, and chair alarm intact. Complexity: Comorbidities affecting pt's physical performance at time of assessment include: DM, dementia, and limited cognition. Personal factors affecting pt at time of IE include: advanced age, limited mobility, history of falls, limited insight into impairments, unable to perform physical activity, inaccessible home environment, ambulating with assistive device, steps to enter home, inability to ambulate household distances, decreased cognition, and impulsivity. Please find objective findings from PT assessment regarding body systems  outlined above with impairments and limitations including decreased ROM, impaired balance, decreased endurance, impaired coordination, gait deviations, pain, decreased activity tolerance, decreased functional mobility tolerance, decreased safety awareness, impaired judgement, and fall risk.  Pt's clinical presentation is currently unstable/unpredictable seen in pt's presentation of abnormal H&H, abnormal blood sugar levels, pain, confusion, and multiple readmissions. The patient's AM-PAC Basic Mobility Inpatient Short Form Raw Score is 11.  Based on patient presentations and impairments, pt would most appropriately benefit from Level II resource intensity upon discharge. Please also refer to the recommendation of the Physical Therapist for safe discharge planning. RN verbalized pt appropriate for PT session.  Barriers to Discharge: Inaccessible home environment, Decreased caregiver support     Rehab Resource Intensity Level, PT: II (Moderate Resource Intensity)    See flowsheet documentation for full assessment.      Dannielle Armando PT, DPT

## 2024-04-28 NOTE — PHYSICAL THERAPY NOTE
PHYSICAL THERAPY EVALUATION  NAME:  Israel Hughes  DATE: 04/28/24    AGE:   83 y.o.  Mrn:   [id number]  ADMIT DX:  Weakness [R53.1]  Generalized weakness [R53.1]  Compression fracture of T12 vertebra, initial encounter (formerly [facility]) [S22.080A]  Ambulatory dysfunction [R26.2]  Problem List:   Patient Active Problem List   Diagnosis    Type 2 diabetes mellitus with retinopathy, without long-term current use of insulin (formerly [facility])    Myasthenia gravis without exacerbation (formerly [facility])    Mixed hyperlipidemia    Retinopathy    Primary open angle glaucoma (POAG) of both eyes, mild stage    Ptosis of both eyelids    Benign prostatic hyperplasia without lower urinary tract symptoms    Essential hypertension    Other age-related cataract    Mild aortic stenosis    Mild mitral regurgitation    Diastolic dysfunction    Mild concentric left ventricular hypertrophy (LVH)    Mild protein-calorie malnutrition (formerly [facility])    Dysphagia    Stage 3 chronic kidney disease, unspecified whether stage 3a or 3b CKD (formerly [facility])    Chronic diastolic congestive heart failure (formerly [facility])    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 (formerly [facility])    Orthostatic lightheadedness    Peripheral arterial disease (formerly [facility])    Ambulatory dysfunction       Past Medical History  Past Medical History:   Diagnosis Date    Diabetes mellitus (formerly [facility])     Hyperlipidemia        Past Surgical History  Past Surgical History:   Procedure Laterality Date    CATARACT EXTRACTION      COLONOSCOPY      DENTAL SURGERY      EYE SURGERY      TONSILLECTOMY      VASECTOMY         Length Of Stay: 1  Performed at least 2 patient identifiers during session: Name, Birthday, and ID bracelet       04/28/24 0932   PT Last Visit   PT Visit Date 04/28/24   Note Type   Note type Evaluation   Pain Assessment   Pain Assessment Tool 0-10   Pain Score 3   Pain Location/Orientation Orientation: Left;Orientation: Upper;Location: Arm   Pain Onset/Description Onset: Sudden   Effect of Pain on Daily Activities Yes   Patient's  Stated Pain Goal No pain   Hospital Pain Intervention(s) Repositioned;Ambulation/increased activity   Multiple Pain Sites Yes   Pain 2   Zuniga-Addison FACES Pain Rating 2 4   Pain Location/Orientation 2 Location: Face  (chin)   Restrictions/Precautions   Weight Bearing Precautions Per Order No   Braces or Orthoses TLSO  (Per ED visit 4/25)   Other Precautions Fall Risk;Chair Alarm;Aspiration;Multiple lines;Pain   Home Living   Type of Home House   Home Layout Two level;Stairs to enter with rails;Other (Comment)  (FFSU per pt and prior EMR)   Bathroom Shower/Tub Walk-in shower   Bathroom Toilet Raised   Bathroom Equipment Grab bars in shower;Shower chair;Hand-held shower;Grab bars around toilet   Bathroom Accessibility Accessible via walker   Home Equipment Walker;Cane;Grab bars   Additional Comments Pt with hx dementia per EMR; questionable historian   Prior Function   Level of Center Needs assistance with IADLS;Independent with functional mobility   Lives With Other (Comment)  (granddaughter)   Receives Help From Family   IADLs Family/Friend/Other provides transportation;Family/Friend/Other provides meals;Family/Friend/Other provides medication management   Falls in the last 6 months 1 to 4  (at least 2 in the last week; pt with impaired cognition)   Vocational Retired   General   Additional Pertinent History Admit following fall at home, pt with hx ED admission 4/25 with noted facial laceration and T12 fx with rec for TLSO brace   Family/Caregiver Present No   Cognition   Overall Cognitive Status Impaired   Arousal/Participation Alert   Orientation Level Oriented to person;Disoriented to place;Disoriented to time;Disoriented to situation  (hospital but not location/name; year as 2022)   Memory Decreased long term memory;Decreased recall of precautions;Decreased recall of recent events;Decreased short term memory;Decreased recall of biographical information   Following Commands Follows one step commands with  "increased time or repetition   Subjective   Subjective \"This chin here, I really messed this up\"   RLE Assessment   RLE Assessment WFL   LLE Assessment   LLE Assessment WFL   Vision-Basic Assessment   Current Vision Wears glasses all the time   Coordination   Movements are Fluid and Coordinated 0   Sensation WFL   Heel to Shin Impaired   Rapid Alternating Movements Impaired   Bed Mobility   Rolling L 5  Supervision   Additional items Impulsive;Bedrails   Supine to Sit 3  Moderate assistance   Additional items Assist x 1;Increased time required;Bedrails   Transfers   Sit to Stand 2  Maximal assistance   Additional items Assist x 1;Increased time required;Verbal cues   Stand to Sit 2  Maximal assistance   Additional items Assist x 1;Increased time required;Verbal cues   Stand pivot 2  Maximal assistance   Additional items Assist x 1;Increased time required;Verbal cues   Additional Comments significant posterior trunk lean with poor ability to self correct   Ambulation/Elevation   Distance 0   Ambulation/Elevation Additional Comments Stand pivot transfer bed to chair only; poor insight and safety with significant posterior trunk lean   Balance   Static Sitting Fair -   Dynamic Sitting Fair -   Static Standing Poor   Dynamic Standing Poor -   Ambulatory Poor -   Endurance Deficit   Endurance Deficit Yes   Endurance Deficit Description Limited mobility at this time   Activity Tolerance   Activity Tolerance Patient limited by fatigue;Patient limited by pain   Medical Staff Made Aware Discussed with RN   Nurse Made Aware Yes   Assessment   Prognosis Fair   Problem List Decreased endurance;Impaired balance;Decreased mobility;Decreased coordination;Decreased cognition;Impaired judgement;Decreased safety awareness;Pain   Assessment Pt is 83 y.o. male seen for PT evaluation s/p admit to [facility] on 4/27/2024 w/ Ambulatory dysfunction. PT consulted to assess pt's functional mobility and d/c needs. Order placed for PT " eval and tx, w/ up and OOB as tolerated order. Pt agreeable to PT  session upon arrival, pt found supine in bed.  PTA, pt was ambulates household distances, lives w/ granddaughter and her spouse in 2 level home with 10 [address] per EMR, and retired.  Pt to benefit from continued PT tx to address deficits and maximize level of functional independent mobility and consistency. Upon conclusion pt  seated in chair, with all needs in reach, and chair alarm intact. Complexity: Comorbidities affecting pt's physical performance at time of assessment include: DM, dementia, and limited cognition. Personal factors affecting pt at time of IE include: advanced age, limited mobility, history of falls, limited insight into impairments, unable to perform physical activity, inaccessible home environment, ambulating with assistive device, steps to enter home, inability to ambulate household distances, decreased cognition, and impulsivity. Please find objective findings from PT assessment regarding body systems outlined above with impairments and limitations including decreased ROM, impaired balance, decreased endurance, impaired coordination, gait deviations, pain, decreased activity tolerance, decreased functional mobility tolerance, decreased safety awareness, impaired judgement, and fall risk.  Pt's clinical presentation is currently unstable/unpredictable seen in pt's presentation of abnormal H&H, abnormal blood sugar levels, pain, confusion, and multiple readmissions. The patient's AM-PAC Basic Mobility Inpatient Short Form Raw Score is 11.  Based on patient presentations and impairments, pt would most appropriately benefit from Level II resource intensity upon discharge. Please also refer to the recommendation of the Physical Therapist for safe discharge planning. RN verbalized pt appropriate for PT session.   Barriers to Discharge Inaccessible home environment;Decreased caregiver support   Goals   LTG Expiration Date 05/12/24   Long  Term Goal #1 Pt will: Perform bed mobility tasks to modified I to improve ease of bed mobility. Perform transfers to modified I to decrease burden of care, improve ease of transfers, and improve activity tolerance. Perform ambulation with RW for 200 feet to  improve activity tolerance. Increase dynamic standing balance to F+ to decrease fall risk.  Increase BLE strength to 4+/5 to improve functional mobility.  Increase OOB activity tolerance to 10 minutes without s/s of exertion to decrease fall risk.  Navigate up and down 10 steps with Supervision so patient can enter and exit home.   Plan   Treatment/Interventions Functional transfer training;LE strengthening/ROM;Elevations;Therapeutic exercise;Endurance training;Cognitive reorientation;Patient/family training;Equipment eval/education;Bed mobility;Gait training;Spoke to nursing   PT Frequency 3-5x/wk   Discharge Recommendation   Rehab Resource Intensity Level, PT II (Moderate Resource Intensity)   AM-PAC Basic Mobility Inpatient   Turning in Flat Bed Without Bedrails 3   Lying on Back to Sitting on Edge of Flat Bed Without Bedrails 2   Moving Bed to Chair 2   Standing Up From Chair Using Arms 2   Walk in Room 1   Climb 3-5 Stairs With Railing 1   Basic Mobility Inpatient Raw Score 11   Basic Mobility Standardized Score 30.25   [facility] Highest Level Of Mobility   -HL Goal 4: Move to chair/commode   -HL Achieved 4: Move to chair/commode   End of Consult   Patient Position at End of Consult Bedside chair;Bed/Chair alarm activated;All needs within reach       Time In: 0932  Time Out: 1000  Total Evaluation Minutes: 28    Dannielle Armando, PT, DPT

## 2024-04-28 NOTE — PROGRESS NOTES
[facility]  Progress Note  Name: Israel Hughes I  MRN: [id number]  Unit/Bed#: -01 I Date of Admission: 4/27/2024   Date of Service: 4/28/2024 I Hospital Day: 1    Assessment/Plan   * Ambulatory dysfunction  Assessment & Plan  Patient has history of multiple falls with recent fall around 2 days prior to presentation noting to have a T12 compression fracture managed with conservative treatment.  Presented again after having fall while walking.  CT head was unremarkable.  Mental status at baseline.  No other symptoms except for some back pain and ambulatory difficulty.     PT/OT documented moderate resource intensity  Fall precaution   Aspiration precaution  TSH 3.613  Granddaughter agreeable for rehab.  Patient has advanced dementia unclear whether he can provide appropriate history.  CM consult for discharge planning.     Hypomagnesemia  Assessment & Plan  Magnesium of 1.3 this morning  Will given 4 g IV magnesium  Repeat magnesium level in am    Peripheral arterial disease (HCC)  Assessment & Plan  Continue aspirin, statin.    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 ([facility])  Assessment & Plan  His dementia at baseline.    Continue donepezil, Namenda    Chronic diastolic congestive heart failure (HCC)  Assessment & Plan  Not in acute exacerbation.  On room air.    Continue home Lasix  I's and O's, daily weight.    Stage 3 chronic kidney disease, unspecified whether stage 3a or 3b CKD ([facility])  Assessment & Plan  Baseline Creatinine between 1-1.2.    Currently better than baseline  Avoid nephrotoxins and hypotension    Benign prostatic hyperplasia without lower urinary tract symptoms  Assessment & Plan  Continue Flomax.    Type 2 diabetes mellitus with retinopathy, without long-term current use of insulin ([facility])  Assessment & Plan  Lab Results   Component Value Date    HGBA1C 7.8 (A) 04/03/2024     Hold oral antidiabetic  Continue sliding scale coverage  Hypoglycemic  protocol  Carbohydrate controlled diet           VTE Pharmacologic Prophylaxis:   Moderate Risk (Score 3-4) - Pharmacological DVT Prophylaxis Ordered: enoxaparin (Lovenox).    Mobility:   Basic Mobility Inpatient Raw Score: 11  JH-HLM Goal: 4: Move to chair/commode  JH-HLM Achieved: 4: Move to chair/commode  JH-HLM Goal achieved. Continue to encourage appropriate mobility.    Patient Centered Rounds: I performed bedside rounds with nursing staff today.   Discussions with Specialists or Other Care Team Provider: nursing    Education and Discussions with Family / Patient: Updated  (granddaughter) at bedside.    Total Time Spent on Date of Encounter in care of patient: 25 mins. This time was spent on one or more of the following: performing physical exam; counseling and coordination of care; obtaining or reviewing history; documenting in the medical record; reviewing/ordering tests, medications or procedures; communicating with other healthcare professionals and discussing with patient's family/caregivers.    Current Length of Stay: 1 day(s)  Current Patient Status: Inpatient   Certification Statement: The patient will continue to require additional inpatient hospital stay due to need for placement to [facility]  Discharge Plan: Anticipate discharge in 24-48 hrs to rehab facility.    Code Status: Level 3 - DNAR and DNI    Subjective:   The patient was seen and examined. The patient is lying in bed in no acute distress. He denies any complaints.     Objective:     Vitals:   Temp (24hrs), Av.8 °F (36.6 °C), Min:97.6 °F (36.4 °C), Max:98.3 °F (36.8 °C)    Temp:  [97.6 °F (36.4 °C)-98.3 °F (36.8 °C)] 97.8 °F (36.6 °C)  HR:  [56-66] 65  Resp:  [18-20] 18  BP: (141-158)/(69-82) 141/69  SpO2:  [96 %-98 %] 98 %  Body mass index is 19.59 kg/m².     Input and Output Summary (last 24 hours):     Intake/Output Summary (Last 24 hours) at 2024 1151  Last data filed at 2024 0932  Gross per 24 hour   Intake 200 ml    Output 100 ml   Net 100 ml       Physical Exam:   Physical Exam  Vitals and nursing note reviewed.   Constitutional:       General: He is awake.      Appearance: Normal appearance.   HENT:      Head: Normocephalic and atraumatic.   Cardiovascular:      Rate and Rhythm: Normal rate and regular rhythm.      Heart sounds: Normal heart sounds.   Pulmonary:      Effort: Pulmonary effort is normal.      Breath sounds: Normal breath sounds.   Abdominal:      Palpations: Abdomen is soft.      Tenderness: There is no abdominal tenderness.   Skin:     General: Skin is warm and dry.   Neurological:      General: No focal deficit present.      Mental Status: He is alert. Mental status is at baseline.   Psychiatric:         Attention and Perception: Attention normal.         Mood and Affect: Mood normal.         Speech: Speech normal.         Behavior: Behavior is cooperative.          Additional Data:     Labs:  Results from last 7 days   Lab Units 04/28/24  0546 04/27/24  1448   WBC Thousand/uL 5.94 6.67   HEMOGLOBIN g/dL 11.0* 10.9*   HEMATOCRIT % 33.3* 33.8*   PLATELETS Thousands/uL 162 159   SEGS PCT %  --  64   LYMPHO PCT %  --  25   MONO PCT %  --  8   EOS PCT %  --  2     Results from last 7 days   Lab Units 04/28/24  0546   SODIUM mmol/L 139   POTASSIUM mmol/L 3.8   CHLORIDE mmol/L 103   CO2 mmol/L 30   BUN mg/dL 18   CREATININE mg/dL 0.84   ANION GAP mmol/L 6   CALCIUM mg/dL 9.1   ALBUMIN g/dL 3.7   TOTAL BILIRUBIN mg/dL 0.62   ALK PHOS U/L 84   ALT U/L 14   AST U/L 24   GLUCOSE RANDOM mg/dL 116         Results from last 7 days   Lab Units 04/28/24  1050 04/28/24  0647 04/27/24  2139 04/27/24  1624   POC GLUCOSE mg/dl 211* 130 149* 162*               Lines/Drains:  Invasive Devices       Peripheral Intravenous Line  Duration             Peripheral IV 04/27/24 Proximal;Right;Ventral (anterior) Forearm <1 day                          Imaging: No pertinent imaging reviewed.    Recent Cultures (last 7 days):         Last  24 Hours Medication List:   Current Facility-Administered Medications   Medication Dose Route Frequency Provider Last Rate    acetaminophen  650 mg Oral Q6H PRN Jamie Londono MD      aspirin  81 mg Oral Daily Jamie Londono MD      atorvastatin  20 mg Oral Daily Jamie Londono MD      donepezil  10 mg Oral HS Jamie Londono MD      enoxaparin  40 mg Subcutaneous Daily Jamie Londono MD      ergocalciferol  50,000 Units Oral Weekly Anish Wilson PA-C      FLUoxetine  20 mg Oral QAM Jamie Londono MD      furosemide  40 mg Oral Daily Jamie Londono MD      insulin lispro  1-5 Units Subcutaneous TID AC Jamie Londono MD      magnesium sulfate  4 g Intravenous Once Anish Wilson PA-C 4 g (04/28/24 0839)    memantine  10 mg Oral BID Jamie Londono MD      ondansetron  4 mg Intravenous Q6H PRN Jamie Londono MD      QUEtiapine  25 mg Oral BID Jamie Londono MD      tamsulosin  0.4 mg Oral Daily Jamie Londono MD          Today, Patient Was Seen By: Anish Wilson PA-C    **Please Note: This note may have been constructed using a voice recognition system.**

## 2024-04-29 ENCOUNTER — TELEPHONE (OUTPATIENT)
Dept: PSYCHIATRY | Facility: CLINIC | Age: 84
End: 2024-04-29

## 2024-04-29 LAB
ANION GAP SERPL CALCULATED.3IONS-SCNC: 9 MMOL/L (ref 4–13)
BUN SERPL-MCNC: 14 MG/DL (ref 5–25)
CALCIUM SERPL-MCNC: 9.4 MG/DL (ref 8.4–10.2)
CHLORIDE SERPL-SCNC: 100 MMOL/L (ref 96–108)
CO2 SERPL-SCNC: 30 MMOL/L (ref 21–32)
CREAT SERPL-MCNC: 0.96 MG/DL (ref 0.6–1.3)
ERYTHROCYTE [DISTWIDTH] IN BLOOD BY AUTOMATED COUNT: 12.9 % (ref 11.6–15.1)
GFR SERPL CREATININE-BSD FRML MDRD: 73 ML/MIN/1.73SQ M
GLUCOSE SERPL-MCNC: 138 MG/DL (ref 65–140)
GLUCOSE SERPL-MCNC: 148 MG/DL (ref 65–140)
GLUCOSE SERPL-MCNC: 148 MG/DL (ref 65–140)
GLUCOSE SERPL-MCNC: 195 MG/DL (ref 65–140)
GLUCOSE SERPL-MCNC: 412 MG/DL (ref 65–140)
HCT VFR BLD AUTO: 36.4 % (ref 36.5–49.3)
HGB BLD-MCNC: 12.4 G/DL (ref 12–17)
MAGNESIUM SERPL-MCNC: 2 MG/DL (ref 1.9–2.7)
MCH RBC QN AUTO: 31.1 PG (ref 26.8–34.3)
MCHC RBC AUTO-ENTMCNC: 34.1 G/DL (ref 31.4–37.4)
MCV RBC AUTO: 91 FL (ref 82–98)
PLATELET # BLD AUTO: 186 THOUSANDS/UL (ref 149–390)
PMV BLD AUTO: 9.5 FL (ref 8.9–12.7)
POTASSIUM SERPL-SCNC: 3.7 MMOL/L (ref 3.5–5.3)
RBC # BLD AUTO: 3.99 MILLION/UL (ref 3.88–5.62)
SODIUM SERPL-SCNC: 139 MMOL/L (ref 135–147)
WBC # BLD AUTO: 6.28 THOUSAND/UL (ref 4.31–10.16)

## 2024-04-29 PROCEDURE — 85027 COMPLETE CBC AUTOMATED: CPT | Performed by: PHYSICIAN ASSISTANT

## 2024-04-29 PROCEDURE — 83735 ASSAY OF MAGNESIUM: CPT | Performed by: PHYSICIAN ASSISTANT

## 2024-04-29 PROCEDURE — 99232 SBSQ HOSP IP/OBS MODERATE 35: CPT | Performed by: PHYSICIAN ASSISTANT

## 2024-04-29 PROCEDURE — 82948 REAGENT STRIP/BLOOD GLUCOSE: CPT

## 2024-04-29 PROCEDURE — 80048 BASIC METABOLIC PNL TOTAL CA: CPT | Performed by: PHYSICIAN ASSISTANT

## 2024-04-29 RX ADMIN — FLUOXETINE HYDROCHLORIDE 20 MG: 20 CAPSULE ORAL at 09:01

## 2024-04-29 RX ADMIN — TAMSULOSIN HYDROCHLORIDE 0.4 MG: 0.4 CAPSULE ORAL at 09:01

## 2024-04-29 RX ADMIN — ATORVASTATIN CALCIUM 20 MG: 20 TABLET, FILM COATED ORAL at 09:01

## 2024-04-29 RX ADMIN — ASPIRIN 81 MG: 81 TABLET, COATED ORAL at 09:01

## 2024-04-29 RX ADMIN — MEMANTINE 10 MG: 5 TABLET ORAL at 09:01

## 2024-04-29 RX ADMIN — FUROSEMIDE 40 MG: 40 TABLET ORAL at 09:01

## 2024-04-29 RX ADMIN — ENOXAPARIN SODIUM 40 MG: 40 INJECTION SUBCUTANEOUS at 09:01

## 2024-04-29 RX ADMIN — DONEPEZIL HYDROCHLORIDE 10 MG: 10 TABLET ORAL at 21:08

## 2024-04-29 RX ADMIN — QUETIAPINE FUMARATE 25 MG: 25 TABLET ORAL at 09:01

## 2024-04-29 RX ADMIN — INSULIN LISPRO 4 UNITS: 100 INJECTION, SOLUTION INTRAVENOUS; SUBCUTANEOUS at 11:49

## 2024-04-29 NOTE — UTILIZATION REVIEW
Initial Clinical Review    Admission: Date/Time/Statement:   Admission Orders (From admission, onward)       Ordered        04/27/24 1555  INPATIENT ADMISSION  Once                          Orders Placed This Encounter   Procedures    INPATIENT ADMISSION     Standing Status:   Standing     Number of Occurrences:   1     Order Specific Question:   Level of Care     Answer:   Med Surg [16]     Order Specific Question:   Estimated length of stay     Answer:   More than 2 Midnights     Order Specific Question:   Certification     Answer:   I certify that inpatient services are medically necessary for this patient for a duration of greater than two midnights. See H&P and MD Progress Notes for additional information about the patient's course of treatment.     ED Arrival Information       Expected   -    Arrival   4/27/2024 13:59    Acuity   Urgent              Means of arrival   Ambulance    Escorted by   [facility]    Service   Hospitalist    Admission type   Emergency              Arrival complaint   generalized weakness             Chief Complaint   Patient presents with    Weakness - Generalized       Initial Presentation: 83 y.o. male to the ED from home with via EMS with complaints of difficulty  walking. H/O fall 2 days ago with a new T12 fracture and was given a TLSO brace . H/O DMII, BPH, PAD, alzheimers, CHF, CKD. GCS 15. PT/OT eval. CT head shows no acute findings.     Anticipated Length of Stay:  Patient will be admitted on an Inpatient basis with an anticipated length of stay of  > 2 midnights.   Justification for Hospital Stay: Amb Dysfunction     Date: 4/28   Day 2: PT recommends post acute rehab.  Mag this morning is 1.3. Give 4 grams and recheck.  Continue asa, statin. Case management for safe dc plan.     Date: 4/29  Day 3: Has surpassed a 2nd midnight with active treatments and services.Mag now 2.0 today.  Continue to monitor and replete as needed. GCS 15. Pending rehab placement.  Continue  with PT.         ED Triage Vitals [04/27/24 1402]   Temperature Pulse Respirations Blood Pressure SpO2   97.6 °F (36.4 °C) 66 20 146/74 96 %      Temp Source Heart Rate Source Patient Position - Orthostatic VS BP Location FiO2 (%)   Temporal Monitor Lying Left arm --      Pain Score       No Pain          Wt Readings from Last 1 Encounters:   04/27/24 53.4 kg (117 lb 11.6 oz)     Additional Vital Signs: ital Signs (last 2 days)    Date/Time Temp Pulse Resp BP MAP (mmHg) SpO2 O2 Device Patient Position - Orthostatic VS   04/29/24 07:17:17 97.4 °F (36.3 °C) Abnormal  60 18 151/75 100 95 % None (Room air) Lying   04/28/24 22:39:05 97.6 °F (36.4 °C) 64 19 104/69 81 96 % -- --   04/28/24 2009 -- -- -- -- -- -- None (Room air) --   04/28/24 14:47:31 97.7 °F (36.5 °C) 68 18 116/68 84 95 % -- --   04/28/24 07:24:23 97.8 °F (36.6 °C) 65 18 141/69 93 98 % -- --   04/28/24 0100 -- -- -- -- -- -- None (Room air) --   04/27/24 23:09:21 97.6 °F (36.4 °C) 56 18 158/82 107 97 % -- --   04/27/24 16:21:44 98.3 °F (36.8 °C) 62 18 148/73 98 97 % -- --   04/27/24 1402 97.6 °F (36.4 °C) 66 20 146/74 -- 96 % None (Room air) Lying     Pertinent Labs/Diagnostic Test Results:   4/27 EKG: Narrative & Impression    Normal sinus rhythm  Cannot rule out prior  Inferior infarct (cited on or before 19-JAN-2024)  Nonspecific T wave changes  When compared with ECG of 25-APR-2024 19:00,  No significant change was found     CT head without contrast   Final Result by Maria De Jesus Riojas MD (04/27 1811)      No acute intracranial abnormality. Stable exam.                  Workstation performed: [id number]               Results from last 7 days   Lab Units 04/29/24  0440 04/28/24  0546 04/27/24  1448 04/25/24  1905   WBC Thousand/uL 6.28 5.94 6.67 6.48   HEMOGLOBIN g/dL 12.4 11.0* 10.9* 11.6*   HEMATOCRIT % 36.4* 33.3* 33.8* 35.0*   PLATELETS Thousands/uL 186 162 159 174   TOTAL NEUT ABS Thousands/µL  --   --  4.28 4.12         Results from  last 7 days   Lab Units 04/29/24  0440 04/28/24  0546 04/27/24  1448 04/25/24  1905   SODIUM mmol/L 139 139 141 139   POTASSIUM mmol/L 3.7 3.8 4.0 4.8   CHLORIDE mmol/L 100 103 103 101   CO2 mmol/L 30 30 29 27   ANION GAP mmol/L 9 6 9 11   BUN mg/dL 14 18 22 26*   CREATININE mg/dL 0.96 0.84 1.06 1.23   EGFR ml/min/1.73sq m 73 81 65 54   CALCIUM mg/dL 9.4 9.1 8.8 9.3   MAGNESIUM mg/dL 2.0 1.3*  --   --      Results from last 7 days   Lab Units 04/28/24  0546 04/25/24  1905   AST U/L 24 22   ALT U/L 14 15   ALK PHOS U/L 84 92   TOTAL PROTEIN g/dL 6.5 7.0   ALBUMIN g/dL 3.7 4.1   TOTAL BILIRUBIN mg/dL 0.62 0.35     Results from last 7 days   Lab Units 04/29/24  0715 04/28/24  2105 04/28/24  1605 04/28/24  1050 04/28/24  0647 04/27/24  2139 04/27/24  1624   POC GLUCOSE mg/dl 148* 183* 195* 211* 130 149* 162*     Results from last 7 days   Lab Units 04/29/24  0440 04/28/24  0546 04/27/24  1448 04/25/24  1905   GLUCOSE RANDOM mg/dL 148* 116 159* 225*           Results from last 7 days   Lab Units 04/27/24  1448   TSH 3RD GENERATON uIU/mL 3.613     Results from last 7 days   Lab Units 04/27/24  1929   CLARITY UA  Clear   COLOR UA  Yellow   SPEC GRAV UA  1.010   PH UA  7.0   GLUCOSE UA mg/dl Negative   KETONES UA mg/dl Negative   BLOOD UA  Negative   PROTEIN UA mg/dl Negative   NITRITE UA  Negative   BILIRUBIN UA  Negative   UROBILINOGEN UA E.U./dl 1.0   LEUKOCYTES UA  Negative         Past Medical History:   Diagnosis Date    Diabetes mellitus (HCC)     Hyperlipidemia      Present on Admission:   Benign prostatic hyperplasia without lower urinary tract symptoms   Chronic diastolic congestive heart failure (HCC)   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 male  Admission Orders:  Scheduled Medications:  aspirin, 81 mg, Oral, Daily  atorvastatin, 20 mg, Oral, Daily  donepezil, 10 mg, Oral, HS  enoxaparin, 40 mg, Subcutaneous, Daily  ergocalciferol, 50,000 Units, Oral, Weekly  FLUoxetine, 20 mg, Oral, QAM  furosemide, 40 mg, Oral, Daily  insulin lispro, 1-5 Units, Subcutaneous, TID AC  memantine, 10 mg, Oral, BID  QUEtiapine, 25 mg, Oral, BID  tamsulosin, 0.4 mg, Oral, Daily      Continuous IV Infusions:     PRN Meds:  acetaminophen, 650 mg, Oral, Q6H PRN  ondansetron, 4 mg, Intravenous, Q6H PRN        None    Network Utilization Review Department  ATTENTION: Please call with any questions or concerns to [phone] and carefully listen to the prompts so that you are directed to the right person. All voicemails are confidential.   For Discharge needs, contact Care Management DC Support Team at [phone] opt. 2  Send all requests for admission clinical reviews, approved or denied determinations and any other requests to dedicated fax number below belonging to the campus where the patient is receiving treatment. List of dedicated fax numbers for the Facilities:  FACILITY NAME UR FAX NUMBER   ADMISSION DENIALS (Administrative/Medical Necessity) [phone]   DISCHARGE SUPPORT TEAM (NETWORK) [phone]   PARENT CHILD HEALTH (Maternity/NICU/Pediatrics) [phone]   [facility] [phone]   [facility] [phone]   [facility] [phone]   [facility] [phone]   [facility] [phone]   [facility] [phone]   [facility] [phone]   [facility] [phone]   [facility]  [facility] [phone]   [facility] [phone]   [facility] [phone]   [facility] [phone]

## 2024-04-29 NOTE — PROGRESS NOTES
[facility]  Progress Note  Name: Israel Hughes I  MRN: [id number]  Unit/Bed#: -01 I Date of Admission: 4/27/2024   Date of Service: 4/29/2024 I Hospital Day: 2    Assessment/Plan   * Ambulatory dysfunction  Assessment & Plan  Patient has history of multiple falls with recent fall around 2 days prior to presentation noting to have a T12 compression fracture managed with conservative treatment.  Presented again after having fall while walking.  CT head was unremarkable.  Mental status at baseline.  No other symptoms except for some back pain and ambulatory difficulty.     PT/OT documented moderate resource intensity  Fall precaution   Aspiration precaution  TSH 3.613  Granddaughter agreeable for rehab.  Patient has advanced dementia unclear whether he can provide appropriate history.  CM to place referrals to STR today and discuss with family    Hypomagnesemia  Assessment & Plan  Magnesium of 1.3 on 4/28- now resolved at 2.0 today  Continue to monitor magnesium level and replete as needed    Peripheral arterial disease (HCC)  Assessment & Plan  Continue aspirin and statin.    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 ([facility])  Assessment & Plan  His dementia at baseline.    Continue donepezil and Namenda    Chronic diastolic congestive heart failure (HCC)  Assessment & Plan  Not in acute exacerbation.  On room air.    Continue home Lasix  I's & O's, daily weight.    Stage 3 chronic kidney disease, unspecified whether stage 3a or 3b CKD (formerly [facility])  Assessment & Plan  Baseline Creatinine between 1-1.2.    Currently better than baseline  Avoid nephrotoxins and hypotension    Benign prostatic hyperplasia without lower urinary tract symptoms  Assessment & Plan  Continue Flomax.    Type 2 diabetes mellitus with retinopathy, without long-term current use of insulin ([facility])  Assessment & Plan  Lab Results   Component Value Date    HGBA1C 7.8 (A) 04/03/2024     Hold oral antidiabetic  Continue  sliding scale coverage  Hypoglycemic protocol  Carbohydrate controlled diet           VTE Pharmacologic Prophylaxis:   Moderate Risk (Score 3-4) - Pharmacological DVT Prophylaxis Ordered: enoxaparin (Lovenox).    Mobility:   Basic Mobility Inpatient Raw Score: 11  -HLM Goal: 4: Move to chair/commode  JH-HLM Achieved: 5: Stand (1 or more minutes)  JH-HLM Goal achieved. Continue to encourage appropriate mobility.    Patient Centered Rounds: I performed bedside rounds with nursing staff today.   Discussions with Specialists or Other Care Team Provider: nursing, CM    Education and Discussions with Family / Patient:  updated granddaughter at bedside yesterday regarding plan for placement to STR. She is agreeable with this plan, no new updates today. CM to reach out to granddaughter to discuss rehab options.     Total Time Spent on Date of Encounter in care of patient: 25 mins. This time was spent on one or more of the following: performing physical exam; counseling and coordination of care; obtaining or reviewing history; documenting in the medical record; reviewing/ordering tests, medications or procedures; communicating with other healthcare professionals and discussing with patient's family/caregivers.    Current Length of Stay: 2 day(s)  Current Patient Status: Inpatient   Certification Statement: The patient will continue to require additional inpatient hospital stay due to need for discharge to STR  Discharge Plan: Anticipate discharge in 24-48 hrs to rehab facility.    Code Status: Level 3 - DNAR and DNI    Subjective:   The patient was seen and examined. The patient is lying in bed in no acute distress. He denies any complaints.     Objective:     Vitals:   Temp (24hrs), Av.6 °F (36.4 °C), Min:97.4 °F (36.3 °C), Max:97.7 °F (36.5 °C)    Temp:  [97.4 °F (36.3 °C)-97.7 °F (36.5 °C)] 97.4 °F (36.3 °C)  HR:  [60-68] 60  Resp:  [18-19] 18  BP: (104-151)/(68-75) 151/75  SpO2:  [95 %-96 %] 95 %  Body mass index  is 19.59 kg/m².     Input and Output Summary (last 24 hours):     Intake/Output Summary (Last 24 hours) at 4/29/2024 0906  Last data filed at 4/29/2024 0500  Gross per 24 hour   Intake 300 ml   Output 700 ml   Net -400 ml       Physical Exam:   Physical Exam  Vitals and nursing note reviewed.   Constitutional:       General: He is awake.      Appearance: Normal appearance.   HENT:      Head: Normocephalic and atraumatic.   Cardiovascular:      Rate and Rhythm: Normal rate and regular rhythm.   Pulmonary:      Effort: Pulmonary effort is normal.      Breath sounds: Normal breath sounds. No wheezing, rhonchi or rales.   Abdominal:      General: Bowel sounds are normal. There is no distension.      Palpations: Abdomen is soft.      Tenderness: There is no abdominal tenderness.   Skin:     General: Skin is warm and dry.   Neurological:      General: No focal deficit present.      Mental Status: He is alert. Mental status is at baseline.   Psychiatric:         Attention and Perception: Attention normal.         Mood and Affect: Mood normal.         Speech: Speech normal.         Behavior: Behavior is cooperative.          Additional Data:     Labs:  Results from last 7 days   Lab Units 04/29/24  0440 04/28/24  0546 04/27/24  1448   WBC Thousand/uL 6.28   < > 6.67   HEMOGLOBIN g/dL 12.4   < > 10.9*   HEMATOCRIT % 36.4*   < > 33.8*   PLATELETS Thousands/uL 186   < > 159   SEGS PCT %  --   --  64   LYMPHO PCT %  --   --  25   MONO PCT %  --   --  8   EOS PCT %  --   --  2    < > = values in this interval not displayed.     Results from last 7 days   Lab Units 04/29/24  0440 04/28/24  0546   SODIUM mmol/L 139 139   POTASSIUM mmol/L 3.7 3.8   CHLORIDE mmol/L 100 103   CO2 mmol/L 30 30   BUN mg/dL 14 18   CREATININE mg/dL 0.96 0.84   ANION GAP mmol/L 9 6   CALCIUM mg/dL 9.4 9.1   ALBUMIN g/dL  --  3.7   TOTAL BILIRUBIN mg/dL  --  0.62   ALK PHOS U/L  --  84   ALT U/L  --  14   AST U/L  --  24   GLUCOSE RANDOM mg/dL 148* 116          Results from last 7 days   Lab Units 04/29/24  0715 04/28/24  2105 04/28/24  1605 04/28/24  1050 04/28/24  0647 04/27/24  2139 04/27/24  1624   POC GLUCOSE mg/dl 148* 183* 195* 211* 130 149* 162*               Lines/Drains:  Invasive Devices       Peripheral Intravenous Line  Duration             Peripheral IV 04/27/24 Proximal;Right;Ventral (anterior) Forearm 1 day                          Imaging: No pertinent imaging reviewed.    Recent Cultures (last 7 days):         Last 24 Hours Medication List:   Current Facility-Administered Medications   Medication Dose Route Frequency Provider Last Rate    acetaminophen  650 mg Oral Q6H PRN Jamie Londono MD      aspirin  81 mg Oral Daily Jamie Londono MD      atorvastatin  20 mg Oral Daily Jamie Londono MD      donepezil  10 mg Oral HS Jamie Londono MD      enoxaparin  40 mg Subcutaneous Daily Jamie Londono MD      ergocalciferol  50,000 Units Oral Weekly Anish Wilson PA-C      FLUoxetine  20 mg Oral QAM Jamie Londono MD      furosemide  40 mg Oral Daily Jamie Londono MD      insulin lispro  1-5 Units Subcutaneous TID AC Jamie Londono MD      memantine  10 mg Oral BID Jamie Londono MD      ondansetron  4 mg Intravenous Q6H PRN Jamie Londono MD      QUEtiapine  25 mg Oral BID Jamie Londono MD      tamsulosin  0.4 mg Oral Daily Jamie Londono MD          Today, Patient Was Seen By: Anish Wilson PA-C    **Please Note: This note may have been constructed using a voice recognition system.**

## 2024-04-29 NOTE — MALNUTRITION/BMI
This medical record reflects one or more clinical indicators suggestive of malnutrition and/or morbid obesity.    Malnutrition Findings:   Adult Malnutrition type: Chronic illness  Adult Degree of Malnutrition: Malnutrition of moderate degree  Malnutrition Characteristics: Weight loss, Inadequate energy                  360 Statement: Pt exhibits new diagnosis of moderate malnutrition r/t intake variability of alzheimers indicated by 11% wt loss x 4 months and muscle losses to be treated with diet consistency alterantion and glucerna.    BMI Findings:           Body mass index is 19.59 kg/m².     See Nutrition note dated 4/29/2024 for additional details.  Completed nutrition assessment is viewable in the nutrition documentation.

## 2024-04-29 NOTE — UTILIZATION REVIEW
NOTIFICATION OF INPATIENT ADMISSION   AUTHORIZATION REQUEST   SERVICING FACILITY:   [place] [address]  Tax ID: [id number]  NPI: [id number]   ATTENDING PROVIDER:  Attending Name and NPI#: Sommer Logan Md [[id number]]  Address: [address] [place] [address]  Phone: [phone]     ADMISSION INFORMATION:  Place of Service: Inpatient Acute [facility]  Place of Service Code: 21  Inpatient Admission Date/Time: 4/27/24  3:55 PM  Discharge Date/Time: No discharge date for patient encounter.  Admitting Diagnosis Code/Description:  Weakness [R53.1]  Generalized weakness [R53.1]  Compression fracture of T12 vertebra, initial encounter ([facility]) [S22.080A]  Ambulatory dysfunction [R26.2]     UTILIZATION REVIEW CONTACT:  Chris Ferrell, Utilization   Network Utilization Review Department  Phone: [phone]  Fax [phone]  Email: [email or web address]  Contact for approvals/pending authorizations, clinical reviews, and discharge.     PHYSICIAN ADVISORY SERVICES:  Medical Necessity Denial & Jzsn-ao-Jloz Review  Phone: [phone]  Fax: [phone]  Email: [email or web address]     DISCHARGE SUPPORT TEAM:  For Patients Discharge Needs & Updates  Phone: [phone] opt. 2 Fax: [phone]  Email: [email or web address]

## 2024-04-29 NOTE — PROGRESS NOTES
Medical apprised pt refusing seroquel and namenda after nurse and family encouraging pt to take meds; as well as pt not yet being seen by psych after being placed in the queue.

## 2024-04-29 NOTE — ASSESSMENT & PLAN NOTE
Magnesium of 1.3 on 4/28- now resolved at 2.0 today  Continue to monitor magnesium level and replete as needed

## 2024-04-29 NOTE — CASE MANAGEMENT
Case Management Assessment & Discharge Planning Note    Patient name Israel Hughes  Location /-01 MRN [id number]  : 1940 Date 2024       Current Admission Date: 2024  Current Admission Diagnosis:Ambulatory dysfunction   Patient Active Problem List    Diagnosis Date Noted    Hypomagnesemia 2024    Ambulatory dysfunction 2024    Peripheral arterial disease (HCC) 2024    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 ([facility]) 2024    Orthostatic lightheadedness 2024    Chronic diastolic congestive heart failure ([facility]) 2024    Stage 3 chronic kidney disease, unspecified whether stage 3a or 3b CKD ([facility]) 2023    Mild protein-calorie malnutrition ([facility]) 2022    Dysphagia 2022    Mild aortic stenosis 10/22/2021    Mild mitral regurgitation 10/22/2021    Diastolic dysfunction 10/22/2021    Mild concentric left ventricular hypertrophy (LVH) 10/22/2021    Type 2 diabetes mellitus with retinopathy, without long-term current use of insulin ([facility]) 09/15/2021    Myasthenia gravis without exacerbation ([facility]) 09/15/2021    Mixed hyperlipidemia 09/15/2021    Retinopathy 09/15/2021    Primary open angle glaucoma (POAG) of both eyes, mild stage 09/15/2021    Ptosis of both eyelids 09/15/2021    Benign prostatic hyperplasia without lower urinary tract symptoms 09/15/2021    Essential hypertension 09/15/2021    Other age-related cataract 09/15/2021      LOS (days): 2  Geometric Mean LOS (GMLOS) (days): 3.1  Days to GMLOS:1     OBJECTIVE:    Risk of Unplanned Readmission Score: 21.93         Current admission status: Inpatient  Referral Reason: Other (d/ c planning)    Preferred Pharmacy:   [facility] [id number]  [address] [place] [address]  Phone: [phone] Fax: [phone]    Primary Care Provider: Giovanni Reyez DO    Primary Insurance: [employer] REP  Secondary Insurance:      ASSESSMENT:  Active Health Care Proxies       Bianca Colbert Alternate Health Care Agent - Grandchild   Primary Phone: [phone] (Mobile)                           Readmission Root Cause  30 Day Readmission: No    Patient Information  Admitted from:: Home  Mental Status: Alert  During Assessment patient was accompanied by: Other-Comment (granddaughter)  Assessment information provided by:: Other - please comment (granddaughter)  Primary Caregiver: Family  Caregiver's Name:: Bianca Colbert  Caregiver's Relationship to Patient:: Family Member (granddaughter)  Caregiver's Telephone Number:: [phone]  Support Systems: Family members  County of Residence: [place]  What city do you live in?: [place]  Home entry access options. Select all that apply.: Stairs  Number of steps to enter home.: 10  Do the steps have railings?: Yes  Type of Current Residence: [address] [place] home  Upon entering residence, is there a bedroom on the main floor (no further steps)?: Yes  Upon entering residence, is there a bathroom on the main floor (no further steps)?: Yes  Living Arrangements: Lives w/ Extended Family (granddaughter)  Is patient a ?: Yes ([employer]    no benefits - they family does have him on a waiting list [facility] [place])  Is patient active with VA ([place] [facility])?: No    Activities of Daily Living Prior to Admission  Functional Status: Assistance (driving, cooking, shopping, laundry, meds, bills)  Completes ADLs independently?: No  Level of ADL dependence: Assistance  Ambulates independently?: Yes  Does patient use assisted devices?: Yes  Assisted Devices (DME) used: Walker, Straight Cane, Other (Comment), Hospital Bed (Cane on occasion, glucometer)  Does patient currently own DME?: Yes  What DME does the patient currently own?: Walker, Straight Cane, Shower Chair, Bedside Commode, Other (Comment), Hospital Bed (bp cuff, glucometer)  Does patient have a history of Outpatient Therapy  (PT/OT)?: Yes (pt was active with [facility])  Does the patient have a history of Short-Term Rehab?: Yes ([facility])  Does patient have a history of HHC?: Yes ([facility])  Does patient currently have HHC?: No         Patient Information Continued  Income Source: Pension/[employer]  Does patient have prescription coverage?: Yes ([employer])  Does patient receive dialysis treatments?: No  Does patient have a history of substance abuse?: No  Does patient have a history of Mental Health Diagnosis?: No         Means of Transportation  Means of Transport to Appts:: Family transport          DISCHARGE DETAILS:    Discharge planning discussed with:: maame Rosenthal  at the bedside  Freedom of Choice: Yes  Comments - Freedom of Choice: recommendation is moderate level - rehab - referrals were sne t  via amy with permisson- auth is needed  CM contacted family/caregiver?: Yes             Contacts  Patient Contacts: Bianca martin  Relationship to Patient:: Family  Contact Method: In Person  Reason/Outcome: Discharge Planning    Requested Home Health Care         Is the patient interested in HHC at discharge?: No    DME Referral Provided  Referral made for DME?: No    Other Referral/Resources/Interventions Provided:  Interventions: Short Term Rehab  Referral Comments: referrals sent via amy for [facility] re[facility] - auth is needed- psych consult ordered- pt not able to be d/c today    Would you like to participate in our [facility] service program?  : No - Declined    Treatment Team Recommendation: Short Term Rehab ([facility] rehab auth is needed- tbd)                                         Family notified:: family at the bedside

## 2024-04-29 NOTE — OCCUPATIONAL THERAPY NOTE
Occupational Therapy         Patient Name: Israel Hughes  Today's Date: 4/29/2024 04/29/24 1200   OT Last Visit   OT Visit Date 04/29/24   Note Type   Note type Cancelled Session   Additional Comments OT order received and chart review performed. @ this time, OT evaluation cancelled d/t  change in mental status: currently hallucinating . OT will follow and evaluate in efforts to provide skilled interventions as appropriate. Nsg aware.    Susi Capps OTR/L

## 2024-04-29 NOTE — TELEPHONE ENCOUNTER
Consult placed on [facility] queue at 1135 to be seen by an [facility] psychiatrist.

## 2024-04-29 NOTE — ASSESSMENT & PLAN NOTE
Lab Results   Component Value Date    HGBA1C 7.8 (A) 04/03/2024     Hold oral antidiabetic  Continue sliding scale coverage  Hypoglycemic protocol  Carbohydrate controlled diet

## 2024-04-29 NOTE — ASSESSMENT & PLAN NOTE
Patient has history of multiple falls with recent fall around 2 days prior to presentation noting to have a T12 compression fracture managed with conservative treatment.  Presented again after having fall while walking.  CT head was unremarkable.  Mental status at baseline.  No other symptoms except for some back pain and ambulatory difficulty.     PT/OT documented moderate resource intensity  Fall precaution   Aspiration precaution  TSH 3.613  Granddaughter agreeable for rehab.  Patient has advanced dementia unclear whether he can provide appropriate history.  CM to place referrals to STR today and discuss with family

## 2024-04-29 NOTE — PLAN OF CARE
Problem: PAIN - ADULT  Goal: Verbalizes/displays adequate comfort level or baseline comfort level  Description: Interventions:  - Encourage patient to monitor pain and request assistance  - Assess pain using appropriate pain scale  - Administer analgesics based on type and severity of pain and evaluate response  - Implement non-pharmacological measures as appropriate and evaluate response  - Consider cultural and social influences on pain and pain management  - Notify physician/advanced practitioner if interventions unsuccessful or patient reports new pain  Outcome: Progressing     Problem: INFECTION - ADULT  Goal: Absence or prevention of progression during hospitalization  Description: INTERVENTIONS:  - Assess and monitor for signs and symptoms of infection  - Monitor lab/diagnostic results  - Monitor all insertion sites, i.e. indwelling lines, tubes, and drains  - Monitor endotracheal if appropriate and nasal secretions for changes in amount and color  - [place] appropriate cooling/warming therapies per order  - Administer medications as ordered  - Instruct and encourage patient and family to use good hand hygiene technique  - Identify and instruct in appropriate isolation precautions for identified infection/condition  Outcome: Progressing  Goal: Absence of fever/infection during neutropenic period  Description: INTERVENTIONS:  - Monitor WBC    Outcome: Progressing     Problem: SAFETY ADULT  Goal: Patient will remain free of falls  Description: INTERVENTIONS:  - Educate patient/family on patient safety including physical limitations  - Instruct patient to call for assistance with activity   - Consult OT/PT to assist with strengthening/mobility   - Keep Call bell within reach  - Keep bed low and locked with side rails adjusted as appropriate  - Keep care items and personal belongings within reach  - Initiate and maintain comfort rounds  - Make Fall Risk Sign visible to staff  - Offer Toileting every 2 Hours,  in advance of need  - Initiate/Maintain bed alarm  - Obtain necessary fall risk management equipment: non skid socks  - Apply yellow socks and bracelet for high fall risk patients  - Consider moving patient to room near nurses station  Outcome: Progressing  Goal: Maintain or return to baseline ADL function  Description: INTERVENTIONS:  -  Assess patient's ability to carry out ADLs; assess patient's baseline for ADL function and identify physical deficits which impact ability to perform ADLs (bathing, care of mouth/teeth, toileting, grooming, dressing, etc.)  - Assess/evaluate cause of self-care deficits   - Assess range of motion  - Assess patient's mobility; develop plan if impaired  - Assess patient's need for assistive devices and provide as appropriate  - Encourage maximum independence but intervene and supervise when necessary  - Involve family in performance of ADLs  - Assess for home care needs following discharge   - Consider OT consult to assist with ADL evaluation and planning for discharge  - Provide patient education as appropriate  Outcome: Progressing  Goal: Maintains/Returns to pre admission functional level  Description: INTERVENTIONS:  - Perform AM-PAC 6 Click Basic Mobility/ Daily Activity assessment daily.  - Set and communicate daily mobility goal to care team and patient/family/caregiver.   - Collaborate with rehabilitation services on mobility goals if consulted  - Perform Range of Motion 2 times a day.  - Reposition patient every 2 hours.  - Dangle patient 2 times a day  - Stand patient 2 times a day  - Ambulate patient 3 times a day  - Out of bed to chair 3 times a day   - Out of bed for meals 3 times a day  - Out of bed for toileting  - Record patient progress and toleration of activity level   Outcome: Progressing     Problem: DISCHARGE PLANNING  Goal: Discharge to home or other facility with appropriate resources  Description: INTERVENTIONS:  - Identify barriers to discharge w/patient and  caregiver  - Arrange for needed discharge resources and transportation as appropriate  - Identify discharge learning needs (meds, wound care, etc.)  - Refer to Case Management Department for coordinating discharge planning if the patient needs post-hospital services based on physician/advanced practitioner order or complex needs related to functional status, cognitive ability, or social support system  Outcome: Progressing     Problem: Knowledge Deficit  Goal: Patient/family/caregiver demonstrates understanding of disease process, treatment plan, medications, and discharge instructions  Description: Complete learning assessment and assess knowledge base.  Interventions:  - Provide teaching at level of understanding  - Provide teaching via preferred learning methods  Outcome: Progressing     Problem: METABOLIC, FLUID AND ELECTROLYTES - ADULT  Goal: Electrolytes maintained within normal limits  Description: INTERVENTIONS:  - Monitor labs and assess patient for signs and symptoms of electrolyte imbalances  - Administer electrolyte replacement as ordered  - Monitor response to electrolyte replacements, including repeat lab results as appropriate  - Instruct patient on fluid and nutrition as appropriate  Outcome: Progressing  Goal: Fluid balance maintained  Description: INTERVENTIONS:  - Monitor labs   - Monitor I/O and WT  - Instruct patient on fluid and nutrition as appropriate  - Assess for signs & symptoms of volume excess or deficit  Outcome: Progressing     Problem: Prexisting or High Potential for Compromised Skin Integrity  Goal: Skin integrity is maintained or improved  Description: INTERVENTIONS:  - Identify patients at risk for skin breakdown  - Assess and monitor skin integrity  - Assess and monitor nutrition and hydration status  - Monitor labs   - Assess for incontinence   - Turn and reposition patient  - Assist with mobility/ambulation  - Relieve pressure over bony prominences  - Avoid friction and  shearing  - Provide appropriate hygiene as needed including keeping skin clean and dry  - Evaluate need for skin moisturizer/barrier cream  - Collaborate with interdisciplinary team   - Patient/family teaching  - Consider wound care consult   Outcome: Progressing     Problem: Nutrition/Hydration-ADULT  Goal: Nutrient/Hydration intake appropriate for improving, restoring or maintaining nutritional needs  Description: Monitor and assess patient's nutrition/hydration status for malnutrition. Collaborate with interdisciplinary team and initiate plan and interventions as ordered.  Monitor patient's weight and dietary intake as ordered or per policy. Utilize nutrition screening tool and intervene as necessary. Determine patient's food preferences and provide high-protein, high-caloric foods as appropriate.     INTERVENTIONS:  - Monitor oral intake, urinary output, labs, and treatment plans  - Assess nutrition and hydration status and recommend course of action  - Evaluate amount of meals eaten  - Assist patient with eating if necessary   - Allow adequate time for meals  - Recommend/ encourage appropriate diets, oral nutritional supplements, and vitamin/mineral supplements  - Order, calculate, and assess calorie counts as needed  - Recommend, monitor, and adjust tube feedings and TPN/PPN based on assessed needs  - Assess need for intravenous fluids  - Provide specific nutrition/hydration education as appropriate  - Include patient/family/caregiver in decisions related to nutrition  Outcome: Progressing

## 2024-04-30 ENCOUNTER — E-CONSULT REQUEST (OUTPATIENT)
Dept: PSYCHIATRY | Facility: CLINIC | Age: 84
End: 2024-04-30

## 2024-04-30 PROBLEM — E44.0 MODERATE PROTEIN-CALORIE MALNUTRITION (HCC): Status: ACTIVE | Noted: 2024-04-30

## 2024-04-30 PROBLEM — R26.9 NEUROLOGIC GAIT DYSFUNCTION: Status: ACTIVE | Noted: 2024-04-27

## 2024-04-30 LAB
GLUCOSE SERPL-MCNC: 157 MG/DL (ref 65–140)
GLUCOSE SERPL-MCNC: 238 MG/DL (ref 65–140)
GLUCOSE SERPL-MCNC: 253 MG/DL (ref 65–140)
GLUCOSE SERPL-MCNC: 310 MG/DL (ref 65–140)
SARS-COV-2 AG UPPER RESP QL IA: NEGATIVE
SARS-COV-2 AG UPPER RESP QL IA: NORMAL

## 2024-04-30 PROCEDURE — 97167 OT EVAL HIGH COMPLEX 60 MIN: CPT

## 2024-04-30 PROCEDURE — 97530 THERAPEUTIC ACTIVITIES: CPT

## 2024-04-30 PROCEDURE — 97110 THERAPEUTIC EXERCISES: CPT

## 2024-04-30 PROCEDURE — 87811 SARS-COV-2 COVID19 W/OPTIC: CPT | Performed by: PHYSICIAN ASSISTANT

## 2024-04-30 PROCEDURE — G0426 INPT/ED TELECONSULT50: HCPCS | Performed by: STUDENT IN AN ORGANIZED HEALTH CARE EDUCATION/TRAINING PROGRAM

## 2024-04-30 PROCEDURE — 82948 REAGENT STRIP/BLOOD GLUCOSE: CPT

## 2024-04-30 PROCEDURE — 99232 SBSQ HOSP IP/OBS MODERATE 35: CPT | Performed by: PHYSICIAN ASSISTANT

## 2024-04-30 RX ORDER — LANOLIN ALCOHOL/MO/W.PET/CERES
3 CREAM (GRAM) TOPICAL
Status: DISCONTINUED | OUTPATIENT
Start: 2024-04-30 | End: 2024-05-01 | Stop reason: HOSPADM

## 2024-04-30 RX ORDER — QUETIAPINE FUMARATE 25 MG/1
25 TABLET, FILM COATED ORAL DAILY
Status: DISCONTINUED | OUTPATIENT
Start: 2024-05-01 | End: 2024-05-01 | Stop reason: HOSPADM

## 2024-04-30 RX ORDER — QUETIAPINE FUMARATE 50 MG/1
50 TABLET, FILM COATED ORAL
Status: DISCONTINUED | OUTPATIENT
Start: 2024-04-30 | End: 2024-05-01 | Stop reason: HOSPADM

## 2024-04-30 RX ADMIN — TAMSULOSIN HYDROCHLORIDE 0.4 MG: 0.4 CAPSULE ORAL at 08:13

## 2024-04-30 RX ADMIN — ATORVASTATIN CALCIUM 20 MG: 20 TABLET, FILM COATED ORAL at 08:12

## 2024-04-30 RX ADMIN — DONEPEZIL HYDROCHLORIDE 10 MG: 10 TABLET ORAL at 22:08

## 2024-04-30 RX ADMIN — QUETIAPINE FUMARATE 50 MG: 50 TABLET ORAL at 22:07

## 2024-04-30 RX ADMIN — MEMANTINE 10 MG: 5 TABLET ORAL at 08:13

## 2024-04-30 RX ADMIN — INSULIN LISPRO 1 UNITS: 100 INJECTION, SOLUTION INTRAVENOUS; SUBCUTANEOUS at 08:13

## 2024-04-30 RX ADMIN — QUETIAPINE FUMARATE 25 MG: 25 TABLET ORAL at 08:13

## 2024-04-30 RX ADMIN — FUROSEMIDE 40 MG: 40 TABLET ORAL at 08:13

## 2024-04-30 RX ADMIN — FLUOXETINE HYDROCHLORIDE 20 MG: 20 CAPSULE ORAL at 08:13

## 2024-04-30 RX ADMIN — INSULIN LISPRO 2 UNITS: 100 INJECTION, SOLUTION INTRAVENOUS; SUBCUTANEOUS at 17:05

## 2024-04-30 RX ADMIN — INSULIN LISPRO 3 UNITS: 100 INJECTION, SOLUTION INTRAVENOUS; SUBCUTANEOUS at 12:26

## 2024-04-30 RX ADMIN — Medication 3 MG: at 22:08

## 2024-04-30 RX ADMIN — ASPIRIN 81 MG: 81 TABLET, COATED ORAL at 08:13

## 2024-04-30 RX ADMIN — ENOXAPARIN SODIUM 40 MG: 40 INJECTION SUBCUTANEOUS at 08:14

## 2024-04-30 RX ADMIN — MEMANTINE 10 MG: 5 TABLET ORAL at 17:05

## 2024-04-30 NOTE — CASE MANAGEMENT
[facility] has received APPROVED authorization.  Insurance:   [employer]  Called in by Rep: Janine   Authorization received for: SNF  Facility: [facility]     Authorization #:[id number]   Start of Care: 4/30  Next Review Date: 5/03  Continued Stay Care Coordinator: Salma STARR#: [phone]  Submit next review to: [phone]     Care Manager notified:Chiqui Pak      Please reach out to CM for updates on any clinical information.

## 2024-04-30 NOTE — ASSESSMENT & PLAN NOTE
Malnutrition Findings:   Adult Malnutrition type: Chronic illness  Adult Degree of Malnutrition: Malnutrition of moderate degree  Malnutrition Characteristics: Weight loss, Inadequate energy                  360 Statement: Pt exhibits new diagnosis of moderate malnutrition r/t intake variability of alzheimers indicated by 11% wt loss x 4 months and muscle losses to be treated with diet consistency alterantion and glucerna.    BMI Findings:           Body mass index is 18.78 kg/m².

## 2024-04-30 NOTE — PLAN OF CARE
Problem: PAIN - ADULT  Goal: Verbalizes/displays adequate comfort level or baseline comfort level  Description: Interventions:  - Encourage patient to monitor pain and request assistance  - Assess pain using appropriate pain scale  - Administer analgesics based on type and severity of pain and evaluate response  - Implement non-pharmacological measures as appropriate and evaluate response  - Consider cultural and social influences on pain and pain management  - Notify physician/advanced practitioner if interventions unsuccessful or patient reports new pain  Outcome: Progressing     Problem: INFECTION - ADULT  Goal: Absence or prevention of progression during hospitalization  Description: INTERVENTIONS:  - Assess and monitor for signs and symptoms of infection  - Monitor lab/diagnostic results  - Monitor all insertion sites, i.e. indwelling lines, tubes, and drains  - Monitor endotracheal if appropriate and nasal secretions for changes in amount and color  - [place] appropriate cooling/warming therapies per order  - Administer medications as ordered  - Instruct and encourage patient and family to use good hand hygiene technique  - Identify and instruct in appropriate isolation precautions for identified infection/condition  Outcome: Progressing  Goal: Absence of fever/infection during neutropenic period  Description: INTERVENTIONS:  - Monitor WBC    Outcome: Progressing     Problem: SAFETY ADULT  Goal: Patient will remain free of falls  Description: INTERVENTIONS:  - Educate patient/family on patient safety including physical limitations  - Instruct patient to call for assistance with activity   - Consult OT/PT to assist with strengthening/mobility   - Keep Call bell within reach  - Keep bed low and locked with side rails adjusted as appropriate  - Keep care items and personal belongings within reach  - Initiate and maintain comfort rounds  - Make Fall Risk Sign visible to staff  - Offer Toileting every 2 Hours,  in advance of need  - Initiate/Maintain bed alarm  - Obtain necessary fall risk management equipment: non skid socks  - Apply yellow socks and bracelet for high fall risk patients  - Consider moving patient to room near nurses station  Outcome: Progressing  Goal: Maintain or return to baseline ADL function  Description: INTERVENTIONS:  -  Assess patient's ability to carry out ADLs; assess patient's baseline for ADL function and identify physical deficits which impact ability to perform ADLs (bathing, care of mouth/teeth, toileting, grooming, dressing, etc.)  - Assess/evaluate cause of self-care deficits   - Assess range of motion  - Assess patient's mobility; develop plan if impaired  - Assess patient's need for assistive devices and provide as appropriate  - Encourage maximum independence but intervene and supervise when necessary  - Involve family in performance of ADLs  - Assess for home care needs following discharge   - Consider OT consult to assist with ADL evaluation and planning for discharge  - Provide patient education as appropriate  Outcome: Progressing  Goal: Maintains/Returns to pre admission functional level  Description: INTERVENTIONS:  - Perform AM-PAC 6 Click Basic Mobility/ Daily Activity assessment daily.  - Set and communicate daily mobility goal to care team and patient/family/caregiver.   - Collaborate with rehabilitation services on mobility goals if consulted  - Perform Range of Motion 2 times a day.  - Reposition patient every 2 hours.  - Dangle patient 2 times a day  - Stand patient 2 times a day  - Ambulate patient 3 times a day  - Out of bed to chair 3 times a day   - Out of bed for meals 3 times a day  - Out of bed for toileting  - Record patient progress and toleration of activity level   Outcome: Progressing     Problem: DISCHARGE PLANNING  Goal: Discharge to home or other facility with appropriate resources  Description: INTERVENTIONS:  - Identify barriers to discharge w/patient and  caregiver  - Arrange for needed discharge resources and transportation as appropriate  - Identify discharge learning needs (meds, wound care, etc.)  - Refer to Case Management Department for coordinating discharge planning if the patient needs post-hospital services based on physician/advanced practitioner order or complex needs related to functional status, cognitive ability, or social support system  Outcome: Progressing     Problem: Knowledge Deficit  Goal: Patient/family/caregiver demonstrates understanding of disease process, treatment plan, medications, and discharge instructions  Description: Complete learning assessment and assess knowledge base.  Interventions:  - Provide teaching at level of understanding  - Provide teaching via preferred learning methods  Outcome: Progressing     Problem: METABOLIC, FLUID AND ELECTROLYTES - ADULT  Goal: Electrolytes maintained within normal limits  Description: INTERVENTIONS:  - Monitor labs and assess patient for signs and symptoms of electrolyte imbalances  - Administer electrolyte replacement as ordered  - Monitor response to electrolyte replacements, including repeat lab results as appropriate  - Instruct patient on fluid and nutrition as appropriate  Outcome: Progressing  Goal: Fluid balance maintained  Description: INTERVENTIONS:  - Monitor labs   - Monitor I/O and WT  - Instruct patient on fluid and nutrition as appropriate  - Assess for signs & symptoms of volume excess or deficit  Outcome: Progressing     Problem: Prexisting or High Potential for Compromised Skin Integrity  Goal: Skin integrity is maintained or improved  Description: INTERVENTIONS:  - Identify patients at risk for skin breakdown  - Assess and monitor skin integrity  - Assess and monitor nutrition and hydration status  - Monitor labs   - Assess for incontinence   - Turn and reposition patient  - Assist with mobility/ambulation  - Relieve pressure over bony prominences  - Avoid friction and  shearing  - Provide appropriate hygiene as needed including keeping skin clean and dry  - Evaluate need for skin moisturizer/barrier cream  - Collaborate with interdisciplinary team   - Patient/family teaching  - Consider wound care consult   Outcome: Progressing

## 2024-04-30 NOTE — PLAN OF CARE
Problem: PAIN - ADULT  Goal: Verbalizes/displays adequate comfort level or baseline comfort level  Description: Interventions:  - Encourage patient to monitor pain and request assistance  - Assess pain using appropriate pain scale  - Administer analgesics based on type and severity of pain and evaluate response  - Implement non-pharmacological measures as appropriate and evaluate response  - Consider cultural and social influences on pain and pain management  - Notify physician/advanced practitioner if interventions unsuccessful or patient reports new pain  Outcome: Progressing     Problem: INFECTION - ADULT  Goal: Absence or prevention of progression during hospitalization  Description: INTERVENTIONS:  - Assess and monitor for signs and symptoms of infection  - Monitor lab/diagnostic results  - Monitor all insertion sites, i.e. indwelling lines, tubes, and drains  - Monitor endotracheal if appropriate and nasal secretions for changes in amount and color  - [place] appropriate cooling/warming therapies per order  - Administer medications as ordered  - Instruct and encourage patient and family to use good hand hygiene technique  - Identify and instruct in appropriate isolation precautions for identified infection/condition  Outcome: Progressing  Goal: Absence of fever/infection during neutropenic period  Description: INTERVENTIONS:  - Monitor WBC    Outcome: Progressing     Problem: SAFETY ADULT  Goal: Patient will remain free of falls  Description: INTERVENTIONS:  - Educate patient/family on patient safety including physical limitations  - Instruct patient to call for assistance with activity   - Consult OT/PT to assist with strengthening/mobility   - Keep Call bell within reach  - Keep bed low and locked with side rails adjusted as appropriate  - Keep care items and personal belongings within reach  - Initiate and maintain comfort rounds  - Make Fall Risk Sign visible to staff  - Offer Toileting every 2 Hours,  in advance of need  - Initiate/Maintain bed alarm  - Obtain necessary fall risk management equipment: non skid socks  - Apply yellow socks and bracelet for high fall risk patients  - Consider moving patient to room near nurses station  Outcome: Progressing  Goal: Maintain or return to baseline ADL function  Description: INTERVENTIONS:  -  Assess patient's ability to carry out ADLs; assess patient's baseline for ADL function and identify physical deficits which impact ability to perform ADLs (bathing, care of mouth/teeth, toileting, grooming, dressing, etc.)  - Assess/evaluate cause of self-care deficits   - Assess range of motion  - Assess patient's mobility; develop plan if impaired  - Assess patient's need for assistive devices and provide as appropriate  - Encourage maximum independence but intervene and supervise when necessary  - Involve family in performance of ADLs  - Assess for home care needs following discharge   - Consider OT consult to assist with ADL evaluation and planning for discharge  - Provide patient education as appropriate  Outcome: Progressing  Goal: Maintains/Returns to pre admission functional level  Description: INTERVENTIONS:  - Perform AM-PAC 6 Click Basic Mobility/ Daily Activity assessment daily.  - Set and communicate daily mobility goal to care team and patient/family/caregiver.   - Collaborate with rehabilitation services on mobility goals if consulted  - Perform Range of Motion 2 times a day.  - Reposition patient every 2 hours.  - Dangle patient 2 times a day  - Stand patient 2 times a day  - Ambulate patient 3 times a day  - Out of bed to chair 3 times a day   - Out of bed for meals 3 times a day  - Out of bed for toileting  - Record patient progress and toleration of activity level   Outcome: Progressing     Problem: DISCHARGE PLANNING  Goal: Discharge to home or other facility with appropriate resources  Description: INTERVENTIONS:  - Identify barriers to discharge w/patient and  caregiver  - Arrange for needed discharge resources and transportation as appropriate  - Identify discharge learning needs (meds, wound care, etc.)  - Refer to Case Management Department for coordinating discharge planning if the patient needs post-hospital services based on physician/advanced practitioner order or complex needs related to functional status, cognitive ability, or social support system  Outcome: Progressing     Problem: Knowledge Deficit  Goal: Patient/family/caregiver demonstrates understanding of disease process, treatment plan, medications, and discharge instructions  Description: Complete learning assessment and assess knowledge base.  Interventions:  - Provide teaching at level of understanding  - Provide teaching via preferred learning methods  Outcome: Progressing     Problem: METABOLIC, FLUID AND ELECTROLYTES - ADULT  Goal: Electrolytes maintained within normal limits  Description: INTERVENTIONS:  - Monitor labs and assess patient for signs and symptoms of electrolyte imbalances  - Administer electrolyte replacement as ordered  - Monitor response to electrolyte replacements, including repeat lab results as appropriate  - Instruct patient on fluid and nutrition as appropriate  Outcome: Progressing  Goal: Fluid balance maintained  Description: INTERVENTIONS:  - Monitor labs   - Monitor I/O and WT  - Instruct patient on fluid and nutrition as appropriate  - Assess for signs & symptoms of volume excess or deficit  Outcome: Progressing     Problem: Prexisting or High Potential for Compromised Skin Integrity  Goal: Skin integrity is maintained or improved  Description: INTERVENTIONS:  - Identify patients at risk for skin breakdown  - Assess and monitor skin integrity  - Assess and monitor nutrition and hydration status  - Monitor labs   - Assess for incontinence   - Turn and reposition patient  - Assist with mobility/ambulation  - Relieve pressure over bony prominences  - Avoid friction and  shearing  - Provide appropriate hygiene as needed including keeping skin clean and dry  - Evaluate need for skin moisturizer/barrier cream  - Collaborate with interdisciplinary team   - Patient/family teaching  - Consider wound care consult   Outcome: Progressing     Problem: Nutrition/Hydration-ADULT  Goal: Nutrient/Hydration intake appropriate for improving, restoring or maintaining nutritional needs  Description: Monitor and assess patient's nutrition/hydration status for malnutrition. Collaborate with interdisciplinary team and initiate plan and interventions as ordered.  Monitor patient's weight and dietary intake as ordered or per policy. Utilize nutrition screening tool and intervene as necessary. Determine patient's food preferences and provide high-protein, high-caloric foods as appropriate.     INTERVENTIONS:  - Monitor oral intake, urinary output, labs, and treatment plans  - Assess nutrition and hydration status and recommend course of action  - Evaluate amount of meals eaten  - Assist patient with eating if necessary   - Allow adequate time for meals  - Recommend/ encourage appropriate diets, oral nutritional supplements, and vitamin/mineral supplements  - Order, calculate, and assess calorie counts as needed  - Recommend, monitor, and adjust tube feedings and TPN/PPN based on assessed needs  - Assess need for intravenous fluids  - Provide specific nutrition/hydration education as appropriate  - Include patient/family/caregiver in decisions related to nutrition  Outcome: Progressing

## 2024-04-30 NOTE — PLAN OF CARE
Problem: OCCUPATIONAL THERAPY ADULT  Goal: Performs self-care activities at highest level of function for planned discharge setting.  See evaluation for individualized goals.  Description: Treatment Interventions: ADL retraining, Functional transfer training, UE strengthening/ROM, Endurance training, Cognitive reorientation, Patient/family training, Equipment evaluation/education, Compensatory technique education, Energy conservation, Activityengagement          See flowsheet documentation for full assessment, interventions and recommendations.   Note: Limitation: Decreased ADL status, Decreased Safe judgement during ADL, Decreased cognition, Decreased endurance, Decreased self-care trans, Decreased high-level ADLs  Prognosis: Fair  Assessment: Patient is a 83 y.o. male seen for OT evaluation s/p admit to  [facility] on 4/27/2024 w/Ambulatory dysfunction + commorbidities/PMHx (as listed in medical record) affecting patient's functional performance c ADL tasks at time of assessment. OT orders placed for evaluation and treatment to assess pt's ADLs, cognitive status + performance during functional tasks in order to formulate appropriate d/c recommendations.     Therapist performed at least two patient identifiers during session including name and wristband. Personal factors affecting patient at time of initial evaluation include: inaccessible home environment, step(s) to enter environment, limited home support, advanced age, inability to perform ADLs, and inability to ambulate household distances.   Pt's clinical presentation is currently unstable/unpredictable given new onset deficits that effect pt's occupational performance and ability to safely return to PLOF including decrease activity tolerance, decrease standing balance, decrease performance during ADL tasks, decrease generalized strength, decrease activity engagement, decrease performance during functional transfers, and high fall risk combined with  medical complications of pain impacting overall mobility status, abnormal renal lab values, abnormal blood sugars, and need for input for mobility technique/safety. This evaluation required an extensive review of medical and/or therapy records and additional review of physical, cognitive and psychosocial history related to functional performance. Based upon functional performance deficits and assessments, this evaluation has been identified as a  high complexity evaluation.      Patient to benefit from continued Occupational Therapy treatment while in the hospital to address aforementioned deficits and maximize level of functional independence with ADLs and functional mobility. Pt currently requires A to facilitate appropriate d/c plan.     Rehab Resource Intensity Level, OT: II (Moderate Resource Intensity)

## 2024-04-30 NOTE — PROGRESS NOTES
[facility]  Progress Note  Name: Israel Hughes I  MRN: [id number]  Unit/Bed#: -01 I Date of Admission: 4/27/2024   Date of Service: 4/30/2024 I Hospital Day: 3    Assessment/Plan   * Gait dysfunction  Assessment & Plan  Patient has history of multiple falls with recent fall likely due to progressing Alzheimer's disease. Patient noted to have a T12 compression fracture managed with conservative treatment.  Presented again after having fall while walking.  CT head was unremarkable.  Mental status at baseline.  No other symptoms except for some back pain and ambulatory difficulty.     PT/OT documented moderate resource intensity  Fall precaution   Aspiration precaution  TSH 3.613  Granddaughter agreeable for rehab.  Patient has advanced dementia.  CM to place referrals to STR today and discuss with family    Moderate protein-calorie malnutrition (HCC)  Assessment & Plan  Malnutrition Findings:   Adult Malnutrition type: Chronic illness  Adult Degree of Malnutrition: Malnutrition of moderate degree  Malnutrition Characteristics: Weight loss, Inadequate energy                  360 Statement: Pt exhibits new diagnosis of moderate malnutrition r/t intake variability of alzheimers indicated by 11% wt loss x 4 months and muscle losses to be treated with diet consistency alterantion and glucerna.    BMI Findings:           Body mass index is 18.78 kg/m².       Hypomagnesemia  Assessment & Plan  Magnesium of 1.3 on 4/28- now resolved   Continue to monitor magnesium level and replete as needed    Peripheral arterial disease (HCC)  Assessment & Plan  Continue aspirin and statin.    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    Chronic diastolic congestive heart failure (HCC)  Assessment & Plan  Not in acute exacerbation.  On room air.    Continue home Lasix  I's & O's, daily weight.    Stage 3 chronic kidney disease, unspecified whether stage 3a or 3b CKD ([facility])  Assessment & Plan  Baseline Creatinine between 1-1.2.    Currently better than baseline  Avoid nephrotoxins and hypotension    Benign prostatic hyperplasia without lower urinary tract symptoms  Assessment & Plan  Continue Flomax.    Type 2 diabetes mellitus with retinopathy, without long-term current use of insulin ([facility])  Assessment & Plan  Lab Results   Component Value Date    HGBA1C 7.8 (A) 04/03/2024     Hold oral antidiabetic  Continue sliding scale coverage  Hypoglycemic protocol  Carbohydrate controlled diet           VTE Pharmacologic Prophylaxis:   Moderate Risk (Score 3-4) - Pharmacological DVT Prophylaxis Ordered: enoxaparin (Lovenox).    Mobility:   Basic Mobility Inpatient Raw Score: 9  -[facility] Goal: 3: Sit at edge of bed  JH-HLM Achieved: 4: Move to chair/commode  -HLM Goal achieved. Continue to encourage appropriate mobility.    Patient Centered Rounds: I performed bedside rounds with nursing staff today.   Discussions with Specialists or Other Care Team Provider: nursing, CM    Education and Discussions with Family / Patient: Updated  (granddaughter) at bedside.    Total Time Spent on Date of Encounter in care of patient: 25 mins. This time was spent on one or more of the following: performing physical exam; counseling and coordination of care; obtaining or reviewing history; documenting in the medical record; reviewing/ordering tests, medications or procedures; communicating with other healthcare professionals and discussing with patient's family/caregivers.    Current Length of Stay: 3 day(s)  Current Patient Status: Inpatient   Certification Statement: The patient will continue to require additional inpatient hospital  stay due to need for placement to [facility]  Discharge Plan: Anticipate discharge in 24-48 hrs to rehab facility.    Code Status: Level 3 - DNAR and DNI    Subjective:   The patient was seen and examined. The patient is sitting up in his chair eating lunch. He is talking to his granddaughter and joking around. He denies any complaints. No episodes of hallucinations so far today.    Objective:     Vitals:   Temp (24hrs), Av.7 °F (36.5 °C), Min:97.4 °F (36.3 °C), Max:98 °F (36.7 °C)    Temp:  [97.4 °F (36.3 °C)-98 °F (36.7 °C)] 98 °F (36.7 °C)  HR:  [59-72] 59  Resp:  [18] 18  BP: ()/(63-70) 120/64  SpO2:  [93 %-95 %] 94 %  Body mass index is 18.78 kg/m².     Input and Output Summary (last 24 hours):     Intake/Output Summary (Last 24 hours) at 2024 1327  Last data filed at 2024 1855  Gross per 24 hour   Intake 360 ml   Output --   Net 360 ml       Physical Exam:   Physical Exam  Vitals and nursing note reviewed.   Constitutional:       General: He is awake.      Appearance: Normal appearance.   HENT:      Head: Normocephalic and atraumatic.   Cardiovascular:      Rate and Rhythm: Normal rate and regular rhythm.   Pulmonary:      Effort: Pulmonary effort is normal.      Breath sounds: Normal breath sounds.   Abdominal:      Palpations: Abdomen is soft.      Tenderness: There is no abdominal tenderness.   Skin:     General: Skin is warm and dry.   Neurological:      General: No focal deficit present.      Mental Status: He is alert. Mental status is at baseline.   Psychiatric:         Attention and Perception: Attention normal.         Mood and Affect: Mood normal.         Speech: Speech normal.         Behavior: Behavior is cooperative.          Additional Data:     Labs:  Results from last 7 days   Lab Units 24  0440 24  0546 24  1448   WBC Thousand/uL 6.28   < > 6.67   HEMOGLOBIN g/dL 12.4   < > 10.9*   HEMATOCRIT % 36.4*   < > 33.8*   PLATELETS Thousands/uL 186   < > 159   SEGS PCT %   --   --  64   LYMPHO PCT %  --   --  25   MONO PCT %  --   --  8   EOS PCT %  --   --  2    < > = values in this interval not displayed.     Results from last 7 days   Lab Units 04/29/24  0440 04/28/24  0546   SODIUM mmol/L 139 139   POTASSIUM mmol/L 3.7 3.8   CHLORIDE mmol/L 100 103   CO2 mmol/L 30 30   BUN mg/dL 14 18   CREATININE mg/dL 0.96 0.84   ANION GAP mmol/L 9 6   CALCIUM mg/dL 9.4 9.1   ALBUMIN g/dL  --  3.7   TOTAL BILIRUBIN mg/dL  --  0.62   ALK PHOS U/L  --  84   ALT U/L  --  14   AST U/L  --  24   GLUCOSE RANDOM mg/dL 148* 116         Results from last 7 days   Lab Units 04/30/24  1037 04/30/24  0645 04/29/24  2059 04/29/24  1553 04/29/24  1059 04/29/24  0715 04/28/24  2105 04/28/24  1605 04/28/24  1050 04/28/24  0647 04/27/24  2139 04/27/24  1624   POC GLUCOSE mg/dl 310* 157* 195* 138 412* 148* 183* 195* 211* 130 149* 162*               Lines/Drains:  Invasive Devices       Peripheral Intravenous Line  Duration             Peripheral IV 04/27/24 Proximal;Right;Ventral (anterior) Forearm 2 days                          Imaging: No pertinent imaging reviewed.    Recent Cultures (last 7 days):         Last 24 Hours Medication List:   Current Facility-Administered Medications   Medication Dose Route Frequency Provider Last Rate    acetaminophen  650 mg Oral Q6H PRN Jamie Londono MD      aspirin  81 mg Oral Daily Jamie Londono MD      atorvastatin  20 mg Oral Daily Jamie Londono MD      donepezil  10 mg Oral HS Jamie Londono MD      enoxaparin  40 mg Subcutaneous Daily Jamie Londono MD      ergocalciferol  50,000 Units Oral Weekly Anish Wilson PA-C      FLUoxetine  20 mg Oral QAM Jamie Londono MD      furosemide  40 mg Oral Daily Jamie Londono MD      insulin lispro  1-5 Units Subcutaneous TID AC Jamie Londono MD      melatonin  3 mg Oral HS Anish Wilson PA-C      memantine  10 mg Oral BID Jamie Londono MD      ondansetron  4 mg  Intravenous Q6H PRN Jamie Londono MD      [START ON 5/1/2024] QUEtiapine  25 mg Oral Daily Anish Wilson PA-C      QUEtiapine  50 mg Oral HS Anish Wilson PA-C      tamsulosin  0.4 mg Oral Daily Jamie Londono MD          Today, Patient Was Seen By: Anish Wilson PA-C    **Please Note: This note may have been constructed using a voice recognition system.**

## 2024-04-30 NOTE — OCCUPATIONAL THERAPY NOTE
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  Pt received  supine in bed c HOB semi-elevated on this date reporting moderate pain + is agreeable to OT session @ this time. @ exit, pt remains in  seated in bedside recliner c all lines intact, all needs met, call bell in hand + nsg aware of location/disposition.   Pain Assessment   Pain Assessment Tool Zuniga-Baker FACES   Zuniga-Baker FACES Pain Rating 4   Pain Onset/Description Onset: Sudden   Patient's Stated Pain Goal No pain   Hospital Pain Intervention(s) Rest   Multiple Pain Sites Yes   Restrictions/Precautions   Weight Bearing Precautions Per Order No   Braces or Orthoses TLSO   Other Precautions Fall Risk;Chair Alarm;Aspiration;Multiple lines;Pain  (TLSO)   Home Living   Type of Home House   Home Layout Two level;Stairs to enter with rails;Able to live on main level with bedroom/bathroom;Performs ADLs on one level  (10 [address], FFSU per pt and prior EMR, full bathroom on main  "level)   Bathroom Shower/Tub Tub/shower unit   Bathroom Toilet Raised   Bathroom Equipment Grab bars in shower;Hand-held shower;Tub transfer bench   Bathroom Accessibility Accessible via walker   Home Equipment Walker;Cane;Grab bars;Hospital bed  (RW @ baseline)   Prior Function   Level of [place] Needs assistance with IADLS;Independent with functional mobility  (Wears a brief @ home, granddaughter manages)   Lives With Other (Comment)  (Granddaughter)   Receives Help From Family   IADLs Family/Friend/Other provides transportation;Family/Friend/Other provides meals;Family/Friend/Other provides medication management   Falls in the last 6 months >10  (\"at least a dozen.. just losing balance\" -per granddaughter)   Vocational Retired   Lifestyle   Autonomy Pt lives in  2 story home c granddaughter + @ baseline, performs ADLs  with A, IADLs with A + fxl mobility with A with RW. (-) . Pt is retired.   Reciprocal Relationships Granddaughter   Service to Others Family relations   ADL   Eating Assistance 5  Supervision/Setup   Grooming Assistance 5  Supervision/Setup   UB Bathing Assistance 4  Minimal Assistance   LB Bathing Assistance 2  Maximal Assistance   UB Dressing Assistance 4  Minimal Assistance   UB Dressing Deficit   (Total A for TLSO don/doff)   LB Dressing Assistance 2  Maximal Assistance   Toileting Assistance  1  Total Assistance   Additional Comments Given fxl performance skills + medical complexity, therapist suspecting via clinical judgement + skilled analysis; pt currently requires stated assist above to perform each area of ADL d/t limitations including: pain, generalized muscle weakness, sitting/standing balance deficits, fxl activity tolerance limitations, poor body mechanics, difficulty performing bend/reach-anterior trunk flexion, requires external assist to complete transitional movements, decreased core strength, decreased postural control, instability in stance, cognitive deficits, safety " awareness concerns, decreased problem solving abilities, and spinal precautions   Bed Mobility   Supine to Sit 3  Moderate assistance   Additional items Assist x 1;HOB elevated;Verbal cues;Increased time required   Additional Comments DNT sit-sup; pt transitioned from EOB-bedside recliner.   Transfers   Sit to Stand 2  Maximal assistance   Additional items Assist x 1;Increased time required;Verbal cues   Stand to Sit 2  Maximal assistance   Additional items Assist x 1;Increased time required;Verbal cues   Stand pivot 2  Maximal assistance   Additional items Assist x 2;Increased time required;Verbal cues  (Posterior LOB, RW use)   Balance   Static Sitting Fair   Dynamic Sitting Fair -   Static Standing Poor   Dynamic Standing Poor -   Ambulatory Poor -   Activity Tolerance   Activity Tolerance Patient limited by pain   Medical Staff Made Aware PT/OT co-eval on this date d/t medical complexity, ambulatory dysfunction c high fall risk, various impeding lines + requirement for skilled interdisciplinary analysis of appropriate d/c recommendations. PT/OT POC/goals I'ly determined per respective discipline. ([place])   Nurse Made Aware Medical staff made aware of current fxn, recommendations for d/c planning, fall risk + pt's location upon exit. ([place])   RUE Assessment   RUE Assessment   (DNT; T12 fracture. WFL for gown donning + RW use)   LUE Assessment   LUE Assessment   (DNT; T12 fracture. WFL for gown donning + RW use)   Hand Function   Gross Motor Coordination Functional   Fine Motor Coordination Functional   Vision-Basic Assessment   Current Vision Wears glasses all the time   Psychosocial   Psychosocial (WDL) WDL   Cognition   Overall Cognitive Status Impaired   Arousal/Participation Alert;Responsive   Orientation Level Oriented to person;Disoriented to situation;Disoriented to time;Disoriented to place   Memory Decreased long term memory;Decreased recall of precautions;Decreased recall of recent events;Decreased  short term memory;Decreased recall of biographical information   Following Commands Follows one step commands with increased time or repetition   Assessment   Limitation Decreased ADL status;Decreased Safe judgement during ADL;Decreased cognition;Decreased endurance;Decreased self-care trans;Decreased high-level ADLs   Prognosis Fair   Assessment Patient is a 83 y.o. male seen for OT evaluation s/p admit to  [facility] on 4/27/2024 w/Ambulatory dysfunction + commorbidities/PMHx (as listed in medical record) affecting patient's functional performance c ADL tasks at time of assessment. OT orders placed for evaluation and treatment to assess pt's ADLs, cognitive status + performance during functional tasks in order to formulate appropriate d/c recommendations.     Therapist performed at least two patient identifiers during session including name and wristband. Personal factors affecting patient at time of initial evaluation include: inaccessible home environment, step(s) to enter environment, limited home support, advanced age, inability to perform ADLs, and inability to ambulate household distances.   Pt's clinical presentation is currently unstable/unpredictable given new onset deficits that effect pt's occupational performance and ability to safely return to [facility] including decrease activity tolerance, decrease standing balance, decrease performance during ADL tasks, decrease generalized strength, decrease activity engagement, decrease performance during functional transfers, and high fall risk combined with medical complications of pain impacting overall mobility status, abnormal renal lab values, abnormal blood sugars, and need for input for mobility technique/safety. This evaluation required an extensive review of medical and/or therapy records and additional review of physical, cognitive and psychosocial history related to functional performance. Based upon functional performance deficits and assessments,  this evaluation has been identified as a  high complexity evaluation.      Patient to benefit from continued Occupational Therapy treatment while in the hospital to address aforementioned deficits and maximize level of functional independence with ADLs and functional mobility. Pt currently requires A to facilitate appropriate d/c plan.   Plan   Treatment Interventions ADL retraining;Functional transfer training;UE strengthening/ROM;Endurance training;Cognitive reorientation;Patient/family training;Equipment evaluation/education;Compensatory technique education;Energy conservation;Activityengagement   Goal Expiration Date 05/10/24   OT Treatment Day 0   OT Frequency 3-5x/wk   Discharge Recommendation   Rehab Resource Intensity Level, OT II (Moderate Resource Intensity)   AM-PAC Daily Activity Inpatient   Lower Body Dressing 2   Bathing 2   Toileting 1   Upper Body Dressing 3   Grooming 3   Eating 3   Daily Activity Raw Score 14   Daily Activity Standardized Score (Calc for Raw Score >=11) 33.39   AM-PAC Applied Cognition Inpatient   Following a Speech/Presentation 3   Understanding Ordinary Conversation 3   Taking Medications 1   Remembering Where Things Are Placed or Put Away 1   Remembering List of 4-5 Errands 1   Taking Care of Complicated Tasks 1   Applied Cognition Raw Score 10   Applied Cognition Standardized Score 24.98   Barthel Index   Feeding 5   Bathing 0   Grooming Score 0   Dressing Score 5   Bladder Score 0   Bowels Score 0   Toilet Use Score 0   Transfers (Bed/Chair) Score 5   Mobility (Level Surface) Score 0   Stairs Score 0   Barthel Index Score 15   End of Consult   Patient Position at End of Consult Bedside chair;All needs within reach;Bed/Chair alarm activated   Nurse Communication Nurse aware of consult       The patient's raw score on the AM-PAC Daily Activity inpatient short form is 14, standardized score is 33.39, less than 39.4. Please refer to the recommendation of the Occupational Therapist  for safe DC planning.    Resource Intensity Recommendation: Level II (Moderate Resource Intensity)        GOALS:    *ADL transfers with (S) for inc'd independence with ADLs/purposeful tasks    *UB ADL with (S) for inc'd independence with self cares    *Increase stand tolerance x 1  m for inc'd tolerance with standing purposeful tasks    *Participate in 10m UE therex to increase overall stamina/activity tolerance for purposeful tasks    *Bed mobility- (I) for inc'd independence to manage own comfort and initiate EOB & OOB purposeful tasks    *Patient will verbalize 3 safety awareness/ principles to prevent falls in the home setting.     *Patient will verbalize and demonstrate use of energy conservation/deep breathing techniques and work simplification skills during functional activities with no verbal cues.     *Patient will increase OOB/sitting tolerance to 2-4 hours per day to increase participation in self-care and leisure tasks with no s/s of exertion.     *Patient will engage in ongoing cognitive assessment to assist with safe discharge planning/recommendations.       Susi Capps, OTR/L

## 2024-04-30 NOTE — PLAN OF CARE
Problem: PHYSICAL THERAPY ADULT  Goal: Performs mobility at highest level of function for planned discharge setting.  See evaluation for individualized goals.  Description: Treatment/Interventions: Functional transfer training, LE strengthening/ROM, Elevations, Therapeutic exercise, Endurance training, Cognitive reorientation, Patient/family training, Equipment eval/education, Bed mobility, Gait training, Spoke to nursing          See flowsheet documentation for full assessment, interventions and recommendations.  Outcome: Progressing  Note: Prognosis: Fair  Problem List: Decreased endurance, Impaired balance, Decreased mobility, Decreased coordination, Decreased cognition, Impaired judgement, Decreased safety awareness, Pain  Assessment: Pt seen for PT treatment session this date with interventions consisting of therapeutic exercise to improve endurance to improve functional mobility and therapeutic activity to improve transfers and increase activity tolerance with functional mobility to decrease fall risk. Pt agreeable to PT treatment session upon arrival, pt found supine in bed, in no apparent distress. Since previous session, pt has made fair progress as evidenced by ability to mobilize OOB  Barriers during this session include pain, confusion, and fatigue.  Pt continues to be functioning below baseline level, and remains limited 2* factors listed above and including impaired activity tolerance, impaired  balance, poor safety awareness, pain, impaired cognition, decreased endurance, and decreased mobility.  Pt prognosis for achieving goals is fair, pending pt progress with hospitalization/medical status improvements, and indicated by static poor cognition and continued required assistance. PT will continue to see pt during current hospitalization in order to address the deficits listed above and provide interventions consistent w/ POC in effort to achieve goals. Current goals and POC remain appropriate, pt  continues to have rehab potential  Upon conclusion pt seated OOB in recliner. The patient's AM-PAC Basic Mobility Inpatient Short Form Raw Score is 9.  Based on patient presentations and impairments, pt would most appropriately benefit from Level 2 resource intensity upon discharge.  Please also refer to the recommendation of the Physical Therapist for safe discharge planning. This session, pt required and most appropriately benefited from skilled OT/PT co-treat due to extensive physical assistance of two therapists, significant regression from functional baseline and decreased activity tolerance.  Barriers to Discharge: Inaccessible home environment, Decreased caregiver support     Rehab Resource Intensity Level, PT: II (Moderate Resource Intensity)    See flowsheet documentation for full assessment.

## 2024-04-30 NOTE — CASE MANAGEMENT
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 ([facility]) 2024    Orthostatic lightheadedness 2024    Chronic diastolic congestive heart failure ([facility]) 2024    Stage 3 chronic kidney disease, unspecified whether stage 3a or 3b CKD ([facility]) 2023    Mild protein-calorie malnutrition ([facility]) 2022    Dysphagia 2022    Mild aortic stenosis 10/22/2021    Mild mitral regurgitation 10/22/2021    Diastolic dysfunction 10/22/2021    Mild concentric left ventricular hypertrophy (LVH) 10/22/2021    Type 2 diabetes mellitus with retinopathy, without long-term current use of insulin ([facility]) 09/15/2021    Myasthenia gravis without exacerbation ([facility]) 09/15/2021    Mixed hyperlipidemia 09/15/2021    Retinopathy 09/15/2021    Primary open angle glaucoma (POAG) of both eyes, mild stage 09/15/2021    Ptosis of both eyelids 09/15/2021    Benign prostatic hyperplasia without lower urinary tract symptoms 09/15/2021    Essential hypertension 09/15/2021    Other age-related cataract 09/15/2021      LOS (days): 3  Geometric Mean LOS (GMLOS) (days): 3.1  Days to GMLOS:0.1     OBJECTIVE:  Risk of Unplanned Readmission Score: 22.18         Current admission status: Inpatient   Preferred Pharmacy:   [facility] [id number]  [place] - [address]  [address] [address]  Phone: [phone] Fax: [phone]    Primary Care Provider: Giovanni Reyez DO    Primary Insurance: [facility]  Secondary Insurance:     DISCHARGE  DETAILS:                                                                                                               Facility Insurance Auth Number: [id number]

## 2024-04-30 NOTE — PHYSICAL THERAPY NOTE
PT Treatment Note    NAME:  Israel Hughes  DATE: 04/30/24    AGE:   83 y.o.  Mrn:   [id number]  ADMIT DX:  Weakness [R53.1]  Generalized weakness [R53.1]  Compression fracture of T12 vertebra, initial encounter ([facility]) [S22.080A]  Ambulatory dysfunction [R26.2]  Performed at least 2 patient identifiers during session: Name and ID bracelet         04/30/24 0938   PT Last Visit   PT Visit Date 04/30/24   Note Type   Note Type Treatment   Pain Assessment   Pain Assessment Tool Zuniga-Baker FACES   Zuniga-Baker FACES Pain Rating 4   Pain Location/Orientation Location: Back   Hospital Pain Intervention(s) Rest   Restrictions/Precautions   Weight Bearing Precautions Per Order No   Braces or Orthoses TLSO   Other Precautions Fall Risk;Chair Alarm;Aspiration;Multiple lines;Pain;Cognitive   General   Chart Reviewed Yes   Family/Caregiver Present Yes  (granddaughter Angelita present for full session and assisted with obtaining information)   Cognition   Overall Cognitive Status Impaired   Arousal/Participation Responsive   Attention Difficulty attending to directions   Orientation Level Oriented to person;Oriented to place;Disoriented to time;Disoriented to situation   Memory Decreased recall of recent events;Decreased long term memory;Decreased short term memory   Following Commands Follows one step commands with increased time or repetition   Bed Mobility   Supine to Sit 3  Moderate assistance   Additional items Assist x 1;LE management;Verbal cues;Increased time required   Additional Comments Max A with TLSO brace donning   Transfers   Sit to Stand 2  Maximal assistance   Additional items Assist x 1;Verbal cues;Increased time required   Stand to Sit 2  Maximal assistance   Additional items Assist x 1;Verbal cues;Increased time required   Additional Comments pt denied dizziness with transitional movement   Ambulation/Elevation   Gait pattern Improper Weight shift;Decreased foot clearance;Excessively slow;Step to;Short stride    Gait Assistance 2  Maximal assist   Additional items Assist x 2;Verbal cues   Assistive Device Rolling walker   Distance 3 ft   Ambulation/Elevation Additional Comments assistance with RW and weight shifting   Balance   Static Sitting Fair +   Dynamic Sitting Fair   Static Standing Poor +   Dynamic Standing Poor   Ambulatory Poor   Endurance Deficit   Endurance Deficit Yes   Activity Tolerance   Activity Tolerance Patient limited by fatigue;Patient limited by pain   Exercises   Hip Flexion Sitting;15 reps;AROM;Bilateral   Hip Adduction Sitting;15 reps;AROM;Bilateral   Knee AROM Long Arc Quad Sitting;15 reps;AROM;Bilateral   Ankle Pumps Sitting;15 reps;AROM;Bilateral   Assessment   Prognosis Fair   Assessment Pt seen for PT treatment session this date with interventions consisting of therapeutic exercise to improve endurance to improve functional mobility and therapeutic activity to improve transfers and increase activity tolerance with functional mobility to decrease fall risk. Pt agreeable to PT treatment session upon arrival, pt found supine in bed, in no apparent distress. Since previous session, pt has made fair progress as evidenced by ability to mobilize OOB  Barriers during this session include pain, confusion, and fatigue.  Pt continues to be functioning below baseline level, and remains limited 2* factors listed above and including impaired activity tolerance, impaired  balance, poor safety awareness, pain, impaired cognition, decreased endurance, and decreased mobility.  Pt prognosis for achieving goals is fair, pending pt progress with hospitalization/medical status improvements, and indicated by static poor cognition and continued required assistance. PT will continue to see pt during current hospitalization in order to address the deficits listed above and provide interventions consistent w/ POC in effort to achieve goals. Current goals and POC remain appropriate, pt continues to have rehab potential   Upon conclusion pt seated OOB in recliner. The patient's AM-PAC Basic Mobility Inpatient Short Form Raw Score is 9.  Based on patient presentations and impairments, pt would most appropriately benefit from Level 2 resource intensity upon discharge.  Please also refer to the recommendation of the Physical Therapist for safe discharge planning. This session, pt required and most appropriately benefited from skilled OT/PT co-treat due to extensive physical assistance of two therapists, significant regression from functional baseline and decreased activity tolerance.   Barriers to Discharge Inaccessible home environment;Decreased caregiver support   Goals   Patient Goals to drink coffee   LTG Expiration Date 05/12/24   PT Treatment Day 1   Plan   Treatment/Interventions Functional transfer training;Gait training;Bed mobility;Equipment eval/education;Therapeutic exercise   Progress Slow progress, cognitive deficits   PT Frequency 3-5x/wk   Discharge Recommendation   Rehab Resource Intensity Level, PT II (Moderate Resource Intensity)   Equipment Recommended Walker   Walker Package Recommended Wheeled walker   AM-PAC Basic Mobility Inpatient   Turning in Flat Bed Without Bedrails 2   Lying on Back to Sitting on Edge of Flat Bed Without Bedrails 2   Moving Bed to Chair 1   Standing Up From Chair Using Arms 2   Walk in Room 1   Climb 3-5 Stairs With Railing 1   Basic Mobility Inpatient Raw Score 9   [facility] Highest Level Of Mobility   -HLM Goal 3: Sit at edge of bed   JH-HLM Achieved 4: Move to chair/commode     Time In: 0912  Time Out: 0938  Total Treatment Minutes: 26    Antonieta Tony, PT

## 2024-04-30 NOTE — CASE MANAGEMENT
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) 2024    Orthostatic lightheadedness 2024    Chronic diastolic congestive heart failure ([facility]) 2024    Stage 3 chronic kidney disease, unspecified whether stage 3a or 3b CKD ([facility]) 2023    Mild protein-calorie malnutrition ([facility]) 2022    Dysphagia 2022    Mild aortic stenosis 10/22/2021    Mild mitral regurgitation 10/22/2021    Diastolic dysfunction 10/22/2021    Mild concentric left ventricular hypertrophy (LVH) 10/22/2021    Type 2 diabetes mellitus with retinopathy, without long-term current use of insulin ([facility]) 09/15/2021    Myasthenia gravis without exacerbation ([facility]) 09/15/2021    Mixed hyperlipidemia 09/15/2021    Retinopathy 09/15/2021    Primary open angle glaucoma (POAG) of both eyes, mild stage 09/15/2021    Ptosis of both eyelids 09/15/2021    Benign prostatic hyperplasia without lower urinary tract symptoms 09/15/2021    Essential hypertension 09/15/2021    Other age-related cataract 09/15/2021      LOS (days): 3  Geometric Mean LOS (GMLOS) (days): 3.1  Days to GMLOS:0     OBJECTIVE:  Risk of Unplanned Readmission Score: 22.67         Current admission status: Inpatient   Preferred Pharmacy:   [facility] [id number]  [place] - [address]  [address] [address]  Phone: [phone] Fax: [phone]    Primary Care Provider: Giovanni Reyez DO    Primary Insurance: [employer]  Secondary Insurance:     DISCHARGE  DETAILS:    Discharge planning discussed with:: patient & Bianca at the bedside  Freedom of Choice: Yes  Comments - Freedom of Choice: recommendation is for rehab - list was given to the pt & Bianca- their choice  is [facility]- sent for auth   & auth was received  CM contacted family/caregiver?: Yes             Contacts  Patient Contacts: Bianca rehrig  Relationship to Patient:: Family  Contact Method: In Person  Reason/Outcome: Discharge Planning    Requested Home Health Care         Is the patient interested in HHC at discharge?: No    DME Referral Provided  Referral made for DME?: No    Other Referral/Resources/Interventions Provided:  Interventions: Short Term Rehab  Referral Comments: auth received- covid is needed- report  needs to be called & avs needs to be faxed - 14:00pm bls - sent for auth via amy to cm support    Would you like to participate in our [facility] service program?  : No - Declined    Treatment Team Recommendation: Short Term Rehab ([facility] - 14:opm BLS [place] sent for auth)     Transport at Discharge : [facility]  Dispatcher Contacted: Yes  Number/Name of Dispatcher: [phone]  Transported by (Company and Unit #): [employer]  ETA of Transport (Date): 04/30/24  ETA of Transport (Time): 1400              IMM Given (Date):: 04/30/24  IMM Given to:: Family (Granddaughter)  Family notified:: family at the bedside

## 2024-04-30 NOTE — ASSESSMENT & PLAN NOTE
Magnesium of 1.3 on 4/28- now resolved   Continue to monitor magnesium level and replete as needed

## 2024-04-30 NOTE — CONSULTS
TeleConsultation - Behavioral Health   Israel Hughes 83 y.o. male MRN: [id number]  Unit/Bed#: -01 Encounter: [id number]        REQUIRED DOCUMENTATION:     1. This service was provided via Telemedicine.  [facility]. [facility]  3. TeleMed provider: Mao Angeles MD.  4. Identify all parties in room with patient during tele consult:  Patient  5.Patient was then informed that this was a Telemedicine visit and that the exam was being conducted confidentially over secure lines. My office door was closed. No one else was in the room.  Patient acknowledged consent and understanding of privacy and security of the Telemedicine visit, and gave us permission to have the assistant stay in the room in order to assist with the history and to conduct the exam.  I informed the patient that I have reviewed their record in Epic and presented the opportunity for them to ask any questions regarding the visit today.  The patient agreed to participate.       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 If mentation is waxing/waning, then rule out superimposed delirium and treat accordingly.  Continue Prozac 20mg daily. Continue daytime seroquel 25mg and increase bedtime Seroquel dose to 50mg for psychosis/hallucinations. Add melatonin  3mg at bedtime.    -Reconsult psychiatry PRN for followup.      Findings and recommendations communicated to primary team/staff.    Current Medications:   Current Facility-Administered Medications   Medication Dose Route Frequency Provider Last Rate    acetaminophen  650 mg Oral Q6H PRN Jamie Londono MD      aspirin  81 mg Oral Daily Jamie Londono MD      atorvastatin  20 mg Oral Daily Jamie Londono MD      donepezil  10 mg Oral HS Jamie Londono MD      enoxaparin  40 mg Subcutaneous Daily Jamie Londono MD      ergocalciferol  50,000 Units Oral Weekly Anish Wilson PA-C      FLUoxetine  20 mg Oral QAM Jamie Londono MD      furosemide  40 mg Oral Daily Jamie Londono MD      insulin lispro  1-5 Units Subcutaneous TID AC Jamie Londono MD      memantine  10 mg Oral BID Jamie Londono MD      ondansetron  4 mg Intravenous Q6H PRN Jamie Londono MD      QUEtiapine  25 mg Oral BID Jamie Londono MD      tamsulosin  0.4 mg Oral Daily Jamie Londono MD         Risks / Benefits of Treatment:  Risks, benefits, and possible side effects of medications explained to patient and patient verbalizes understanding.      Inpatient consult to Psychiatry  Consult performed by: Mao Angeles MD  Consult ordered by: Anish Wilson PA-C        Physician Requesting Consult: Steven Russo DO  Principal Problem:Ambulatory dysfunction    Reason for Consult: Hallucination    History of Present Illness:  82yo M with hx of Dementia currently admitted for ambulatory dysfunction/decline and recent fall. Psychiatry consulted due to hallucinations. Chart reviewed. Patient was awake during interview. Patient was alert & oriented only to person and place, but stated that the year is 1920. His attention was markedly impaired, unable to solve serial 7's or other cognitive screening tools. His thought process was concrete during the interview. Patient understood to a  limited degree the reason for his hospitalization, and was able to provide only a limited history. He reported feeling good overall. He denies any recent fall, contrary to documented events, suggesting memory impairment. He had 0/3 short term recall. Patient denied having pain or discomfort. He reported sleeping good. He was unable to recall that he refused his medicines earlier, though stated he would be willing to receive them now onwards. During the interview, patient did not appear to respond to internal stimuli or engage in self dialogue, however when specifically asked about hallucinations, he paused, looked at the side of the room and stated that there are 2 women laughing and sticking their tongue out, and patient started smiling. Patient denied hearing their voices or hearing other sounds than this writer. Patient denied suicidal or homicidal ideations. No family was present at bedside for additional information at the time. Per discussion with staff, patient was confused intermittently during admission, especially in the evening.       Psychiatric Review Of Systems:  Negative except as reported or endorsed in HPI    Historical Information     Past Psychiatric History:   History provided by patient as follows:  Psychiatric Hospitalizations:   Patient denies  Outpatient Treatment History:   Patient denies  Suicide Attempts:   Patient denies  History of self-harm:   Patient denies  Violence History:   Patient denies  Current Psychotropic regimen:Seroquel 25mg bid, Prozac 20mg  Past Psychiatric medication trials: MADDIE    Substance Abuse History:  Denies    Family Psychiatric History:    Non-contributory    Social History:  Social History     Socioeconomic History    Marital status:      Spouse name: None    Number of children: None    Years of education: None    Highest education level: None   Occupational History    Occupation: retired   Tobacco Use    Smoking status: Never     Passive exposure: Never     Smokeless tobacco: Never   Vaping Use    Vaping status: Never Used   Substance and Sexual Activity    Alcohol use: Not Currently    Drug use: Never    Sexual activity: Not Currently     Partners: Female   Other Topics Concern    None   Social History Narrative        Two children    Lives alone    Retired .     Served in the [place] and deployed to [place].      Social Determinants of Health     Financial Resource Strain: Low Risk  (12/1/2023)    Overall Financial Resource Strain (CARDIA)     Difficulty of Paying Living Expenses: Not hard at all   Food Insecurity: No Food Insecurity (2/24/2024)    Hunger Vital Sign     Worried About Running Out of Food in the Last Year: Never true     Ran Out of Food in the Last Year: Never true   Transportation Needs: No Transportation Needs (2/24/2024)    PRAPARE - Transportation     Lack of Transportation (Medical): No     Lack of Transportation (Non-Medical): No   Physical Activity: Not on file   Stress: Not on file   Social Connections: Not on file   Intimate Partner Violence: Not on file   Housing Stability: Low Risk  (2/24/2024)    Housing Stability Vital Sign     Unable to Pay for Housing in the Last Year: No     Number of Places Lived in the Last Year: 1     Unstable Housing in the Last Year: No               Past Medical History:   Diagnosis Date    Diabetes mellitus (HCC)     Hyperlipidemia        Medical Review Of Systems:    Review of Systems    Meds/Allergies     all current active meds have been reviewed  Allergies   Allergen Reactions    Bactrim [Sulfamethoxazole-Trimethoprim] GI Intolerance    Simvastatin Myalgia and Other (See Comments)       Objective     Vital signs in last 24 hours:  Temp:  [97.4 °F (36.3 °C)-98 °F (36.7 °C)] 98 °F (36.7 °C)  HR:  [59-72] 59  Resp:  [18] 18  BP: ()/(63-70) 120/64      Intake/Output Summary (Last 24 hours) at 4/30/2024 1040  Last data filed at 4/29/2024 1855  Gross per 24 hour   Intake 360 ml   Output --    Net 360 ml       Mental Status Evaluation:    Appearance:  thin & gaunt looking   Behavior:  Calm, laying in hospital bed   Speech:  normal pitch and normal volume   Mood:  euthymic   Affect:  Mood congruent   Thought Process:  impaired   Associations concrete associations   Thought Content:  normal   Perceptual Disturbances: Visual hallucinations+   Risk Potential: Suicidal Ideations none  Homicidal Ideations none   Sensorium:  person and place   Cognition:  Impaired   Consciousness:  alert and awake    Attention: attention span appeared shorter than expected for age   Insight:  limited   Judgment: limited       Lab Results: I have personally reviewed all pertinent laboratory/tests results.     Most Recent Labs: @RESUFAST(WBC,RBC,HGB,HCT,PLT, RBC,RDW,NEUTROABS,SODIUM,K,CL,CO2,BUN,CREATININE,GLUC,GLUF,CALCIUM,AST,ALT,ALKPHOS,TP,ALB,TBILI,CHOLESTEROL,HDL,TRIG,LDLCALC,NONHDLC,VALPROICTOT,CARBAMAZEPIN,LITHIUM,AMMONIA,NWS7QFBWSHWQ,FREET4,T3FREE,EXTPREGUR,PREGSERUM,HCG,HCGQUANT,RPR,HGBA1C,EAG)@    Imaging Studies: CT head without contrast    Result Date: 4/27/2024  Narrative: CT BRAIN - WITHOUT CONTRAST INDICATION:   fall 2 days ago, started to lean to the L last night, worsening weakness. COMPARISON: April 25, 2024 TECHNIQUE:  CT examination of the brain was performed.  Multiplanar 2D reformatted images were created from the source data. Radiation dose length product (DLP) for this visit:  868 mGy-cm .  This examination, like all CT scans performed in the [facility], was performed utilizing techniques to minimize radiation dose exposure, including the use of iterative reconstruction and automated exposure control. IMAGE QUALITY:  Diagnostic. FINDINGS: PARENCHYMA:  No intracranial mass, mass effect or midline shift. No CT signs of acute infarction.  No acute parenchymal hemorrhage. VENTRICLES AND EXTRA-AXIAL SPACES:  Normal for the patient's age. Stable age-related atrophy. VISUALIZED ORBITS: Normal  visualized orbits. PARANASAL SINUSES: Normal visualized paranasal sinuses. CALVARIUM AND EXTRACRANIAL SOFT TISSUES:  Normal.     Impression: No acute intracranial abnormality. Stable exam. Workstation performed: [id number]     XR spine thoracic 3 views    Result Date: 4/26/2024  Narrative: XR SPINE THORACIC 3 VW INDICATION: upright sp brace. COMPARISON: CT performed earlier on April 25, 2024. May 19, 2023 FINDINGS: Anterior superior endplate wedge compression fracture at T12 with approximately 20% loss of anterior vertebral body height, unchanged from CT of earlier the same day. No other fractures. Spinal degenerative changes. No destructive osseous lesion. Unremarkable visualized soft tissues.     Impression: Unchanged anterior superior wedge compression fracture at T12 with approximately 20% loss of anterior vertebral body height. Workstation performed: [id number]     TRAUMA - CT chest abdomen pelvis w contrast    Result Date: 4/25/2024  Narrative: CT CHEST, ABDOMEN AND PELVIS WITH IV CONTRAST INDICATION: TRAUMA. COMPARISON: 2/24/2024 TECHNIQUE: CT examination of the chest, abdomen and pelvis was performed. Multiplanar 2D reformatted images were created from the source data. This examination, like all CT scans performed in the [facility], was performed utilizing techniques to minimize radiation dose exposure, including the use of iterative reconstruction and automated exposure control. Radiation dose length product (DLP) for this visit: 581 mGy-cm IV Contrast: 85 mL of iohexol (OMNIPAQUE) Enteric Contrast: Not administered. FINDINGS: CHEST LUNGS: No focal consolidation. No tracheal or endobronchial lesion. PLEURA: Unremarkable. HEART/GREAT VESSELS: Coronary artery calcifications. No pericardial effusion. No thoracic aortic aneurysm. Mild atherosclerotic calcifications of the thoracic aorta. Aortic valve calcifications. MEDIASTINUM AND TALIA: Unremarkable. CHEST WALL AND LOWER NECK: Unremarkable.  ABDOMEN LIVER/BILIARY TREE: Unremarkable. GALLBLADDER: No calcified gallstones. No pericholecystic inflammatory change. SPLEEN: Unremarkable. PANCREAS: Unremarkable. ADRENAL GLANDS: Unremarkable. KIDNEYS/URETERS: Simple renal cyst(s). Otherwise unremarkable kidneys. No hydronephrosis. STOMACH AND BOWEL: No bowel obstruction. Colonic diverticulosis without evidence of diverticulitis. APPENDIX: No findings to suggest appendicitis. ABDOMINOPELVIC CAVITY: No ascites. No pneumoperitoneum. No lymphadenopathy. VESSELS: Mild atherosclerotic calcifications. Tortuous abdominal aorta. 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 No leak. PELVIS REPRODUCTIVE ORGANS: Redemonstrated prostatomegaly. URINARY BLADDER: Unremarkable. ABDOMINAL WALL/INGUINAL REGIONS: Unremarkable. BONES: Mild compression fracture of the superior endplate of T12. About 1 mm retropulsion. It was not evident in February 2024. No other acute fracture or dislocation. Multilevel degenerative changes in the spine.     Impression: Mild compression fracture of the superior endplate of T12, not previously evident. 1 mm retropulsion. No other acute fracture or dislocation suspected. No acute intrathoracic or intra-abdominal pathology. Chronic findings, as per the body of the report. I personally discussed this study with Mr. Remi PA-C on 4/25/2024 8:08 PM. Workstation performed: [id number]     XR Trauma pelvis ap only 1 or 2 vw    Result Date: 4/25/2024  Narrative: XR PELVIS AP ONLY 1 OR 2 VW INDICATION: TRAUMA. COMPARISON: None FINDINGS: No acute fracture or dislocation. Subchondral sclerosis at the symphysis pubis is noted. No lytic or blastic osseous lesion. Unremarkable soft tissues. Unremarkable visualized lumbar spine.     Impression: No acute osseous abnormality. Workstation performed: [id number]     XR Trauma chest portable    Result Date: 4/25/2024  Narrative: XR CHEST PORTABLE INDICATION: TRAUMA. COMPARISON:  2/23/2024 FINDINGS: Clear lungs. No pneumothorax or pleural effusion. Normal cardiomediastinal silhouette. Bones are unremarkable for age. Normal upper abdomen.     Impression: No acute cardiopulmonary disease. Workstation performed: [id number]     TRAUMA - CT spine cervical wo contrast    Result Date: 4/25/2024  Narrative: CT CERVICAL SPINE - WITHOUT CONTRAST INDICATION:   TRAUMA. COMPARISON:  None. TECHNIQUE:  CT examination of the cervical spine was performed without intravenous contrast.  Contiguous axial images were obtained. Multiplanar 2D reformatted images were created from the source data. Radiation dose length product (DLP) for this visit:  414 mGy-cm .  This examination, like all CT scans performed in the [facility], was performed utilizing techniques to minimize radiation dose exposure, including the use of iterative reconstruction and automated exposure control. IMAGE QUALITY:  Diagnostic. FINDINGS: ALIGNMENT: Grade 1 retrolisthesis of C3 on C4 and C4 on C5, likely degenerative in etiology. VERTEBRAE:  No fracture. DEGENERATIVE CHANGES: Moderate multilevel degenerative changes at the disc spaces and facets. Likely secondary grade 1 retrolisthesis of C3 on C4 and C4 on C5. PREVERTEBRAL AND PARASPINAL SOFT TISSUES: Unremarkable THORACIC INLET: Mildly thickened esophageal wall.     Impression: No cervical spine fracture. Multilevel degenerative changes, with likely secondary grade 1 retrolisthesis of C3 on C4 and C4 on C5. Mild esophageal wall thickening. Correlate with clinical findings. Workstation performed: [id number]     TRAUMA - CT head wo contrast    Result Date: 4/25/2024  Narrative: CT BRAIN - WITHOUT CONTRAST INDICATION:   TRAUMA. COMPARISON:  None. TECHNIQUE:  CT examination of the brain was performed.  Multiplanar 2D reformatted images were created from the source data. Radiation dose length product (DLP) for this visit:  829 mGy-cm .  This examination, like all CT scans  performed in the [facility], was performed utilizing techniques to minimize radiation dose exposure, including the use of iterative reconstruction and automated exposure control. IMAGE QUALITY:  Diagnostic. FINDINGS: PARENCHYMA: Decreased attenuation is noted in periventricular and subcortical white matter demonstrating an appearance that is statistically most likely to represent mild microangiopathic change. No CT signs of acute infarction.  No intracranial mass, mass effect or midline shift.  No acute parenchymal hemorrhage. VENTRICLES AND EXTRA-AXIAL SPACES:  Normal for the patient's age. VISUALIZED ORBITS: Status post bilateral cataract surgery PARANASAL SINUSES: Normal visualized paranasal sinuses. Rightward nasal septal deviation. CALVARIUM AND EXTRACRANIAL SOFT TISSUES:  Normal.     Impression: No acute intracranial abnormality. Chronic unchanged intracranial findings, as above. Workstation performed: [id number]     EKG/Pathology/Other Studies:   Lab Results   Component Value Date    VENTRATE 63 04/27/2024    ATRIALRATE 63 04/27/2024    PRINT 152 04/27/2024    QRSDINT 84 04/27/2024    QTINT 390 04/27/2024    QTCINT 399 04/27/2024    PAXIS 44 04/27/2024    QRSAXIS 60 04/27/2024    TWAVEAXIS 49 04/27/2024        Code Status: Level 3 - DNAR and DNI  Advance Directive and Living Will:      Power of : Yes  POLST:      Screenings:    1. Nutrition Screening  Nutrition Assessment (completed by Staff): Nutrition  Feeding: Able to feed self  Diet Type: Diabetic  Appetite: Good    2. Pain Screening  Pain Screening: Pain Assessment  Pain Assessment Tool: 0-10  Pain Score: 0  Zuniga-Baker FACES Pain Rating: Hurts little more  Pain Location/Orientation: Location: Back  Pain Onset/Description: Onset: Sudden  Effect of Pain on Daily Activities: Yes  Patient's Stated Pain Goal: No pain  Hospital Pain Intervention(s): Rest  Multiple Pain Sites: Yes    3. Suicide Screening  ED Crisis Suicide Risk  Assessment:      C-SSRS Screening (Nursing Assessment - recent):    C-SSRS Screening (Nursing Assessment - since last contact):      MADDIE due to patient condition/mental status    Counseling / Coordination of Care:    Total floor / unit time spent today 50 minutes. Greater than 50% of total time was spent with the patient and / or family counseling and / or coordination of care. A description of the counseling / coordination of care: Direct Patient Care, Chart Review, and Documentation     Disclaimer: Portions of this note may have been generated by a front end voice activated word recognition program. There may be typographical grammar or word substitution errors generated by the program or my typing skills in this note that may have been escaped my editorial review.

## 2024-04-30 NOTE — CASE MANAGEMENT
[facility] received request for authorization from Care Manager.  Authorization request submitted for: SNF  Facility Name: [facility]   NPI:[id number]  Facility MD: Dr. Alvarez    NPI: [id number]   Authorization initiated by contacting insurance:  [employer]  Via: [employer]   Clinicals submitted via: [facility] [employer] attachment   Pending Reference #:[id number]     Care Manager notified: Chiqui Pak      Updates to authorization status will be noted in chart. Please reach out to CM for updates on any clinical information.

## 2024-04-30 NOTE — ASSESSMENT & PLAN NOTE
Patient has history of multiple falls with recent fall likely due to progressing Alzheimer's disease. Patient noted to have a T12 compression fracture managed with conservative treatment.  Presented again after having fall while walking.  CT head was unremarkable.  Mental status at baseline.  No other symptoms except for some back pain and ambulatory difficulty.     PT/OT documented moderate resource intensity  Fall precaution   Aspiration precaution  TSH 3.613  Granddaughter agreeable for rehab.  Patient has advanced dementia.  CM to place referrals to STR today and discuss with family

## 2024-04-30 NOTE — ASSESSMENT & PLAN NOTE
Patient noted to have hallucination yesterday- No reports of this today  Continue donepezil and Namenda  Psychiatry consultation appreciated- recommended continue daytime seroquel at 25 mg po but increased bedtime Seroquel to 50 mg and adding melatonin 3 mg qhs.

## 2024-04-30 NOTE — PROGRESS NOTES
Patient:  JEAN PIERRE FOWLER    MRN:  [id number]    Aidin Request ID:  [id number]    Level of care reserved:  Skilled Nursing Facility    Partner Reserved:  [facility], [place] [address] [phone]    Clinical needs requested:    Geography searched:  30 miles around [id number]    Start of Service:    Request sent:  10:57am EDT on 4/29/2024 by Chiqui Pak    Partner reserved:  2:01pm EDT on 4/30/2024 by Chiqui Pak    Choice list shared:  1:22pm EDT on 4/30/2024 by Chiqui Pak

## 2024-05-01 ENCOUNTER — TRANSITIONAL CARE MANAGEMENT (OUTPATIENT)
Dept: INTERNAL MEDICINE CLINIC | Facility: CLINIC | Age: 84
End: 2024-05-01

## 2024-05-01 VITALS
BODY MASS INDEX: 18.81 KG/M2 | DIASTOLIC BLOOD PRESSURE: 70 MMHG | OXYGEN SATURATION: 94 % | TEMPERATURE: 97.3 F | HEIGHT: 65 IN | HEART RATE: 56 BPM | SYSTOLIC BLOOD PRESSURE: 139 MMHG | RESPIRATION RATE: 18 BRPM | WEIGHT: 112.88 LBS

## 2024-05-01 LAB
GLUCOSE SERPL-MCNC: 171 MG/DL (ref 65–140)
GLUCOSE SERPL-MCNC: 252 MG/DL (ref 65–140)

## 2024-05-01 PROCEDURE — 82948 REAGENT STRIP/BLOOD GLUCOSE: CPT

## 2024-05-01 PROCEDURE — 97535 SELF CARE MNGMENT TRAINING: CPT

## 2024-05-01 PROCEDURE — 99239 HOSP IP/OBS DSCHRG MGMT >30: CPT

## 2024-05-01 RX ORDER — ACETAMINOPHEN 325 MG/1
650 TABLET ORAL EVERY 6 HOURS PRN
Start: 2024-05-01

## 2024-05-01 RX ORDER — QUETIAPINE FUMARATE 50 MG/1
50 TABLET, FILM COATED ORAL
Start: 2024-05-01

## 2024-05-01 RX ORDER — QUETIAPINE FUMARATE 25 MG/1
25 TABLET, FILM COATED ORAL DAILY
Start: 2024-05-02

## 2024-05-01 RX ADMIN — ENOXAPARIN SODIUM 40 MG: 40 INJECTION SUBCUTANEOUS at 09:26

## 2024-05-01 RX ADMIN — INSULIN LISPRO 2 UNITS: 100 INJECTION, SOLUTION INTRAVENOUS; SUBCUTANEOUS at 11:48

## 2024-05-01 RX ADMIN — ACETAMINOPHEN 650 MG: 325 TABLET ORAL at 12:11

## 2024-05-01 RX ADMIN — FLUOXETINE HYDROCHLORIDE 20 MG: 20 CAPSULE ORAL at 09:23

## 2024-05-01 RX ADMIN — ASPIRIN 81 MG: 81 TABLET, COATED ORAL at 09:24

## 2024-05-01 RX ADMIN — ATORVASTATIN CALCIUM 20 MG: 20 TABLET, FILM COATED ORAL at 09:24

## 2024-05-01 RX ADMIN — MEMANTINE 10 MG: 5 TABLET ORAL at 09:24

## 2024-05-01 RX ADMIN — TAMSULOSIN HYDROCHLORIDE 0.4 MG: 0.4 CAPSULE ORAL at 09:23

## 2024-05-01 RX ADMIN — QUETIAPINE FUMARATE 25 MG: 25 TABLET ORAL at 09:24

## 2024-05-01 RX ADMIN — INSULIN LISPRO 1 UNITS: 100 INJECTION, SOLUTION INTRAVENOUS; SUBCUTANEOUS at 07:47

## 2024-05-01 NOTE — DISCHARGE SUMMARY
[facility]  Discharge- Israel Hughes 1940, 83 y.o. male MRN: [id number]  Unit/Bed#: [facility] Encounter: [id number]  Primary Care Provider: Giovanni Reyez DO   Date and time admitted to hospital: 4/27/2024  1:59 PM    * Gait dysfunction  Assessment & Plan  Patient has history of multiple falls with recent fall likely due to progressing Alzheimer's disease. Patient noted to have a T12 compression fracture managed with conservative treatment.  Presented again after having fall while walking.  CT head was unremarkable.  Mental status at baseline.  No other symptoms except for some back pain and ambulatory difficulty.   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  Will discharge on increased dose of Seroquel 50 mg at daytime and continue 25 mg in the a.m.     Stage 3 chronic kidney disease, unspecified whether stage 3a or 3b CKD (HCC)  Assessment & Plan  Baseline Creatinine between 1-1.2.    Creatinine at baseline      Type 2 diabetes mellitus with retinopathy, without long-term current use of insulin (HCC)  Assessment & Plan  Lab Results   Component Value Date    HGBA1C 7.8 (A) 04/03/2024       Continue prehospital oral hypoglycemics on discharge    Peripheral arterial disease (HCC)  Assessment & Plan  Continue aspirin and statin on discharge    Moderate protein-calorie malnutrition (HCC)  Assessment & Plan  Malnutrition Findings:   Adult Malnutrition type: Chronic illness  Adult Degree  of Malnutrition: Malnutrition of moderate degree  Malnutrition Characteristics: Weight loss, Inadequate energy                  360 Statement: Pt exhibits new diagnosis of moderate malnutrition r/t intake variability of alzheimers indicated by 11% wt loss x 4 months and muscle losses to be treated with diet consistency alterantion and glucerna.    BMI Findings:           Body mass index is 18.78 kg/m².       Hypomagnesemia  Assessment & Plan  Resolved with repletion    Benign prostatic hyperplasia without lower urinary tract symptoms  Assessment & Plan  Continue prehospital Flomax on discharge              Medical Problems       Resolved Problems  Date Reviewed: 5/1/2024   None         Admission Date:   Admission Orders (From admission, onward)       Ordered        04/27/24 1555  INPATIENT ADMISSION  Once                            Admitting Diagnosis: Weakness [R53.1]  Generalized weakness [R53.1]  Compression fracture of T12 vertebra, initial encounter ([facility]) [S22.080A]  Ambulatory dysfunction [R26.2]    HPI:  In brief, patient presented to the ED after a fall 2 days prior noted to have T12 compression fracture.  Daughter was having difficulty taking care of patient at home.  PT/OT consulted and patient admitted to [facility] service.    Procedures Performed:   Orders Placed This Encounter   Procedures    ED ECG Documentation Only       Summary of Hospital Course: For full details regarding patient hospitalization please refer to the contents of the chart and the H&P as dictated by Dr. Londono.  In brief, patient is an 83-year-old male with a past medical history of late onset Alzheimer's dementia, stage III renal disease, type 2 diabetes, PAD, BPH, CHF and gait dysfunction who presented to the ED after a fall.  Patient was living with granddaughter who reports having difficulty taking care of him.  Patient was noted to have a T12 compression fracture managed with conservative treatment.  He did have a head CT on arrival  that was unremarkable.  Mental status is currently at his baseline.  Patient is medically stable for discharge today.  Will be discharged to [facility] as arranged by case management.    Significant Findings, Care, Treatment and Services Provided:   4/25 chest x-ray no acute cardiopulmonary disease  4/25 x-ray pelvis: No acute osseous abnormality  4/25 CT head: No acute intracranial abnormality.  Raya unchanged intracranial findings.  4/25 CT C-spine: No C-spine fracture.  Multilevel degenerative changes  4/25 CT chest abdomen pelvis: Mild compression fracture of the superior endplate of T12 not previously evident.  1 mm retropulsion.  No other acute fracture or dislocation suspected.  No acute intrathoracic or intra-abdominal pathology  4/25 x-ray spine: Unchanged anterior superior wedge compression fracture T12 with approximately 20% loss of anterior vertebral body height  4/27 CT head: No acute intracranial abnormality, stable exam      Complications: None    Condition at Discharge: good         Discharge instructions/Information to patient and family:   See after visit summary for information provided to patient and family.      Provisions for Follow-Up Care:  See after visit summary for information related to follow-up care and any pertinent home health orders.      PCP: Giovanni Reyez DO    Disposition: Skilled nursing facility at [facility]    Planned Readmission: No    Discharge Statement   I spent 38 minutes discharging the patient. This time was spent on the day of discharge. I had direct contact with the patient on the day of discharge. Additional documentation is required if more than 30 minutes were spent on discharge.     Discharge Medications:  See after visit summary for reconciled discharge medications provided to patient and family.

## 2024-05-01 NOTE — PLAN OF CARE
Problem: PAIN - ADULT  Goal: Verbalizes/displays adequate comfort level or baseline comfort level  Description: Interventions:  - Encourage patient to monitor pain and request assistance  - Assess pain using appropriate pain scale  - Administer analgesics based on type and severity of pain and evaluate response  - Implement non-pharmacological measures as appropriate and evaluate response  - Consider cultural and social influences on pain and pain management  - Notify physician/advanced practitioner if interventions unsuccessful or patient reports new pain  Outcome: Progressing     Problem: SAFETY ADULT  Goal: Maintain or return to baseline ADL function  Description: INTERVENTIONS:  -  Assess patient's ability to carry out ADLs; assess patient's baseline for ADL function and identify physical deficits which impact ability to perform ADLs (bathing, care of mouth/teeth, toileting, grooming, dressing, etc.)  - Assess/evaluate cause of self-care deficits   - Assess range of motion  - Assess patient's mobility; develop plan if impaired  - Assess patient's need for assistive devices and provide as appropriate  - Encourage maximum independence but intervene and supervise when necessary  - Involve family in performance of ADLs  - Assess for home care needs following discharge   - Consider OT consult to assist with ADL evaluation and planning for discharge  - Provide patient education as appropriate  Outcome: Progressing     Problem: Knowledge Deficit  Goal: Patient/family/caregiver demonstrates understanding of disease process, treatment plan, medications, and discharge instructions  Description: Complete learning assessment and assess knowledge base.  Interventions:  - Provide teaching at level of understanding  - Provide teaching via preferred learning methods  Outcome: Progressing     Problem: METABOLIC, FLUID AND ELECTROLYTES - ADULT  Goal: Fluid balance maintained  Description: INTERVENTIONS:  - Monitor labs   -  Monitor I/O and WT  - Instruct patient on fluid and nutrition as appropriate  - Assess for signs & symptoms of volume excess or deficit  Outcome: Progressing     Problem: Nutrition/Hydration-ADULT  Goal: Nutrient/Hydration intake appropriate for improving, restoring or maintaining nutritional needs  Description: Monitor and assess patient's nutrition/hydration status for malnutrition. Collaborate with interdisciplinary team and initiate plan and interventions as ordered.  Monitor patient's weight and dietary intake as ordered or per policy. Utilize nutrition screening tool and intervene as necessary. Determine patient's food preferences and provide high-protein, high-caloric foods as appropriate.     INTERVENTIONS:  - Monitor oral intake, urinary output, labs, and treatment plans  - Assess nutrition and hydration status and recommend course of action  - Evaluate amount of meals eaten  - Assist patient with eating if necessary   - Allow adequate time for meals  - Recommend/ encourage appropriate diets, oral nutritional supplements, and vitamin/mineral supplements  - Order, calculate, and assess calorie counts as needed  - Recommend, monitor, and adjust tube feedings and TPN/PPN based on assessed needs  - Assess need for intravenous fluids  - Provide specific nutrition/hydration education as appropriate  - Include patient/family/caregiver in decisions related to nutrition  Outcome: Progressing

## 2024-05-01 NOTE — NURSING NOTE
Pt DC to [facility] via [facility] in stable condition. All belongings packed by pt's granddaughter Angelita. IV catheter removed fully intact. AVS reviewed with Pamela receiving nurse, and all questions were answered.

## 2024-05-01 NOTE — CASE MANAGEMENT
[facility] received request for transport authorization from Care Manager.   Insurance: [employer]   Type of transport: BLS     Per patient's insurance no authorization is required for   BLS transport.     Care Manager notified: Chiqui Pak     Please reach out to  for updates on any clinical information.

## 2024-05-01 NOTE — ASSESSMENT & PLAN NOTE
History of dementia at baseline  Appreciate input of psychiatry who followed.  Will discharge on increased dose of Seroquel 50 mg at daytime and continue 25 mg in the a.m.

## 2024-05-01 NOTE — OCCUPATIONAL THERAPY NOTE
05/01/24 1132   OT Last Visit   OT Visit Date 05/01/24   Note Type   Note Type Treatment   Pain Assessment   Pain Assessment Tool 0-10   Pain Score No Pain   Restrictions/Precautions   Weight Bearing Precautions Per Order No   Braces or Orthoses TLSO   Other Precautions Fall Risk;Chair Alarm;Aspiration;Pain;Cognitive   Lifestyle   Reciprocal Relationships granddaughter was present for session   Intrinsic Gratification used to horseback ride; enjoys spending time with his granddaughter's dog, and his great grand-children; watches some TV   ADL   Where Assessed Other (Comment)  (seated in bed (back supported))   Eating Assistance 5  Supervision/Setup   Eating Deficit Supervision/safety;Setup   Grooming Comments *patient reported brushing teeth/doing mouth care earlier today   UB Bathing Assistance 4  Minimal Assistance   UB Bathing Deficit Setup;Verbal cueing;Supervision/safety;Increased time to complete;Right arm   UB Bathing Comments exhibited some difficulty washing R arm with L arm, referring to soreness from a fall   LB Bathing Assistance 4  Minimal Assistance   LB Bathing Deficit Setup;Verbal cueing;Increased time to complete   LB Bathing Comments *patient able to easily bring each foot close to mid-body to reach feet for care   UB Dressing Comments *Min. assistance with unfamiliar aspects of hospital gown change   LB Dressing Comments Demos.'d ability to doff/kelly socks with increased time to complete   Bed Mobility   Additional Comments patient and granddaughter chose for patient to eat lunch in bed > anticipating discharge early this afternoon   Coordination   Gross Motor appears WFL   Dexterity appears WFL   Subjective   Subjective patient joked Throughout session, actually with almost every verbal response > he did answer some questions of importance with appropriateness   Cognition   Overall Cognitive Status Impaired   Arousal/Participation Responsive;Cooperative   Attention Attends with cues to  redirect   Orientation Level Oriented to person;Disoriented to place;Disoriented to time;Disoriented to situation   Memory Decreased long term memory;Decreased short term memory   Following Commands Follows one step commands with increased time or repetition   Activity Tolerance   Activity Tolerance Patient limited by fatigue;Patient limited by pain   Medical Staff Made Aware nurse Bhakti aware of session   Assessment   Assessment Patient participated in Skilled OT session this date with interventions consisting of ADL re training with the use of correct body mechnaics and increase postural control . Patient agreeable to OT treatment session, upon arrival patient was found seated in bed (back supported).  Patient requiring frequent re direction, cognitive assistance to anticipate next step, and one step directives, occasional verbal cues for correct technique, and self-care assistance as noted in flow sheet/AM-PAC. Patient continues to be functioning below baseline level, occupational performance remains limited secondary to factors listed above and increased risk for falls and injury.   Patient to benefit from continued Occupational Therapy treatment while in the hospital to address deficits as defined above and maximize level of functional independence with ADLs and functional mobility.   Plan   Treatment Interventions ADL retraining;Patient/family training;Activityengagement   Goal Expiration Date 05/10/24   OT Treatment Day 1   Discharge Recommendation   Additional Comments 2 The patient's raw score on the AM-PAC Daily Activity Inpatient Short Form is 14. A raw score of less than 19 suggests the patient may benefit from discharge to post-acute rehabilitation services. Please refer to the recommendation of the Occupational Therapist for safe discharge planning.   AM-PAC Daily Activity Inpatient   Lower Body Dressing 2   Bathing 2   Toileting 1   Upper Body Dressing 3   Grooming 3   Eating 3   Daily Activity Raw  Score 14   Daily Activity Standardized Score (Calc for Raw Score >=11) 33.39   AM-PAC Applied Cognition Inpatient   Following a Speech/Presentation 3   Understanding Ordinary Conversation 3   Taking Medications 1   Remembering Where Things Are Placed or Put Away 1   Remembering List of 4-5 Errands 1   Taking Care of Complicated Tasks 1   Applied Cognition Raw Score 10   Applied Cognition Standardized Score 24.98       GIBRAN Garcia/BRINDA

## 2024-05-01 NOTE — PLAN OF CARE
Problem: SAFETY ADULT  Goal: Patient will remain free of falls  Description: INTERVENTIONS:  - Educate patient/family on patient safety including physical limitations  - Instruct patient to call for assistance with activity   - Consult OT/PT to assist with strengthening/mobility   - Keep Call bell within reach  - Keep bed low and locked with side rails adjusted as appropriate  - Keep care items and personal belongings within reach  - Initiate and maintain comfort rounds  - Make Fall Risk Sign visible to staff  - Offer Toileting every 2 Hours, in advance of need  - Initiate/Maintain bed alarm  - Obtain necessary fall risk management equipment: non skid socks  - Apply yellow socks and bracelet for high fall risk patients  - Consider moving patient to room near nurses station  Outcome: Progressing     Problem: Prexisting or High Potential for Compromised Skin Integrity  Goal: Skin integrity is maintained or improved  Description: INTERVENTIONS:  - Identify patients at risk for skin breakdown  - Assess and monitor skin integrity  - Assess and monitor nutrition and hydration status  - Monitor labs   - Assess for incontinence   - Turn and reposition patient  - Assist with mobility/ambulation  - Relieve pressure over bony prominences  - Avoid friction and shearing  - Provide appropriate hygiene as needed including keeping skin clean and dry  - Evaluate need for skin moisturizer/barrier cream  - Collaborate with interdisciplinary team   - Patient/family teaching  - Consider wound care consult   Outcome: Progressing     Problem: PAIN - ADULT  Goal: Verbalizes/displays adequate comfort level or baseline comfort level  Description: Interventions:  - Encourage patient to monitor pain and request assistance  - Assess pain using appropriate pain scale  - Administer analgesics based on type and severity of pain and evaluate response  - Implement non-pharmacological measures as appropriate and evaluate response  - Consider  cultural and social influences on pain and pain management  - Notify physician/advanced practitioner if interventions unsuccessful or patient reports new pain  Outcome: Progressing

## 2024-05-01 NOTE — ASSESSMENT & PLAN NOTE
Lab Results   Component Value Date    HGBA1C 7.8 (A) 04/03/2024       Continue prehospital oral hypoglycemics on discharge

## 2024-05-01 NOTE — PLAN OF CARE
Problem: PAIN - ADULT  Goal: Verbalizes/displays adequate comfort level or baseline comfort level  Description: Interventions:  - Encourage patient to monitor pain and request assistance  - Assess pain using appropriate pain scale  - Administer analgesics based on type and severity of pain and evaluate response  - Implement non-pharmacological measures as appropriate and evaluate response  - Consider cultural and social influences on pain and pain management  - Notify physician/advanced practitioner if interventions unsuccessful or patient reports new pain  5/1/2024 1304 by Bhakti Perez RN  Outcome: Adequate for Discharge  5/1/2024 0902 by Bhakti Perez RN  Outcome: Progressing     Problem: INFECTION - ADULT  Goal: Absence or prevention of progression during hospitalization  Description: INTERVENTIONS:  - Assess and monitor for signs and symptoms of infection  - Monitor lab/diagnostic results  - Monitor all insertion sites, i.e. indwelling lines, tubes, and drains  - Monitor endotracheal if appropriate and nasal secretions for changes in amount and color  - [place] appropriate cooling/warming therapies per order  - Administer medications as ordered  - Instruct and encourage patient and family to use good hand hygiene technique  - Identify and instruct in appropriate isolation precautions for identified infection/condition  5/1/2024 1304 by Bhakti Perez RN  Outcome: Adequate for Discharge  5/1/2024 0902 by Bhakti Perez RN  Outcome: Progressing  Goal: Absence of fever/infection during neutropenic period  Description: INTERVENTIONS:  - Monitor WBC    5/1/2024 1304 by Bhakti Perez RN  Outcome: Adequate for Discharge  5/1/2024 0902 by Bhakti Perez RN  Outcome: Progressing     Problem: SAFETY ADULT  Goal: Patient will remain free of falls  Description: INTERVENTIONS:  - Educate patient/family on patient safety including physical limitations  - Instruct patient to call for assistance with activity   - Consult OT/PT to  assist with strengthening/mobility   - Keep Call bell within reach  - Keep bed low and locked with side rails adjusted as appropriate  - Keep care items and personal belongings within reach  - Initiate and maintain comfort rounds  - Make Fall Risk Sign visible to staff  - Offer Toileting every 2 Hours, in advance of need  - Initiate/Maintain bed alarm  - Obtain necessary fall risk management equipment: non skid socks  - Apply yellow socks and bracelet for high fall risk patients  - Consider moving patient to room near nurses station  5/1/2024 1304 by Bhakti Perez RN  Outcome: Adequate for Discharge  5/1/2024 0902 by Bhakti Perez RN  Outcome: Progressing  Goal: Maintain or return to baseline ADL function  Description: INTERVENTIONS:  -  Assess patient's ability to carry out ADLs; assess patient's baseline for ADL function and identify physical deficits which impact ability to perform ADLs (bathing, care of mouth/teeth, toileting, grooming, dressing, etc.)  - Assess/evaluate cause of self-care deficits   - Assess range of motion  - Assess patient's mobility; develop plan if impaired  - Assess patient's need for assistive devices and provide as appropriate  - Encourage maximum independence but intervene and supervise when necessary  - Involve family in performance of ADLs  - Assess for home care needs following discharge   - Consider OT consult to assist with ADL evaluation and planning for discharge  - Provide patient education as appropriate  5/1/2024 1304 by Bhakti Perez RN  Outcome: Adequate for Discharge  5/1/2024 0902 by Bhakti Perez RN  Outcome: Progressing  Goal: Maintains/Returns to pre admission functional level  Description: INTERVENTIONS:  - Perform AM-PAC 6 Click Basic Mobility/ Daily Activity assessment daily.  - Set and communicate daily mobility goal to care team and patient/family/caregiver.   - Collaborate with rehabilitation services on mobility goals if consulted  - Perform Range of Motion 2 times a  day.  - Reposition patient every 2 hours.  - Dangle patient 2 times a day  - Stand patient 2 times a day  - Ambulate patient 3 times a day  - Out of bed to chair 3 times a day   - Out of bed for meals 3 times a day  - Out of bed for toileting  - Record patient progress and toleration of activity level   5/1/2024 1304 by Bhakti Perez RN  Outcome: Adequate for Discharge  5/1/2024 0902 by Bhakti Perez RN  Outcome: Progressing     Problem: DISCHARGE PLANNING  Goal: Discharge to home or other facility with appropriate resources  Description: INTERVENTIONS:  - Identify barriers to discharge w/patient and caregiver  - Arrange for needed discharge resources and transportation as appropriate  - Identify discharge learning needs (meds, wound care, etc.)  - Refer to Case Management Department for coordinating discharge planning if the patient needs post-hospital services based on physician/advanced practitioner order or complex needs related to functional status, cognitive ability, or social support system  5/1/2024 1304 by Bhakti Perez RN  Outcome: Adequate for Discharge  5/1/2024 0902 by Bhakti Perez RN  Outcome: Progressing     Problem: Knowledge Deficit  Goal: Patient/family/caregiver demonstrates understanding of disease process, treatment plan, medications, and discharge instructions  Description: Complete learning assessment and assess knowledge base.  Interventions:  - Provide teaching at level of understanding  - Provide teaching via preferred learning methods  5/1/2024 1304 by Bhakti Perez RN  Outcome: Adequate for Discharge  5/1/2024 0902 by Bhakti Perez RN  Outcome: Progressing     Problem: METABOLIC, FLUID AND ELECTROLYTES - ADULT  Goal: Electrolytes maintained within normal limits  Description: INTERVENTIONS:  - Monitor labs and assess patient for signs and symptoms of electrolyte imbalances  - Administer electrolyte replacement as ordered  - Monitor response to electrolyte replacements, including repeat lab  results as appropriate  - Instruct patient on fluid and nutrition as appropriate  5/1/2024 1304 by Bhakti Perez RN  Outcome: Adequate for Discharge  5/1/2024 0902 by Bhakti Perez RN  Outcome: Progressing  Goal: Fluid balance maintained  Description: INTERVENTIONS:  - Monitor labs   - Monitor I/O and WT  - Instruct patient on fluid and nutrition as appropriate  - Assess for signs & symptoms of volume excess or deficit  5/1/2024 1304 by Bhakti Perez RN  Outcome: Adequate for Discharge  5/1/2024 0902 by Bhakti Perez RN  Outcome: Progressing     Problem: Prexisting or High Potential for Compromised Skin Integrity  Goal: Skin integrity is maintained or improved  Description: INTERVENTIONS:  - Identify patients at risk for skin breakdown  - Assess and monitor skin integrity  - Assess and monitor nutrition and hydration status  - Monitor labs   - Assess for incontinence   - Turn and reposition patient  - Assist with mobility/ambulation  - Relieve pressure over bony prominences  - Avoid friction and shearing  - Provide appropriate hygiene as needed including keeping skin clean and dry  - Evaluate need for skin moisturizer/barrier cream  - Collaborate with interdisciplinary team   - Patient/family teaching  - Consider wound care consult   5/1/2024 1304 by Bhakti Perez RN  Outcome: Adequate for Discharge  5/1/2024 0902 by Bhakti Perez RN  Outcome: Progressing     Problem: Nutrition/Hydration-ADULT  Goal: Nutrient/Hydration intake appropriate for improving, restoring or maintaining nutritional needs  Description: Monitor and assess patient's nutrition/hydration status for malnutrition. Collaborate with interdisciplinary team and initiate plan and interventions as ordered.  Monitor patient's weight and dietary intake as ordered or per policy. Utilize nutrition screening tool and intervene as necessary. Determine patient's food preferences and provide high-protein, high-caloric foods as appropriate.     INTERVENTIONS:  - Monitor  oral intake, urinary output, labs, and treatment plans  - Assess nutrition and hydration status and recommend course of action  - Evaluate amount of meals eaten  - Assist patient with eating if necessary   - Allow adequate time for meals  - Recommend/ encourage appropriate diets, oral nutritional supplements, and vitamin/mineral supplements  - Order, calculate, and assess calorie counts as needed  - Recommend, monitor, and adjust tube feedings and TPN/PPN based on assessed needs  - Assess need for intravenous fluids  - Provide specific nutrition/hydration education as appropriate  - Include patient/family/caregiver in decisions related to nutrition  5/1/2024 1304 by Bhakti Perez, RN  Outcome: Adequate for Discharge  5/1/2024 0902 by Bhakti Perez RN  Outcome: Progressing

## 2024-05-01 NOTE — CASE MANAGEMENT
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 ([facility]) 2024    Orthostatic lightheadedness 2024    Chronic diastolic congestive heart failure ([facility]) 2024    Stage 3 chronic kidney disease, unspecified whether stage 3a or 3b CKD ([facility]) 2023    Mild protein-calorie malnutrition ([facility]) 2022    Dysphagia 2022    Mild aortic stenosis 10/22/2021    Mild mitral regurgitation 10/22/2021    Diastolic dysfunction 10/22/2021    Mild concentric left ventricular hypertrophy (LVH) 10/22/2021    Type 2 diabetes mellitus with retinopathy, without long-term current use of insulin ([facility]) 09/15/2021    Myasthenia gravis without exacerbation ([facility]) 09/15/2021    Mixed hyperlipidemia 09/15/2021    Retinopathy 09/15/2021    Primary open angle glaucoma (POAG) of both eyes, mild stage 09/15/2021    Ptosis of both eyelids 09/15/2021    Benign prostatic hyperplasia without lower urinary tract symptoms 09/15/2021    Essential hypertension 09/15/2021    Other age-related cataract 09/15/2021      LOS (days): 4  Geometric Mean LOS (GMLOS) (days): 3.1  Days to GMLOS:-0.7     OBJECTIVE:  Risk of Unplanned Readmission Score: 22.87         Current admission status: Inpatient   Preferred Pharmacy:   [id number]  [place] - [address]  [address] [address]  Phone: [phone] Fax: [phone]    Primary Care Provider: Giovanni Reyez DO    Primary Insurance: [employer] REP  Secondary Insurance:     DISCHARGE  DETAILS:    Discharge planning discussed with:: patient & Bianca at the bedside  Freedom of Choice: Yes  Comments - Freedom of Choice: recommendation is for rehab - pt was acceted to [facility]- family & pt's choice- auth was received- no auth needed for [facility] transport  CM contacted family/caregiver?: Yes  Were Treatment Team discharge recommendations reviewed with patient/caregiver?: Yes  Did patient/caregiver verbalize understanding of patient care needs?: Yes  Were patient/caregiver advised of the risks associated with not following Treatment Team discharge recommendations?: Yes    Contacts  Patient Contacts: Bianca rehrig  Relationship to Patient:: Family (granddaughter)  Contact Method: In Person  Reason/Outcome: Discharge Planning    Requested Home Health Care         Is the patient interested in HHC at discharge?: No    DME Referral Provided  Referral made for DME?: No    Other Referral/Resources/Interventions Provided:  Interventions: Short Term Rehab  Referral Comments: auth received- covid completed- rn was given report & fax#- rn, pt,family ,provider, &snf aware of the transport time  14:00pm [facility] no auth is needed for transport    Would you like to participate in our [facility] service program?  : No - Declined    Treatment Team Recommendation: Short Term Rehab ([facility] auth received- 14:00pm [facility])  Discharge Destination Plan:: Short Term Rehab ([facility] auth received - 14:00 [facility] no auth needed)  Transport at Discharge : [facility]           ETA of Transport (Date): 05/01/24  ETA of Transport (Time): 1400         Pt & family are in agreement with the d/c & d/c plan           Family notified:: family at the bedside       Accepting Facility Name, City & State : [facility] [address] [place] [address]  Receiving Facility/Agency Phone Number: [phone] opt#1  Facility/Agency Fax Number: [phone]

## 2024-05-01 NOTE — ASSESSMENT & PLAN NOTE
Without exacerbation  On room air with nonlabored respirations  Euvolemic on exam today  Continue furosemide on discharge

## 2024-05-01 NOTE — ASSESSMENT & PLAN NOTE
Patient has history of multiple falls with recent fall likely due to progressing Alzheimer's disease. Patient noted to have a T12 compression fracture managed with conservative treatment.  Presented again after having fall while walking.  CT head was unremarkable.  Mental status at baseline.  No other symptoms except for some back pain and ambulatory difficulty.   PT/OT documented moderate resource intensity  Patient with history of advanced dementia   Medically stable for discharge  [facility]

## 2024-05-02 ENCOUNTER — PATIENT OUTREACH (OUTPATIENT)
Dept: CASE MANAGEMENT | Facility: OTHER | Age: 84
End: 2024-05-02

## 2024-05-02 NOTE — UTILIZATION REVIEW
NOTIFICATION OF ADMISSION DISCHARGE   This is a Notification of Discharge from [facility]. Please be advised that this patient has been discharge from our facility. Below you will find the admission and discharge date and time including the patient’s disposition.   UTILIZATION REVIEW CONTACT:  Eva Brown  Utilization   Network Utilization Review Department  Phone: [phone] x carefully listen to the prompts. All voicemails are confidential.  Email: [email or web address]     ADMISSION INFORMATION  PRESENTATION DATE: 4/27/2024  1:59 PM  OBERVATION ADMISSION DATE:   INPATIENT ADMISSION DATE: 4/27/24  3:55 PM   DISCHARGE DATE: 5/1/2024  2:09 PM   DISPOSITION:Non [facility] SNF/TCU/SNU    Network Utilization Review Department  ATTENTION: Please call with any questions or concerns to [phone] and carefully listen to the prompts so that you are directed to the right person. All voicemails are confidential.   For Discharge needs, contact Care Management DC Support Team at [phone] opt. 2  Send all requests for admission clinical reviews, approved or denied determinations and any other requests to dedicated fax number below belonging to the campus where the patient is receiving treatment. List of dedicated fax numbers for the Facilities:  FACILITY NAME UR FAX NUMBER   ADMISSION DENIALS (Administrative/Medical Necessity) [phone]   DISCHARGE SUPPORT TEAM ([facility]) [phone]   PARENT CHILD HEALTH (Maternity/NICU/Pediatrics) [phone]   [facility] [phone]   General acute hospital [phone]   [facility] [phone]   [facility] [phone]   [facility] [phone]   [facility] [phone]   [facility] [phone]   [facility]  [phone]   [facility] [phone]   [facility] [phone]   [facility] [phone]   [facility] [phone]

## 2024-05-02 NOTE — PROGRESS NOTES
Outpatient Care Management DOUGIE/SNF Pathway. Discharged 5/1/24 to [facility]. Email sent to facility to inform them the patient is on the DOUGIE Pathway and I will be following them during their skilled stay.  This Admin Coordinator will continue to monitor via chart review.

## 2024-05-08 ENCOUNTER — PATIENT OUTREACH (OUTPATIENT)
Dept: CASE MANAGEMENT | Facility: OTHER | Age: 84
End: 2024-05-08

## 2024-05-10 ENCOUNTER — TELEPHONE (OUTPATIENT)
Dept: NEUROSURGERY | Facility: CLINIC | Age: 84
End: 2024-05-10

## 2024-05-10 NOTE — TELEPHONE ENCOUNTER
Called Sloane  at [phone]  [facility] and made appt for Roverto 5/16/24 145p

## 2024-05-15 ENCOUNTER — PATIENT OUTREACH (OUTPATIENT)
Dept: CASE MANAGEMENT | Facility: OTHER | Age: 84
End: 2024-05-15

## 2024-05-15 ENCOUNTER — TELEPHONE (OUTPATIENT)
Dept: NEUROSURGERY | Facility: CLINIC | Age: 84
End: 2024-05-15

## 2024-05-15 NOTE — TELEPHONE ENCOUNTER
Received a call from patient's sister in law Chiqui questioning the reason for patient's upcoming appt.     Attempted to return her call however she did not answer. Left detailed VM advising the appt is for patient's compression fracture. Provided office number should she have further questions or concerns.

## 2024-05-22 ENCOUNTER — PATIENT OUTREACH (OUTPATIENT)
Dept: CASE MANAGEMENT | Facility: OTHER | Age: 84
End: 2024-05-22

## 2024-05-28 ENCOUNTER — TRANSITIONAL CARE MANAGEMENT (OUTPATIENT)
Dept: INTERNAL MEDICINE CLINIC | Facility: CLINIC | Age: 84
End: 2024-05-28

## 2024-05-29 ENCOUNTER — PATIENT OUTREACH (OUTPATIENT)
Dept: CASE MANAGEMENT | Facility: OTHER | Age: 84
End: 2024-05-29

## 2024-05-29 DIAGNOSIS — N18.30 STAGE 3 CHRONIC KIDNEY DISEASE, UNSPECIFIED WHETHER STAGE 3A OR 3B CKD (HCC): Primary | ICD-10-CM

## 2024-05-29 NOTE — PROGRESS NOTES
Update obtained from PCC the patient discharged 5/28/24 to Home.  I have removed myself off of the care team, added the CM to the care team who will follow the patient through the episode, sent the care manager an inbasket notifying them of the DOUGIE/SNF Pathway.  Ambulatory referral placed for complex care management.  A email was sent to the facility requesting discharge instructions. When Admin Coordinator has received the Discharge paperwork  Admin Coordinator will attach to this encounter.

## 2024-05-29 NOTE — PROGRESS NOTES
"Identified for SNF/DOUGIE pathway.  Chart review completed.  Admitted after fall at home.  Chin laceration and T12 compression fracture.  Discharged from [facility] on 5/1/24 to [facility] and discharged to home 5/28/24.        Message left with sister-in-law, Chiqui with call back information.      Missed call returned to Chiqui.    Israel is doing okay, seems calmer and hs no complaint of pain.  Has back brace on.    Chiqui reports he is very weak and wobbly on his feet, so they do not weigh him.  He does seem to be eating okay.  He has no edema - he has \"chicken legs\".      Chiqui reports that [facility] reported that they would be referring Israel for home PT, but she hasn't been called with an appointment yet and she will check on this.  If there is no referral, she will call Dr. Reyez to make one.      Med review done.  Seraquel was changed to 25 mg with breakfast and lunch and 50 mg at night.      Blood sugar last night was 243.   She will be checking once a day since it is difficult to get Israel to allow  her to do these checks.      Follow up appointment with PCP scheduled for 6/5/24.  BMP ordered, Dr. Reyez notified and number given to Chiqui for mobile lab.      Chiqui states she has no further needs at this time.    "

## 2024-05-30 ENCOUNTER — TELEPHONE (OUTPATIENT)
Dept: LAB | Facility: HOSPITAL | Age: 84
End: 2024-05-30

## 2024-06-03 ENCOUNTER — APPOINTMENT (OUTPATIENT)
Dept: LAB | Facility: HOSPITAL | Age: 84
End: 2024-06-03
Attending: INTERNAL MEDICINE
Payer: COMMERCIAL

## 2024-06-03 DIAGNOSIS — E11.319 TYPE 2 DIABETES MELLITUS WITH RETINOPATHY OF BOTH EYES, WITHOUT LONG-TERM CURRENT USE OF INSULIN, MACULAR EDEMA PRESENCE UNSPECIFIED, UNSPECIFIED RETINOPATHY SEVERITY (HCC): ICD-10-CM

## 2024-06-03 DIAGNOSIS — R41.0 DELIRIUM: ICD-10-CM

## 2024-06-03 DIAGNOSIS — N18.30 STAGE 3 CHRONIC KIDNEY DISEASE, UNSPECIFIED WHETHER STAGE 3A OR 3B CKD (HCC): ICD-10-CM

## 2024-06-03 DIAGNOSIS — I10 ESSENTIAL HYPERTENSION: ICD-10-CM

## 2024-06-03 DIAGNOSIS — G30.1 MILD LATE ONSET ALZHEIMER'S DEMENTIA WITH AGITATION (HCC): ICD-10-CM

## 2024-06-03 DIAGNOSIS — F02.A11 MILD LATE ONSET ALZHEIMER'S DEMENTIA WITH AGITATION (HCC): ICD-10-CM

## 2024-06-03 DIAGNOSIS — F03.A18: ICD-10-CM

## 2024-06-03 DIAGNOSIS — E78.2 MIXED HYPERLIPIDEMIA: ICD-10-CM

## 2024-06-03 DIAGNOSIS — F03.B11 MODERATE DEMENTIA WITH AGITATION, UNSPECIFIED DEMENTIA TYPE (HCC): ICD-10-CM

## 2024-06-03 DIAGNOSIS — I50.43 ACUTE ON CHRONIC COMBINED SYSTOLIC (CONGESTIVE) AND DIASTOLIC (CONGESTIVE) HEART FAILURE (HCC): ICD-10-CM

## 2024-06-03 LAB
ALBUMIN SERPL BCP-MCNC: 4.2 G/DL (ref 3.5–5)
ALP SERPL-CCNC: 93 U/L (ref 34–104)
ALT SERPL W P-5'-P-CCNC: 18 U/L (ref 7–52)
ANION GAP SERPL CALCULATED.3IONS-SCNC: 12 MMOL/L (ref 4–13)
AST SERPL W P-5'-P-CCNC: 23 U/L (ref 13–39)
BASOPHILS # BLD AUTO: 0.05 THOUSANDS/ÂΜL (ref 0–0.1)
BASOPHILS NFR BLD AUTO: 1 % (ref 0–1)
BILIRUB SERPL-MCNC: 0.51 MG/DL (ref 0.2–1)
BUN SERPL-MCNC: 25 MG/DL (ref 5–25)
CALCIUM SERPL-MCNC: 9.8 MG/DL (ref 8.4–10.2)
CHLORIDE SERPL-SCNC: 102 MMOL/L (ref 96–108)
CHOLEST SERPL-MCNC: 156 MG/DL
CO2 SERPL-SCNC: 29 MMOL/L (ref 21–32)
CREAT SERPL-MCNC: 0.94 MG/DL (ref 0.6–1.3)
EOSINOPHIL # BLD AUTO: 0.09 THOUSAND/ÂΜL (ref 0–0.61)
EOSINOPHIL NFR BLD AUTO: 1 % (ref 0–6)
ERYTHROCYTE [DISTWIDTH] IN BLOOD BY AUTOMATED COUNT: 13.8 % (ref 11.6–15.1)
FOLATE SERPL-MCNC: 7.8 NG/ML
GFR SERPL CREATININE-BSD FRML MDRD: 75 ML/MIN/1.73SQ M
GLUCOSE P FAST SERPL-MCNC: 128 MG/DL (ref 65–99)
HCT VFR BLD AUTO: 39.4 % (ref 36.5–49.3)
HDLC SERPL-MCNC: 64 MG/DL
HGB BLD-MCNC: 12.3 G/DL (ref 12–17)
IMM GRANULOCYTES # BLD AUTO: 0.07 THOUSAND/UL (ref 0–0.2)
IMM GRANULOCYTES NFR BLD AUTO: 1 % (ref 0–2)
LDLC SERPL CALC-MCNC: 58 MG/DL (ref 0–100)
LYMPHOCYTES # BLD AUTO: 1.44 THOUSANDS/ÂΜL (ref 0.6–4.47)
LYMPHOCYTES NFR BLD AUTO: 17 % (ref 14–44)
MCH RBC QN AUTO: 30.7 PG (ref 26.8–34.3)
MCHC RBC AUTO-ENTMCNC: 31.2 G/DL (ref 31.4–37.4)
MCV RBC AUTO: 98 FL (ref 82–98)
MONOCYTES # BLD AUTO: 0.57 THOUSAND/ÂΜL (ref 0.17–1.22)
MONOCYTES NFR BLD AUTO: 7 % (ref 4–12)
NEUTROPHILS # BLD AUTO: 6.52 THOUSANDS/ÂΜL (ref 1.85–7.62)
NEUTS SEG NFR BLD AUTO: 73 % (ref 43–75)
NRBC BLD AUTO-RTO: 0 /100 WBCS
PLATELET # BLD AUTO: 211 THOUSANDS/UL (ref 149–390)
PMV BLD AUTO: 10 FL (ref 8.9–12.7)
POTASSIUM SERPL-SCNC: 4.1 MMOL/L (ref 3.5–5.3)
PROLACTIN SERPL-MCNC: 2.71 NG/ML
PROT SERPL-MCNC: 7.3 G/DL (ref 6.4–8.4)
RBC # BLD AUTO: 4.01 MILLION/UL (ref 3.88–5.62)
SODIUM SERPL-SCNC: 143 MMOL/L (ref 135–147)
TRIGL SERPL-MCNC: 170 MG/DL
TSH SERPL DL<=0.05 MIU/L-ACNC: 3.82 UIU/ML (ref 0.45–4.5)
VIT B12 SERPL-MCNC: 375 PG/ML (ref 180–914)
WBC # BLD AUTO: 8.74 THOUSAND/UL (ref 4.31–10.16)

## 2024-06-03 PROCEDURE — 36415 COLL VENOUS BLD VENIPUNCTURE: CPT

## 2024-06-03 PROCEDURE — 84443 ASSAY THYROID STIM HORMONE: CPT

## 2024-06-03 PROCEDURE — 82746 ASSAY OF FOLIC ACID SERUM: CPT

## 2024-06-03 PROCEDURE — 85025 COMPLETE CBC W/AUTO DIFF WBC: CPT

## 2024-06-03 PROCEDURE — 86780 TREPONEMA PALLIDUM: CPT

## 2024-06-03 PROCEDURE — 84146 ASSAY OF PROLACTIN: CPT

## 2024-06-03 PROCEDURE — 82607 VITAMIN B-12: CPT

## 2024-06-03 PROCEDURE — 80053 COMPREHEN METABOLIC PANEL: CPT

## 2024-06-03 PROCEDURE — 80061 LIPID PANEL: CPT

## 2024-06-04 LAB
T PALLIDUM AB SER QL IF: NON REACTIVE
TREPONEMA PALLIDUM IGG+IGM AB [PRESENCE] IN SERUM OR PLASMA BY IMMUNOASSAY: NORMAL

## 2024-06-05 ENCOUNTER — OFFICE VISIT (OUTPATIENT)
Dept: INTERNAL MEDICINE CLINIC | Facility: CLINIC | Age: 84
End: 2024-06-05
Payer: COMMERCIAL

## 2024-06-05 ENCOUNTER — HOME HEALTH ADMISSION (OUTPATIENT)
Dept: HOME HEALTH SERVICES | Facility: HOME HEALTHCARE | Age: 84
End: 2024-06-05
Payer: COMMERCIAL

## 2024-06-05 ENCOUNTER — APPOINTMENT (OUTPATIENT)
Dept: LAB | Facility: CLINIC | Age: 84
End: 2024-06-05
Payer: COMMERCIAL

## 2024-06-05 VITALS
TEMPERATURE: 96.1 F | HEART RATE: 81 BPM | SYSTOLIC BLOOD PRESSURE: 122 MMHG | DIASTOLIC BLOOD PRESSURE: 80 MMHG | OXYGEN SATURATION: 96 % | HEIGHT: 65 IN | BODY MASS INDEX: 18.78 KG/M2

## 2024-06-05 DIAGNOSIS — E11.319 TYPE 2 DIABETES MELLITUS WITH RETINOPATHY OF BOTH EYES, WITHOUT LONG-TERM CURRENT USE OF INSULIN, MACULAR EDEMA PRESENCE UNSPECIFIED, UNSPECIFIED RETINOPATHY SEVERITY (HCC): ICD-10-CM

## 2024-06-05 DIAGNOSIS — R45.4 ANGER: ICD-10-CM

## 2024-06-05 DIAGNOSIS — G30.1 MILD LATE ONSET ALZHEIMER'S DEMENTIA WITH AGITATION (HCC): ICD-10-CM

## 2024-06-05 DIAGNOSIS — F02.A11 MILD LATE ONSET ALZHEIMER'S DEMENTIA WITH AGITATION (HCC): Primary | ICD-10-CM

## 2024-06-05 DIAGNOSIS — S22.080D COMPRESSION FRACTURE OF T12 VERTEBRA WITH ROUTINE HEALING, SUBSEQUENT ENCOUNTER: ICD-10-CM

## 2024-06-05 DIAGNOSIS — G30.1 MILD LATE ONSET ALZHEIMER'S DEMENTIA WITH AGITATION (HCC): Primary | ICD-10-CM
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
## 2024-06-05 DIAGNOSIS — R26.9 GAIT ABNORMALITY: ICD-10-CM

## 2024-06-05 DIAGNOSIS — R53.81 PHYSICAL DECONDITIONING: ICD-10-CM

## 2024-06-05 DIAGNOSIS — F02.A11 MILD LATE ONSET ALZHEIMER'S DEMENTIA WITH AGITATION (HCC): ICD-10-CM

## 2024-06-05 DIAGNOSIS — G47.9 SLEEP DISTURBANCE: ICD-10-CM

## 2024-06-05 DIAGNOSIS — R45.1 AGITATION: ICD-10-CM

## 2024-06-05 LAB
BACTERIA UR QL AUTO: NORMAL /HPF
BILIRUB UR QL STRIP: NEGATIVE
CLARITY UR: CLEAR
COLOR UR: ABNORMAL
CREAT UR-MCNC: 36.9 MG/DL
GLUCOSE UR STRIP-MCNC: ABNORMAL MG/DL
HGB UR QL STRIP.AUTO: NEGATIVE
KETONES UR STRIP-MCNC: NEGATIVE MG/DL
LEUKOCYTE ESTERASE UR QL STRIP: ABNORMAL
MICROALBUMIN UR-MCNC: <7 MG/L
MICROALBUMIN/CREAT 24H UR: <19 MG/G CREATININE (ref 0–30)
NITRITE UR QL STRIP: NEGATIVE
NON-SQ EPI CELLS URNS QL MICRO: NORMAL /HPF
PH UR STRIP.AUTO: 6 [PH]
PROT UR STRIP-MCNC: NEGATIVE MG/DL
RBC #/AREA URNS AUTO: NORMAL /HPF
SP GR UR STRIP.AUTO: 1.02 (ref 1–1.03)
UROBILINOGEN UR STRIP-ACNC: <2 MG/DL
WBC #/AREA URNS AUTO: NORMAL /HPF

## 2024-06-05 PROCEDURE — 81001 URINALYSIS AUTO W/SCOPE: CPT

## 2024-06-05 PROCEDURE — 82570 ASSAY OF URINE CREATININE: CPT

## 2024-06-05 PROCEDURE — 99496 TRANSJ CARE MGMT HIGH F2F 7D: CPT | Performed by: INTERNAL MEDICINE

## 2024-06-05 PROCEDURE — 82043 UR ALBUMIN QUANTITATIVE: CPT

## 2024-06-05 RX ORDER — QUETIAPINE FUMARATE 50 MG/1
50 TABLET, FILM COATED ORAL
Qty: 90 TABLET | Refills: 1 | Status: SHIPPED | OUTPATIENT
Start: 2024-06-05

## 2024-06-05 RX ORDER — QUETIAPINE FUMARATE 50 MG/1
50 TABLET, FILM COATED ORAL
COMMUNITY
End: 2024-06-05 | Stop reason: SDUPTHER

## 2024-06-05 RX ORDER — QUETIAPINE FUMARATE 50 MG/1
50 TABLET, FILM COATED ORAL
Qty: 90 TABLET | Refills: 0 | Status: SHIPPED | OUTPATIENT
Start: 2024-06-05

## 2024-06-05 RX ORDER — INSULIN GLARGINE 100 [IU]/ML
10 INJECTION, SOLUTION SUBCUTANEOUS
Qty: 10 ML | Refills: 0 | Status: SHIPPED | OUTPATIENT
Start: 2024-06-05

## 2024-06-05 RX ORDER — INSULIN GLARGINE 100 [IU]/ML
10 INJECTION, SOLUTION SUBCUTANEOUS
COMMUNITY
End: 2024-06-05 | Stop reason: SDUPTHER

## 2024-06-05 NOTE — PROGRESS NOTES
Transition of Care Visit  Name: Israel Hughes      : 1940      MRN: [id number]  Encounter Provider: Giovanni Reyez DO  Encounter Date: 2024   Encounter department: [facility]    Assessment & Plan   1. Fall, subsequent encounter  -     insulin glargine (LANTUS) 100 units/mL subcutaneous injection; Inject 10 Units under the skin daily at bedtime  -     Referral to [facility]; Future  -     Needles & Syringes MISC; Use in the morning  2. 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 Compression fracture of T12 vertebra with routine healing, subsequent encounter  -     insulin glargine (LANTUS) 100 units/mL subcutaneous injection; Inject 10 Units under the skin daily at bedtime  -     Referral to [facility]; Future  -     Needles & Syringes MISC; Use in the morning  4. Physical deconditioning  -     insulin glargine (LANTUS) 100 units/mL subcutaneous injection; Inject 10 Units under the skin daily at bedtime  -     Referral to [facility]; Future  -     Needles & Syringes MISC; Use in the morning  5. Gait abnormality  -     insulin glargine (LANTUS) 100 units/mL subcutaneous injection; Inject 10 Units under the skin daily at bedtime  -     Referral to [facility]; Future  -     Needles & Syringes MISC; Use in the morning  6. Type 2 diabetes mellitus with retinopathy of both eyes, without long-term current use of insulin, macular edema presence unspecified, unspecified retinopathy severity (HCC)  -     insulin glargine (LANTUS) 100 units/mL subcutaneous injection; Inject 10 Units under the  skin daily at bedtime  -     Referral to [facility]; Future  -     Needles & Syringes MISC; Use in the morning     A/P; Stable and improving, but slowly. Needs more PT and will get [facility] involved. Will get  involved as well for DME etc. Sugars now good per caretaker and tolerating the insulin. Recent labs ok. Continue current treatment and RTC one month for routine.     History of Present Illness     Transitional Care Management Review:   Israel Hughes is a 83 y.o. male here for TCM follow up.     During the TCM phone call patient stated:  TCM Call       Date and time call was made  5/28/2024  1:58 PM    Hospital care reviewed  Records reviewed    Patient was hospitialized at  Other (comment)    Comment  [facility]    Date of Admission  05/01/24    Date of discharge  05/28/24    Diagnosis  Gait dysfunction    Disposition  Home; Home health services    Were the patients medications reviewed and updated  Yes    Current Symptoms  None          TCM Call       Post hospital issues  None    Should patient be enrolled in anticoag monitoring?  No    Scheduled for follow up?  Yes    Did you obtain your prescribed medications  Yes    Do you need help managing your prescriptions or medications  No    Is transportation to your appointment needed  No    I have advised the patient to call PCP with any new or worsening symptoms  Alee Ann MA          WM presents for f/u brief admission followed by acute rehab admission for fall. Pt without LOC and mechanical fall noted. Dx with t12 vertebral fx. Treated conservatively and felt to be a candidate for rehab. Admitted to [facility]. Did ok and d/c'd to home. Admission uncomplicated except for elevated sugars and pt now on insulin. Since d/c one week ago, doing ok. NO fever or chills. Mental status at baseline and no agitation. Appetite is fair. Pain improving slowly. Intermittently wearing his back brace. Not very active and mostly now  "wheelchair bound. No changes in bowel or bladder habits. Back on his meds. Labs recently repeated and good.       Review of Systems   Constitutional:  Positive for activity change. Negative for appetite change, chills, diaphoresis, fatigue and fever.   HENT: Negative.     Eyes:  Negative for visual disturbance.   Respiratory:  Negative for cough, chest tightness, shortness of breath and wheezing.    Cardiovascular:  Negative for chest pain, palpitations and leg swelling.   Gastrointestinal:  Negative for abdominal pain, constipation, diarrhea, nausea and vomiting.   Endocrine: Negative for cold intolerance and heat intolerance.   Genitourinary:  Negative for difficulty urinating, dysuria and frequency.   Musculoskeletal:  Positive for back pain and gait problem. Negative for arthralgias and myalgias.   Neurological:  Positive for weakness. Negative for dizziness, seizures, syncope, light-headedness and headaches.   Psychiatric/Behavioral:  Positive for confusion. Negative for agitation, behavioral problems, dysphoric mood and sleep disturbance. The patient is not nervous/anxious.      Objective     /80 (BP Location: Left arm, Patient Position: Sitting)   Pulse 81   Temp (!) 96.1 °F (35.6 °C) (Tympanic)   Ht 5' 5\" (1.651 m)   SpO2 96%   BMI 18.78 kg/m²     Physical Exam  Vitals and nursing note reviewed.   Constitutional:       General: He is not in acute distress.     Appearance: Normal appearance. He is not ill-appearing.   HENT:      Head: Normocephalic and atraumatic.      Mouth/Throat:      Mouth: Mucous membranes are moist.   Eyes:      Extraocular Movements: Extraocular movements intact.      Conjunctiva/sclera: Conjunctivae normal.      Pupils: Pupils are equal, round, and reactive to light.   Cardiovascular:      Rate and Rhythm: Normal rate and regular rhythm.      Heart sounds: Normal heart sounds. No murmur heard.  Pulmonary:      Effort: Pulmonary effort is normal. No respiratory distress.     "  Breath sounds: Normal breath sounds. No wheezing, rhonchi or rales.   Abdominal:      General: Bowel sounds are normal. There is no distension.      Palpations: Abdomen is soft.      Tenderness: There is no abdominal tenderness.   Musculoskeletal:         General: Tenderness and signs of injury present.      Right lower leg: No edema.      Left lower leg: No edema.      Comments: Back brace in place. Difficulty getting out of the wheelchair.    Neurological:      General: No focal deficit present.      Mental Status: He is alert and oriented to person, place, and time. Mental status is at baseline.      Cranial Nerves: Cranial nerve deficit present.      Motor: Weakness present.      Gait: Gait abnormal.   Psychiatric:         Behavior: Behavior normal.         Thought Content: Thought content normal.       Medications have been reviewed by provider in current encounter    Administrative Statements

## 2024-06-06 ENCOUNTER — HOSPITAL ENCOUNTER (EMERGENCY)
Facility: HOSPITAL | Age: 84
Discharge: HOME | End: 2024-06-06
Attending: FAMILY MEDICINE | Admitting: FAMILY MEDICINE
Payer: COMMERCIAL

## 2024-06-06 ENCOUNTER — HOME CARE VISIT (OUTPATIENT)
Dept: HOME HEALTH SERVICES | Facility: HOME HEALTHCARE | Age: 84
End: 2024-06-06
Payer: COMMERCIAL

## 2024-06-06 ENCOUNTER — TELEPHONE (OUTPATIENT)
Dept: INTERNAL MEDICINE CLINIC | Facility: CLINIC | Age: 84
End: 2024-06-06

## 2024-06-06 ENCOUNTER — HOME CARE VISIT (OUTPATIENT)
Dept: HOME HEALTH SERVICES | Facility: HOME HEALTHCARE | Age: 84
End: 2024-06-06

## 2024-06-06 VITALS
OXYGEN SATURATION: 94 % | DIASTOLIC BLOOD PRESSURE: 56 MMHG | HEART RATE: 70 BPM | SYSTOLIC BLOOD PRESSURE: 102 MMHG | TEMPERATURE: 97.3 F | RESPIRATION RATE: 18 BRPM

## 2024-06-06 DIAGNOSIS — E83.42 HYPOMAGNESEMIA: Primary | ICD-10-CM

## 2024-06-06 LAB
ANION GAP SERPL CALCULATED.3IONS-SCNC: 13 MMOL/L (ref 4–13)
BACTERIA UR QL AUTO: ABNORMAL /HPF
BASOPHILS # BLD AUTO: 0.03 THOUSANDS/ÂΜL (ref 0–0.1)
BASOPHILS NFR BLD AUTO: 0 % (ref 0–1)
BILIRUB UR QL STRIP: NEGATIVE
BUN SERPL-MCNC: 30 MG/DL (ref 5–25)
CALCIUM SERPL-MCNC: 9.9 MG/DL (ref 8.4–10.2)
CHLORIDE SERPL-SCNC: 101 MMOL/L (ref 96–108)
CLARITY UR: CLEAR
CO2 SERPL-SCNC: 26 MMOL/L (ref 21–32)
COLOR UR: ABNORMAL
CREAT SERPL-MCNC: 1.3 MG/DL (ref 0.6–1.3)
EOSINOPHIL # BLD AUTO: 0.09 THOUSAND/ÂΜL (ref 0–0.61)
EOSINOPHIL NFR BLD AUTO: 1 % (ref 0–6)
ERYTHROCYTE [DISTWIDTH] IN BLOOD BY AUTOMATED COUNT: 13.2 % (ref 11.6–15.1)
GFR SERPL CREATININE-BSD FRML MDRD: 50 ML/MIN/1.73SQ M
GLUCOSE SERPL-MCNC: 334 MG/DL (ref 65–140)
GLUCOSE UR STRIP-MCNC: ABNORMAL MG/DL
HCT VFR BLD AUTO: 37.7 % (ref 36.5–49.3)
HGB BLD-MCNC: 12.2 G/DL (ref 12–17)
HGB UR QL STRIP.AUTO: ABNORMAL
IMM GRANULOCYTES # BLD AUTO: 0.06 THOUSAND/UL (ref 0–0.2)
IMM GRANULOCYTES NFR BLD AUTO: 1 % (ref 0–2)
KETONES UR STRIP-MCNC: NEGATIVE MG/DL
LEUKOCYTE ESTERASE UR QL STRIP: NEGATIVE
LYMPHOCYTES # BLD AUTO: 1.13 THOUSANDS/ÂΜL (ref 0.6–4.47)
LYMPHOCYTES NFR BLD AUTO: 14 % (ref 14–44)
MAGNESIUM SERPL-MCNC: 1.7 MG/DL (ref 1.9–2.7)
MCH RBC QN AUTO: 31.1 PG (ref 26.8–34.3)
MCHC RBC AUTO-ENTMCNC: 32.4 G/DL (ref 31.4–37.4)
MCV RBC AUTO: 96 FL (ref 82–98)
MONOCYTES # BLD AUTO: 0.46 THOUSAND/ÂΜL (ref 0.17–1.22)
MONOCYTES NFR BLD AUTO: 6 % (ref 4–12)
NEUTROPHILS # BLD AUTO: 6.39 THOUSANDS/ÂΜL (ref 1.85–7.62)
NEUTS SEG NFR BLD AUTO: 78 % (ref 43–75)
NITRITE UR QL STRIP: NEGATIVE
NON-SQ EPI CELLS URNS QL MICRO: ABNORMAL /HPF
NRBC BLD AUTO-RTO: 0 /100 WBCS
PH UR STRIP.AUTO: 6 [PH]
PLATELET # BLD AUTO: 205 THOUSANDS/UL (ref 149–390)
PMV BLD AUTO: 9.6 FL (ref 8.9–12.7)
POTASSIUM SERPL-SCNC: 4.3 MMOL/L (ref 3.5–5.3)
PROT UR STRIP-MCNC: NEGATIVE MG/DL
RBC # BLD AUTO: 3.92 MILLION/UL (ref 3.88–5.62)
RBC #/AREA URNS AUTO: ABNORMAL /HPF
SODIUM SERPL-SCNC: 140 MMOL/L (ref 135–147)
SP GR UR STRIP.AUTO: 1.01
UROBILINOGEN UR QL STRIP.AUTO: 0.2 E.U./DL
WBC # BLD AUTO: 8.16 THOUSAND/UL (ref 4.31–10.16)
WBC #/AREA URNS AUTO: ABNORMAL /HPF

## 2024-06-06 PROCEDURE — 80048 BASIC METABOLIC PNL TOTAL CA: CPT | Performed by: FAMILY MEDICINE

## 2024-06-06 PROCEDURE — 81003 URINALYSIS AUTO W/O SCOPE: CPT | Performed by: FAMILY MEDICINE

## 2024-06-06 PROCEDURE — 400013 VN SOC

## 2024-06-06 PROCEDURE — 96361 HYDRATE IV INFUSION ADD-ON: CPT

## 2024-06-06 PROCEDURE — 81001 URINALYSIS AUTO W/SCOPE: CPT | Performed by: FAMILY MEDICINE

## 2024-06-06 PROCEDURE — 99284 EMERGENCY DEPT VISIT MOD MDM: CPT | Performed by: FAMILY MEDICINE

## 2024-06-06 PROCEDURE — 96365 THER/PROPH/DIAG IV INF INIT: CPT

## 2024-06-06 PROCEDURE — 85025 COMPLETE CBC W/AUTO DIFF WBC: CPT | Performed by: FAMILY MEDICINE

## 2024-06-06 PROCEDURE — 99284 EMERGENCY DEPT VISIT MOD MDM: CPT

## 2024-06-06 PROCEDURE — 83735 ASSAY OF MAGNESIUM: CPT | Performed by: FAMILY MEDICINE

## 2024-06-06 PROCEDURE — 36415 COLL VENOUS BLD VENIPUNCTURE: CPT | Performed by: FAMILY MEDICINE

## 2024-06-06 PROCEDURE — G0299 HHS/HOSPICE OF RN EA 15 MIN: HCPCS

## 2024-06-06 RX ORDER — MAGNESIUM SULFATE 1 G/100ML
1 INJECTION INTRAVENOUS ONCE
Status: COMPLETED | OUTPATIENT
Start: 2024-06-06 | End: 2024-06-06

## 2024-06-06 RX ADMIN — MAGNESIUM SULFATE HEPTAHYDRATE 1 G: 1 INJECTION, SOLUTION INTRAVENOUS at 11:47

## 2024-06-06 RX ADMIN — SODIUM CHLORIDE 1000 ML: 0.9 INJECTION, SOLUTION INTRAVENOUS at 11:08

## 2024-06-06 NOTE — ED NOTES
Pt's granddaughter to the bedside to offer more history and insight into current concerns. Pt tells ED MD that he is leaning to the side because it feels better. Pt's granddaughter reports pt has diagnosed dementia.      Erinn Easton RN  06/06/24 [id number]

## 2024-06-06 NOTE — ED PROVIDER NOTES
History  Chief Complaint   Patient presents with    Medical Problem     Pts presents from home with concern for stroke. Home health aid notes pt is leaning to the left and it started approx 24hrs ago.        Medical Problem  Associated symptoms: no abdominal pain, no chest pain, no cough, no diarrhea, no fever, no headaches, no myalgias, no nausea, no rash, no rhinorrhea, no shortness of breath, no sore throat and no vomiting      This Is a 83-year-old male presented to ED with complaint of leaning to the left that started yesterday.  Patient seen by his PCP yesterday and there was no concern reported at that time.  Grandma daughter is at bedside stated that she feels that today he is having difficulty getting out of bed and started to lean on the left side.  She stated that he does have a home PT which has not started patient was seen examined at bedside awake alert oriented x 3 GCS 15.  Patient is able to move all extremities without difficulty which was seen by granddaughter stated that he was really doing that at home.  Prior to Admission Medications   Prescriptions Last Dose Informant Patient Reported? Taking?   Blood Glucose Monitoring Suppl (Accu-Chek Guide Me) w/Device KIT   No No   Sig: Use 1 kit in the morning   Cholecalciferol (D 1000) 25 MCG (1000 UT) capsule   No No   Sig: Take 1 capsule (1,000 Units total) by mouth daily   Empagliflozin (JARDIANCE) 10 MG TABS tablet   No No   Sig: Take 1 tablet (10 mg total) by mouth daily   FLUoxetine (PROzac) 20 mg capsule   No No   Sig: Take 1 capsule (20 mg total) by mouth every morning   Lancets (accu-chek multiclix) lancets   No No   Sig: USE   TO CHECK GLUCOSE IN THE MORNING AS DIRECTED   Needles & Syringes MISC   No No   Sig: Use in the morning   Omega-3 Fatty Acids (fish oil) 1,000 mg   No No   Sig: Take 1 capsule (1,000 mg total) by mouth daily   OneTouch Delica Lancets 33G MISC   No No   Sig: Test once a day   QUEtiapine (SEROquel) 25 mg tablet  Self No No    Sig: Take 1 tablet (25 mg total) by mouth in the morning   Patient taking differently: Take 25 mg by mouth 2 (two) times a day before breakfast and lunch 1 at breakfast and 1 at lunch   QUEtiapine (SEROquel) 50 mg tablet   No No   Sig: Take 1 tablet (50 mg total) by mouth daily at bedtime   QUEtiapine (SEROquel) 50 mg tablet   No No   Sig: Take 1 tablet (50 mg total) by mouth daily at bedtime   acetaminophen (TYLENOL) 325 mg tablet   No No   Sig: Take 2 tablets (650 mg total) by mouth every 6 (six) hours as needed for mild pain   aspirin (ECOTRIN LOW STRENGTH) 81 mg EC tablet   Yes No   Sig: Take 81 mg by mouth   atorvastatin (LIPITOR) 20 mg tablet   No No   Sig: Take 1 tablet (20 mg total) by mouth daily   donepezil (ARICEPT) 5 mg tablet   No No   Sig: Take 2 tablets (10 mg total) by mouth daily at bedtime   furosemide (LASIX) 40 mg tablet   No No   Sig: Take 1 tablet (40 mg total) by mouth daily   glucose blood test strip   No No   Sig: Use 1 each in the morning Use as instructed   insulin glargine (LANTUS) 100 units/mL subcutaneous injection   No No   Sig: Inject 10 Units under the skin daily at bedtime   memantine (Namenda) 10 mg tablet   No No   Sig: Take 1 tablet (10 mg total) by mouth 2 (two) times a day   metFORMIN (GLUCOPHAGE) 1000 MG tablet   No No   Sig: Take 1 tablet (1,000 mg total) by mouth 2 (two) times a day with meals   tamsulosin (FLOMAX) 0.4 mg   No No   Sig: Take 1 capsule (0.4 mg total) by mouth in the morning      Facility-Administered Medications: None       Past Medical History:   Diagnosis Date    Diabetes mellitus (HCC)     Hyperlipidemia        Past Surgical History:   Procedure Laterality Date    CATARACT EXTRACTION      COLONOSCOPY      DENTAL SURGERY      EYE SURGERY      TONSILLECTOMY      VASECTOMY         History reviewed. No pertinent family history.  I have reviewed and agree with the history as documented.    E-Cigarette/Vaping    E-Cigarette Use Never User       E-Cigarette/Vaping Substances    Nicotine No     THC No     CBD No     Flavoring No     Other No     Unknown No      Social History     Tobacco Use    Smoking status: Never     Passive exposure: Never    Smokeless tobacco: Never   Vaping Use    Vaping status: Never Used   Substance Use Topics    Alcohol use: Not Currently    Drug use: Never       Review of Systems   Constitutional:  Negative for chills and fever.   HENT:  Negative for rhinorrhea and sore throat.    Eyes:  Negative for visual disturbance.   Respiratory:  Negative for cough and shortness of breath.    Cardiovascular:  Negative for chest pain and leg swelling.   Gastrointestinal:  Negative for abdominal pain, diarrhea, nausea and vomiting.   Genitourinary:  Negative for dysuria.   Musculoskeletal:  Negative for back pain and myalgias.   Skin:  Negative for rash.   Neurological:  Negative for dizziness and headaches.   Psychiatric/Behavioral:  Negative for confusion.    All other systems reviewed and are negative.      Physical Exam  Physical Exam  Vitals and nursing note reviewed.   Constitutional:       General: He is not in acute distress.     Appearance: He is well-developed. He is not diaphoretic.   HENT:      Head: Normocephalic and atraumatic.      Right Ear: External ear normal.      Left Ear: External ear normal.      Nose: Nose normal.      Mouth/Throat:      Mouth: Mucous membranes are moist.      Pharynx: Oropharynx is clear. No posterior oropharyngeal erythema.   Eyes:      Conjunctiva/sclera: Conjunctivae normal.      Pupils: Pupils are equal, round, and reactive to light.   Cardiovascular:      Rate and Rhythm: Normal rate and regular rhythm.      Pulses: Normal pulses.      Heart sounds: Normal heart sounds.   Pulmonary:      Effort: Pulmonary effort is normal. No respiratory distress.      Breath sounds: Normal breath sounds. No wheezing.   Abdominal:      General: Bowel sounds are normal. There is no distension.      Palpations:  Abdomen is soft.      Tenderness: There is no abdominal tenderness.   Musculoskeletal:         General: No swelling or tenderness. Normal range of motion.      Cervical back: Normal range of motion and neck supple.   Lymphadenopathy:      Cervical: No cervical adenopathy.   Skin:     General: Skin is warm and dry.      Capillary Refill: Capillary refill takes less than 2 seconds.   Neurological:      Mental Status: He is alert and oriented to person, place, and time.   Psychiatric:         Mood and Affect: Mood normal.         Behavior: Behavior normal.         Vital Signs  ED Triage Vitals [06/06/24 1047]   Temperature Pulse Respirations Blood Pressure SpO2   (!) 97.3 °F (36.3 °C) 85 18 109/63 96 %      Temp Source Heart Rate Source Patient Position - Orthostatic VS BP Location FiO2 (%)   Temporal Monitor -- Left arm --      Pain Score       No Pain           Vitals:    06/06/24 1047 06/06/24 1200   BP: 109/63 102/56   Pulse: 85 70         Visual Acuity      ED Medications  Medications   sodium chloride 0.9 % bolus 1,000 mL (1,000 mL Intravenous New Bag 6/6/24 1108)   magnesium sulfate IVPB (premix) SOLN 1 g (1 g Intravenous New Bag 6/6/24 1147)       Diagnostic Studies  Results Reviewed       Procedure Component Value Units Date/Time    Urine Microscopic [[id number]]  (Abnormal) Collected: 06/06/24 1106    Lab Status: Final result Specimen: Urine, Straight Cath Updated: 06/06/24 1133     RBC, UA 4-10 /hpf      WBC, UA 1-2 /hpf      Epithelial Cells Occasional /hpf      Bacteria, UA None Seen /hpf     Basic metabolic panel [[id number]]  (Abnormal) Collected: 06/06/24 1059    Lab Status: Final result Specimen: Blood from Arm, Left Updated: 06/06/24 1129     Sodium 140 mmol/L      Potassium 4.3 mmol/L      Chloride 101 mmol/L      CO2 26 mmol/L      ANION GAP 13 mmol/L      BUN 30 mg/dL      Creatinine 1.30 mg/dL      Glucose 334 mg/dL      Calcium 9.9 mg/dL      eGFR 50 ml/min/1.73sq m     Narrative:      National  Kidney Disease Foundation guidelines for Chronic Kidney Disease (CKD):     Stage 1 with normal or high GFR (GFR > 90 mL/min/1.73 square meters)    Stage 2 Mild CKD (GFR = 60-89 mL/min/1.73 square meters)    Stage 3A Moderate CKD (GFR = 45-59 mL/min/1.73 square meters)    Stage 3B Moderate CKD (GFR = 30-44 mL/min/1.73 square meters)    Stage 4 Severe CKD (GFR = 15-29 mL/min/1.73 square meters)    Stage 5 End Stage CKD (GFR <15 mL/min/1.73 square meters)  Note: GFR calculation is accurate only with a steady state creatinine    Magnesium [[id number]]  (Abnormal) Collected: 06/06/24 1059    Lab Status: Final result Specimen: Blood from Arm, Left Updated: 06/06/24 1129     Magnesium 1.7 mg/dL     UA w Reflex to Microscopic w Reflex to Culture [[id number]]  (Abnormal) Collected: 06/06/24 1106    Lab Status: Final result Specimen: Urine, Straight Cath Updated: 06/06/24 1122     Color, UA Straw     Clarity, UA Clear     Specific Gravity, UA 1.010     pH, UA 6.0     Leukocytes, UA Negative     Nitrite, UA Negative     Protein, UA Negative mg/dl      Glucose, UA 3+ mg/dl      Ketones, UA Negative mg/dl      Urobilinogen, UA 0.2 E.U./dl      Bilirubin, UA Negative     Occult Blood, UA 1+    CBC and differential [[id number]]  (Abnormal) Collected: 06/06/24 1059    Lab Status: Final result Specimen: Blood from Arm, Left Updated: 06/06/24 1114     WBC 8.16 Thousand/uL      RBC 3.92 Million/uL      Hemoglobin 12.2 g/dL      Hematocrit 37.7 %      MCV 96 fL      MCH 31.1 pg      MCHC 32.4 g/dL      RDW 13.2 %      MPV 9.6 fL      Platelets 205 Thousands/uL      nRBC 0 /100 WBCs      Segmented % 78 %      Immature Grans % 1 %      Lymphocytes % 14 %      Monocytes % 6 %      Eosinophils Relative 1 %      Basophils Relative 0 %      Absolute Neutrophils 6.39 Thousands/µL      Absolute Immature Grans 0.06 Thousand/uL      Absolute Lymphocytes 1.13 Thousands/µL      Absolute Monocytes 0.46 Thousand/µL      Eosinophils Absolute 0.09  Thousand/µL      Basophils Absolute 0.03 Thousands/µL                    No orders to display              Procedures  Procedures         ED Course                                             Medical Decision Making  This Is a 83-year-old male presented to ED with complaint of leaning to the left that started yesterday.  Patient seen by his PCP yesterday and there was no concern reported at that time.  Grandma daughter is at bedside stated that she feels that today he is having difficulty getting out of bed and started to lean on the left side.  She stated that he does have a home PT which has not started patient was seen examined at bedside awake alert oriented x 3 GCS 15.  Patient is able to move all extremities without difficulty which was seen by granddaughter stated that he was really doing that at home.  Patient does not offer any complaint.  History taken from patient and EMS and granddaughter was at bedside  Differential diagnoses include amatory dysfunction secondary to T12 fracture/lecture abnormality/UTI  Plan will obtain labs CBC BMP magnesium.  Will give patient IV fluid will obtain UA will continue to observe.  At this time we will hold off any imaging patient is awake alert and I stroke scale is 08 patient is able to move all extremity without difficulty states that he is leaning on left side because he feels more comfortable but able to stay in the middle.  Labs reviewed within normal limits magnesium slightly low which we will replace.  Discussed with the granddaughter patient is stable for discharge  Disposition discharge home with the strict precautions return to the notice any worsening symptoms I do recommend starting physical therapy soon as possible encouraging patient to get out of bed and taking steps.    Amount and/or Complexity of Data Reviewed  Labs: ordered.    Risk  Prescription drug management.             Disposition  Final diagnoses:   Hypomagnesemia     Time reflects when diagnosis  was documented in both MDM as applicable and the Disposition within this note       Time User Action Codes Description Comment    6/6/2024 11:43 AM Micky Olsen Add [E83.42] Hypomagnesemia           ED Disposition       ED Disposition   Discharge    Condition   Stable    Date/Time   Thu Jun 6, 2024 11:43 AM    Comment   Israel Hughes discharge to home/self care.                   Follow-up Information       Follow up With Specialties Details Why Contact Florin Reyez DO Internal Medicine Call today  [address] [place] [address]  [address]  [place] [id number] [phone]              Current Discharge Medication List        CONTINUE these medications which have NOT CHANGED    Details   acetaminophen (TYLENOL) 325 mg tablet Take 2 tablets (650 mg total) by mouth every 6 (six) hours as needed for mild pain    Associated Diagnoses: Compression fracture of T12 vertebra, initial encounter ([facility])      aspirin (ECOTRIN LOW STRENGTH) 81 mg EC tablet Take 81 mg by mouth      atorvastatin (LIPITOR) 20 mg tablet Take 1 tablet (20 mg total) by mouth daily  Qty: 90 tablet, Refills: 1    Associated Diagnoses: Type 2 diabetes mellitus with retinopathy of both eyes, without long-term current use of insulin, macular edema presence unspecified, unspecified retinopathy severity ([facility]); Mixed hyperlipidemia      Blood Glucose Monitoring Suppl (Accu-Chek Guide Me) w/Device KIT Use 1 kit in the morning  Qty: 1 kit, Refills: 0    Associated Diagnoses: Type 2 diabetes mellitus with retinopathy of both eyes, without long-term current use of insulin, macular edema presence unspecified, unspecified retinopathy severity (HCC)      Cholecalciferol (D 1000) 25 MCG (1000 UT) capsule Take 1 capsule (1,000 Units total) by mouth daily  Qty: 90 capsule, Refills: 1    Associated Diagnoses: Vitamin D deficiency      donepezil (ARICEPT) 5 mg tablet Take 2 tablets (10 mg total) by mouth daily at bedtime  Qty: 90 tablet, Refills: 0    Associated  Diagnoses: Memory difficulty      Empagliflozin (JARDIANCE) 10 MG TABS tablet Take 1 tablet (10 mg total) by mouth daily  Qty: 90 tablet, Refills: 3    Associated Diagnoses: Type 2 diabetes mellitus with retinopathy of both eyes, without long-term current use of insulin, macular edema presence unspecified, unspecified retinopathy severity (HCC)      FLUoxetine (PROzac) 20 mg capsule Take 1 capsule (20 mg total) by mouth every morning  Qty: 90 capsule, Refills: 1    Associated Diagnoses: Agitation      furosemide (LASIX) 40 mg tablet Take 1 tablet (40 mg total) by mouth daily  Qty: 90 tablet, Refills: 1    Associated Diagnoses: CHF exacerbation (HCC)      glucose blood test strip Use 1 each in the morning Use as instructed  Qty: 100 strip, Refills: 1    Associated Diagnoses: Type 2 diabetes mellitus with retinopathy of both eyes, without long-term current use of insulin, macular edema presence unspecified, unspecified retinopathy severity (HCC)      insulin glargine (LANTUS) 100 units/mL subcutaneous injection Inject 10 Units under the skin daily at bedtime  Qty: 10 mL, Refills: 0    Associated Diagnoses: 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)      metFORMIN (GLUCOPHAGE) 1000 MG tablet Take 1 tablet (1,000 mg total) by mouth 2 (two) times a day with meals  Qty: 180 tablet, Refills: 1    Associated Diagnoses: Type 2 diabetes mellitus with retinopathy of both eyes, without long-term current use of insulin, macular edema presence unspecified, unspecified retinopathy severity (HCC)      Needles & Syringes MISC Use in the morning  Qty: 100 each, Refills: 3    Associated Diagnoses: 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 OneTouch Delica Lancets 33G MISC Test once a day  Qty: 100 each, Refills: 5    Associated Diagnoses: Type 2 diabetes mellitus with retinopathy of both eyes, without long-term current use of insulin, macular edema presence unspecified, unspecified retinopathy severity (HCC)      !! 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 - Potential duplicate medications found. Please discuss with provider.          No discharge procedures on file.    PDMP Review       None            ED Provider  Electronically Signed by             Micky Olsen MD  06/06/24 3538

## 2024-06-07 ENCOUNTER — HOME CARE VISIT (OUTPATIENT)
Dept: HOME HEALTH SERVICES | Facility: HOME HEALTHCARE | Age: 84
End: 2024-06-07
Payer: COMMERCIAL

## 2024-06-07 ENCOUNTER — PATIENT OUTREACH (OUTPATIENT)
Dept: CASE MANAGEMENT | Facility: OTHER | Age: 84
End: 2024-06-07

## 2024-06-07 VITALS
RESPIRATION RATE: 16 BRPM | SYSTOLIC BLOOD PRESSURE: 118 MMHG | TEMPERATURE: 97.6 F | HEART RATE: 74 BPM | DIASTOLIC BLOOD PRESSURE: 68 MMHG | OXYGEN SATURATION: 94 %

## 2024-06-07 PROCEDURE — G0151 HHCP-SERV OF PT,EA 15 MIN: HCPCS

## 2024-06-07 NOTE — PROGRESS NOTES
"Spoke with Chiqui who said that Israel is doing \"pretty good today, getting around\".  They took him to the ER yesterday because he was leaning to the left and couldn't step on his leg,where was evaluated.  He was  was negative for stroke, but found to have a low magnesium level and given IV magnesium sulfate.  Chiqui said that no new medications were ordered, but she was given an information sheet with magnesium rich foods.  She states Israel has a good appetite.  He has  a referral to [facility] for PT/OT.  They are scheduled for PT visit today.      Israel is now sixth on the list for placement at [facility] in [place].      Chiqui denies any further needs, says they are doing okay for now.    "

## 2024-06-08 VITALS — SYSTOLIC BLOOD PRESSURE: 92 MMHG | HEART RATE: 78 BPM | OXYGEN SATURATION: 94 % | DIASTOLIC BLOOD PRESSURE: 50 MMHG

## 2024-06-09 ENCOUNTER — APPOINTMENT (EMERGENCY)
Dept: CT IMAGING | Facility: HOSPITAL | Age: 84
End: 2024-06-09
Payer: COMMERCIAL

## 2024-06-09 ENCOUNTER — APPOINTMENT (EMERGENCY)
Dept: RADIOLOGY | Facility: HOSPITAL | Age: 84
End: 2024-06-09
Payer: COMMERCIAL

## 2024-06-09 ENCOUNTER — HOSPITAL ENCOUNTER (EMERGENCY)
Facility: HOSPITAL | Age: 84
Discharge: HOME | End: 2024-06-09
Attending: EMERGENCY MEDICINE
Payer: COMMERCIAL

## 2024-06-09 VITALS
RESPIRATION RATE: 18 BRPM | OXYGEN SATURATION: 98 % | HEART RATE: 88 BPM | TEMPERATURE: 97.5 F | DIASTOLIC BLOOD PRESSURE: 57 MMHG | SYSTOLIC BLOOD PRESSURE: 104 MMHG
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
## 2024-06-09 PROCEDURE — 70450 CT HEAD/BRAIN W/O DYE: CPT

## 2024-06-09 PROCEDURE — 72192 CT PELVIS W/O DYE: CPT

## 2024-06-09 PROCEDURE — 72125 CT NECK SPINE W/O DYE: CPT

## 2024-06-09 PROCEDURE — 72170 X-RAY EXAM OF PELVIS: CPT

## 2024-06-09 PROCEDURE — 99284 EMERGENCY DEPT VISIT MOD MDM: CPT

## 2024-06-09 NOTE — CASE COMMUNICATION
PT initial evaluation completed.  plan to see 1xwkx1, 2xwkx2 for strengthening, education on hep, gait and transfer training.

## 2024-06-09 NOTE — ED PROVIDER NOTES
"History  Chief Complaint   Patient presents with    Fall     Fall pt denies any pain; unwitnessed      83-year-old male presents emergency room status post falling out of bed.  Patient notes that he was trying to \"go to the hopper,\" and fell getting out of bed.  He landed on his left hip and flank, denies any significant pain at this time but due to the fact that he has a history of a compression fracture at T12-L1, his daughter brought him in to be evaluated.  Patient denies any LOC or head strike.  Patient denies any abdominal pain or chest pain.  Patient has pain over the posterior aspect of his right hip as well as the upper portion of his neck.  Patient is currently on blood thinners and has no significant complaint at this time.        Prior to Admission Medications   Prescriptions Last Dose Informant Patient Reported? Taking?   Blood Glucose Monitoring Suppl (Accu-Chek Guide Me) w/Device KIT   No No   Sig: Use 1 kit in the morning   Cholecalciferol (D 1000) 25 MCG (1000 UT) capsule   No No   Sig: Take 1 capsule (1,000 Units total) by mouth daily   FLUoxetine (PROzac) 20 mg capsule   No No   Sig: Take 1 capsule (20 mg total) by mouth every morning   Lancets (accu-chek multiclix) lancets   No No   Sig: USE   TO CHECK GLUCOSE IN THE MORNING AS DIRECTED   Needles & Syringes MISC   No No   Sig: Use in the morning   Omega-3 Fatty Acids (fish oil) 1,000 mg   No No   Sig: Take 1 capsule (1,000 mg total) by mouth daily   OneTouch Delica Lancets 33G MISC   No No   Sig: Test once a day   QUEtiapine (SEROquel) 25 mg tablet  Self No No   Sig: Take 1 tablet (25 mg total) by mouth in the morning   Patient taking differently: Take 25 mg by mouth 2 (two) times a day before breakfast and lunch 1 at breakfast and 1 at lunch   QUEtiapine (SEROquel) 50 mg tablet   No No   Sig: Take 1 tablet (50 mg total) by mouth daily at bedtime   QUEtiapine (SEROquel) 50 mg tablet   No No   Sig: Take 1 tablet (50 mg total) by mouth daily at " bedtime   acetaminophen (TYLENOL) 325 mg tablet   No No   Sig: Take 2 tablets (650 mg total) by mouth every 6 (six) hours as needed for mild pain   aspirin (ECOTRIN LOW STRENGTH) 81 mg EC tablet   Yes No   Sig: Take 81 mg by mouth daily   atorvastatin (LIPITOR) 20 mg tablet   No No   Sig: Take 1 tablet (20 mg total) by mouth daily   donepezil (ARICEPT) 5 mg tablet   No No   Sig: Take 2 tablets (10 mg total) by mouth daily at bedtime   furosemide (LASIX) 40 mg tablet   No No   Sig: Take 1 tablet (40 mg total) by mouth daily   glucose blood test strip   No No   Sig: Use 1 each in the morning Use as instructed   insulin glargine (LANTUS) 100 units/mL subcutaneous injection   No No   Sig: Inject 10 Units under the skin daily at bedtime   memantine (Namenda) 10 mg tablet   No No   Sig: Take 1 tablet (10 mg total) by mouth 2 (two) times a day   metFORMIN (GLUCOPHAGE) 1000 MG tablet   No No   Sig: Take 1 tablet (1,000 mg total) by mouth 2 (two) times a day with meals   tamsulosin (FLOMAX) 0.4 mg   No No   Sig: Take 1 capsule (0.4 mg total) by mouth in the morning      Facility-Administered Medications: None       Past Medical History:   Diagnosis Date    CHF (congestive heart failure) (HCC)     CKD (chronic kidney disease)     Diabetes mellitus (HCC)     Hyperlipidemia     Thoracic spine fracture (HCC) 05/10/2024    estimate       Past Surgical History:   Procedure Laterality Date    CATARACT EXTRACTION      COLONOSCOPY      DENTAL SURGERY      EYE SURGERY      TONSILLECTOMY      VASECTOMY         No family history on file.  I have reviewed and agree with the history as documented.    E-Cigarette/Vaping    E-Cigarette Use Never User      E-Cigarette/Vaping Substances    Nicotine No     THC No     CBD No     Flavoring No     Other No     Unknown No      Social History     Tobacco Use    Smoking status: Never     Passive exposure: Never    Smokeless tobacco: Never   Vaping Use    Vaping status: Never Used   Substance Use  Topics    Alcohol use: Not Currently    Drug use: Never       Review of Systems   Constitutional:  Positive for activity change. Negative for chills and fever.   HENT:  Negative for ear pain and sore throat.    Eyes:  Negative for pain and visual disturbance.   Respiratory:  Negative for cough and shortness of breath.    Cardiovascular:  Negative for chest pain and palpitations.   Gastrointestinal:  Negative for abdominal pain and vomiting.   Genitourinary:  Negative for dysuria and hematuria.   Musculoskeletal:  Positive for myalgias. Negative for arthralgias and back pain.   Skin:  Negative for color change, pallor and rash.   Neurological:  Negative for seizures and syncope.   All other systems reviewed and are negative.      Physical Exam  Physical Exam  Constitutional:       General: He is not in acute distress.     Appearance: Normal appearance. He is normal weight. He is ill-appearing. He is not toxic-appearing.   HENT:      Head: Normocephalic and atraumatic.      Right Ear: External ear normal.      Left Ear: External ear normal.      Nose: Nose normal.      Mouth/Throat:      Mouth: Mucous membranes are moist.   Eyes:      Conjunctiva/sclera: Conjunctivae normal.   Neck:      Comments: Mild discomfort C1-C2 on the left.  Cardiovascular:      Rate and Rhythm: Normal rate and regular rhythm.      Pulses: Normal pulses.      Heart sounds: Normal heart sounds.   Pulmonary:      Effort: Pulmonary effort is normal.      Breath sounds: Normal breath sounds.   Abdominal:      General: Abdomen is flat. There is no distension.      Palpations: Abdomen is soft. There is no mass.      Tenderness: There is no abdominal tenderness.   Musculoskeletal:         General: Tenderness present. No swelling or deformity. Normal range of motion.      Cervical back: Normal range of motion. Tenderness present.      Comments: Tenderness over the posterior right buttock.  The patient's pain is over the right SI joint.    There is  also mild tenderness over the cervical spine at C1-C2 on the left.       Skin:     General: Skin is warm and dry.      Capillary Refill: Capillary refill takes 2 to 3 seconds.      Coloration: Skin is not pale.      Findings: No rash.   Neurological:      General: No focal deficit present.      Mental Status: He is alert and oriented to person, place, and time. Mental status is at baseline.      Motor: No weakness.   Psychiatric:         Mood and Affect: Mood normal.         Vital Signs  ED Triage Vitals   Temperature Pulse Respirations Blood Pressure SpO2   06/09/24 1417 06/09/24 1417 06/09/24 1417 06/09/24 1417 06/09/24 1417   97.6 °F (36.4 °C) 79 18 104/57 95 %      Temp Source Heart Rate Source Patient Position - Orthostatic VS BP Location FiO2 (%)   06/09/24 1417 06/09/24 1420 -- -- --   Tympanic Monitor         Pain Score       06/09/24 1420       No Pain           Vitals:    06/09/24 1417 06/09/24 1420   BP: 104/57    Pulse: 79 88         Visual Acuity      ED Medications  Medications - No data to display    Diagnostic Studies  Results Reviewed       None                   CT pelvis wo contrast   Final Result by Jaycob Flores MD (06/09 1844)      No acute findings in the pelvis within the limits of unenhanced technique.      Workstation performed: [id number]         XR pelvis ap only 1 or 2 vw   ED Interpretation by Moe Feliz Jr., DO (06/09 1700)   Degenerative changes versus superior ramus fracture.      Final Result by Tana Germain MD (06/10 0901)      No acute osseous abnormality.      Workstation performed: [id number]         CT head without contrast   Final Result by Jaycob Flores MD (06/09 1711)      No acute intracranial abnormality.                  Workstation performed: [id number]         CT spine cervical without contrast   Final Result by Jaycob Flores MD (06/09 1714)      No cervical spine fracture or traumatic malalignment.                  Workstation performed:  [id number]                    Procedures  Procedures         ED Course                               SBIRT 22yo+      Flowsheet Row Most Recent Value   Initial Alcohol Screen: US AUDIT-C     1. How often do you have a drink containing alcohol? 0 Filed at: 06/09/2024 1423   2. How many drinks containing alcohol do you have on a typical day you are drinking?  0 Filed at: 06/09/2024 1423   3a. Male UNDER 65: How often do you have five or more drinks on one occasion? 0 Filed at: 06/09/2024 1423   3b. FEMALE Any Age, or MALE 65+: How often do you have 4 or more drinks on one occassion? 0 Filed at: 06/09/2024 1423   Audit-C Score 0 Filed at: 06/09/2024 1423   KATINA: How many times in the past year have you...    Used an illegal drug or used a prescription medication for non-medical reasons? Never Filed at: 06/09/2024 1423                      Medical Decision Making  Patient is an 83-year-old male who presents emergency room status post falling out of bed this morning.  Family notes that he is not supposed to be getting out of bed on his own but he tried anyway and fell to the ground.  The patient states that he has pain over the right side of the right pelvis and lower back.  Family is concerned because he has had a compression fracture in the past, and he notes there is no significant pain in that region at this time but he is here due to concern.  Patient is currently wearing a spinal brace.  Patient denies any head strike or loss of consciousness.  Differential diagnosis is contusion versus fracture.  Hematoma is also on the differential.  Scans were done in the emergency room of the head and cervical spine as well as an x-ray of the pelvis.  There is questionable ramus fracture on the x-ray versus degenerative changes so a CT scan was ordered of the pelvis.  For that reason a CT scan was also ordered of the pelvis.  The patient is neurologically intact on my evaluation and has normal vital signs.  Patient is alert and  oriented x 3.  Patient signed out to Dr. Still pending evaluation of the CT scan of the pelvis.    Amount and/or Complexity of Data Reviewed  Radiology: ordered and independent interpretation performed.             Disposition  Final diagnoses:   Fall, initial encounter     Time reflects when diagnosis was documented in both MDM as applicable and the Disposition within this note       Time User Action Codes Description Comment    6/9/2024  7:09 PM Neto Still Add [W19.XXXA] Fall, initial encounter           ED Disposition       ED Disposition   Discharge    Condition   Stable    Date/Time   Sun Jun 9, 2024 1909    Comment   Israel Hughes discharge to home/self care.                   Follow-up Information       Follow up With Specialties Details Why Contact Info    Giovanni Reyez, DO Internal Medicine In 3 days  [address] [place] [address]  [address]  [place] [id number] [phone]              Discharge Medication List as of 6/9/2024  7:12 PM        CONTINUE these medications which have NOT CHANGED    Details   acetaminophen (TYLENOL) 325 mg tablet Take 2 tablets (650 mg total) by mouth every 6 (six) hours as needed for mild pain, Starting Wed 5/1/2024, No Print      aspirin (ECOTRIN LOW STRENGTH) 81 mg EC tablet Take 81 mg by mouth, Historical Med      atorvastatin (LIPITOR) 20 mg tablet Take 1 tablet (20 mg total) by mouth daily, Starting Mon 3/4/2024, Normal      Blood Glucose Monitoring Suppl (Accu-Chek Guide Me) w/Device KIT Use 1 kit in the morning, Starting Thu 4/11/2024, Normal      Cholecalciferol (D 1000) 25 MCG (1000 UT) capsule Take 1 capsule (1,000 Units total) by mouth daily, Starting Mon 3/4/2024, Normal      donepezil (ARICEPT) 5 mg tablet Take 2 tablets (10 mg total) by mouth daily at bedtime, Starting Mon 3/4/2024, Normal      FLUoxetine (PROzac) 20 mg capsule Take 1 capsule (20 mg total) by mouth every morning, Starting Mon 3/4/2024, Normal      furosemide (LASIX) 40 mg tablet Take 1 tablet (40  mg total) by mouth daily, Starting Mon 4/8/2024, Normal      glucose blood test strip Use 1 each in the morning Use as instructed, Starting Fri 4/12/2024, Normal      insulin glargine (LANTUS) 100 units/mL subcutaneous injection Inject 10 Units under the skin daily at bedtime, Starting Wed 6/5/2024, Normal      !! Lancets (accu-chek multiclix) lancets USE   TO CHECK GLUCOSE IN THE MORNING AS DIRECTED, Normal      memantine (Namenda) 10 mg tablet Take 1 tablet (10 mg total) by mouth 2 (two) times a day, Starting Wed 4/3/2024, Normal      metFORMIN (GLUCOPHAGE) 1000 MG tablet Take 1 tablet (1,000 mg total) by mouth 2 (two) times a day with meals, Starting Mon 3/4/2024, Normal      Needles & Syringes MISC Use in the morning, Starting Wed 6/5/2024, Normal      Omega-3 Fatty Acids (fish oil) 1,000 mg Take 1 capsule (1,000 mg total) by mouth daily, Starting Mon 3/4/2024, Normal      !! OneTouch Delica Lancets 33G MISC Test once a day, Normal      !! QUEtiapine (SEROquel) 25 mg tablet Take 1 tablet (25 mg total) by mouth in the morning, Starting Thu 5/2/2024, No Print      !! QUEtiapine (SEROquel) 50 mg tablet Take 1 tablet (50 mg total) by mouth daily at bedtime, Starting Wed 6/5/2024, Normal      !! QUEtiapine (SEROquel) 50 mg tablet Take 1 tablet (50 mg total) by mouth daily at bedtime, Starting Wed 6/5/2024, Normal      tamsulosin (FLOMAX) 0.4 mg Take 1 capsule (0.4 mg total) by mouth in the morning, Starting Mon 3/4/2024, Normal      Empagliflozin (JARDIANCE) 10 MG TABS tablet Take 1 tablet (10 mg total) by mouth daily, Starting Wed 4/3/2024, Until Sat 3/29/2025, Normal       !! - Potential duplicate medications found. Please discuss with provider.          No discharge procedures on file.    PDMP Review       None            ED Provider  Electronically Signed by             Moe Feliz Jr., DO  06/13/24 [id number]

## 2024-06-09 NOTE — ED ATTENDING ATTESTATION
6/9/2024  I, Neto Still MD, saw and evaluated the patient. I have discussed the patient with the resident/non-physician practitioner and agree with the resident's/non-physician practitioner's findings, Plan of Care, and MDM as documented in the resident's/non-physician practitioner's note, except where noted. All available labs and Radiology studies were reviewed.  I was present for key portions of any procedure(s) performed by the resident/non-physician practitioner and I was immediately available to provide assistance.       At this point I agree with the current assessment done in the Emergency Department.  I have conducted an independent evaluation of this patient a history and physical is as follows:    ED Course patient was discharged home CT of the head CT of the neck and CT pelvis were negative for         Critical Care Time  Procedures

## 2024-06-10 ENCOUNTER — HOME CARE VISIT (OUTPATIENT)
Dept: HOME HEALTH SERVICES | Facility: HOME HEALTHCARE | Age: 84
End: 2024-06-10
Payer: COMMERCIAL

## 2024-06-10 ENCOUNTER — TELEPHONE (OUTPATIENT)
Age: 84
End: 2024-06-10

## 2024-06-10 ENCOUNTER — VBI (OUTPATIENT)
Dept: INTERNAL MEDICINE CLINIC | Facility: CLINIC | Age: 84
End: 2024-06-10

## 2024-06-10 VITALS
RESPIRATION RATE: 18 BRPM | HEART RATE: 68 BPM | DIASTOLIC BLOOD PRESSURE: 62 MMHG | SYSTOLIC BLOOD PRESSURE: 110 MMHG | TEMPERATURE: 97.3 F | OXYGEN SATURATION: 94 %

## 2024-06-10 PROCEDURE — G0299 HHS/HOSPICE OF RN EA 15 MIN: HCPCS

## 2024-06-10 NOTE — TELEPHONE ENCOUNTER
06/10/24 10:46 AM    Patient contacted post ED visit, [facility] department spoke with patient/caregiver and outreach was successful.    Thank you.  Neto Patton MA  [facility]

## 2024-06-10 NOTE — TELEPHONE ENCOUNTER
Pt recently discharged from [facility] after 3 weeks.  While he was there they prescribed him Lantus, 10 units at bedtime due to glucose readings being between 400-450 at night.  Pt also is still taking Metformin 1000 mg BID and Jardiance 10 mg daily.  Said since he is home his glucose at night has not been above 145.  Pt has not been taking the lantus at night.  Afraid it may drop his glucose too low.  Glucose this morning was 120.    Please advise

## 2024-06-11 ENCOUNTER — TELEPHONE (OUTPATIENT)
Age: 84
End: 2024-06-11

## 2024-06-11 ENCOUNTER — TELEPHONE (OUTPATIENT)
Dept: INTERNAL MEDICINE CLINIC | Facility: CLINIC | Age: 84
End: 2024-06-11

## 2024-06-11 ENCOUNTER — HOME CARE VISIT (OUTPATIENT)
Dept: HOME HEALTH SERVICES | Facility: HOME HEALTHCARE | Age: 84
End: 2024-06-11
Payer: COMMERCIAL

## 2024-06-11 VITALS — SYSTOLIC BLOOD PRESSURE: 110 MMHG | OXYGEN SATURATION: 96 % | HEART RATE: 67 BPM | DIASTOLIC BLOOD PRESSURE: 62 MMHG

## 2024-06-11 DIAGNOSIS — E11.319 TYPE 2 DIABETES MELLITUS WITH RETINOPATHY OF BOTH EYES, WITHOUT LONG-TERM CURRENT USE OF INSULIN, MACULAR EDEMA PRESENCE UNSPECIFIED, UNSPECIFIED RETINOPATHY SEVERITY (HCC): ICD-10-CM

## 2024-06-11 DIAGNOSIS — E11.319 TYPE 2 DIABETES MELLITUS WITH RETINOPATHY OF BOTH EYES, WITHOUT LONG-TERM CURRENT USE OF INSULIN, MACULAR EDEMA PRESENCE UNSPECIFIED, UNSPECIFIED RETINOPATHY SEVERITY (HCC): Primary | ICD-10-CM

## 2024-06-11 PROCEDURE — G0151 HHCP-SERV OF PT,EA 15 MIN: HCPCS

## 2024-06-11 NOTE — TELEPHONE ENCOUNTER
[facility]    They need to know what size needles and Syringes the patient needs.    Please advise:        Needles & Syringes MISC

## 2024-06-12 ENCOUNTER — HOME CARE VISIT (OUTPATIENT)
Dept: HOME HEALTH SERVICES | Facility: HOME HEALTHCARE | Age: 84
End: 2024-06-12
Payer: COMMERCIAL

## 2024-06-12 VITALS — SYSTOLIC BLOOD PRESSURE: 110 MMHG | HEART RATE: 66 BPM | OXYGEN SATURATION: 96 % | DIASTOLIC BLOOD PRESSURE: 60 MMHG

## 2024-06-12 PROCEDURE — G0152 HHCP-SERV OF OT,EA 15 MIN: HCPCS

## 2024-06-12 RX ORDER — SYRINGE-NEEDLE,INSULIN,0.5 ML 27GX1/2"
SYRINGE, EMPTY DISPOSABLE MISCELLANEOUS DAILY
Qty: 100 EACH | Refills: 3 | Status: SHIPPED | OUTPATIENT
Start: 2024-06-12

## 2024-06-12 NOTE — TELEPHONE ENCOUNTER
[place] for QUEtiapine (SEROquel) 50 mg tablet     Submitted via    [x]CMM-KEY [id number]  []Surescripts-Case ID #   []Faxed to plan   []Other website   []Phone call Case ID #     Office notes sent, clinical questions answered. Awaiting determination    Turnaround time for your insurance to make a decision on your Prior Authorization can take 7-21 business days.

## 2024-06-12 NOTE — CASE COMMUNICATION
OT evaluation completed 6/12/24.  No further visits planned at this time.  Patient is able to shower with assist from granddaughter.

## 2024-06-13 ENCOUNTER — HOME CARE VISIT (OUTPATIENT)
Dept: HOME HEALTH SERVICES | Facility: HOME HEALTHCARE | Age: 84
End: 2024-06-13
Payer: COMMERCIAL

## 2024-06-13 VITALS — SYSTOLIC BLOOD PRESSURE: 106 MMHG | DIASTOLIC BLOOD PRESSURE: 70 MMHG | HEART RATE: 78 BPM | OXYGEN SATURATION: 96 %

## 2024-06-13 VITALS
RESPIRATION RATE: 16 BRPM | TEMPERATURE: 97.4 F | DIASTOLIC BLOOD PRESSURE: 70 MMHG | OXYGEN SATURATION: 98 % | SYSTOLIC BLOOD PRESSURE: 110 MMHG | HEART RATE: 85 BPM

## 2024-06-13 PROCEDURE — G0299 HHS/HOSPICE OF RN EA 15 MIN: HCPCS

## 2024-06-13 PROCEDURE — G0151 HHCP-SERV OF PT,EA 15 MIN: HCPCS

## 2024-06-13 NOTE — TELEPHONE ENCOUNTER
[place] for QUEtiapine (SEROquel) 50 mg tablet   Approved     Date(s) approved until 12/31/2024    Case #    Patient advised by          [x] [employer] Message  [x] Phone call   []LMOM  []L/M to call office as no active Communication consent on file  []Unable to leave detailed message as VM not approved on Communication consent       Pharmacy advised by    [x]Fax  []Phone call    Approval letter scanned into Media Yes

## 2024-06-14 ENCOUNTER — PATIENT OUTREACH (OUTPATIENT)
Dept: CASE MANAGEMENT | Facility: OTHER | Age: 84
End: 2024-06-14

## 2024-06-14 NOTE — PROGRESS NOTES
Chart review reveals Israel had a fall on 6/9/24 while getting out of bed.  CT scan and xrays show no acute abnormality.      Message left with sister-in-law Chiqui with call back information.

## 2024-06-17 ENCOUNTER — HOME CARE VISIT (OUTPATIENT)
Dept: HOME HEALTH SERVICES | Facility: HOME HEALTHCARE | Age: 84
End: 2024-06-17
Payer: COMMERCIAL

## 2024-06-17 VITALS — HEART RATE: 92 BPM | OXYGEN SATURATION: 95 % | DIASTOLIC BLOOD PRESSURE: 58 MMHG | SYSTOLIC BLOOD PRESSURE: 92 MMHG

## 2024-06-17 PROCEDURE — G0151 HHCP-SERV OF PT,EA 15 MIN: HCPCS

## 2024-06-19 ENCOUNTER — HOME CARE VISIT (OUTPATIENT)
Dept: HOME HEALTH SERVICES | Facility: HOME HEALTHCARE | Age: 84
End: 2024-06-19
Payer: COMMERCIAL

## 2024-06-19 VITALS — HEART RATE: 89 BPM | OXYGEN SATURATION: 97 % | DIASTOLIC BLOOD PRESSURE: 78 MMHG | SYSTOLIC BLOOD PRESSURE: 120 MMHG

## 2024-06-19 PROCEDURE — G0151 HHCP-SERV OF PT,EA 15 MIN: HCPCS

## 2024-06-21 ENCOUNTER — PATIENT OUTREACH (OUTPATIENT)
Dept: CASE MANAGEMENT | Facility: OTHER | Age: 84
End: 2024-06-21

## 2024-06-21 NOTE — PROGRESS NOTES
"Spoke with Chiqui CHEN, who states that Israel is doing \"pretty good\".  He is eating well.  She believes he is finished with home PT/OT.  She says he is still a bit weak, but gets around okay.  He is now #5 on [facility] for [facility].    She denies further needs at this time.    "

## 2024-06-25 ENCOUNTER — RA CDI HCC (OUTPATIENT)
Dept: OTHER | Facility: HOSPITAL | Age: 84
End: 2024-06-25

## 2024-06-25 NOTE — PROGRESS NOTES
E11.22, E11.51, I13.0, e11.65  HCC coding opportunities          Chart Reviewed number of suggestions sent to Provider: 4     Patients Insurance     Medicare Insurance: Medicare

## 2024-06-27 ENCOUNTER — PATIENT OUTREACH (OUTPATIENT)
Dept: CASE MANAGEMENT | Facility: OTHER | Age: 84
End: 2024-06-27

## 2024-06-27 NOTE — PROGRESS NOTES
"Chiqui reports that Israel is doing \"pretty good\".  Eating his cereal right now.    She denies any needs.    DOUGIE pathway completed 6/27/24.  "

## 2024-06-30 DIAGNOSIS — R41.3 MEMORY DIFFICULTY: ICD-10-CM

## 2024-06-30 RX ORDER — DONEPEZIL HYDROCHLORIDE 5 MG/1
10 TABLET, FILM COATED ORAL
Qty: 180 TABLET | Refills: 1 | Status: SHIPPED | OUTPATIENT
Start: 2024-06-30

## 2024-07-03 ENCOUNTER — TELEPHONE (OUTPATIENT)
Age: 84
End: 2024-07-03

## 2024-07-03 DIAGNOSIS — L30.9 DERMATITIS: Primary | ICD-10-CM

## 2024-07-03 RX ORDER — NYSTATIN 100000 [USP'U]/G
POWDER TOPICAL 2 TIMES DAILY
Qty: 60 G | Refills: 1 | Status: SHIPPED | OUTPATIENT
Start: 2024-07-03

## 2024-07-03 NOTE — TELEPHONE ENCOUNTER
Chiqui, sister in law to pt called in requesting if Dr. Reyez could please put in a script for: nystatin topical powder 100,000, 2 times a day - he was receiving this at the home he was in & Chiqui wanted to know if it was possible pt can get the script?    Script to go to the following pharmacy:  [facility] [address] [place] [address] - [facility] [address]E JURADO [phone]     Please advise & call Chiqui with update on request/inquiry. Thank you!    Chiqui Burns   [phone]

## 2024-07-05 ENCOUNTER — TELEPHONE (OUTPATIENT)
Dept: INTERNAL MEDICINE CLINIC | Facility: CLINIC | Age: 84
End: 2024-07-05

## 2024-07-05 NOTE — TELEPHONE ENCOUNTER
Received fax from [employer] requesting a prior authorization for Nystatin [id number] UNIT/GM Powder    Request is scanned into the patients media.

## 2024-07-08 ENCOUNTER — APPOINTMENT (EMERGENCY)
Dept: CT IMAGING | Facility: HOSPITAL | Age: 84
DRG: 056 | End: 2024-07-08
Payer: COMMERCIAL

## 2024-07-08 ENCOUNTER — TELEPHONE (OUTPATIENT)
Age: 84
End: 2024-07-08

## 2024-07-08 ENCOUNTER — HOSPITAL ENCOUNTER (INPATIENT)
Facility: HOSPITAL | Age: 84
LOS: 12 days | Discharge: NURSING, REHAB OR LONG-TERM CARE | DRG: 056 | End: 2024-07-20
Attending: EMERGENCY MEDICINE | Admitting: INTERNAL MEDICINE
Payer: COMMERCIAL

## 2024-07-08 ENCOUNTER — NURSE TRIAGE (OUTPATIENT)
Age: 84
End: 2024-07-08

## 2024-07-08 DIAGNOSIS — K59.00 CONSTIPATION: ICD-10-CM

## 2024-07-08 DIAGNOSIS — F03.90 DEMENTIA (HCC): Primary | ICD-10-CM

## 2024-07-08 DIAGNOSIS — E11.3293 TYPE 2 DIABETES MELLITUS WITH MILD NONPROLIFERATIVE RETINOPATHY OF BOTH EYES, WITHOUT LONG-TERM CURRENT USE OF INSULIN, MACULAR EDEMA PRESENCE UNSPECIFIED (HCC): ICD-10-CM

## 2024-07-08 DIAGNOSIS — R41.82 ALTERED MENTAL STATE: ICD-10-CM

## 2024-07-08 LAB
ALBUMIN SERPL BCG-MCNC: 4 G/DL (ref 3.5–5)
ALP SERPL-CCNC: 86 U/L (ref 34–104)
ALT SERPL W P-5'-P-CCNC: 14 U/L (ref 7–52)
ANION GAP SERPL CALCULATED.3IONS-SCNC: 10 MMOL/L (ref 4–13)
APTT PPP: 30 SECONDS (ref 23–37)
AST SERPL W P-5'-P-CCNC: 20 U/L (ref 13–39)
BACTERIA UR QL AUTO: NORMAL /HPF
BASOPHILS # BLD AUTO: 0.02 THOUSANDS/ÂΜL (ref 0–0.1)
BASOPHILS NFR BLD AUTO: 0 % (ref 0–1)
BILIRUB SERPL-MCNC: 0.41 MG/DL (ref 0.2–1)
BILIRUB UR QL STRIP: NEGATIVE
BUN SERPL-MCNC: 31 MG/DL (ref 5–25)
CALCIUM SERPL-MCNC: 9.7 MG/DL (ref 8.4–10.2)
CHLORIDE SERPL-SCNC: 99 MMOL/L (ref 96–108)
CLARITY UR: CLEAR
CO2 SERPL-SCNC: 30 MMOL/L (ref 21–32)
COLOR UR: YELLOW
CREAT SERPL-MCNC: 1.01 MG/DL (ref 0.6–1.3)
EOSINOPHIL # BLD AUTO: 0.07 THOUSAND/ÂΜL (ref 0–0.61)
EOSINOPHIL NFR BLD AUTO: 1 % (ref 0–6)
ERYTHROCYTE [DISTWIDTH] IN BLOOD BY AUTOMATED COUNT: 12.4 % (ref 11.6–15.1)
FLUAV RNA RESP QL NAA+PROBE: NEGATIVE
FLUBV RNA RESP QL NAA+PROBE: NEGATIVE
GFR SERPL CREATININE-BSD FRML MDRD: 68 ML/MIN/1.73SQ M
GLUCOSE SERPL-MCNC: 119 MG/DL (ref 65–140)
GLUCOSE SERPL-MCNC: 140 MG/DL (ref 65–140)
GLUCOSE SERPL-MCNC: 169 MG/DL (ref 65–140)
GLUCOSE UR STRIP-MCNC: ABNORMAL MG/DL
HCT VFR BLD AUTO: 40.4 % (ref 36.5–49.3)
HGB BLD-MCNC: 13.3 G/DL (ref 12–17)
HGB UR QL STRIP.AUTO: ABNORMAL
IMM GRANULOCYTES # BLD AUTO: 0.05 THOUSAND/UL (ref 0–0.2)
IMM GRANULOCYTES NFR BLD AUTO: 1 % (ref 0–2)
INR PPP: 0.95 (ref 0.84–1.19)
KETONES UR STRIP-MCNC: NEGATIVE MG/DL
LEUKOCYTE ESTERASE UR QL STRIP: NEGATIVE
LYMPHOCYTES # BLD AUTO: 1.47 THOUSANDS/ÂΜL (ref 0.6–4.47)
LYMPHOCYTES NFR BLD AUTO: 22 % (ref 14–44)
MAGNESIUM SERPL-MCNC: 1.9 MG/DL (ref 1.9–2.7)
MCH RBC QN AUTO: 31.4 PG (ref 26.8–34.3)
MCHC RBC AUTO-ENTMCNC: 32.9 G/DL (ref 31.4–37.4)
MCV RBC AUTO: 95 FL (ref 82–98)
MONOCYTES # BLD AUTO: 0.51 THOUSAND/ÂΜL (ref 0.17–1.22)
MONOCYTES NFR BLD AUTO: 8 % (ref 4–12)
NEUTROPHILS # BLD AUTO: 4.62 THOUSANDS/ÂΜL (ref 1.85–7.62)
NEUTS SEG NFR BLD AUTO: 68 % (ref 43–75)
NITRITE UR QL STRIP: NEGATIVE
NON-SQ EPI CELLS URNS QL MICRO: NORMAL /HPF
NRBC BLD AUTO-RTO: 0 /100 WBCS
PH UR STRIP.AUTO: 7.5 [PH]
PLATELET # BLD AUTO: 195 THOUSANDS/UL (ref 149–390)
PLATELET # BLD AUTO: 213 THOUSANDS/UL (ref 149–390)
PMV BLD AUTO: 9.1 FL (ref 8.9–12.7)
PMV BLD AUTO: 9.4 FL (ref 8.9–12.7)
POTASSIUM SERPL-SCNC: 4.4 MMOL/L (ref 3.5–5.3)
PROT SERPL-MCNC: 7.2 G/DL (ref 6.4–8.4)
PROT UR STRIP-MCNC: NEGATIVE MG/DL
PROTHROMBIN TIME: 12.8 SECONDS (ref 11.6–14.5)
RBC # BLD AUTO: 4.24 MILLION/UL (ref 3.88–5.62)
RBC #/AREA URNS AUTO: NORMAL /HPF
RSV RNA RESP QL NAA+PROBE: NEGATIVE
SARS-COV-2 RNA RESP QL NAA+PROBE: NEGATIVE
SODIUM SERPL-SCNC: 139 MMOL/L (ref 135–147)
SP GR UR STRIP.AUTO: 1.01
UROBILINOGEN UR QL STRIP.AUTO: 1 E.U./DL
WBC # BLD AUTO: 6.74 THOUSAND/UL (ref 4.31–10.16)
WBC #/AREA URNS AUTO: NORMAL /HPF

## 2024-07-08 PROCEDURE — 85025 COMPLETE CBC W/AUTO DIFF WBC: CPT | Performed by: EMERGENCY MEDICINE

## 2024-07-08 PROCEDURE — 81001 URINALYSIS AUTO W/SCOPE: CPT | Performed by: EMERGENCY MEDICINE

## 2024-07-08 PROCEDURE — 85730 THROMBOPLASTIN TIME PARTIAL: CPT | Performed by: EMERGENCY MEDICINE

## 2024-07-08 PROCEDURE — 83735 ASSAY OF MAGNESIUM: CPT | Performed by: EMERGENCY MEDICINE

## 2024-07-08 PROCEDURE — 99223 1ST HOSP IP/OBS HIGH 75: CPT | Performed by: INTERNAL MEDICINE

## 2024-07-08 PROCEDURE — 82948 REAGENT STRIP/BLOOD GLUCOSE: CPT

## 2024-07-08 PROCEDURE — NC001 PR NO CHARGE: Performed by: INTERNAL MEDICINE

## 2024-07-08 PROCEDURE — 80053 COMPREHEN METABOLIC PANEL: CPT | Performed by: EMERGENCY MEDICINE

## 2024-07-08 PROCEDURE — 99285 EMERGENCY DEPT VISIT HI MDM: CPT

## 2024-07-08 PROCEDURE — 99285 EMERGENCY DEPT VISIT HI MDM: CPT | Performed by: EMERGENCY MEDICINE

## 2024-07-08 PROCEDURE — 85049 AUTOMATED PLATELET COUNT: CPT | Performed by: INTERNAL MEDICINE

## 2024-07-08 PROCEDURE — 85610 PROTHROMBIN TIME: CPT | Performed by: EMERGENCY MEDICINE

## 2024-07-08 PROCEDURE — 36415 COLL VENOUS BLD VENIPUNCTURE: CPT | Performed by: EMERGENCY MEDICINE

## 2024-07-08 PROCEDURE — 96360 HYDRATION IV INFUSION INIT: CPT

## 2024-07-08 PROCEDURE — 70450 CT HEAD/BRAIN W/O DYE: CPT

## 2024-07-08 PROCEDURE — 0241U HB NFCT DS VIR RESP RNA 4 TRGT: CPT | Performed by: EMERGENCY MEDICINE

## 2024-07-08 RX ORDER — DONEPEZIL HYDROCHLORIDE 10 MG/1
10 TABLET, FILM COATED ORAL
Status: DISCONTINUED | OUTPATIENT
Start: 2024-07-08 | End: 2024-07-20 | Stop reason: HOSPADM

## 2024-07-08 RX ORDER — FLUOXETINE HYDROCHLORIDE 20 MG/1
20 CAPSULE ORAL EVERY MORNING
Status: DISCONTINUED | OUTPATIENT
Start: 2024-07-09 | End: 2024-07-20 | Stop reason: HOSPADM

## 2024-07-08 RX ORDER — INSULIN GLARGINE 100 [IU]/ML
10 INJECTION, SOLUTION SUBCUTANEOUS
Status: DISCONTINUED | OUTPATIENT
Start: 2024-07-08 | End: 2024-07-09

## 2024-07-08 RX ORDER — MEMANTINE HYDROCHLORIDE 5 MG/1
10 TABLET ORAL 2 TIMES DAILY
Status: DISCONTINUED | OUTPATIENT
Start: 2024-07-08 | End: 2024-07-20 | Stop reason: HOSPADM

## 2024-07-08 RX ORDER — INSULIN LISPRO 100 [IU]/ML
1-5 INJECTION, SOLUTION INTRAVENOUS; SUBCUTANEOUS
Status: DISCONTINUED | OUTPATIENT
Start: 2024-07-08 | End: 2024-07-20 | Stop reason: HOSPADM

## 2024-07-08 RX ORDER — TAMSULOSIN HYDROCHLORIDE 0.4 MG/1
0.4 CAPSULE ORAL DAILY
Status: DISCONTINUED | OUTPATIENT
Start: 2024-07-08 | End: 2024-07-20 | Stop reason: HOSPADM

## 2024-07-08 RX ORDER — ONDANSETRON 2 MG/ML
4 INJECTION INTRAMUSCULAR; INTRAVENOUS EVERY 6 HOURS PRN
Status: DISCONTINUED | OUTPATIENT
Start: 2024-07-08 | End: 2024-07-20 | Stop reason: HOSPADM

## 2024-07-08 RX ORDER — QUETIAPINE FUMARATE 50 MG/1
50 TABLET, FILM COATED ORAL
Status: DISCONTINUED | OUTPATIENT
Start: 2024-07-08 | End: 2024-07-20 | Stop reason: HOSPADM

## 2024-07-08 RX ORDER — ACETAMINOPHEN 325 MG/1
650 TABLET ORAL EVERY 4 HOURS PRN
Status: DISCONTINUED | OUTPATIENT
Start: 2024-07-08 | End: 2024-07-20 | Stop reason: HOSPADM

## 2024-07-08 RX ORDER — ATORVASTATIN CALCIUM 20 MG/1
20 TABLET, FILM COATED ORAL DAILY
Status: DISCONTINUED | OUTPATIENT
Start: 2024-07-09 | End: 2024-07-20 | Stop reason: HOSPADM

## 2024-07-08 RX ORDER — QUETIAPINE FUMARATE 25 MG/1
25 TABLET, FILM COATED ORAL
Status: DISCONTINUED | OUTPATIENT
Start: 2024-07-09 | End: 2024-07-20 | Stop reason: HOSPADM

## 2024-07-08 RX ORDER — FUROSEMIDE 40 MG/1
40 TABLET ORAL DAILY
Status: DISCONTINUED | OUTPATIENT
Start: 2024-07-09 | End: 2024-07-20 | Stop reason: HOSPADM

## 2024-07-08 RX ORDER — HEPARIN SODIUM 5000 [USP'U]/ML
5000 INJECTION, SOLUTION INTRAVENOUS; SUBCUTANEOUS EVERY 8 HOURS SCHEDULED
Status: DISCONTINUED | OUTPATIENT
Start: 2024-07-08 | End: 2024-07-20 | Stop reason: HOSPADM

## 2024-07-08 RX ADMIN — DONEPEZIL HYDROCHLORIDE 10 MG: 10 TABLET ORAL at 21:19

## 2024-07-08 RX ADMIN — SODIUM CHLORIDE 1000 ML: 0.9 INJECTION, SOLUTION INTRAVENOUS at 16:36

## 2024-07-08 RX ADMIN — INSULIN GLARGINE 10 UNITS: 100 INJECTION, SOLUTION SUBCUTANEOUS at 21:19

## 2024-07-08 RX ADMIN — TAMSULOSIN HYDROCHLORIDE 0.4 MG: 0.4 CAPSULE ORAL at 20:57

## 2024-07-08 RX ADMIN — MEMANTINE 10 MG: 5 TABLET ORAL at 20:57

## 2024-07-08 RX ADMIN — HEPARIN SODIUM 5000 UNITS: 5000 INJECTION INTRAVENOUS; SUBCUTANEOUS at 21:20

## 2024-07-08 RX ADMIN — QUETIAPINE FUMARATE 50 MG: 50 TABLET ORAL at 21:19

## 2024-07-08 NOTE — ED NOTES
Patient family member reports that patient typically can not verbalize when he needs to urinate for a sample. Dr. Marcano aware.      Sai Cooper RN  07/08/24 3554

## 2024-07-08 NOTE — ASSESSMENT & PLAN NOTE
Likely worsening in the setting of fatigue, ambulatory dysfunction, difficulty with ADLs    Continue donepezil, memantine, fluoxetine  Monitor mentation  Outpatient follow-up with Neurology

## 2024-07-08 NOTE — ASSESSMENT & PLAN NOTE
Malnutrition Findings:                                 BMI Findings:           Body mass index is 20.18 kg/m².   Present on admission as evidenced by BMI of 20    Encourage lifestyle modification and weight gain

## 2024-07-08 NOTE — TELEPHONE ENCOUNTER
Sister in law called stating pt has been bedfast for the past week. C/O pain legs, shoulders and low back. He is eating but not a lot and is drinking gatorade and taking tylenol for pain. Also, states pt is hallucinating.      Warm transfer to Nurse triage

## 2024-07-08 NOTE — ASSESSMENT & PLAN NOTE
Lab Results   Component Value Date    HGBA1C 8.2 (H) 05/17/2024       Recent Labs     07/08/24  1614   POCGLU 169*       Blood Sugar Average: Last 72 hrs:  (P) 169  Poorly controlled with hemoglobin A1c of 8.2%    Continue home Lantus 10 units daily at bedtime  Sliding scale insulin with Accu-Cheks  Diabetic diet  Hold home SGLT2 inhibitor

## 2024-07-08 NOTE — H&P
[facility] hospital  H&P  Name: Israel Hughes 84 y.o. 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)  Assessment & Plan  Likely worsening in the setting of fatigue, ambulatory dysfunction, difficulty with ADLs    Continue donepezil, memantine, fluoxetine  Monitor mentation  Outpatient follow-up with Neurology    Type 2 diabetes mellitus with retinopathy, without long-term current use of insulin (HCC)  Assessment & Plan  Lab Results   Component Value Date    HGBA1C 8.2 (H) 05/17/2024       Recent Labs     07/08/24  1614   POCGLU 169*       Blood Sugar Average: Last 72 hrs:  (P) 169  Poorly controlled with hemoglobin A1c of 8.2%    Continue home Lantus 10 units daily at bedtime  Sliding scale insulin with Accu-Cheks  Diabetic diet  Hold home SGLT2 inhibitor    Benign prostatic hyperplasia without lower urinary tract symptoms  Assessment & Plan  Asymptomatic and chronic in nature    Continue home Flomax    Chronic diastolic congestive heart failure (HCC)  Assessment & Plan  Euvolemic and well compensated  Weight at baseline    Continue home loop diuretic  Monitor volume status          Moderate protein-calorie malnutrition (HCC)  Assessment & Plan  Malnutrition Findings:                                 BMI Findings:           Body mass index is 20.18 kg/m².   Present on admission as evidenced by BMI of 20    Encourage lifestyle  modification and weight gain         VTE Prophylaxis: Heparin  Code Status: Level 3 DNR/DNI    Anticipated Length of Stay:  Patient will be admitted on an Inpatient basis with an anticipated length of stay of greater than 2 midnights.   Justification for Hospital Stay: Gait dysfunction    Total Time for Visit, including Counseling / Coordination of Care: I have spent a total time of 70 minutes on 07/08/24 in caring for this patient including Diagnostic results, Prognosis, Risks and benefits of tx options, Instructions for management, Patient and family education, Importance of tx compliance, Risk factor reductions, Impressions, Counseling / Coordination of care, Documenting in the medical record, Reviewing / ordering tests, medicine, procedures  , Obtaining or reviewing history  , and Communicating with other healthcare professionals .    Chief Complaint:   Fatigue    History of Present Illness:    Israel Hughes is a 84 y.o. male with past medical history significant for Alzheimer's dementia, BPH, insulin-dependent type 2 diabetes mellitus, HFpEF who presents with ambulatory dysfunction.  The patient has a history of Alzheimer's dementia and the patient's family states that he has been having difficulty with ADLs as well as mild gait disturbance.  In the ED, all vital signs were found to be stable.  Laboratory analysis unremarkable.  The patient and family are amendable to placement to a skilled nursing facility.    Review of Systems:    Review of Systems   Constitutional:  Negative for chills and fever.   HENT:  Negative for ear pain and sore throat.    Eyes:  Negative for pain and visual disturbance.   Respiratory:  Negative for cough and shortness of breath.    Cardiovascular:  Negative for chest pain and palpitations.   Gastrointestinal:  Negative for abdominal pain and vomiting.   Genitourinary:  Negative for dysuria and hematuria.   Musculoskeletal:  Negative for arthralgias and back pain.   Skin:  Negative  for color change and rash.   Neurological:  Positive for weakness. Negative for seizures and syncope.   All other systems reviewed and are negative.      Past Medical and Surgical History:     Past Medical History:   Diagnosis Date    CHF (congestive heart failure) (HCC)     CKD (chronic kidney disease)     Diabetes mellitus (HCC)     Hyperlipidemia     Thoracic spine fracture (HCC) 05/10/2024    estimate       Past Surgical History:   Procedure Laterality Date    CATARACT EXTRACTION      COLONOSCOPY      DENTAL SURGERY      EYE SURGERY      TONSILLECTOMY      VASECTOMY         Meds/Allergies:    Prior to Admission medications    Medication Sig Start Date End Date Taking? Authorizing Provider   acetaminophen (TYLENOL) 325 mg tablet Take 2 tablets (650 mg total) by mouth every 6 (six) hours as needed for mild pain 5/1/24   RUSH Villatoro   aspirin (ECOTRIN LOW STRENGTH) 81 mg EC tablet Take 81 mg by mouth daily    Historical Provider, MD   atorvastatin (LIPITOR) 20 mg tablet Take 1 tablet (20 mg total) by mouth daily 3/4/24   Giovanni Reyez DO   Blood Glucose Monitoring Suppl (Accu-Chek Guide Me) w/Device KIT Use 1 kit in the morning 4/11/24   Giovanni Reyez DO   Cholecalciferol (D 1000) 25 MCG (1000 UT) capsule Take 1 capsule (1,000 Units total) by mouth daily 3/4/24   Giovanni Reyez DO   donepezil (ARICEPT) 5 mg tablet TAKE 2 TABLETS BY MOUTH ONCE DAILY AT BEDTIME 6/30/24   Giovanni Reyez DO   Empagliflozin (JARDIANCE) 10 MG TABS tablet Take 1 tablet (10 mg total) by mouth daily 6/11/24 12/8/24  Giovanni Reyez DO   FLUoxetine (PROzac) 20 mg capsule Take 1 capsule (20 mg total) by mouth every morning 3/4/24   Giovanni Reyez DO   furosemide (LASIX) 40 mg tablet Take 1 tablet (40 mg total) by mouth daily 4/8/24   Giovanni Reyez DO   glucose blood test strip Use 1 each in the morning Use as instructed 4/12/24   Giovanni Reyez DO   insulin glargine (LANTUS) 100 units/mL subcutaneous injection Inject 10 Units  "under the skin daily at bedtime 6/5/24   Giovanni Reyez DO   Insulin Syringe-Needle U-100 27G X 1/2\" 1 ML MISC Use daily 6/12/24   Giovanni Reyez DO   Lancets (accu-chek multiclix) lancets USE   TO CHECK GLUCOSE IN THE MORNING AS DIRECTED 4/15/24   Giovanni Reyez DO   memantine (Namenda) 10 mg tablet Take 1 tablet (10 mg total) by mouth 2 (two) times a day 4/3/24   Giovanni Reyez DO   metFORMIN (GLUCOPHAGE) 1000 MG tablet Take 1 tablet (1,000 mg total) by mouth 2 (two) times a day with meals 3/4/24   Giovanni Reyez DO   Needles & Syringes MISC Use in the morning 6/5/24   Giovanni Reyez DO   nystatin (MYCOSTATIN) powder Apply topically 2 (two) times a day 7/3/24   Giovanni Reyez DO   Omega-3 Fatty Acids (fish oil) 1,000 mg Take 1 capsule (1,000 mg total) by mouth daily 3/4/24   Giovanni Reyez DO   OneTouch Delica Lancets 33G MISC Test once a day 4/3/24   Giovanni Reyez DO   QUEtiapine (SEROquel) 25 mg tablet Take 1 tablet (25 mg total) by mouth in the morning  Patient taking differently: Take 25 mg by mouth 2 (two) times a day before breakfast and lunch 1 at breakfast and 1 at lunch 5/2/24   RUSH Villatoro   QUEtiapine (SEROquel) 50 mg tablet Take 1 tablet (50 mg total) by mouth daily at bedtime 6/5/24   Giovanni Reyez DO   QUEtiapine (SEROquel) 50 mg tablet Take 1 tablet (50 mg total) by mouth daily at bedtime 6/5/24   Giovanni Reyez DO   tamsulosin (FLOMAX) 0.4 mg Take 1 capsule (0.4 mg total) by mouth in the morning 3/4/24   Giovanni Reyez DO       Allergies:   Allergies   Allergen Reactions    Bactrim [Sulfamethoxazole-Trimethoprim] GI Intolerance    Simvastatin Myalgia and Other (See Comments)       Social History:     Marital Status:    Substance Use History:   Social History     Substance and Sexual Activity   Alcohol Use Not Currently     Social History     Tobacco Use   Smoking Status Never    Passive exposure: Never   Smokeless Tobacco Never     Social History     Substance and Sexual " Activity   Drug Use Never       Family History:    Pertinent family history reviewed    Physical Exam:     Vitals:   Blood Pressure: 139/73 (07/08/24 1730)  Pulse: 64 (07/08/24 1730)  Temperature: 97.9 °F (36.6 °C) (07/08/24 1550)  Temp Source: Temporal (07/08/24 1550)  Respirations: 20 (07/08/24 1730)  Weight - Scale: 55 kg (121 lb 4.1 oz) (07/08/24 1550)  SpO2: 94 % (07/08/24 1730)    Physical Exam  Vitals and nursing note reviewed.   Constitutional:       General: He is not in acute distress.     Appearance: He is well-developed.   HENT:      Head: Normocephalic and atraumatic.   Eyes:      Conjunctiva/sclera: Conjunctivae normal.   Cardiovascular:      Rate and Rhythm: Normal rate and regular rhythm.      Heart sounds: No murmur heard.  Pulmonary:      Effort: Pulmonary effort is normal. No respiratory distress.      Breath sounds: Normal breath sounds.   Abdominal:      Palpations: Abdomen is soft.      Tenderness: There is no abdominal tenderness.   Musculoskeletal:         General: No swelling.      Cervical back: Neck supple.   Skin:     General: Skin is warm and dry.      Capillary Refill: Capillary refill takes less than 2 seconds.   Neurological:      Mental Status: He is alert.   Psychiatric:         Mood and Affect: Mood normal.          Additional Data:     Lab Results: I have reviewed pertinent results     Results from last 7 days   Lab Units 07/08/24  1609   WBC Thousand/uL 6.74   HEMOGLOBIN g/dL 13.3   HEMATOCRIT % 40.4   PLATELETS Thousands/uL 213   SEGS PCT % 68   LYMPHO PCT % 22   MONO PCT % 8   EOS PCT % 1     Results from last 7 days   Lab Units 07/08/24  1609   SODIUM mmol/L 139   POTASSIUM mmol/L 4.4   CHLORIDE mmol/L 99   CO2 mmol/L 30   BUN mg/dL 31*   CREATININE mg/dL 1.01   ANION GAP mmol/L 10   CALCIUM mg/dL 9.7   ALBUMIN g/dL 4.0   TOTAL BILIRUBIN mg/dL 0.41   ALK PHOS U/L 86   ALT U/L 14   AST U/L 20   GLUCOSE RANDOM mg/dL 140     Results from last 7 days   Lab Units 07/08/24  1609    INR  0.95     Results from last 7 days   Lab Units 07/08/24  1614   POC GLUCOSE mg/dl 169*               Imaging: I have reviewed pertinent imaging     CT head without contrast   Final Result by Venkat Londono MD (07/08 1705)      No acute intracranial abnormality.                  Workstation performed: [id number]             EKG, Pathology, and Other Studies Reviewed on Admission:   EKG: NSR    Allscripts / Epic Records Reviewed    ** Please Note: This note has been constructed using a voice recognition system. **

## 2024-07-08 NOTE — ASSESSMENT & PLAN NOTE
Patient presents with ambulatory dysfunction and fatigue  Family state patient is unable to care for himself and may need placement to skilled nursing facility  Likely progression of Alzheimer's dementia  Patient and family agreeable to placement by case management    PT/OT evaluation and treatment  Case management consultation for safe disposition planning  Fall precautions

## 2024-07-08 NOTE — TELEPHONE ENCOUNTER
"Answer Assessment - Initial Assessment Questions  1. REASON FOR CALL or QUESTION: \"What is your reason for calling today?\" or \"How can I best help you?\" or \"What question do you have that I can help answer?\"  Pt's caregiver calls in with concerns of pt having increased all over body pain. She states that he is also having increased hallucinations and confusion as well. His appetite is somewhat decreased but he is still eating. He is drinking fluids normally-sugar free gatorade and chocolate milk. Caregiver denies any fever or change in urine habits and states that his vitals are normal. He is hallucinating people in his bedroom and having conversations with people that aren't there. He is refusing to get out of bed. Caregiver unsure if she should call EMS and would like to know what you recommend. Please call denny back directly.    Protocols used: Information Only Call - No Triage-ADULT-[place]    "

## 2024-07-08 NOTE — ED PROVIDER NOTES
"History  Chief Complaint   Patient presents with    Weakness - Generalized     Patient with advanced dementia has been declining over the past week. Now unable to ambulate.     84-year-old male with history of dementia presents with worsening mental status hallucinations over the last week.  Granddaughter helps provide history.  Has had difficulty ambulating, and now she is having more difficulty caring for him at home.  No falls.          Prior to Admission Medications   Prescriptions Last Dose Informant Patient Reported? Taking?   Blood Glucose Monitoring Suppl (Accu-Chek Guide Me) w/Device KIT   No No   Sig: Use 1 kit in the morning   Cholecalciferol (D 1000) 25 MCG (1000 UT) capsule   No No   Sig: Take 1 capsule (1,000 Units total) by mouth daily   Empagliflozin (JARDIANCE) 10 MG TABS tablet   No No   Sig: Take 1 tablet (10 mg total) by mouth daily   FLUoxetine (PROzac) 20 mg capsule   No No   Sig: Take 1 capsule (20 mg total) by mouth every morning   Insulin Syringe-Needle U-100 27G X 1/2\" 1 ML MISC   No No   Sig: Use daily   Lancets (accu-chek multiclix) lancets   No No   Sig: USE   TO CHECK GLUCOSE IN THE MORNING AS DIRECTED   Needles & Syringes MISC   No No   Sig: Use in the morning   Omega-3 Fatty Acids (fish oil) 1,000 mg   No No   Sig: Take 1 capsule (1,000 mg total) by mouth daily   OneTouch Delica Lancets 33G MISC   No No   Sig: Test once a day   QUEtiapine (SEROquel) 25 mg tablet  Self No No   Sig: Take 1 tablet (25 mg total) by mouth in the morning   Patient taking differently: Take 25 mg by mouth 2 (two) times a day before breakfast and lunch 1 at breakfast and 1 at lunch   QUEtiapine (SEROquel) 50 mg tablet   No No   Sig: Take 1 tablet (50 mg total) by mouth daily at bedtime   QUEtiapine (SEROquel) 50 mg tablet   No No   Sig: Take 1 tablet (50 mg total) by mouth daily at bedtime   acetaminophen (TYLENOL) 325 mg tablet   No No   Sig: Take 2 tablets (650 mg total) by mouth every 6 (six) hours as " needed for mild pain   aspirin (ECOTRIN LOW STRENGTH) 81 mg EC tablet   Yes No   Sig: Take 81 mg by mouth daily   atorvastatin (LIPITOR) 20 mg tablet   No No   Sig: Take 1 tablet (20 mg total) by mouth daily   donepezil (ARICEPT) 5 mg tablet   No No   Sig: TAKE 2 TABLETS BY MOUTH ONCE DAILY AT BEDTIME   furosemide (LASIX) 40 mg tablet   No No   Sig: Take 1 tablet (40 mg total) by mouth daily   glucose blood test strip   No No   Sig: Use 1 each in the morning Use as instructed   insulin glargine (LANTUS) 100 units/mL subcutaneous injection   No No   Sig: Inject 10 Units under the skin daily at bedtime   memantine (Namenda) 10 mg tablet   No No   Sig: Take 1 tablet (10 mg total) by mouth 2 (two) times a day   metFORMIN (GLUCOPHAGE) 1000 MG tablet   No No   Sig: Take 1 tablet (1,000 mg total) by mouth 2 (two) times a day with meals   nystatin (MYCOSTATIN) powder   No No   Sig: Apply topically 2 (two) times a day   tamsulosin (FLOMAX) 0.4 mg   No No   Sig: Take 1 capsule (0.4 mg total) by mouth in the morning      Facility-Administered Medications: None       Past Medical History:   Diagnosis Date    CHF (congestive heart failure) (HCC)     CKD (chronic kidney disease)     Diabetes mellitus (HCC)     Hyperlipidemia     Thoracic spine fracture (HCC) 05/10/2024    estimate       Past Surgical History:   Procedure Laterality Date    CATARACT EXTRACTION      COLONOSCOPY      DENTAL SURGERY      EYE SURGERY      TONSILLECTOMY      VASECTOMY         History reviewed. No pertinent family history.  I have reviewed and agree with the history as documented.    E-Cigarette/Vaping    E-Cigarette Use Never User      E-Cigarette/Vaping Substances    Nicotine No     THC No     CBD No     Flavoring No     Other No     Unknown No      Social History     Tobacco Use    Smoking status: Never     Passive exposure: Never    Smokeless tobacco: Never   Vaping Use    Vaping status: Never Used   Substance Use Topics    Alcohol use: Not  Currently    Drug use: Never       Review of Systems    Physical Exam  Physical Exam  Vitals and nursing note reviewed.   Constitutional:       Appearance: Normal appearance. He is well-developed.   HENT:      Head: Normocephalic and atraumatic.      Mouth/Throat:      Mouth: Mucous membranes are dry.   Eyes:      Conjunctiva/sclera: Conjunctivae normal.      Pupils: Pupils are equal, round, and reactive to light.   Neck:      Trachea: No tracheal deviation.   Cardiovascular:      Rate and Rhythm: Normal rate and regular rhythm.      Heart sounds: Normal heart sounds. No murmur heard.  Pulmonary:      Effort: Pulmonary effort is normal. No respiratory distress.      Breath sounds: Normal breath sounds. No wheezing or rales.   Abdominal:      General: Bowel sounds are normal. There is no distension.      Palpations: Abdomen is soft.      Tenderness: There is no abdominal tenderness.   Musculoskeletal:         General: No deformity.      Cervical back: Normal range of motion and neck supple.   Skin:     General: Skin is warm and dry.      Capillary Refill: Capillary refill takes less than 2 seconds.   Neurological:      General: No focal deficit present.      Mental Status: He is alert and oriented to person, place, and time.      Sensory: No sensory deficit.   Psychiatric:         Mood and Affect: Mood normal.         Judgment: Judgment normal.         Vital Signs  ED Triage Vitals   Temperature Pulse Respirations Blood Pressure SpO2   07/08/24 1550 07/08/24 1550 07/08/24 1550 07/08/24 1550 07/08/24 1550   97.9 °F (36.6 °C) 79 20 129/66 95 %      Temp Source Heart Rate Source Patient Position - Orthostatic VS BP Location FiO2 (%)   07/08/24 1550 07/08/24 1550 07/08/24 1550 07/08/24 1550 --   Temporal Monitor Lying Left arm       Pain Score       07/08/24 1809       No Pain           Vitals:    07/08/24 1809 07/08/24 2238 07/09/24 0700 07/09/24 1434   BP: 138/76 141/74 120/71 126/84   Pulse: 65 60 68 81   Patient  Position - Orthostatic VS: Lying            Visual Acuity  Visual Acuity      Flowsheet Row Most Recent Value   L Pupil Size (mm) 2   R Pupil Size (mm) 2   L Pupil Shape Round   R Pupil Shape Round            ED Medications  Medications   aspirin (ECOTRIN LOW STRENGTH) EC tablet 81 mg (81 mg Oral Given 7/9/24 0957)   atorvastatin (LIPITOR) tablet 20 mg (20 mg Oral Given 7/9/24 0957)   donepezil (ARICEPT) tablet 10 mg (10 mg Oral Given 7/8/24 2119)   FLUoxetine (PROzac) capsule 20 mg (20 mg Oral Given 7/9/24 0957)   furosemide (LASIX) tablet 40 mg (40 mg Oral Given 7/9/24 0957)   memantine (NAMENDA) tablet 10 mg (10 mg Oral Given 7/9/24 0957)   QUEtiapine (SEROquel) tablet 25 mg (25 mg Oral Given 7/9/24 1208)   QUEtiapine (SEROquel) tablet 50 mg (50 mg Oral Given 7/8/24 2119)   tamsulosin (FLOMAX) capsule 0.4 mg (0.4 mg Oral Given 7/9/24 0957)   acetaminophen (TYLENOL) tablet 650 mg (has no administration in time range)   ondansetron (ZOFRAN) injection 4 mg (has no administration in time range)   heparin (porcine) subcutaneous injection 5,000 Units (5,000 Units Subcutaneous Given 7/9/24 1449)   insulin lispro (HumALOG/ADMELOG) 100 units/mL subcutaneous injection 1-5 Units (2 Units Subcutaneous Given 7/9/24 1209)   insulin lispro (HumALOG/ADMELOG) 100 units/mL subcutaneous injection 1-5 Units ( Subcutaneous Not Given 7/8/24 2121)   insulin glargine (LANTUS) subcutaneous injection 5 Units 0.05 mL (has no administration in time range)   sodium chloride 0.9 % bolus 1,000 mL (1,000 mL Intravenous New Bag 7/8/24 1636)   magnesium sulfate 2 g/50 mL IVPB (premix) 2 g (2 g Intravenous New Bag 7/9/24 0957)       Diagnostic Studies  Results Reviewed       Procedure Component Value Units Date/Time    Basic metabolic panel [[id number]] Collected: 07/09/24 0523    Lab Status: Final result Specimen: Blood from Arm, Right Updated: 07/09/24 0551     Sodium 139 mmol/L      Potassium 4.0 mmol/L      Chloride 102 mmol/L      CO2 27  mmol/L      ANION GAP 10 mmol/L      BUN 25 mg/dL      Creatinine 0.85 mg/dL      Glucose 65 mg/dL      Calcium 9.1 mg/dL      eGFR 80 ml/min/1.73sq m     Narrative:      National Kidney Disease Foundation guidelines for Chronic Kidney Disease (CKD):     Stage 1 with normal or high GFR (GFR > 90 mL/min/1.73 square meters)    Stage 2 Mild CKD (GFR = 60-89 mL/min/1.73 square meters)    Stage 3A Moderate CKD (GFR = 45-59 mL/min/1.73 square meters)    Stage 3B Moderate CKD (GFR = 30-44 mL/min/1.73 square meters)    Stage 4 Severe CKD (GFR = 15-29 mL/min/1.73 square meters)    Stage 5 End Stage CKD (GFR <15 mL/min/1.73 square meters)  Note: GFR calculation is accurate only with a steady state creatinine    Magnesium [[id number]]  (Abnormal) Collected: 07/09/24 0523    Lab Status: Final result Specimen: Blood from Arm, Right Updated: 07/09/24 0551     Magnesium 1.8 mg/dL     Phosphorus [[id number]]  (Normal) Collected: 07/09/24 0523    Lab Status: Final result Specimen: Blood from Arm, Right Updated: 07/09/24 0551     Phosphorus 3.0 mg/dL     CBC and differential [[id number]] Collected: 07/09/24 0523    Lab Status: Final result Specimen: Blood from Arm, Right Updated: 07/09/24 0529     WBC 8.32 Thousand/uL      RBC 4.06 Million/uL      Hemoglobin 12.6 g/dL      Hematocrit 38.9 %      MCV 96 fL      MCH 31.0 pg      MCHC 32.4 g/dL      RDW 12.2 %      MPV 9.0 fL      Platelets 191 Thousands/uL      nRBC 0 /100 WBCs      Segmented % 71 %      Immature Grans % 1 %      Lymphocytes % 20 %      Monocytes % 7 %      Eosinophils Relative 1 %      Basophils Relative 0 %      Absolute Neutrophils 5.95 Thousands/µL      Absolute Immature Grans 0.05 Thousand/uL      Absolute Lymphocytes 1.67 Thousands/µL      Absolute Monocytes 0.54 Thousand/µL      Eosinophils Absolute 0.08 Thousand/µL      Basophils Absolute 0.03 Thousands/µL     Platelet count [[id number]]  (Normal) Collected: 07/08/24 3578    Lab Status: Final result Specimen: Blood  from Arm, Left Updated: 07/08/24 1852     Platelets 195 Thousands/uL      MPV 9.1 fL     Urine Microscopic [[id number]]  (Normal) Collected: 07/08/24 1721    Lab Status: Final result Specimen: Urine, Straight Cath Updated: 07/08/24 1733     RBC, UA 1-2 /hpf      WBC, UA 0-1 /hpf      Epithelial Cells Occasional /hpf      Bacteria, UA Occasional /hpf     UA w Reflex to Microscopic w Reflex to Culture [[id number]]  (Abnormal) Collected: 07/08/24 1721    Lab Status: Final result Specimen: Urine, Straight Cath Updated: 07/08/24 1727     Color, UA Yellow     Clarity, UA Clear     Specific Gravity, UA 1.015     pH, UA 7.5     Leukocytes, UA Negative     Nitrite, UA Negative     Protein, UA Negative mg/dl      Glucose, UA 3+ mg/dl      Ketones, UA Negative mg/dl      Urobilinogen, UA 1.0 E.U./dl      Bilirubin, UA Negative     Occult Blood, UA Trace-Intact    FLU/RSV/COVID - if FLU/RSV clinically relevant [[id number]]  (Normal) Collected: 07/08/24 1637    Lab Status: Final result Specimen: Nares from Nose Updated: 07/08/24 1725     SARS-CoV-2 Negative     INFLUENZA A PCR Negative     INFLUENZA B PCR Negative     RSV PCR Negative    Narrative:      FOR PEDIATRIC PATIENTS - copy/paste COVID Guidelines URL to browser: [email or web address]    SARS-CoV-2 assay is a Nucleic Acid Amplification assay intended for the  qualitative detection of nucleic acid from SARS-CoV-2 in nasopharyngeal  swabs. Results are for the presumptive identification of SARS-CoV-2 RNA.    Positive results are indicative of infection with SARS-CoV-2, the virus  causing COVID-19, but do not rule out bacterial infection or co-infection  with other viruses. Laboratories within the [place] and its  territories are required to report all positive results to the appropriate  public health authorities. Negative results do not preclude SARS-CoV-2  infection and should not be used as the sole basis for  treatment or other  patient management decisions. Negative results must be combined with  clinical observations, patient history, and epidemiological information.  This test has not been FDA cleared or approved.    This test has been authorized by FDA under an Emergency Use Authorization  (EUA). This test is only authorized for the duration of time the  declaration that circumstances exist justifying the authorization of the  emergency use of an in vitro diagnostic tests for detection of SARS-CoV-2  virus and/or diagnosis of COVID-19 infection under section 564(b)(1) of  the Act, 21 U.S.C. 360bbb-3(b)(1), unless the authorization is terminated  or revoked sooner. The test has been validated but independent review by FDA  and CLIA is pending.    Test performed using [employer]: This RT-PCR assay targets N2,  a region unique to SARS-CoV-2. A conserved region in the E-gene was chosen  for pan-Sarbecovirus detection which includes SARS-CoV-2.    According to CMS-2020-01-R, this platform meets the definition of high-throughput technology.    Comprehensive metabolic panel [[id number]]  (Abnormal) Collected: 07/08/24 1609    Lab Status: Final result Specimen: Blood from Arm, Right Updated: 07/08/24 1630     Sodium 139 mmol/L      Potassium 4.4 mmol/L      Chloride 99 mmol/L      CO2 30 mmol/L      ANION GAP 10 mmol/L      BUN 31 mg/dL      Creatinine 1.01 mg/dL      Glucose 140 mg/dL      Calcium 9.7 mg/dL      AST 20 U/L      ALT 14 U/L      Alkaline Phosphatase 86 U/L      Total Protein 7.2 g/dL      Albumin 4.0 g/dL      Total Bilirubin 0.41 mg/dL      eGFR 68 ml/min/1.73sq m     Narrative:      National Kidney Disease Foundation guidelines for Chronic Kidney Disease (CKD):     Stage 1 with normal or high GFR (GFR > 90 mL/min/1.73 square meters)    Stage 2 Mild CKD (GFR = 60-89 mL/min/1.73 square meters)    Stage 3A Moderate CKD (GFR = 45-59 mL/min/1.73 square meters)    Stage 3B Moderate CKD (GFR = 30-44  mL/min/1.73 square meters)    Stage 4 Severe CKD (GFR = 15-29 mL/min/1.73 square meters)    Stage 5 End Stage CKD (GFR <15 mL/min/1.73 square meters)  Note: GFR calculation is accurate only with a steady state creatinine    Magnesium [[id number]]  (Normal) Collected: 07/08/24 1609    Lab Status: Final result Specimen: Blood from Arm, Right Updated: 07/08/24 1630     Magnesium 1.9 mg/dL     Protime-INR [[id number]]  (Normal) Collected: 07/08/24 1609    Lab Status: Final result Specimen: Blood from Arm, Right Updated: 07/08/24 1628     Protime 12.8 seconds      INR 0.95    APTT [[id number]]  (Normal) Collected: 07/08/24 1609    Lab Status: Final result Specimen: Blood from Arm, Right Updated: 07/08/24 1628     PTT 30 seconds     CBC and differential [[id number]] Collected: 07/08/24 1609    Lab Status: Final result Specimen: Blood from Arm, Right Updated: 07/08/24 1618     WBC 6.74 Thousand/uL      RBC 4.24 Million/uL      Hemoglobin 13.3 g/dL      Hematocrit 40.4 %      MCV 95 fL      MCH 31.4 pg      MCHC 32.9 g/dL      RDW 12.4 %      MPV 9.4 fL      Platelets 213 Thousands/uL      nRBC 0 /100 WBCs      Segmented % 68 %      Immature Grans % 1 %      Lymphocytes % 22 %      Monocytes % 8 %      Eosinophils Relative 1 %      Basophils Relative 0 %      Absolute Neutrophils 4.62 Thousands/µL      Absolute Immature Grans 0.05 Thousand/uL      Absolute Lymphocytes 1.47 Thousands/µL      Absolute Monocytes 0.51 Thousand/µL      Eosinophils Absolute 0.07 Thousand/µL      Basophils Absolute 0.02 Thousands/µL     Fingerstick Glucose (POCT) [[id number]]  (Abnormal) Collected: 07/08/24 1614    Lab Status: Final result Specimen: Blood Updated: 07/08/24 1614     POC Glucose 169 mg/dl                    CT head without contrast   Final Result by Venkat Londono MD (07/08 1705)      No acute intracranial abnormality.                  Workstation performed: [id number]                    Procedures  Procedures         ED Course                                SBIRT 22yo+      Flowsheet Row Most Recent Value   Initial Alcohol Screen: US AUDIT-C     1. How often do you have a drink containing alcohol? 0 Filed at: 07/08/2024 1553   2. How many drinks containing alcohol do you have on a typical day you are drinking?  0 Filed at: 07/08/2024 1553   3a. Male UNDER 65: How often do you have five or more drinks on one occasion? 0 Filed at: 07/08/2024 1553   3b. FEMALE Any Age, or MALE 65+: How often do you have 4 or more drinks on one occassion? 0 Filed at: 07/08/2024 1553   Audit-C Score 0 Filed at: 07/08/2024 1553   KATINA: How many times in the past year have you...    Used an illegal drug or used a prescription medication for non-medical reasons? Never Filed at: 07/08/2024 1553                      Medical Decision Making  84-year-old male with dementia presents with symptoms consistent with failure to thrive likely advanced dementia.  Infection also possible as well as dehydration.  Will get a chemistry look for electrolyte abnormalities, urinalysis, CBC to look for leukocytosis or anemia.  Will also require a CT scan of the head to rule out intracranial hemorrhage etc.    CT head viewed and interpreted independently, no acute disease was appreciated.  Patient still have difficulty caring for himself, will require admission for altered mental status, possible placement or acute rehab.    Problems Addressed:  Altered mental state: acute illness or injury  Dementia (HCC): acute illness or injury    Amount and/or Complexity of Data Reviewed  Independent Historian: caregiver  Labs: ordered. Decision-making details documented in ED Course.  Radiology: ordered and independent interpretation performed. Decision-making details documented in ED Course.    Risk  Decision regarding hospitalization.             Disposition  Final diagnoses:   Altered mental state   Dementia (HCC)     Time reflects when diagnosis was documented in both MDM as applicable and the  Disposition within this note       Time User Action Codes Description Comment    7/8/2024  5:32 PM Drew Marcano Add [R41.82] Altered mental state     7/8/2024  5:32 PM Drew Marcano Add [F03.90] Dementia (HCC)     7/8/2024  5:38 PM Steven Russo Modify [R41.82] Altered mental state     7/8/2024  5:38 PM Steven Russo Modify [F03.90] Dementia (HCC)           ED Disposition       ED Disposition   Admit    Condition   Stable    Date/Time   Mon Jul 8, 2024 1724    Comment   Case was discussed with mila and the patient's admission status was agreed to be Admission Status: inpatient status to the service of Dr. zaragoza .               Follow-up Information    None         Current Discharge Medication List        CONTINUE these medications which have NOT CHANGED    Details   acetaminophen (TYLENOL) 325 mg tablet Take 2 tablets (650 mg total) by mouth every 6 (six) hours as needed for mild pain    Associated Diagnoses: Compression fracture of T12 vertebra, initial encounter ([facility])      aspirin (ECOTRIN LOW STRENGTH) 81 mg EC tablet Take 81 mg by mouth daily      atorvastatin (LIPITOR) 20 mg tablet Take 1 tablet (20 mg total) by mouth daily  Qty: 90 tablet, Refills: 1    Associated Diagnoses: Type 2 diabetes mellitus with retinopathy of both eyes, without long-term current use of insulin, macular edema presence unspecified, unspecified retinopathy severity ([facility]); Mixed hyperlipidemia      Blood Glucose Monitoring Suppl (Accu-Chek Guide Me) w/Device KIT Use 1 kit in the morning  Qty: 1 kit, Refills: 0    Associated Diagnoses: Type 2 diabetes mellitus with retinopathy of both eyes, without long-term current use of insulin, macular edema presence unspecified, unspecified retinopathy severity ([facility])      Cholecalciferol (D 1000) 25 MCG (1000 UT) capsule Take 1 capsule (1,000 Units total) by mouth daily  Qty: 90 capsule, Refills: 1    Associated Diagnoses: Vitamin D deficiency      donepezil (ARICEPT) 5 mg tablet TAKE 2  "TABLETS BY MOUTH ONCE DAILY AT BEDTIME  Qty: 180 tablet, Refills: 1    Associated Diagnoses: Memory difficulty      Empagliflozin (JARDIANCE) 10 MG TABS tablet Take 1 tablet (10 mg total) by mouth daily  Qty: 90 tablet, Refills: 1    Associated Diagnoses: Type 2 diabetes mellitus with retinopathy of both eyes, without long-term current use of insulin, macular edema presence unspecified, unspecified retinopathy severity (HCC)      FLUoxetine (PROzac) 20 mg capsule Take 1 capsule (20 mg total) by mouth every morning  Qty: 90 capsule, Refills: 1    Associated Diagnoses: Agitation      furosemide (LASIX) 40 mg tablet Take 1 tablet (40 mg total) by mouth daily  Qty: 90 tablet, Refills: 1    Associated Diagnoses: CHF exacerbation (HCC)      glucose blood test strip Use 1 each in the morning Use as instructed  Qty: 100 strip, Refills: 1    Associated Diagnoses: Type 2 diabetes mellitus with retinopathy of both eyes, without long-term current use of insulin, macular edema presence unspecified, unspecified retinopathy severity (HCC)      insulin glargine (LANTUS) 100 units/mL subcutaneous injection Inject 10 Units under the skin daily at bedtime  Qty: 10 mL, Refills: 0    Associated Diagnoses: 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); Fall, subsequent encounter; Compression fracture of T12 vertebra with routine healing, subsequent encounter; Physical deconditioning; Gait abnormality; Type 2 diabetes mellitus with retinopathy of both eyes, without long-term current use of insulin, macular edema presence unspecified, unspecified retinopathy severity (HCC)      Insulin Syringe-Needle U-100 27G X 1/2\" 1 ML MISC Use daily  Qty: 100 each, Refills: 3    Associated Diagnoses: Type 2 diabetes mellitus with retinopathy of both eyes, without long-term current use of insulin, macular edema presence unspecified, unspecified retinopathy severity (HCC)      !! 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)      metFORMIN (GLUCOPHAGE) 1000 MG tablet Take 1 tablet (1,000 mg total) by mouth 2 (two) times a day with meals  Qty: 180 tablet, Refills: 1    Associated Diagnoses: Type 2 diabetes mellitus with retinopathy of both eyes, without long-term current use of insulin, macular edema presence unspecified, unspecified retinopathy severity (HCC)      Needles & Syringes MISC Use in the morning  Qty: 100 each, Refills: 3    Associated Diagnoses: 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); Fall, subsequent encounter; Compression fracture of T12 vertebra with routine healing, subsequent encounter; Physical deconditioning; Gait abnormality; Type 2 diabetes mellitus with retinopathy of both eyes, without long-term current use of insulin, macular edema presence unspecified, unspecified retinopathy severity (HCC)      nystatin (MYCOSTATIN) powder Apply topically 2 (two) times a day  Qty: 60 g, Refills: 1    Associated Diagnoses: Dermatitis      Omega-3 Fatty Acids (fish oil) 1,000 mg Take 1 capsule (1,000 mg total) by mouth daily  Qty: 90 capsule, Refills: 1    Associated Diagnoses: Mixed hyperlipidemia      !! OneTouch Delica Lancets 33G MISC Test once a day  Qty: 100 each, Refills: 5    Associated Diagnoses: Type 2 diabetes mellitus with retinopathy of both eyes, without long-term current use of insulin, macular edema presence unspecified, unspecified retinopathy severity (HCC)      !! 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 - Potential duplicate medications found. Please discuss with provider.          No discharge procedures on file.    PDMP Review       None            ED Provider  Electronically Signed by             Drew Marcano DO  07/09/24 7431

## 2024-07-08 NOTE — ED PROVIDER NOTES
"History  Chief Complaint   Patient presents with    Weakness - Generalized     Patient with advanced dementia has been declining over the past week. Now unable to ambulate.     HPI    Prior to Admission Medications   Prescriptions Last Dose Informant Patient Reported? Taking?   Blood Glucose Monitoring Suppl (Accu-Chek Guide Me) w/Device KIT   No No   Sig: Use 1 kit in the morning   Cholecalciferol (D 1000) 25 MCG (1000 UT) capsule   No No   Sig: Take 1 capsule (1,000 Units total) by mouth daily   Empagliflozin (JARDIANCE) 10 MG TABS tablet   No No   Sig: Take 1 tablet (10 mg total) by mouth daily   FLUoxetine (PROzac) 20 mg capsule   No No   Sig: Take 1 capsule (20 mg total) by mouth every morning   Insulin Syringe-Needle U-100 27G X 1/2\" 1 ML MISC   No No   Sig: Use daily   Lancets (accu-chek multiclix) lancets   No No   Sig: USE   TO CHECK GLUCOSE IN THE MORNING AS DIRECTED   Needles & Syringes MISC   No No   Sig: Use in the morning   Omega-3 Fatty Acids (fish oil) 1,000 mg   No No   Sig: Take 1 capsule (1,000 mg total) by mouth daily   OneTouch Delica Lancets 33G MISC   No No   Sig: Test once a day   QUEtiapine (SEROquel) 25 mg tablet  Self No No   Sig: Take 1 tablet (25 mg total) by mouth in the morning   Patient taking differently: Take 25 mg by mouth 2 (two) times a day before breakfast and lunch 1 at breakfast and 1 at lunch   QUEtiapine (SEROquel) 50 mg tablet   No No   Sig: Take 1 tablet (50 mg total) by mouth daily at bedtime   QUEtiapine (SEROquel) 50 mg tablet   No No   Sig: Take 1 tablet (50 mg total) by mouth daily at bedtime   acetaminophen (TYLENOL) 325 mg tablet   No No   Sig: Take 2 tablets (650 mg total) by mouth every 6 (six) hours as needed for mild pain   aspirin (ECOTRIN LOW STRENGTH) 81 mg EC tablet   Yes No   Sig: Take 81 mg by mouth daily   atorvastatin (LIPITOR) 20 mg tablet   No No   Sig: Take 1 tablet (20 mg total) by mouth daily   donepezil (ARICEPT) 5 mg tablet   No No   Sig: TAKE 2 " TABLETS BY MOUTH ONCE DAILY AT BEDTIME   furosemide (LASIX) 40 mg tablet   No No   Sig: Take 1 tablet (40 mg total) by mouth daily   glucose blood test strip   No No   Sig: Use 1 each in the morning Use as instructed   insulin glargine (LANTUS) 100 units/mL subcutaneous injection   No No   Sig: Inject 10 Units under the skin daily at bedtime   memantine (Namenda) 10 mg tablet   No No   Sig: Take 1 tablet (10 mg total) by mouth 2 (two) times a day   metFORMIN (GLUCOPHAGE) 1000 MG tablet   No No   Sig: Take 1 tablet (1,000 mg total) by mouth 2 (two) times a day with meals   nystatin (MYCOSTATIN) powder   No No   Sig: Apply topically 2 (two) times a day   tamsulosin (FLOMAX) 0.4 mg   No No   Sig: Take 1 capsule (0.4 mg total) by mouth in the morning      Facility-Administered Medications: None       Past Medical History:   Diagnosis Date    CHF (congestive heart failure) (HCC)     CKD (chronic kidney disease)     Diabetes mellitus (HCC)     Hyperlipidemia     Thoracic spine fracture (HCC) 05/10/2024    estimate       Past Surgical History:   Procedure Laterality Date    CATARACT EXTRACTION      COLONOSCOPY      DENTAL SURGERY      EYE SURGERY      TONSILLECTOMY      VASECTOMY         History reviewed. No pertinent family history.  I have reviewed and agree with the history as documented.    E-Cigarette/Vaping    E-Cigarette Use Never User      E-Cigarette/Vaping Substances    Nicotine No     THC No     CBD No     Flavoring No     Other No     Unknown No      Social History     Tobacco Use    Smoking status: Never     Passive exposure: Never    Smokeless tobacco: Never   Vaping Use    Vaping status: Never Used   Substance Use Topics    Alcohol use: Not Currently    Drug use: Never       Review of Systems    Physical Exam  Physical Exam    Vital Signs  ED Triage Vitals [07/08/24 1550]   Temperature Pulse Respirations Blood Pressure SpO2   97.9 °F (36.6 °C) 79 20 129/66 95 %      Temp Source Heart Rate Source Patient  Position - Orthostatic VS BP Location FiO2 (%)   Temporal Monitor Lying Left arm --      Pain Score       --           Vitals:    07/08/24 1550   BP: 129/66   Pulse: 79   Patient Position - Orthostatic VS: Lying         Visual Acuity  Visual Acuity      Flowsheet Row Most Recent Value   L Pupil Size (mm) 2   R Pupil Size (mm) 2            ED Medications  Medications - No data to display    Diagnostic Studies  Results Reviewed       Procedure Component Value Units Date/Time    CBC and differential [[id number]]     Lab Status: No result Specimen: Blood     Protime-INR [[id number]]     Lab Status: No result Specimen: Blood     APTT [[id number]]     Lab Status: No result Specimen: Blood     Comprehensive metabolic panel [[id number]]     Lab Status: No result Specimen: Blood     Magnesium [[id number]]     Lab Status: No result Specimen: Blood     UA w Reflex to Microscopic w Reflex to Culture [[id number]]     Lab Status: No result Specimen: Urine                    No orders to display              Procedures  Procedures         ED Course                               SBIRT 20yo+      Flowsheet Row Most Recent Value   Initial Alcohol Screen: US AUDIT-C     1. How often do you have a drink containing alcohol? 0 Filed at: 07/08/2024 1553   2. How many drinks containing alcohol do you have on a typical day you are drinking?  0 Filed at: 07/08/2024 1553   3a. Male UNDER 65: How often do you have five or more drinks on one occasion? 0 Filed at: 07/08/2024 0033   3b. FEMALE Any Age, or MALE 65+: How often do you have 4 or more drinks on one occassion? 0 Filed at: 07/08/2024 1553   Audit-C Score 0 Filed at: 07/08/2024 1553   KATINA: How many times in the past year have you...    Used an illegal drug or used a prescription medication for non-medical reasons? Never Filed at: 07/08/2024 1553                      Medical Decision Making  Amount and/or Complexity of Data Reviewed  Labs: ordered.             Disposition  Final diagnoses:    None     ED Disposition       None          Follow-up Information    None         Patient's Medications   Discharge Prescriptions    No medications on file       No discharge procedures on file.    PDMP Review       None            ED Provider  Electronically Signed by             Neto Still MD  07/08/24 1163

## 2024-07-08 NOTE — ASSESSMENT & PLAN NOTE
Euvolemic and well compensated  Weight at baseline    Continue home loop diuretic  Monitor volume status

## 2024-07-09 LAB
ANION GAP SERPL CALCULATED.3IONS-SCNC: 10 MMOL/L (ref 4–13)
BASOPHILS # BLD AUTO: 0.03 THOUSANDS/ÂΜL (ref 0–0.1)
BASOPHILS NFR BLD AUTO: 0 % (ref 0–1)
BUN SERPL-MCNC: 25 MG/DL (ref 5–25)
CALCIUM SERPL-MCNC: 9.1 MG/DL (ref 8.4–10.2)
CHLORIDE SERPL-SCNC: 102 MMOL/L (ref 96–108)
CO2 SERPL-SCNC: 27 MMOL/L (ref 21–32)
CREAT SERPL-MCNC: 0.85 MG/DL (ref 0.6–1.3)
EOSINOPHIL # BLD AUTO: 0.08 THOUSAND/ÂΜL (ref 0–0.61)
EOSINOPHIL NFR BLD AUTO: 1 % (ref 0–6)
ERYTHROCYTE [DISTWIDTH] IN BLOOD BY AUTOMATED COUNT: 12.2 % (ref 11.6–15.1)
GFR SERPL CREATININE-BSD FRML MDRD: 80 ML/MIN/1.73SQ M
GLUCOSE SERPL-MCNC: 125 MG/DL (ref 65–140)
GLUCOSE SERPL-MCNC: 141 MG/DL (ref 65–140)
GLUCOSE SERPL-MCNC: 162 MG/DL (ref 65–140)
GLUCOSE SERPL-MCNC: 235 MG/DL (ref 65–140)
GLUCOSE SERPL-MCNC: 64 MG/DL (ref 65–140)
GLUCOSE SERPL-MCNC: 65 MG/DL (ref 65–140)
HCT VFR BLD AUTO: 38.9 % (ref 36.5–49.3)
HGB BLD-MCNC: 12.6 G/DL (ref 12–17)
IMM GRANULOCYTES # BLD AUTO: 0.05 THOUSAND/UL (ref 0–0.2)
IMM GRANULOCYTES NFR BLD AUTO: 1 % (ref 0–2)
LYMPHOCYTES # BLD AUTO: 1.67 THOUSANDS/ÂΜL (ref 0.6–4.47)
LYMPHOCYTES NFR BLD AUTO: 20 % (ref 14–44)
MAGNESIUM SERPL-MCNC: 1.8 MG/DL (ref 1.9–2.7)
MCH RBC QN AUTO: 31 PG (ref 26.8–34.3)
MCHC RBC AUTO-ENTMCNC: 32.4 G/DL (ref 31.4–37.4)
MCV RBC AUTO: 96 FL (ref 82–98)
MONOCYTES # BLD AUTO: 0.54 THOUSAND/ÂΜL (ref 0.17–1.22)
MONOCYTES NFR BLD AUTO: 7 % (ref 4–12)
NEUTROPHILS # BLD AUTO: 5.95 THOUSANDS/ÂΜL (ref 1.85–7.62)
NEUTS SEG NFR BLD AUTO: 71 % (ref 43–75)
NRBC BLD AUTO-RTO: 0 /100 WBCS
PHOSPHATE SERPL-MCNC: 3 MG/DL (ref 2.3–4.1)
PLATELET # BLD AUTO: 191 THOUSANDS/UL (ref 149–390)
PMV BLD AUTO: 9 FL (ref 8.9–12.7)
POTASSIUM SERPL-SCNC: 4 MMOL/L (ref 3.5–5.3)
RBC # BLD AUTO: 4.06 MILLION/UL (ref 3.88–5.62)
SODIUM SERPL-SCNC: 139 MMOL/L (ref 135–147)
WBC # BLD AUTO: 8.32 THOUSAND/UL (ref 4.31–10.16)

## 2024-07-09 PROCEDURE — 85025 COMPLETE CBC W/AUTO DIFF WBC: CPT | Performed by: INTERNAL MEDICINE

## 2024-07-09 PROCEDURE — 99232 SBSQ HOSP IP/OBS MODERATE 35: CPT | Performed by: INTERNAL MEDICINE

## 2024-07-09 PROCEDURE — 82948 REAGENT STRIP/BLOOD GLUCOSE: CPT

## 2024-07-09 PROCEDURE — 80048 BASIC METABOLIC PNL TOTAL CA: CPT | Performed by: INTERNAL MEDICINE

## 2024-07-09 PROCEDURE — 84100 ASSAY OF PHOSPHORUS: CPT | Performed by: INTERNAL MEDICINE

## 2024-07-09 PROCEDURE — 83735 ASSAY OF MAGNESIUM: CPT | Performed by: INTERNAL MEDICINE

## 2024-07-09 RX ORDER — MAGNESIUM SULFATE HEPTAHYDRATE 40 MG/ML
2 INJECTION, SOLUTION INTRAVENOUS ONCE
Status: COMPLETED | OUTPATIENT
Start: 2024-07-09 | End: 2024-07-09

## 2024-07-09 RX ORDER — INSULIN GLARGINE 100 [IU]/ML
5 INJECTION, SOLUTION SUBCUTANEOUS
Status: DISCONTINUED | OUTPATIENT
Start: 2024-07-09 | End: 2024-07-12

## 2024-07-09 RX ADMIN — MEMANTINE 10 MG: 5 TABLET ORAL at 17:32

## 2024-07-09 RX ADMIN — MEMANTINE 10 MG: 5 TABLET ORAL at 09:57

## 2024-07-09 RX ADMIN — QUETIAPINE FUMARATE 25 MG: 25 TABLET ORAL at 12:08

## 2024-07-09 RX ADMIN — TAMSULOSIN HYDROCHLORIDE 0.4 MG: 0.4 CAPSULE ORAL at 09:57

## 2024-07-09 RX ADMIN — HEPARIN SODIUM 5000 UNITS: 5000 INJECTION INTRAVENOUS; SUBCUTANEOUS at 21:22

## 2024-07-09 RX ADMIN — QUETIAPINE FUMARATE 50 MG: 50 TABLET ORAL at 21:22

## 2024-07-09 RX ADMIN — QUETIAPINE FUMARATE 25 MG: 25 TABLET ORAL at 07:33

## 2024-07-09 RX ADMIN — HEPARIN SODIUM 5000 UNITS: 5000 INJECTION INTRAVENOUS; SUBCUTANEOUS at 14:49

## 2024-07-09 RX ADMIN — ASPIRIN 81 MG: 81 TABLET, COATED ORAL at 09:57

## 2024-07-09 RX ADMIN — FUROSEMIDE 40 MG: 40 TABLET ORAL at 09:57

## 2024-07-09 RX ADMIN — INSULIN GLARGINE 5 UNITS: 100 INJECTION, SOLUTION SUBCUTANEOUS at 21:23

## 2024-07-09 RX ADMIN — DONEPEZIL HYDROCHLORIDE 10 MG: 10 TABLET ORAL at 21:22

## 2024-07-09 RX ADMIN — ATORVASTATIN CALCIUM 20 MG: 20 TABLET, FILM COATED ORAL at 09:57

## 2024-07-09 RX ADMIN — HEPARIN SODIUM 5000 UNITS: 5000 INJECTION INTRAVENOUS; SUBCUTANEOUS at 05:49

## 2024-07-09 RX ADMIN — MAGNESIUM SULFATE HEPTAHYDRATE 2 G: 40 INJECTION, SOLUTION INTRAVENOUS at 09:57

## 2024-07-09 RX ADMIN — FLUOXETINE HYDROCHLORIDE 20 MG: 20 CAPSULE ORAL at 09:57

## 2024-07-09 RX ADMIN — INSULIN LISPRO 2 UNITS: 100 INJECTION, SOLUTION INTRAVENOUS; SUBCUTANEOUS at 12:09

## 2024-07-09 NOTE — PLAN OF CARE
Problem: PAIN - ADULT  Goal: Verbalizes/displays adequate comfort level or baseline comfort level  Description: Interventions:  - Encourage patient to monitor pain and request assistance  - Assess pain using appropriate pain scale  - Administer analgesics based on type and severity of pain and evaluate response  - Implement non-pharmacological measures as appropriate and evaluate response  - Consider cultural and social influences on pain and pain management  - Notify physician/advanced practitioner if interventions unsuccessful or patient reports new pain  Outcome: Progressing     Problem: INFECTION - ADULT  Goal: Absence or prevention of progression during hospitalization  Description: INTERVENTIONS:  - Assess and monitor for signs and symptoms of infection  - Monitor lab/diagnostic results  - Monitor all insertion sites, i.e. indwelling lines, tubes, and drains  - Monitor endotracheal if appropriate and nasal secretions for changes in amount and color  - [place] appropriate cooling/warming therapies per order  - Administer medications as ordered  - Instruct and encourage patient and family to use good hand hygiene technique  - Identify and instruct in appropriate isolation precautions for identified infection/condition  Outcome: Progressing  Goal: Absence of fever/infection during neutropenic period  Description: INTERVENTIONS:  - Monitor WBC    Outcome: Progressing

## 2024-07-09 NOTE — PLAN OF CARE
Problem: Potential for Falls  Goal: Patient will remain free of falls  Description: INTERVENTIONS:  - Educate patient/family on patient safety including physical limitations  - Instruct patient to call for assistance with activity   - Consult OT/PT to assist with strengthening/mobility   - Keep Call bell within reach  - Keep bed low and locked with side rails adjusted as appropriate  - Keep care items and personal belongings within reach  - Initiate and maintain comfort rounds  - Make Fall Risk Sign visible to staff  - Offer Toileting every 2 Hours, in advance of need  - Initiate/Maintain bed alarm  - Obtain necessary fall risk management equipment: socks  - Apply yellow socks and bracelet for high fall risk patients  - Consider moving patient to room near nurses station  Outcome: Not Progressing     Problem: INFECTION - ADULT  Goal: Absence or prevention of progression during hospitalization  Description: INTERVENTIONS:  - Assess and monitor for signs and symptoms of infection  - Monitor lab/diagnostic results  - Monitor all insertion sites, i.e. indwelling lines, tubes, and drains  - Monitor endotracheal if appropriate and nasal secretions for changes in amount and color  - [place] appropriate cooling/warming therapies per order  - Administer medications as ordered  - Instruct and encourage patient and family to use good hand hygiene technique  - Identify and instruct in appropriate isolation precautions for identified infection/condition  Outcome: Not Progressing     Problem: SAFETY ADULT  Goal: Patient will remain free of falls  Description: INTERVENTIONS:  - Educate patient/family on patient safety including physical limitations  - Instruct patient to call for assistance with activity   - Consult OT/PT to assist with strengthening/mobility   - Keep Call bell within reach  - Keep bed low and locked with side rails adjusted as appropriate  - Keep care items and personal belongings within reach  - Initiate and  maintain comfort rounds  - Make Fall Risk Sign visible to staff  - Offer Toileting every 2 Hours, in advance of need  - Initiate/Maintain bed alarm  - Obtain necessary fall risk management equipment: socks  - Apply yellow socks and bracelet for high fall risk patients  - Consider moving patient to room near nurses station  Outcome: Not Progressing     Problem: Knowledge Deficit  Goal: Patient/family/caregiver demonstrates understanding of disease process, treatment plan, medications, and discharge instructions  Description: Complete learning assessment and assess knowledge base.  Interventions:  - Provide teaching at level of understanding  - Provide teaching via preferred learning methods  Outcome: Not Progressing     Problem: Nutrition/Hydration-ADULT  Goal: Nutrient/Hydration intake appropriate for improving, restoring or maintaining nutritional needs  Description: Monitor and assess patient's nutrition/hydration status for malnutrition. Collaborate with interdisciplinary team and initiate plan and interventions as ordered.  Monitor patient's weight and dietary intake as ordered or per policy. Utilize nutrition screening tool and intervene as necessary. Determine patient's food preferences and provide high-protein, high-caloric foods as appropriate.     INTERVENTIONS:  - Monitor oral intake, urinary output, labs, and treatment plans  - Assess nutrition and hydration status and recommend course of action  - Evaluate amount of meals eaten  - Assist patient with eating if necessary   - Allow adequate time for meals  - Recommend/ encourage appropriate diets, oral nutritional supplements, and vitamin/mineral supplements  - Order, calculate, and assess calorie counts as needed  - Recommend, monitor, and adjust tube feedings and TPN/PPN based on assessed needs  - Assess need for intravenous fluids  - Provide specific nutrition/hydration education as appropriate  - Include patient/family/caregiver in decisions related to  nutrition  Outcome: Not Progressing

## 2024-07-09 NOTE — NURSING NOTE
Patient morning FSBS 64. Patient given orange juice, Patient CHAD in to feed breakfast. Will re-check blood sugar.

## 2024-07-09 NOTE — CASE MANAGEMENT
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) 2024     Orthostatic lightheadedness 2024     Chronic diastolic congestive heart failure (HCC) 2024     Stage 3 chronic kidney disease, unspecified whether stage 3a or 3b CKD (HCC) 2023     Mild protein-calorie malnutrition (HCC) 2022     Dysphagia 2022     Mild aortic stenosis 10/22/2021     Mild mitral regurgitation 10/22/2021     Diastolic dysfunction 10/22/2021     Mild concentric left ventricular hypertrophy (LVH) 10/22/2021     Type 2 diabetes mellitus with retinopathy, without long-term current use of insulin ([facility]) 09/15/2021     Myasthenia gravis without exacerbation ([facility]) 09/15/2021     Mixed hyperlipidemia 09/15/2021     Retinopathy 09/15/2021     Primary open angle glaucoma (POAG) of both eyes, mild stage 09/15/2021     Ptosis of both eyelids 09/15/2021     Benign prostatic hyperplasia without lower urinary tract symptoms 09/15/2021     Essential hypertension 09/15/2021     Other age-related cataract 09/15/2021       LOS (days): 1  Geometric Mean LOS (GMLOS) (days):   Days to GMLOS:     OBJECTIVE:    Risk of Unplanned Readmission Score: 33.32     Current admission status: Inpatient    Preferred Pharmacy:   [facility]  [place] - [address]  [address] [address]  Phone: [phone] Fax: [phone]    Primary Care Provider: Giovanni Reyez DO    Primary Insurance:  [employer] REP  Secondary Insurance:     ASSESSMENT:  Active Health Care Proxies       Bianca Colbert Alternate Health Care Agent - Grandchild   Primary Phone: [phone] (Mobile)                 Advance Directives  Does patient have a Health Care POA?: Yes  Does patient have Advance Directives?: Yes  Advance Directives: Living will, Power of  for health care  Primary Contact: Chiqui BONILLA    Readmission Root Cause  30 Day Readmission: No    Patient Information  Admitted from:: Home  Mental Status: Confused  During Assessment patient was accompanied by: Other-Comment (TC to Chiqui Burns to obtain all information)  Primary Caregiver: Other (Comment) (CHAD)  Caregiver's Name:: Bianca Colbert  Caregiver's Relationship to Patient:: Family Member (Leonidas)  Caregiver's Telephone Number:: [phone]  Support Systems: Family members  [place] [facility] Residence: [facility] do you live in?: [place]  Type of Current Residence: 2 story home (Lives on the first floor)  Upon entering residence, is there a bedroom on the main floor (no further steps)?: Yes  Upon entering residence, is there a bathroom on the main floor (no further steps)?: Yes  Living Arrangements: Lives Alone  Is patient a ?: Yes  Is patient active with [facility] ([facility] [facility])?: No    Activities of Daily Living Prior to Admission  Functional Status: Assistance  Completes ADLs independently?: No  Level of ADL dependence: Assistance  Ambulates independently?: Yes  Does patient use assisted devices?: No  Does patient currently own DME?: Yes  What DME does the patient currently own?: Shower Chair, Hospital Bed, Walker, Straight Cane, Bedside Commode, Other (Comment) (Glucometer, BP cuff)  Does patient have a history of Outpatient Therapy (PT/OT)?: No  Does the patient have a history of Short-Term Rehab?: Yes ([facility])  Does patient have a history of HHC?: Yes  Does patient currently have HHC?: No    Patient Information  Continued  Income Source: Pension/alf  Does patient have prescription coverage?: Yes  Does patient receive dialysis treatments?: No  Does patient have a history of substance abuse?: No  Does patient have a history of Mental Health Diagnosis?: No    PHQ 2/9 Screening   Reviewed PHQ 2/9 Depression Screening Score?: No    Means of Transportation  Means of Transport to Appts:: Family transport    DISCHARGE DETAILS:    Discharge planning discussed with:: Pt CHAD Burns  Freedom of Choice: Yes  Comments - Freedom of Choice: CHAD (POA) in agreement with a referral being made via [facility] for STR  CM contacted family/caregiver?: Yes  Were Treatment Team discharge recommendations reviewed with patient/caregiver?: Yes  Did patient/caregiver verbalize understanding of patient care needs?: Yes  Were patient/caregiver advised of the risks associated with not following Treatment Team discharge recommendations?: Yes    Contacts  Patient Contacts: Chiqui Burns (POA)  Contact Method: Phone  Phone Number: [phone]  Reason/Outcome: Discharge Planning    Requested Home Health Care         Is the patient interested in HHC at discharge?: No    DME Referral Provided  Referral made for DME?: No    Other Referral/Resources/Interventions Provided:  Interventions: Short Term Rehab    Treatment Team Recommendation: Short Term Rehab  Discharge Destination Plan:: Short Term Rehab  Pt has a history of dementia.  TC to pt CHAD ARAYA) for all information.  Pt lives on the first floor of the home, amber Valenzuela lives on the second floor.  Chiqui provides bathing, morning meds in the am, amber provides lunch and pm care.  Pt has been lying around lately and not moving well.  Will need PT/OT evaluation for STR.  Will also need authorization for STR.  Will follow for care needs.

## 2024-07-09 NOTE — ASSESSMENT & PLAN NOTE
Malnutrition Findings:                                 BMI Findings:           Body mass index is 19.11 kg/m².   Present on admission as evidenced by BMI of 20    Encourage lifestyle modification and weight gain

## 2024-07-09 NOTE — ASSESSMENT & PLAN NOTE
Lab Results   Component Value Date    HGBA1C 8.2 (H) 05/17/2024       Recent Labs     07/08/24  1614 07/08/24 2054 07/09/24  0724 07/09/24  0810   POCGLU 169* 119 64* 141*         Blood Sugar Average: Last 72 hrs:  (P) 123.25  Poorly controlled with hemoglobin A1c of 8.2%    Continue home Lantus 5 units daily at bedtime  Sliding scale insulin with Accu-Cheks  Diabetic diet  Hold home SGLT2 inhibitor

## 2024-07-09 NOTE — PROGRESS NOTES
[facility]  Progress Note  Name: Israel Hughes I  MRN: [id number]  Unit/Bed#: -01 I Date of Admission: 7/8/2024   Date of Service: 7/9/2024  Hospital Day: 1    Assessment & Plan   * Gait dysfunction  Assessment & Plan  Patient presents with ambulatory dysfunction and fatigue  Family state patient is unable to care for himself and may need placement to skilled nursing facility  Likely progression of Alzheimer's dementia  Patient and family agreeable to placement by case management    PT/OT evaluation and treatment  Case management consultation for safe disposition planning  Fall precautions    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)  Assessment & Plan  Likely worsening in the setting of fatigue, ambulatory dysfunction, difficulty with ADLs    Continue donepezil, memantine, fluoxetine  Monitor mentation  Outpatient follow-up with Neurology    Type 2 diabetes mellitus with retinopathy, without long-term current use of insulin (HCC)  Assessment & Plan  Lab Results   Component Value Date    HGBA1C 8.2 (H) 05/17/2024       Recent Labs     07/08/24  1614 07/08/24 2054 07/09/24  0724 07/09/24  0810   POCGLU 169* 119 64* 141*         Blood Sugar Average: Last 72 hrs:  (P) 123.25  Poorly controlled with hemoglobin A1c of 8.2%    Continue home Lantus 5 units daily at bedtime  Sliding scale insulin with Accu-Cheks  Diabetic diet  Hold home SGLT2 inhibitor    Benign prostatic hyperplasia without lower urinary tract symptoms  Assessment & Plan  Asymptomatic and chronic in nature    Continue home Flomax    Chronic diastolic congestive heart failure (HCC)  Assessment & Plan  Euvolemic and well compensated  Weight at baseline    Continue home loop diuretic  Monitor volume status          Moderate protein-calorie malnutrition (HCC)  Assessment & Plan  Malnutrition Findings:                                 BMI Findings:           Body mass index is 19.11 kg/m².   Present on  admission as evidenced by BMI of 20    Encourage lifestyle modification and weight gain               VTE Pharmacologic Prophylaxis: VTE Score: 3 Moderate Risk (Score 3-4) - Pharmacological DVT Prophylaxis Ordered: heparin.    Mobility:   Basic Mobility Inpatient Raw Score: 17  JH-HLM Goal: 5: Stand one or more mins  JH-HLM Achieved: 2: Bed activities/Dependent transfer  JH-HLM Goal NOT achieved. Continue with multidisciplinary rounding and encourage appropriate mobility to improve upon JH-HLM goals.    Patient Centered Rounds: I performed bedside rounds with nursing staff today.     Discussions with Specialists or Other Care Team Provider: Nursing    Education and Discussions with Family / Patient: Updated  (sister-in-law) via phone.    Total Time Spent on Date of Encounter in care of patient: 35 mins. This time was spent on one or more of the following: performing physical exam; counseling and coordination of care; obtaining or reviewing history; documenting in the medical record; reviewing/ordering tests, medications or procedures; communicating with other healthcare professionals and discussing with patient's family/caregivers.    Current Length of Stay: 1 day(s)  Current Patient Status: Inpatient   Certification Statement: The patient will continue to require additional inpatient hospital stay due to ambulatory dysfunction  Discharge Plan: Anticipate discharge in 24-48 hrs to discharge location to be determined pending rehab evaluations.    Code Status: Level 3 - DNAR and DNI    Subjective:   Patient seen and examined at bedside. No acute events overnight. Denies chest pain, SOB, diaphoresis, nausea/vomiting/diarrhea, fevers/chills.     Objective:     Vitals:   Temp (24hrs), Av.1 °F (36.2 °C), Min:96.6 °F (35.9 °C), Max:97.9 °F (36.6 °C)    Temp:  [96.6 °F (35.9 °C)-97.9 °F (36.6 °C)] 97 °F (36.1 °C)  HR:  [60-79] 68  Resp:  [16-20] 17  BP: (120-141)/(66-76) 120/71  SpO2:  [92 %-95 %] 92  %  Body mass index is 19.11 kg/m².     Input and Output Summary (last 24 hours):     Intake/Output Summary (Last 24 hours) at 7/9/2024 0941  Last data filed at 7/9/2024 0739  Gross per 24 hour   Intake 300 ml   Output 200 ml   Net 100 ml       Physical Exam:   Physical Exam  Vitals and nursing note reviewed.   Constitutional:       General: He is not in acute distress.     Appearance: He is well-developed.   HENT:      Head: Normocephalic and atraumatic.   Eyes:      Conjunctiva/sclera: Conjunctivae normal.   Cardiovascular:      Rate and Rhythm: Normal rate and regular rhythm.      Heart sounds: No murmur heard.  Pulmonary:      Effort: Pulmonary effort is normal. No respiratory distress.      Breath sounds: Normal breath sounds.   Abdominal:      Palpations: Abdomen is soft.      Tenderness: There is no abdominal tenderness.   Musculoskeletal:         General: No swelling.      Cervical back: Neck supple.   Skin:     General: Skin is warm and dry.      Capillary Refill: Capillary refill takes less than 2 seconds.   Neurological:      Mental Status: He is alert.   Psychiatric:         Mood and Affect: Mood normal.          Additional Data:     Labs:  Results from last 7 days   Lab Units 07/09/24  0523   WBC Thousand/uL 8.32   HEMOGLOBIN g/dL 12.6   HEMATOCRIT % 38.9   PLATELETS Thousands/uL 191   SEGS PCT % 71   LYMPHO PCT % 20   MONO PCT % 7   EOS PCT % 1     Results from last 7 days   Lab Units 07/09/24  0523 07/08/24  1609   SODIUM mmol/L 139 139   POTASSIUM mmol/L 4.0 4.4   CHLORIDE mmol/L 102 99   CO2 mmol/L 27 30   BUN mg/dL 25 31*   CREATININE mg/dL 0.85 1.01   ANION GAP mmol/L 10 10   CALCIUM mg/dL 9.1 9.7   ALBUMIN g/dL  --  4.0   TOTAL BILIRUBIN mg/dL  --  0.41   ALK PHOS U/L  --  86   ALT U/L  --  14   AST U/L  --  20   GLUCOSE RANDOM mg/dL 65 140     Results from last 7 days   Lab Units 07/08/24  1609   INR  0.95     Results from last 7 days   Lab Units 07/09/24  0810 07/09/24  0724 07/08/24 2054  07/08/24  1614   POC GLUCOSE mg/dl 141* 64* 119 169*               Lines/Drains:  Invasive Devices       Peripheral Intravenous Line  Duration             Peripheral IV 07/08/24 Right;Ventral (anterior) Forearm <1 day                          Imaging: Reviewed radiology reports from this admission including: CT head    Recent Cultures (last 7 days):         Last 24 Hours Medication List:   Current Facility-Administered Medications   Medication Dose Route Frequency Provider Last Rate    acetaminophen  650 mg Oral Q4H PRN Steven C Dylanrty, DO      aspirin  81 mg Oral Daily Steven C Yorty, DO      atorvastatin  20 mg Oral Daily Steven C Yorty, DO      donepezil  10 mg Oral HS Steven C Yorty, DO      FLUoxetine  20 mg Oral QAM Steven C Dylanrty, DO      furosemide  40 mg Oral Daily Steven C Dylanrty, DO      heparin (porcine)  5,000 Units Subcutaneous Q8H ZEINAB Steven Russo, DO      insulin glargine  5 Units Subcutaneous HS Steven C Rafaela, DO      insulin lispro  1-5 Units Subcutaneous TID AC Steven C Rafaela, DO      insulin lispro  1-5 Units Subcutaneous HS Steven C Rafaela, DO      memantine  10 mg Oral BID Steven Russo, DO      ondansetron  4 mg Intravenous Q6H PRN Steven Russo, DO      QUEtiapine  25 mg Oral BID before breakfast/lunch Steven Russo, DO      QUEtiapine  50 mg Oral HS Steven C Dylanrtleah, DO      tamsulosin  0.4 mg Oral Daily Steven Russo, DO          Today, Patient Was Seen By: Steven Russo DO    **Please Note: This note may have been constructed using a voice recognition system.**

## 2024-07-09 NOTE — UTILIZATION REVIEW
Initial Clinical Review    Admission: Date/Time/Statement:   Admission Orders (From admission, onward)       Ordered        07/08/24 1732  INPATIENT ADMISSION  Once                          Orders Placed This Encounter   Procedures    INPATIENT ADMISSION     Standing Status:   Standing     Number of Occurrences:   1     Order Specific Question:   Level of Care     Answer:   Med Surg [16]     Order Specific Question:   Estimated length of stay     Answer:   More than 2 Midnights     Order Specific Question:   Certification     Answer:   I certify that inpatient services are medically necessary for this patient for a duration of greater than two midnights. See H&P and MD Progress Notes for additional information about the patient's course of treatment.     ED Arrival Information       Expected   -    Arrival   7/8/2024 15:47    Acuity   Urgent              Means of arrival   Ambulance    Escorted by   [facility]    Service   Hospitalist    Admission type   Emergency              Arrival complaint   -             Chief Complaint   Patient presents with    Weakness - Generalized     Patient with advanced dementia has been declining over the past week. Now unable to ambulate.       Initial Presentation: 84 y.o. male who presented by EMS to [facility] ED. Inpatient admission for evaluation and treatment of dementia and gait dysfunction. PMHx: BPH, CHF, CKD, dementia, T2DM, HLD. Presented w/ ambulatory dysfunction and increased difficulty completing ADLs. On exam, dry mucous membranes. Plan: PT/OT evals, continue PTA meds, SSI w/ BG checks ACHS, Lantus, supportive care, Trend labs, replete electrolytes as needed.     Anticipated Length of Stay/Certification Statement: Patient will be admitted on an Inpatient basis with an anticipated length of stay of greater than 2 midnights.   Justification for Hospital Stay: Gait dysfunction       Date: 7/9   Day 2: Pt denies new symptoms. Exam unremarkable. Plan:  continue current meds, SSI w/ BG Checks ACHS, decrease Lantus to 5 units qHS, Trend labs, replete electrolytes as needed. PT/OT evals.       Date: 7/10  Day 3: Has surpassed a 2nd midnight with active treatments and services. Pt without new complaints. Exam unremarkable. Plan: continue current meds, SSI w/ BG checks ACHS, Trend labs, replete electrolytes as needed. Pending PT/OT evals.       ED Triage Vitals   Temperature Pulse Respirations Blood Pressure SpO2 Pain Score   07/08/24 1550 07/08/24 1550 07/08/24 1550 07/08/24 1550 07/08/24 1550 07/08/24 1809   97.9 °F (36.6 °C) 79 20 129/66 95 % No Pain     Weight (last 2 days)       Date/Time Weight    07/10/24 0500 46.6 (102.73)     Weight: Patient was weighted x2 one pillow one sheet on blanket o2 tank and SCD machine was off nurse made aware. at 07/10/24 0500    07/08/24 1809 52.1 (114.86)    07/08/24 1730 52.1 (114.86)    07/08/24 1550 55 (121.25)            Vital Signs (last 3 days)       Date/Time Temp Pulse Resp BP MAP (mmHg) SpO2 O2 Device Argenis Coma Scale Score Pain    07/10/24 07:39:15 97.4 °F (36.3 °C) 62 18 155/77 103 94 % -- -- --    07/09/24 22:05:46 97.7 °F (36.5 °C) 66 18 111/73 86 94 % -- -- --    07/09/24 2010 -- -- -- -- -- -- -- 14 No Pain    07/09/24 14:34:06 -- 81 -- 126/84 98 95 % -- -- --    07/09/24 1433 96.9 °F (36.1 °C) -- 17 -- -- -- -- -- --    07/09/24 0739 -- -- -- -- -- -- None (Room air) 14 No Pain    07/09/24 0700 97 °F (36.1 °C) 68 17 120/71 -- -- -- -- --    07/08/24 22:38:06 97.5 °F (36.4 °C) 60 17 141/74 96 92 % -- -- --    07/08/24 2120 -- -- -- -- -- 95 % None (Room air) 13 No Pain    07/08/24 2050 -- -- -- -- -- 94 % None (Room air) -- No Pain    07/08/24 1840 -- -- -- -- -- -- None (Room air) 14 --    07/08/24 1809 96.6 °F (35.9 °C) 65 16 138/76 -- 94 % -- -- No Pain    07/08/24 1730 96.6 °F (35.9 °C) 64 20 139/73 101 94 % -- -- --    07/08/24 1644 -- -- -- -- -- -- None (Room air) -- --    07/08/24 1556 -- -- -- -- -- -- --  14 --    07/08/24 1550 97.9 °F (36.6 °C) 79 20 129/66 -- 95 % None (Room air) -- --              Pertinent Labs/Diagnostic Test Results:   Radiology:  CT head without contrast   Final Interpretation by Venkat Londono MD (07/08 1705)      No acute intracranial abnormality.                  Workstation performed: [id number]               Results from last 7 days   Lab Units 07/08/24  1637   SARS-COV-2  Negative     Results from last 7 days   Lab Units 07/10/24  0502 07/09/24  0523 07/08/24  1848 07/08/24  1609   WBC Thousand/uL 6.52 8.32  --  6.74   HEMOGLOBIN g/dL 12.8 12.6  --  13.3   HEMATOCRIT % 39.3 38.9  --  40.4   PLATELETS Thousands/uL 206 191 195 213   TOTAL NEUT ABS Thousands/µL 3.27 5.95  --  4.62         Results from last 7 days   Lab Units 07/10/24  0502 07/09/24  0523 07/08/24  1609   SODIUM mmol/L 137 139 139   POTASSIUM mmol/L 3.7 4.0 4.4   CHLORIDE mmol/L 101 102 99   CO2 mmol/L 28 27 30   ANION GAP mmol/L 8 10 10   BUN mg/dL 21 25 31*   CREATININE mg/dL 0.86 0.85 1.01   EGFR ml/min/1.73sq m 80 80 68   CALCIUM mg/dL 9.1 9.1 9.7   MAGNESIUM mg/dL 2.2 1.8* 1.9   PHOSPHORUS mg/dL 2.9 3.0  --      Results from last 7 days   Lab Units 07/08/24  1609   AST U/L 20   ALT U/L 14   ALK PHOS U/L 86   TOTAL PROTEIN g/dL 7.2   ALBUMIN g/dL 4.0   TOTAL BILIRUBIN mg/dL 0.41     Results from last 7 days   Lab Units 07/10/24  0734 07/09/24  2109 07/09/24  1619 07/09/24  1115 07/09/24  0810 07/09/24  0724 07/08/24  2054 07/08/24  1614   POC GLUCOSE mg/dl 85 162* 125 235* 141* 64* 119 169*     Results from last 7 days   Lab Units 07/10/24  0502 07/09/24  0523 07/08/24  1609   GLUCOSE RANDOM mg/dL 79 65 140     Results from last 7 days   Lab Units 07/08/24  1609   PROTIME seconds 12.8   INR  0.95   PTT seconds 30     Results from last 7 days   Lab Units 07/08/24  1721   CLARITY UA  Clear   COLOR UA  Yellow   SPEC GRAV UA  1.015   PH UA  7.5   GLUCOSE UA mg/dl 3+*   KETONES UA mg/dl Negative   BLOOD UA  Trace-Intact*    PROTEIN UA mg/dl Negative   NITRITE UA  Negative   BILIRUBIN UA  Negative   UROBILINOGEN UA E.U./dl 1.0   LEUKOCYTES UA  Negative   WBC UA /hpf 0-1   RBC UA /hpf 1-2   BACTERIA UA /hpf Occasional   EPITHELIAL CELLS WET PREP /hpf Occasional     Results from last 7 days   Lab Units 07/08/24  1637   INFLUENZA A PCR  Negative   INFLUENZA B PCR  Negative   RSV PCR  Negative         ED Treatment-Medication Administration from 07/08/2024 1547 to 07/08/2024 1800         Date/Time Order Dose Route Action     07/08/2024 1636 sodium chloride 0.9 % bolus 1,000 mL 1,000 mL Intravenous New Bag            Past Medical History:   Diagnosis Date    CHF (congestive heart failure) ([facility])     CKD (chronic kidney disease)     Diabetes mellitus ([facility])     Hyperlipidemia     Thoracic spine fracture ([facility]) 05/10/2024    estimate     Present on Admission:   Type 2 diabetes mellitus with retinopathy, without long-term current use of insulin ([facility])   Benign prostatic hyperplasia without lower urinary tract symptoms   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 ([facility])   Gait dysfunction   Moderate protein-calorie malnutrition ([facility])   Chronic diastolic congestive heart failure ([facility])      Admitting Diagnosis: Altered mental state [R41.82]  Weakness [R53.1]  Dementia (HCC) [F03.90]  Age/Sex: 84 y.o. male  Admission Orders:  Regular Diet.  Blood glucose checks TID AC.  Fall precautions.  Up & OOB as tolerated.  DW. I&O. SCDs.    Scheduled Medications:  aspirin, 81 mg, Oral, Daily  atorvastatin, 20 mg, Oral, Daily  donepezil, 10 mg, Oral, HS  FLUoxetine, 20 mg, Oral, QAM  furosemide, 40 mg, Oral, Daily  heparin (porcine), 5,000 Units, Subcutaneous, Q8H ZEINAB  insulin glargine, 5 Units, Subcutaneous, HS  insulin lispro, 1-5 Units, Subcutaneous, TID AC  insulin lispro, 1-5 Units, Subcutaneous, HS  memantine, 10 mg, Oral, BID  QUEtiapine, 25 mg, Oral, BID before breakfast/lunch  QUEtiapine, 50 mg, Oral, HS  tamsulosin, 0.4 mg, Oral,  Daily    Continuous IV Infusions: none    PRN Meds:  acetaminophen, 650 mg, Oral, Q4H PRN  ondansetron, 4 mg, Intravenous, Q6H PRN  magnesium sulfate, 2 g, Intravenous; 7/9 x1    insulin glargine (LANTUS) subcutaneous injection 10 Units 0.1 mL  Dose: 10 Units  Freq: Daily at bedtime Route: SC  Start: 07/08/24 2200 End: 07/09/24 0940    IP CONSULT TO CASE MANAGEMENT    Network Utilization Review Department  ATTENTION: Please call with any questions or concerns to [phone] and carefully listen to the prompts so that you are directed to the right person. All voicemails are confidential.   For Discharge needs, contact Care Management DC Support Team at [phone] opt. [phone]  Send all requests for admission clinical reviews, approved or denied determinations and any other requests to dedicated fax number below belonging to the campus where the patient is receiving treatment. List of dedicated fax numbers for the Facilities:  FACILITY NAME UR FAX NUMBER   ADMISSION DENIALS (Administrative/Medical Necessity) [phone]   DISCHARGE SUPPORT TEAM (NETWORK) [phone]   PARENT CHILD HEALTH (Maternity/NICU/Pediatrics) [phone]   [facility] [phone]   [facility] [phone]   [facility] [phone]   [facility] [phone]   [facility] [phone]   [facility] [phone]   [facility] [phone]   [facility] [phone]   [facility] [phone]   [facility] [phone]   [facility] [phone]   [facility] [phone]

## 2024-07-10 ENCOUNTER — TELEPHONE (OUTPATIENT)
Dept: INTERNAL MEDICINE CLINIC | Facility: CLINIC | Age: 84
End: 2024-07-10

## 2024-07-10 LAB
ANION GAP SERPL CALCULATED.3IONS-SCNC: 8 MMOL/L (ref 4–13)
BASOPHILS # BLD AUTO: 0.03 THOUSANDS/ÂΜL (ref 0–0.1)
BASOPHILS NFR BLD AUTO: 1 % (ref 0–1)
BUN SERPL-MCNC: 21 MG/DL (ref 5–25)
CALCIUM SERPL-MCNC: 9.1 MG/DL (ref 8.4–10.2)
CHLORIDE SERPL-SCNC: 101 MMOL/L (ref 96–108)
CO2 SERPL-SCNC: 28 MMOL/L (ref 21–32)
CREAT SERPL-MCNC: 0.86 MG/DL (ref 0.6–1.3)
EOSINOPHIL # BLD AUTO: 0.11 THOUSAND/ÂΜL (ref 0–0.61)
EOSINOPHIL NFR BLD AUTO: 2 % (ref 0–6)
ERYTHROCYTE [DISTWIDTH] IN BLOOD BY AUTOMATED COUNT: 12.3 % (ref 11.6–15.1)
GFR SERPL CREATININE-BSD FRML MDRD: 80 ML/MIN/1.73SQ M
GLUCOSE SERPL-MCNC: 125 MG/DL (ref 65–140)
GLUCOSE SERPL-MCNC: 215 MG/DL (ref 65–140)
GLUCOSE SERPL-MCNC: 278 MG/DL (ref 65–140)
GLUCOSE SERPL-MCNC: 79 MG/DL (ref 65–140)
GLUCOSE SERPL-MCNC: 85 MG/DL (ref 65–140)
HCT VFR BLD AUTO: 39.3 % (ref 36.5–49.3)
HGB BLD-MCNC: 12.8 G/DL (ref 12–17)
IMM GRANULOCYTES # BLD AUTO: 0.05 THOUSAND/UL (ref 0–0.2)
IMM GRANULOCYTES NFR BLD AUTO: 1 % (ref 0–2)
LYMPHOCYTES # BLD AUTO: 2.52 THOUSANDS/ÂΜL (ref 0.6–4.47)
LYMPHOCYTES NFR BLD AUTO: 39 % (ref 14–44)
MAGNESIUM SERPL-MCNC: 2.2 MG/DL (ref 1.9–2.7)
MCH RBC QN AUTO: 30.9 PG (ref 26.8–34.3)
MCHC RBC AUTO-ENTMCNC: 32.6 G/DL (ref 31.4–37.4)
MCV RBC AUTO: 95 FL (ref 82–98)
MONOCYTES # BLD AUTO: 0.54 THOUSAND/ÂΜL (ref 0.17–1.22)
MONOCYTES NFR BLD AUTO: 8 % (ref 4–12)
NEUTROPHILS # BLD AUTO: 3.27 THOUSANDS/ÂΜL (ref 1.85–7.62)
NEUTS SEG NFR BLD AUTO: 49 % (ref 43–75)
NRBC BLD AUTO-RTO: 0 /100 WBCS
PHOSPHATE SERPL-MCNC: 2.9 MG/DL (ref 2.3–4.1)
PLATELET # BLD AUTO: 206 THOUSANDS/UL (ref 149–390)
PMV BLD AUTO: 9.1 FL (ref 8.9–12.7)
POTASSIUM SERPL-SCNC: 3.7 MMOL/L (ref 3.5–5.3)
RBC # BLD AUTO: 4.14 MILLION/UL (ref 3.88–5.62)
SODIUM SERPL-SCNC: 137 MMOL/L (ref 135–147)
WBC # BLD AUTO: 6.52 THOUSAND/UL (ref 4.31–10.16)

## 2024-07-10 PROCEDURE — 84100 ASSAY OF PHOSPHORUS: CPT | Performed by: INTERNAL MEDICINE

## 2024-07-10 PROCEDURE — 99232 SBSQ HOSP IP/OBS MODERATE 35: CPT | Performed by: INTERNAL MEDICINE

## 2024-07-10 PROCEDURE — 97163 PT EVAL HIGH COMPLEX 45 MIN: CPT

## 2024-07-10 PROCEDURE — 97167 OT EVAL HIGH COMPLEX 60 MIN: CPT

## 2024-07-10 PROCEDURE — 82948 REAGENT STRIP/BLOOD GLUCOSE: CPT

## 2024-07-10 PROCEDURE — 85025 COMPLETE CBC W/AUTO DIFF WBC: CPT | Performed by: INTERNAL MEDICINE

## 2024-07-10 PROCEDURE — 83735 ASSAY OF MAGNESIUM: CPT | Performed by: INTERNAL MEDICINE

## 2024-07-10 PROCEDURE — 80048 BASIC METABOLIC PNL TOTAL CA: CPT | Performed by: INTERNAL MEDICINE

## 2024-07-10 RX ADMIN — QUETIAPINE FUMARATE 25 MG: 25 TABLET ORAL at 11:47

## 2024-07-10 RX ADMIN — MEMANTINE 10 MG: 5 TABLET ORAL at 08:11

## 2024-07-10 RX ADMIN — FUROSEMIDE 40 MG: 40 TABLET ORAL at 08:11

## 2024-07-10 RX ADMIN — HEPARIN SODIUM 5000 UNITS: 5000 INJECTION INTRAVENOUS; SUBCUTANEOUS at 05:36

## 2024-07-10 RX ADMIN — QUETIAPINE FUMARATE 25 MG: 25 TABLET ORAL at 08:12

## 2024-07-10 RX ADMIN — INSULIN LISPRO 2 UNITS: 100 INJECTION, SOLUTION INTRAVENOUS; SUBCUTANEOUS at 21:13

## 2024-07-10 RX ADMIN — DONEPEZIL HYDROCHLORIDE 10 MG: 10 TABLET ORAL at 21:13

## 2024-07-10 RX ADMIN — TAMSULOSIN HYDROCHLORIDE 0.4 MG: 0.4 CAPSULE ORAL at 08:11

## 2024-07-10 RX ADMIN — HEPARIN SODIUM 5000 UNITS: 5000 INJECTION INTRAVENOUS; SUBCUTANEOUS at 17:16

## 2024-07-10 RX ADMIN — INSULIN GLARGINE 5 UNITS: 100 INJECTION, SOLUTION SUBCUTANEOUS at 21:13

## 2024-07-10 RX ADMIN — ASPIRIN 81 MG: 81 TABLET, COATED ORAL at 08:11

## 2024-07-10 RX ADMIN — HEPARIN SODIUM 5000 UNITS: 5000 INJECTION INTRAVENOUS; SUBCUTANEOUS at 21:13

## 2024-07-10 RX ADMIN — ATORVASTATIN CALCIUM 20 MG: 20 TABLET, FILM COATED ORAL at 08:12

## 2024-07-10 RX ADMIN — QUETIAPINE FUMARATE 50 MG: 50 TABLET ORAL at 21:13

## 2024-07-10 RX ADMIN — MEMANTINE 10 MG: 5 TABLET ORAL at 17:16

## 2024-07-10 RX ADMIN — INSULIN LISPRO 1 UNITS: 100 INJECTION, SOLUTION INTRAVENOUS; SUBCUTANEOUS at 11:48

## 2024-07-10 RX ADMIN — FLUOXETINE HYDROCHLORIDE 20 MG: 20 CAPSULE ORAL at 08:11

## 2024-07-10 NOTE — PLAN OF CARE
Problem: Potential for Falls  Goal: Patient will remain free of falls  Description: INTERVENTIONS:  - Educate patient/family on patient safety including physical limitations  - Instruct patient to call for assistance with activity   - Consult OT/PT to assist with strengthening/mobility   - Keep Call bell within reach  - Keep bed low and locked with side rails adjusted as appropriate  - Keep care items and personal belongings within reach  - Initiate and maintain comfort rounds  - Make Fall Risk Sign visible to staff  - Offer Toileting every  Hours, in advance of need  - Initiate/Maintain alarm  - Obtain necessary fall risk management equipment:   - Apply yellow socks and bracelet for high fall risk patients  - Consider moving patient to room near nurses station  7/9/2024 2048 by Joyce Santos RN  Outcome: Progressing  7/9/2024 2048 by Joyce Santos RN  Outcome: Progressing     Problem: PAIN - ADULT  Goal: Verbalizes/displays adequate comfort level or baseline comfort level  Description: Interventions:  - Encourage patient to monitor pain and request assistance  - Assess pain using appropriate pain scale  - Administer analgesics based on type and severity of pain and evaluate response  - Implement non-pharmacological measures as appropriate and evaluate response  - Consider cultural and social influences on pain and pain management  - Notify physician/advanced practitioner if interventions unsuccessful or patient reports new pain  7/9/2024 2048 by Joyce Santos RN  Outcome: Progressing  7/9/2024 2048 by Joyce Santos RN  Outcome: Progressing     Problem: INFECTION - ADULT  Goal: Absence or prevention of progression during hospitalization  Description: INTERVENTIONS:  - Assess and monitor for signs and symptoms of infection  - Monitor lab/diagnostic results  - Monitor all insertion sites, i.e. indwelling lines, tubes, and drains  - Monitor endotracheal if appropriate and nasal secretions for changes in amount and color  -  [place] appropriate cooling/warming therapies per order  - Administer medications as ordered  - Instruct and encourage patient and family to use good hand hygiene technique  - Identify and instruct in appropriate isolation precautions for identified infection/condition  7/9/2024 2048 by Joyce Santos RN  Outcome: Progressing  7/9/2024 2048 by Joyce Santos RN  Outcome: Progressing  Goal: Absence of fever/infection during neutropenic period  Description: INTERVENTIONS:  - Monitor WBC    Outcome: Progressing     Problem: SAFETY ADULT  Goal: Patient will remain free of falls  Description: INTERVENTIONS:  - Educate patient/family on patient safety including physical limitations  - Instruct patient to call for assistance with activity   - Consult OT/PT to assist with strengthening/mobility   - Keep Call bell within reach  - Keep bed low and locked with side rails adjusted as appropriate  - Keep care items and personal belongings within reach  - Initiate and maintain comfort rounds  - Make Fall Risk Sign visible to staff  - Offer Toileting every  Hours, in advance of need  - Initiate/Maintain alarm  - Obtain necessary fall risk management equipment:   - Apply yellow socks and bracelet for high fall risk patients  - Consider moving patient to room near nurses station  7/9/2024 2048 by Joyce Santos RN  Outcome: Progressing  7/9/2024 2048 by Joyce Santos RN  Outcome: Progressing  Goal: Maintain or return to baseline ADL function  Description: INTERVENTIONS:  -  Assess patient's ability to carry out ADLs; assess patient's baseline for ADL function and identify physical deficits which impact ability to perform ADLs (bathing, care of mouth/teeth, toileting, grooming, dressing, etc.)  - Assess/evaluate cause of self-care deficits   - Assess range of motion  - Assess patient's mobility; develop plan if impaired  - Assess patient's need for assistive devices and provide as appropriate  - Encourage maximum independence but intervene  and supervise when necessary  - Involve family in performance of ADLs  - Assess for home care needs following discharge   - Consider OT consult to assist with ADL evaluation and planning for discharge  - Provide patient education as appropriate  Outcome: Progressing  Goal: Maintains/Returns to pre admission functional level  Description: INTERVENTIONS:  - Perform AM-PAC 6 Click Basic Mobility/ Daily Activity assessment daily.  - Set and communicate daily mobility goal to care team and patient/family/caregiver.   - Collaborate with rehabilitation services on mobility goals if consulted  - Perform Range of Motion  times a day.  - Reposition patient every  hours.  - Dangle patient  times a day  - Stand patient  times a day  - Ambulate patient  times a day  - Out of bed to chair  times a day   - Out of bed for meals times a day  - Out of bed for toileting  - Record patient progress and toleration of activity level   Outcome: Progressing     Problem: DISCHARGE PLANNING  Goal: Discharge to home or other facility with appropriate resources  Description: INTERVENTIONS:  - Identify barriers to discharge w/patient and caregiver  - Arrange for needed discharge resources and transportation as appropriate  - Identify discharge learning needs (meds, wound care, etc.)  - Arrange for interpretive services to assist at discharge as needed  - Refer to Case Management Department for coordinating discharge planning if the patient needs post-hospital services based on physician/advanced practitioner order or complex needs related to functional status, cognitive ability, or social support system  Outcome: Progressing     Problem: Knowledge Deficit  Goal: Patient/family/caregiver demonstrates understanding of disease process, treatment plan, medications, and discharge instructions  Description: Complete learning assessment and assess knowledge base.  Interventions:  - Provide teaching at level of understanding  - Provide teaching via  preferred learning methods  Outcome: Progressing     Problem: Nutrition/Hydration-ADULT  Goal: Nutrient/Hydration intake appropriate for improving, restoring or maintaining nutritional needs  Description: Monitor and assess patient's nutrition/hydration status for malnutrition. Collaborate with interdisciplinary team and initiate plan and interventions as ordered.  Monitor patient's weight and dietary intake as ordered or per policy. Utilize nutrition screening tool and intervene as necessary. Determine patient's food preferences and provide high-protein, high-caloric foods as appropriate.     INTERVENTIONS:  - Monitor oral intake, urinary output, labs, and treatment plans  - Assess nutrition and hydration status and recommend course of action  - Evaluate amount of meals eaten  - Assist patient with eating if necessary   - Allow adequate time for meals  - Recommend/ encourage appropriate diets, oral nutritional supplements, and vitamin/mineral supplements  - Order, calculate, and assess calorie counts as needed  - Recommend, monitor, and adjust tube feedings and TPN/PPN based on assessed needs  - Assess need for intravenous fluids  - Provide specific nutrition/hydration education as appropriate  - Include patient/family/caregiver in decisions related to nutrition  Outcome: Progressing     Problem: Prexisting or High Potential for Compromised Skin Integrity  Goal: Skin integrity is maintained or improved  Description: INTERVENTIONS:  - Identify patients at risk for skin breakdown  - Assess and monitor skin integrity  - Assess and monitor nutrition and hydration status  - Monitor labs   - Assess for incontinence   - Turn and reposition patient  - Assist with mobility/ambulation  - Relieve pressure over bony prominences  - Avoid friction and shearing  - Provide appropriate hygiene as needed including keeping skin clean and dry  - Evaluate need for skin moisturizer/barrier cream  - Collaborate with interdisciplinary  team   - Patient/family teaching  - Consider wound care consult   Outcome: Progressing

## 2024-07-10 NOTE — PLAN OF CARE
Problem: OCCUPATIONAL THERAPY ADULT  Goal: Performs self-care activities at highest level of function for planned discharge setting.  See evaluation for individualized goals.  Description: Treatment Interventions: ADL retraining, Functional transfer training, UE strengthening/ROM, Endurance training, Patient/family training, Cognitive reorientation, Equipment evaluation/education, Compensatory technique education, Energy conservation, Activityengagement          See flowsheet documentation for full assessment, interventions and recommendations.   Note: Limitation: Decreased ADL status, Decreased UE ROM, Decreased UE strength, Decreased endurance, Decreased self-care trans, Decreased high-level ADLs  Prognosis: Fair  Assessment: Patient is a 84 y.o. male seen for OT evaluation s/p admit to  [facility] on 7/8/2024 w/Neurologic gait dysfunction + commorbidities/PMHx (as listed in medical record) affecting patient's functional performance c ADL tasks at time of assessment. OT orders placed for evaluation and treatment to assess pt's ADLs, cognitive status + performance during functional tasks in order to formulate appropriate d/c recommendations.     Therapist performed at least two patient identifiers during session including name and wristband. Personal factors affecting patient at time of initial evaluation include: inaccessible home environment, step(s) to enter environment, multi-level environment, limited home support, inability to perform ADLs, inability to ambulate household distances, limited insight into impairments, and decreased initiation and engagement.   Pt's clinical presentation is currently unstable/unpredictable given new onset deficits that effect pt's occupational performance and ability to safely return to PLOF including decrease activity tolerance, decrease standing balance, decrease sitting balance, decrease performance during ADL tasks, decrease generalized strength, decrease activity  engagement, decrease performance during functional transfers, and high fall risk combined with medical complications of abnormal renal lab values, abnormal blood sugars, and need for input for mobility technique/safety. This evaluation required an extensive review of medical and/or therapy records and additional review of physical, cognitive and psychosocial history related to functional performance. Based upon functional performance deficits and assessments, this evaluation has been identified as a  high complexity evaluation.      Patient to benefit from continued Occupational Therapy treatment while in the hospital to address aforementioned deficits and maximize level of functional independence with ADLs and functional mobility. Pt currently requires A to facilitate appropriate d/c plan.     Rehab Resource Intensity Level, OT: II (Moderate Resource Intensity)

## 2024-07-10 NOTE — PHYSICAL THERAPY NOTE
PHYSICAL THERAPY EVALUATION  NAME:  Israel Hughes  DATE: 07/10/24    AGE:   84 y.o.  Mrn:   [id number]  ADMIT DX:  Altered mental state [R41.82]  Weakness [R53.1]  Dementia (HCC) [F03.90]  Problem List:   Patient Active Problem List   Diagnosis    Type 2 diabetes mellitus with retinopathy, without long-term current use of insulin (HCC)    Myasthenia gravis without exacerbation (HCC)    Mixed hyperlipidemia    Retinopathy    Primary open angle glaucoma (POAG) of both eyes, mild stage    Ptosis of both eyelids    Benign prostatic hyperplasia without lower urinary tract symptoms    Essential hypertension    Other age-related cataract    Mild aortic stenosis    Mild mitral regurgitation    Diastolic dysfunction    Mild concentric left ventricular hypertrophy (LVH)    Mild protein-calorie malnutrition (HCC)    Dysphagia    Stage 3 chronic kidney disease, unspecified whether stage 3a or 3b CKD (HCC)    Chronic diastolic congestive heart failure (HCC)    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)    Orthostatic lightheadedness    Peripheral arterial disease (HCC)    Gait dysfunction    Hypomagnesemia    Moderate protein-calorie malnutrition (HCC)       Past Medical History  Past Medical History:   Diagnosis Date    CHF (congestive heart failure) (HCC)     CKD (chronic kidney disease)     Diabetes mellitus (HCC)     Hyperlipidemia     Thoracic spine fracture (HCC) 05/10/2024    estimate       Past Surgical History  Past Surgical History:   Procedure Laterality Date    CATARACT EXTRACTION      COLONOSCOPY      DENTAL SURGERY      EYE SURGERY      TONSILLECTOMY      VASECTOMY         Length Of Stay: 2  Performed at least 2 patient identifiers during session: Name and ID bracelet       07/10/24 0914   PT Last Visit   PT Visit Date 07/10/24   Note Type   Note type Evaluation   Pain Assessment   Pain Assessment Tool FLACC   Pain Rating: FLACC (Rest) - Face 0   Pain Rating: FLACC (Rest) - Legs 0   Pain  Rating: FLACC (Rest) - Activity 0   Pain Rating: FLACC (Rest) - Cry 0   Pain Rating: FLACC (Rest) - Consolability 0   Score: FLACC (Rest) 0   Pain Rating: FLACC (Activity) - Face 0   Pain Rating: FLACC (Activity) - Legs 0   Pain Rating: FLACC (Activity) - Activity 0   Pain Rating: FLACC (Activity) - Cry 0   Pain Rating: FLACC (Activity) - Consolability 0   Score: FLACC (Activity) 0   Restrictions/Precautions   Weight Bearing Precautions Per Order No   Other Precautions Cognitive;Bed Alarm;Chair Alarm;Fall Risk   Home Living   Type of Home House   Home Layout Two level;Able to live on main level with bedroom/bathroom;Performs ADLs on one level;Stairs to enter with rails  (2+6 BIENVENIDO)   Bathroom Shower/Tub Walk-in shower   Bathroom Toilet Raised   Bathroom Equipment Grab bars in shower;Shower chair;Grab bars around toilet;Hand-held shower   Home Equipment Walker;Cane;Wheelchair-electric   Prior Function   Level of [place] Needs assistance with ADLs;Needs assistance with IADLS;Needs assistance with functional mobility   Lives With   ([facility])   Receives Help From Family   IADLs Family/Friend/Other provides transportation;Family/Friend/Other provides meals;Family/Friend/Other provides medication management   Falls in the last 6 months 1 to 4   Vocational Retired   Comments [facility] reported pt bedbound for the last ~ 3 wks; prior transfered with assist and used electric w/c   General   Family/Caregiver Present Yes  (assisted with obtaining home living and PLOF information)   Cognition   Overall Cognitive Status Impaired   Arousal/Participation Responsive   Orientation Level Oriented to person;Disoriented to place;Disoriented to time;Disoriented to situation   Memory Decreased recall of precautions;Decreased recall of recent events;Decreased short term memory;Decreased recall of biographical information;Decreased long term memory   Following Commands Follows one step commands inconsistently   RLE Assessment   RLE Assessment    (unable to follow  cues for MMT)   Coordination   Sensation WFL   Bed Mobility   Supine to Sit 2  Maximal assistance   Additional items Assist x 2;LE management;Verbal cues;Increased time required;HOB elevated   Additional Comments pt reported dizziness with transitional movement; BP 93/61   Transfers   Stand pivot 1  Dependent   Additional items Assist x 1;Verbal cues;Increased time required  (HHA; B knee blocking)   Additional Comments attempted STS however pt uable to attain fuly erect position; hand and foot placement changed and SPT performed   Balance   Static Sitting Poor   Dynamic Sitting Poor -   Static Standing Poor -   Endurance Deficit   Endurance Deficit Yes   Endurance Deficit Description pt reported fatigue with activity   Activity Tolerance   Activity Tolerance Patient limited by fatigue  (and cognition)   Nurse Made Aware RN made aware of session results   Assessment   Prognosis Fair   Assessment Pt is 84 y.o. male seen for PT evaluation s/p admit to [facility] on 7/8/2024 w/ Neurologic gait dysfunction. PT consulted to assess pt's functional mobility and d/c needs. Order placed for PT eval and tx, w/ up and OOB as tolerated order. Pt agreeable to PT  session upon arrival, pt found supine in bed.  PTA, pt was requiring A for mobility, lives w/ family in 2 level home, and retired.  Pt to benefit from continued PT tx to address deficits and maximize level of functional independent mobility and consistency. Upon conclusion pt  seated in recliner. Complexity: Comorbidities affecting pt's physical performance at time of assessment include: CHF and dementia. Personal factors affecting pt at time of IE include: advanced age, limited mobility, limited insight into impairments, and decreased cognition. Please find objective findings from PT assessment regarding body systems outlined above with impairments and limitations including impaired balance, decreased endurance, decreased safety awareness,  impaired judgement, fall risk, and decreased cognition.  Pt's clinical presentation is currently unstable/unpredictable seen in pt's presentation of abnormal blood sugar levels, reports of dizziness with activity, confusion, and multiple readmissions. The patient's AM-[facility] Basic Mobility Inpatient Short Form Raw Score is  .  Based on patient presentations and impairments, pt would most appropriately benefit from Level 2 resource intensity upon discharge. Please also refer to the recommendation of the Physical Therapist for safe discharge planning. RN verbalized pt appropriate for PT session. Pt seen as a co-eval with OT due to the patient's co-morbidities, clinically unstable presentation, and present impairments which are a regression from the patient's baseline.   Barriers to Discharge Inaccessible home environment;Decreased caregiver support   Goals   Patient Goals to watch TV   LTG Expiration Date 07/20/24   Long Term Goal #1 Pt will: Perform bed mobility tasks to consistent min A of 1 to improve ease of bed mobility. Perform transfers to consistent min A of 1 to improve ease of transfers. Perform ambulation with Min A and RW for 10 ft to  decrease burden of care. Increase dynamic standing balance to F- to decrease fall risk.  Increase OOB activity tolerance to 10 minutes without s/s of exertion to decrease fall risk.   Plan   Treatment/Interventions Functional transfer training;Therapeutic exercise;Endurance training;Gait training;Bed mobility;Equipment eval/education   PT Frequency 3-5x/wk   Discharge Recommendation   Rehab Resource Intensity Level, PT II (Moderate Resource Intensity)   Equipment Recommended   (TBD)   AM-PAC Basic Mobility Inpatient   Turning in Flat Bed Without Bedrails 1   Lying on Back to Sitting on Edge of Flat Bed Without Bedrails 1   Moving Bed to Chair 1   Standing Up From Chair Using Arms 1   Walk in Room 1   Climb 3-5 Stairs With Railing 1   Basic Mobility Inpatient Raw Score 6    Turning Head Towards Sound 4   Follow Simple Instructions 2   Low Function Basic Mobility Raw Score  12   Low Function Basic Mobility Standardized Score  18.33   [facility] Highest Level Of Mobility   -HL Goal 2: Bed activities/Dependent transfer   -HL Achieved 4: Move to chair/commode       Time In: 0911  Time Out: 0928  Total Evaluation Minutes: 17    Antonieta Tony, PT

## 2024-07-10 NOTE — ASSESSMENT & PLAN NOTE
Patient presents with ambulatory dysfunction and fatigue  Family state patient is unable to care for himself and may need placement to skilled nursing facility  Likely progression of Alzheimer's dementia  Patient and family agreeable to placement by case management    PT/OT evaluation and treatment  Case management consultation for safe disposition planning  Fall precautions  Patient is medically stable for discharge to short-term rehab

## 2024-07-10 NOTE — PROGRESS NOTES
[facility]  Progress Note  Name: Israel Hughes I  MRN: [id number]  Unit/Bed#: -01 I Date of Admission: 7/8/2024   Date of Service: 7/10/2024 I Hospital Day: 2    Assessment & Plan   * Gait dysfunction  Assessment & Plan  Patient presents with ambulatory dysfunction and fatigue  Family state patient is unable to care for himself and may need placement to skilled nursing facility  Likely progression of Alzheimer's dementia  Patient and family agreeable to placement by case management    PT/OT evaluation and treatment  Case management consultation for safe disposition planning  Fall precautions    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)  Assessment & Plan  Likely worsening in the setting of fatigue, ambulatory dysfunction, difficulty with ADLs    Continue donepezil, memantine, fluoxetine  Monitor mentation  Outpatient follow-up with Neurology    Type 2 diabetes mellitus with retinopathy, without long-term current use of insulin (HCC)  Assessment & Plan  Lab Results   Component Value Date    HGBA1C 8.2 (H) 05/17/2024       Recent Labs     07/09/24  0810 07/09/24  1115 07/09/24  1619 07/09/24  2109   POCGLU 141* 235* 125 162*         Blood Sugar Average: Last 72 hrs:  (P) 145  Poorly controlled with hemoglobin A1c of 8.2%    Continue home Lantus 5 units daily at bedtime  Sliding scale insulin with Accu-Cheks  Diabetic diet  Hold home SGLT2 inhibitor    Benign prostatic hyperplasia without lower urinary tract symptoms  Assessment & Plan  Asymptomatic and chronic in nature    Continue home Flomax    Chronic diastolic congestive heart failure (HCC)  Assessment & Plan  Euvolemic and well compensated  Weight at baseline    Continue home loop diuretic  Monitor volume status          Moderate protein-calorie malnutrition (HCC)  Assessment & Plan  Malnutrition Findings:                                 BMI Findings:           Body mass index is 17.1 kg/m².   Present on admission  as evidenced by BMI of 20    Encourage lifestyle modification and weight gain               VTE Pharmacologic Prophylaxis: VTE Score: 3 Moderate Risk (Score 3-4) - Pharmacological DVT Prophylaxis Ordered: heparin.    Mobility:   Basic Mobility Inpatient Raw Score: 17  JH-HLM Goal: 5: Stand one or more mins  JH-HLM Achieved: 2: Bed activities/Dependent transfer  JH-HLM Goal NOT achieved. Continue with multidisciplinary rounding and encourage appropriate mobility to improve upon JH-HLM goals.    Patient Centered Rounds: I performed bedside rounds with nursing staff today.     Discussions with Specialists or Other Care Team Provider: Nursing    Education and Discussions with Family / Patient: Updated  (sister-in-law) via phone.    Total Time Spent on Date of Encounter in care of patient: 35 mins. This time was spent on one or more of the following: performing physical exam; counseling and coordination of care; obtaining or reviewing history; documenting in the medical record; reviewing/ordering tests, medications or procedures; communicating with other healthcare professionals and discussing with patient's family/caregivers.    Current Length of Stay: 2 day(s)  Current Patient Status: Inpatient   Certification Statement: The patient will continue to require additional inpatient hospital stay due to ambulatory dysfunction  Discharge Plan: Anticipate discharge in 24-48 hrs to discharge location to be determined pending rehab evaluations.    Code Status: Level 3 - DNAR and DNI    Subjective:   Patient seen and examined at bedside. No acute events overnight. Denies chest pain, SOB, diaphoresis, nausea/vomiting/diarrhea, fevers/chills.     Objective:     Vitals:   Temp (24hrs), Av.3 °F (36.3 °C), Min:96.9 °F (36.1 °C), Max:97.7 °F (36.5 °C)    Temp:  [96.9 °F (36.1 °C)-97.7 °F (36.5 °C)] 97.7 °F (36.5 °C)  HR:  [66-81] 66  Resp:  [17-18] 18  BP: (111-126)/(73-84) 111/73  SpO2:  [94 %-95 %] 94 %  Body mass  index is 17.1 kg/m².     Input and Output Summary (last 24 hours):     Intake/Output Summary (Last 24 hours) at 7/10/2024 0718  Last data filed at 7/9/2024 1700  Gross per 24 hour   Intake 420 ml   Output --   Net 420 ml       Physical Exam:   Physical Exam  Vitals and nursing note reviewed.   Constitutional:       General: He is not in acute distress.     Appearance: He is well-developed.   HENT:      Head: Normocephalic and atraumatic.   Eyes:      Conjunctiva/sclera: Conjunctivae normal.   Cardiovascular:      Rate and Rhythm: Normal rate and regular rhythm.      Heart sounds: No murmur heard.  Pulmonary:      Effort: Pulmonary effort is normal. No respiratory distress.      Breath sounds: Normal breath sounds.   Abdominal:      Palpations: Abdomen is soft.      Tenderness: There is no abdominal tenderness.   Musculoskeletal:         General: No swelling.      Cervical back: Neck supple.   Skin:     General: Skin is warm and dry.      Capillary Refill: Capillary refill takes less than 2 seconds.   Neurological:      Mental Status: He is alert.   Psychiatric:         Mood and Affect: Mood normal.          Additional Data:     Labs:  Results from last 7 days   Lab Units 07/10/24  0502   WBC Thousand/uL 6.52   HEMOGLOBIN g/dL 12.8   HEMATOCRIT % 39.3   PLATELETS Thousands/uL 206   SEGS PCT % 49   LYMPHO PCT % 39   MONO PCT % 8   EOS PCT % 2     Results from last 7 days   Lab Units 07/10/24  0502 07/09/24  0523 07/08/24  1609   SODIUM mmol/L 137   < > 139   POTASSIUM mmol/L 3.7   < > 4.4   CHLORIDE mmol/L 101   < > 99   CO2 mmol/L 28   < > 30   BUN mg/dL 21   < > 31*   CREATININE mg/dL 0.86   < > 1.01   ANION GAP mmol/L 8   < > 10   CALCIUM mg/dL 9.1   < > 9.7   ALBUMIN g/dL  --   --  4.0   TOTAL BILIRUBIN mg/dL  --   --  0.41   ALK PHOS U/L  --   --  86   ALT U/L  --   --  14   AST U/L  --   --  20   GLUCOSE RANDOM mg/dL 79   < > 140    < > = values in this interval not displayed.     Results from last 7 days   Lab  Units 07/08/24  1609   INR  0.95     Results from last 7 days   Lab Units 07/09/24  2109 07/09/24  1619 07/09/24  1115 07/09/24  0810 07/09/24  0724 07/08/24 2054 07/08/24  1614   POC GLUCOSE mg/dl 162* 125 235* 141* 64* 119 169*               Lines/Drains:  Invasive Devices       Peripheral Intravenous Line  Duration             Peripheral IV 07/08/24 Right;Ventral (anterior) Forearm 1 day                          Imaging: Reviewed radiology reports from this admission including: CT head    Recent Cultures (last 7 days):         Last 24 Hours Medication List:   Current Facility-Administered Medications   Medication Dose Route Frequency Provider Last Rate    acetaminophen  650 mg Oral Q4H PRN Steven C Dylanrty, DO      aspirin  81 mg Oral Daily Steven C Yorty, DO      atorvastatin  20 mg Oral Daily Steven C Yorty, DO      donepezil  10 mg Oral HS Steven C Dylanrty, DO      FLUoxetine  20 mg Oral QAM Steven C Dylanrty, DO      furosemide  40 mg Oral Daily Steven C Yorty, DO      heparin (porcine)  5,000 Units Subcutaneous Q8H ZEINAB Steven C Rafaela, DO      insulin glargine  5 Units Subcutaneous HS Steven C Dylanrty, DO      insulin lispro  1-5 Units Subcutaneous TID AC Steven C Rafaela, DO      insulin lispro  1-5 Units Subcutaneous HS Steven C Yorty, DO      memantine  10 mg Oral BID Steven C Dylanrty, DO      ondansetron  4 mg Intravenous Q6H PRN Steven C Dylanrty, DO      QUEtiapine  25 mg Oral BID before breakfast/lunch Steven C Rafaela, DO      QUEtiapine  50 mg Oral HS Steven C Dylanrty, DO      tamsulosin  0.4 mg Oral Daily Steven C Rafaela, DO          Today, Patient Was Seen By: Steven Russo DO    **Please Note: This note may have been constructed using a voice recognition system.**

## 2024-07-10 NOTE — MALNUTRITION/BMI
This medical record reflects one or more clinical indicators suggestive of malnutrition and/or morbid obesity.    Malnutrition Findings:   Adult Malnutrition type: Chronic illness  Adult Degree of Malnutrition: Other severe protein calorie malnutrition  Malnutrition Characteristics: Weight loss, Fat loss, Muscle loss    360 Statement: Malnutrition related to chronic illness as evidenced by severe orbital adipose loss, moderate clavicle muscle loss, >10% body weight loss in 6 months, >7.5% body weight loss in 3 months.  To treat with oral diet and nutrition supplement as tolerated.    BMI Findings:           Body mass index is 17.1 kg/m².     See Nutrition note dated 7/10/2024 in flow sheets for additional details.  Completed nutrition assessment is viewable in the nutrition documentation.

## 2024-07-10 NOTE — ASSESSMENT & PLAN NOTE
Malnutrition Findings:                                 BMI Findings:           Body mass index is 17.1 kg/m².   Present on admission as evidenced by BMI of 20    Encourage lifestyle modification and weight gain

## 2024-07-10 NOTE — OCCUPATIONAL THERAPY NOTE
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)    Moderate protein-calorie malnutrition (HCC)    Past Medical History  Past Medical History:   Diagnosis Date    CHF (congestive heart failure) (HCC)     CKD (chronic kidney disease)     Diabetes mellitus (HCC)     Hyperlipidemia     Thoracic spine fracture (HCC) 05/10/2024    estimate     Past Surgical History  Past Surgical History:   Procedure Laterality Date    CATARACT EXTRACTION      COLONOSCOPY      DENTAL SURGERY      EYE SURGERY      TONSILLECTOMY      VASECTOMY                OT Last Visit   OT Visit Date 07/09/24   Note Type   Note type Evaluation   Additional Comments Nsg staff verbally cleared pt for OT evaluation.  Pt received  supine in bed on this date reporting no pain + is agreeable to OT session @ this time. @ exit, pt remains in  seated in bedside recliner c all lines intact, all needs met, call bell in hand + nsg aware of location/disposition.   Pain Assessment   Pain Assessment Tool 0-10   Pain Score No Pain   Home Living   Type of Home House   Home Layout Two level;Performs ADLs on one level;Able to live on main level with bedroom/bathroom;Stairs to enter with rails  (FFSU)   Bathroom Shower/Tub Walk-in shower   Bathroom Toilet Raised   Bathroom Equipment Shower chair;Commode;Grab bars in shower;Hand-held shower   Home Equipment Hospital bed;Walker;Cane;Wheelchair-manual   Prior Function   Level of [place] Needs assistance with ADLs;Needs assistance with IADLS;Independent with functional mobility   Lives With   (Grand daughter lives on 1st floor,  +  "[facility](?) c pt)   Receives Help From Family   IADLs Family/Friend/Other provides transportation;Family/Friend/Other provides meals;Family/Friend/Other provides medication management   Falls in the last 6 months 1 to 4   Vocational Retired   Comments Per Case Management note-\"Pt lives on the first floor of the home, amber Valenzuela lives on the second floor.  Chiqui provides bathing, morning meds in the am, amber provides lunch and pm care.  Pt has been lying around lately and not moving well.\"   Lifestyle   Autonomy Pt lives in  2 story home c family + @ baseline, performs ADLs  with A, IADLs with A + fxl mobility with A. (-) . Pt is retired.   Reciprocal Relationships Family   Service to Others Retired   Subjective   Subjective \"sure, I like when women take my covers off\"   ADL   Eating Assistance 5  Supervision/Setup   Grooming Assistance 5  Supervision/Setup   UB Bathing Assistance 3  Moderate Assistance   LB Bathing Assistance 2  Maximal Assistance   UB Dressing Assistance 4  Minimal Assistance   LB Dressing Assistance 2  Maximal Assistance   Toileting Assistance  1  Total Assistance   Additional Comments Given fxl performance skills + medical complexity, therapist suspecting via clinical judgement + skilled analysis; pt currently requires stated assist above to perform each area of ADL d/t limitations including: generalized muscle weakness, sitting/standing balance deficits, fxl activity tolerance limitations, difficulty performing bend/reach-anterior trunk flexion, requires external assist to complete transitional movements, decreased core strength, decreased postural control, instability in stance, cognitive deficits, safety awareness concerns, and decreased problem solving abilities   Bed Mobility   Supine to Sit 2  Maximal assistance   Additional items Assist x 2;HOB elevated;Increased time required;Verbal cues;LE management   Transfers   Stand pivot 1  Dependent   Additional items Assist x " 2;Verbal cues;Increased time required   Additional Comments 93/61 while seated c reports of dizziness following sup-sit transition (prior BP in AM: 155/77 while supine)   Balance   Static Sitting Poor   Dynamic Sitting Poor -   Activity Tolerance   Activity Tolerance Patient limited by fatigue   Medical Staff Made Aware PT/OT co-eval on this date d/t medical complexity, ambulatory dysfunction c high fall risk, various impeding lines + requirement for skilled interdisciplinary analysis of appropriate d/c recommendations. PT/OT POC/goals I'ly determined per respective discipline. (Antonieta)   Nurse Made Aware Medical staff made aware of current fxn, recommendations for d/c planning, fall risk + pt's location upon exit. (Gretchen)   RUE Assessment   RUE Assessment   (Impaired 3/5)   LUE Assessment   LUE Assessment   (Impaired 4/5)   Cognition   Overall Cognitive Status Impaired   Arousal/Participation Alert   Attention Attends with cues to redirect   Orientation Level Oriented to person;Disoriented to place;Disoriented to time;Disoriented to situation   Memory Decreased short term memory;Decreased recall of biographical information;Decreased long term memory;Decreased recall of recent events;Decreased recall of precautions   Following Commands Follows one step commands inconsistently   Assessment   Limitation Decreased ADL status;Decreased UE ROM;Decreased UE strength;Decreased endurance;Decreased self-care trans;Decreased high-level ADLs   Prognosis Fair   Assessment Patient is a 84 y.o. male seen for OT evaluation s/p admit to  [facility] on 7/8/2024 w/Neurologic gait dysfunction + commorbidities/PMHx (as listed in medical record) affecting patient's functional performance c ADL tasks at time of assessment. OT orders placed for evaluation and treatment to assess pt's ADLs, cognitive status + performance during functional tasks in order to formulate appropriate d/c recommendations.     Therapist performed at least  two patient identifiers during session including name and wristband. Personal factors affecting patient at time of initial evaluation include: inaccessible home environment, step(s) to enter environment, multi-level environment, limited home support, inability to perform ADLs, inability to ambulate household distances, limited insight into impairments, and decreased initiation and engagement.   Pt's clinical presentation is currently unstable/unpredictable given new onset deficits that effect pt's occupational performance and ability to safely return to OF including decrease activity tolerance, decrease standing balance, decrease sitting balance, decrease performance during ADL tasks, decrease generalized strength, decrease activity engagement, decrease performance during functional transfers, and high fall risk combined with medical complications of abnormal renal lab values, abnormal blood sugars, and need for input for mobility technique/safety. This evaluation required an extensive review of medical and/or therapy records and additional review of physical, cognitive and psychosocial history related to functional performance. Based upon functional performance deficits and assessments, this evaluation has been identified as a  high complexity evaluation.      Patient to benefit from continued Occupational Therapy treatment while in the hospital to address aforementioned deficits and maximize level of functional independence with ADLs and functional mobility. Pt currently requires A to facilitate appropriate d/c plan.   Plan   Treatment Interventions ADL retraining;Functional transfer training;UE strengthening/ROM;Endurance training;Patient/family training;Cognitive reorientation;Equipment evaluation/education;Compensatory technique education;Energy conservation;Activityengagement   Goal Expiration Date 07/20/24   OT Treatment Day 0   OT Frequency 3-5x/wk   Discharge Recommendation   Rehab Resource Intensity  Level, OT II (Moderate Resource Intensity)   AM-PAC Daily Activity Inpatient   Lower Body Dressing 2   Bathing 2   Toileting 1   Upper Body Dressing 3   Grooming 3   Eating 3   Daily Activity Raw Score 14   Daily Activity Standardized Score (Calc for Raw Score >=11) 33.39   AM-PAC Applied Cognition Inpatient   Following a Speech/Presentation 3   Understanding Ordinary Conversation 3   Taking Medications 1   Remembering Where Things Are Placed or Put Away 1   Remembering List of 4-5 Errands 1   Taking Care of Complicated Tasks 1   Applied Cognition Raw Score 10   Applied Cognition Standardized Score 24.98   Barthel Index   Feeding 5   Bathing 0   Grooming Score 0   Dressing Score 0   Bladder Score 0   Bowels Score 0   Toilet Use Score 0   Transfers (Bed/Chair) Score 0   Mobility (Level Surface) Score 0   Stairs Score 0   Barthel Index Score 5   End of Consult   Patient Position at End of Consult Bedside chair;All needs within reach;Bed/Chair alarm activated  (Propped c pillows - lateral support wedge for midline support)   Nurse Communication Nurse aware of consult     The patient's raw score on the AM-PAC Daily Activity inpatient short form is 14, standardized score is 33.39, less than 39.4. Please refer to the recommendation of the Occupational Therapist for safe DC planning.    Resource Intensity Recommendation: Level II (Moderate Resource Intensity)        GOALS:    *Increase stand tolerance x 1  m for inc'd tolerance with standing purposeful tasks    *Participate in 10m UE therex to increase overall stamina/activity tolerance for purposeful tasks    *Bed mobility- Mod (A) for inc'd independence to manage own comfort and initiate EOB & OOB purposeful tasks    *Patient will verbalize 3 safety awareness/ principles to prevent falls in the home setting.     *Patient will verbalize and demonstrate use of energy conservation/deep breathing techniques and work simplification skills during functional  activities with no verbal cues.     *Patient will increase OOB/sitting tolerance to 2-4 hours per day to increase participation in self-care and leisure tasks with no s/s of exertion.     *Patient will engage in ongoing cognitive assessment to assist with safe discharge planning/recommendations.     *Pt will verbalize and demonstrate understanding of post-op movement precautions 100% (if applicable) in tx sessions for increased safety and functional mobility.      *Pt will demonstrate use of long handled AE (if appropriate) during 100% of tx sessions for increased ADL safety and independence following D/C     Susi Capps OTR/BRINDA

## 2024-07-10 NOTE — ASSESSMENT & PLAN NOTE
Lab Results   Component Value Date    HGBA1C 8.2 (H) 05/17/2024       Recent Labs     07/09/24  0810 07/09/24  1115 07/09/24  1619 07/09/24 2109   POCGLU 141* 235* 125 162*         Blood Sugar Average: Last 72 hrs:  (P) 145  Poorly controlled with hemoglobin A1c of 8.2%    Lantus 15 units daily at bedtime  Schedule 5 units of Lantus 3 times daily with meals  Sliding scale insulin with Accu-Cheks  Diabetic diet  Hold home SGLT2 inhibitor

## 2024-07-10 NOTE — TELEPHONE ENCOUNTER
NYSTATIN [id number] POWDER    Key- [id number]  [facility]

## 2024-07-10 NOTE — PLAN OF CARE
Problem: PHYSICAL THERAPY ADULT  Goal: Performs mobility at highest level of function for planned discharge setting.  See evaluation for individualized goals.  Description: Treatment/Interventions: Functional transfer training, Therapeutic exercise, Endurance training, Gait training, Bed mobility, Equipment eval/education  Equipment Recommended:  (TBD)       See flowsheet documentation for full assessment, interventions and recommendations.  Outcome: Progressing  Note: Prognosis: Fair     Assessment: Pt is 84 y.o. male seen for PT evaluation s/p admit to [facility] on 7/8/2024 w/ Neurologic gait dysfunction. PT consulted to assess pt's functional mobility and d/c needs. Order placed for PT eval and tx, w/ up and OOB as tolerated order. Pt agreeable to PT  session upon arrival, pt found supine in bed.  PTA, pt was requiring A for mobility, lives w/ family in 2 level home, and retired.  Pt to benefit from continued PT tx to address deficits and maximize level of functional independent mobility and consistency. Upon conclusion pt  seated in recliner. Complexity: Comorbidities affecting pt's physical performance at time of assessment include: CHF and dementia. Personal factors affecting pt at time of IE include: advanced age, limited mobility, limited insight into impairments, and decreased cognition. Please find objective findings from PT assessment regarding body systems outlined above with impairments and limitations including impaired balance, decreased endurance, decreased safety awareness, impaired judgement, fall risk, and decreased cognition.  Pt's clinical presentation is currently unstable/unpredictable seen in pt's presentation of abnormal blood sugar levels, reports of dizziness with activity, confusion, and multiple readmissions. The patient's AM-PAC Basic Mobility Inpatient Short Form Raw Score is  .  Based on patient presentations and impairments, pt would most appropriately benefit from Level 2  resource intensity upon discharge. Please also refer to the recommendation of the Physical Therapist for safe discharge planning. RN verbalized pt appropriate for PT session. Pt seen as a co-eval with OT due to the patient's co-morbidities, clinically unstable presentation, and present impairments which are a regression from the patient's baseline.  Barriers to Discharge: Inaccessible home environment, Decreased caregiver support     Rehab Resource Intensity Level, PT: II (Moderate Resource Intensity)    See flowsheet documentation for full assessment.

## 2024-07-11 PROBLEM — E43 SEVERE PROTEIN-CALORIE MALNUTRITION (HCC): Status: ACTIVE | Noted: 2024-07-11

## 2024-07-11 LAB
ANION GAP SERPL CALCULATED.3IONS-SCNC: 8 MMOL/L (ref 4–13)
BASOPHILS # BLD AUTO: 0.03 THOUSANDS/ÂΜL (ref 0–0.1)
BASOPHILS NFR BLD AUTO: 0 % (ref 0–1)
BUN SERPL-MCNC: 23 MG/DL (ref 5–25)
CALCIUM SERPL-MCNC: 9.2 MG/DL (ref 8.4–10.2)
CHLORIDE SERPL-SCNC: 101 MMOL/L (ref 96–108)
CO2 SERPL-SCNC: 29 MMOL/L (ref 21–32)
CREAT SERPL-MCNC: 0.93 MG/DL (ref 0.6–1.3)
EOSINOPHIL # BLD AUTO: 0.09 THOUSAND/ÂΜL (ref 0–0.61)
EOSINOPHIL NFR BLD AUTO: 1 % (ref 0–6)
ERYTHROCYTE [DISTWIDTH] IN BLOOD BY AUTOMATED COUNT: 12.3 % (ref 11.6–15.1)
FLUAV RNA RESP QL NAA+PROBE: NEGATIVE
FLUBV RNA RESP QL NAA+PROBE: NEGATIVE
GFR SERPL CREATININE-BSD FRML MDRD: 75 ML/MIN/1.73SQ M
GLUCOSE SERPL-MCNC: 199 MG/DL (ref 65–140)
GLUCOSE SERPL-MCNC: 211 MG/DL (ref 65–140)
GLUCOSE SERPL-MCNC: 332 MG/DL (ref 65–140)
GLUCOSE SERPL-MCNC: 89 MG/DL (ref 65–140)
GLUCOSE SERPL-MCNC: 95 MG/DL (ref 65–140)
HCT VFR BLD AUTO: 40 % (ref 36.5–49.3)
HGB BLD-MCNC: 13.1 G/DL (ref 12–17)
IMM GRANULOCYTES # BLD AUTO: 0.05 THOUSAND/UL (ref 0–0.2)
IMM GRANULOCYTES NFR BLD AUTO: 1 % (ref 0–2)
LYMPHOCYTES # BLD AUTO: 2.39 THOUSANDS/ÂΜL (ref 0.6–4.47)
LYMPHOCYTES NFR BLD AUTO: 34 % (ref 14–44)
MAGNESIUM SERPL-MCNC: 2 MG/DL (ref 1.9–2.7)
MCH RBC QN AUTO: 30.9 PG (ref 26.8–34.3)
MCHC RBC AUTO-ENTMCNC: 32.8 G/DL (ref 31.4–37.4)
MCV RBC AUTO: 94 FL (ref 82–98)
MONOCYTES # BLD AUTO: 0.55 THOUSAND/ÂΜL (ref 0.17–1.22)
MONOCYTES NFR BLD AUTO: 8 % (ref 4–12)
NEUTROPHILS # BLD AUTO: 3.86 THOUSANDS/ÂΜL (ref 1.85–7.62)
NEUTS SEG NFR BLD AUTO: 56 % (ref 43–75)
NRBC BLD AUTO-RTO: 0 /100 WBCS
PHOSPHATE SERPL-MCNC: 3.1 MG/DL (ref 2.3–4.1)
PLATELET # BLD AUTO: 227 THOUSANDS/UL (ref 149–390)
PMV BLD AUTO: 9.1 FL (ref 8.9–12.7)
POTASSIUM SERPL-SCNC: 3.8 MMOL/L (ref 3.5–5.3)
RBC # BLD AUTO: 4.24 MILLION/UL (ref 3.88–5.62)
RSV RNA RESP QL NAA+PROBE: NEGATIVE
SARS-COV-2 RNA RESP QL NAA+PROBE: NEGATIVE
SODIUM SERPL-SCNC: 138 MMOL/L (ref 135–147)
WBC # BLD AUTO: 6.97 THOUSAND/UL (ref 4.31–10.16)

## 2024-07-11 PROCEDURE — 82948 REAGENT STRIP/BLOOD GLUCOSE: CPT

## 2024-07-11 PROCEDURE — 85025 COMPLETE CBC W/AUTO DIFF WBC: CPT | Performed by: INTERNAL MEDICINE

## 2024-07-11 PROCEDURE — 80048 BASIC METABOLIC PNL TOTAL CA: CPT | Performed by: INTERNAL MEDICINE

## 2024-07-11 PROCEDURE — 99232 SBSQ HOSP IP/OBS MODERATE 35: CPT | Performed by: INTERNAL MEDICINE

## 2024-07-11 PROCEDURE — 84100 ASSAY OF PHOSPHORUS: CPT | Performed by: INTERNAL MEDICINE

## 2024-07-11 PROCEDURE — 0241U HB NFCT DS VIR RESP RNA 4 TRGT: CPT | Performed by: INTERNAL MEDICINE

## 2024-07-11 PROCEDURE — 83735 ASSAY OF MAGNESIUM: CPT | Performed by: INTERNAL MEDICINE

## 2024-07-11 PROCEDURE — 97530 THERAPEUTIC ACTIVITIES: CPT

## 2024-07-11 RX ADMIN — INSULIN LISPRO 1 UNITS: 100 INJECTION, SOLUTION INTRAVENOUS; SUBCUTANEOUS at 21:06

## 2024-07-11 RX ADMIN — MEMANTINE 10 MG: 5 TABLET ORAL at 09:29

## 2024-07-11 RX ADMIN — HEPARIN SODIUM 5000 UNITS: 5000 INJECTION INTRAVENOUS; SUBCUTANEOUS at 21:08

## 2024-07-11 RX ADMIN — MEMANTINE 10 MG: 5 TABLET ORAL at 17:26

## 2024-07-11 RX ADMIN — INSULIN GLARGINE 5 UNITS: 100 INJECTION, SOLUTION SUBCUTANEOUS at 21:07

## 2024-07-11 RX ADMIN — HEPARIN SODIUM 5000 UNITS: 5000 INJECTION INTRAVENOUS; SUBCUTANEOUS at 13:51

## 2024-07-11 RX ADMIN — INSULIN LISPRO 1 UNITS: 100 INJECTION, SOLUTION INTRAVENOUS; SUBCUTANEOUS at 17:28

## 2024-07-11 RX ADMIN — FUROSEMIDE 40 MG: 40 TABLET ORAL at 09:29

## 2024-07-11 RX ADMIN — ATORVASTATIN CALCIUM 20 MG: 20 TABLET, FILM COATED ORAL at 09:29

## 2024-07-11 RX ADMIN — QUETIAPINE FUMARATE 25 MG: 25 TABLET ORAL at 06:02

## 2024-07-11 RX ADMIN — QUETIAPINE FUMARATE 25 MG: 25 TABLET ORAL at 12:00

## 2024-07-11 RX ADMIN — INSULIN LISPRO 3 UNITS: 100 INJECTION, SOLUTION INTRAVENOUS; SUBCUTANEOUS at 12:00

## 2024-07-11 RX ADMIN — QUETIAPINE FUMARATE 50 MG: 50 TABLET ORAL at 21:09

## 2024-07-11 RX ADMIN — DONEPEZIL HYDROCHLORIDE 10 MG: 10 TABLET ORAL at 21:08

## 2024-07-11 RX ADMIN — TAMSULOSIN HYDROCHLORIDE 0.4 MG: 0.4 CAPSULE ORAL at 09:29

## 2024-07-11 RX ADMIN — ASPIRIN 81 MG: 81 TABLET, COATED ORAL at 09:29

## 2024-07-11 RX ADMIN — FLUOXETINE HYDROCHLORIDE 20 MG: 20 CAPSULE ORAL at 09:29

## 2024-07-11 NOTE — CASE MANAGEMENT
[facility] received request for authorization from Care Manager.  Authorization request submitted for: [facility]  [facility] NPI: [id number]  Facility MD: Dillon Chau NPI: [id number]  Authorization initiated by contacting insurance: [employer]   Via: [employer]   Clinicals submitted via [employer] attachment   Pending Reference #: [id number]    Care Manager notified: Elizabeth Patel     Updates to authorization status will be noted in chart. Please reach out to CM for updates on any clinical information.

## 2024-07-11 NOTE — PROGRESS NOTES
[facility]  Progress Note  Name: Israel Hughes I  MRN: [id number]  Unit/Bed#: -01 I Date of Admission: 7/8/2024   Date of Service: 7/11/2024 I Hospital Day: 3    Assessment & Plan   * Gait dysfunction  Assessment & Plan  Patient presents with ambulatory dysfunction and fatigue  Family state patient is unable to care for himself and may need placement to skilled nursing facility  Likely progression of Alzheimer's dementia  Patient and family agreeable to placement by case management    PT/OT evaluation and treatment  Case management consultation for safe disposition planning  Fall precautions    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)  Assessment & Plan  Likely worsening in the setting of fatigue, ambulatory dysfunction, difficulty with ADLs    Continue donepezil, memantine, fluoxetine  Monitor mentation  Outpatient follow-up with Neurology    Type 2 diabetes mellitus with retinopathy, without long-term current use of insulin (HCC)  Assessment & Plan  Lab Results   Component Value Date    HGBA1C 8.2 (H) 05/17/2024       Recent Labs     07/09/24  0810 07/09/24  1115 07/09/24  1619 07/09/24  2109   POCGLU 141* 235* 125 162*         Blood Sugar Average: Last 72 hrs:  (P) 145  Poorly controlled with hemoglobin A1c of 8.2%    Continue home Lantus 5 units daily at bedtime  Sliding scale insulin with Accu-Cheks  Diabetic diet  Hold home SGLT2 inhibitor    Benign prostatic hyperplasia without lower urinary tract symptoms  Assessment & Plan  Asymptomatic and chronic in nature    Continue home Flomax    Chronic diastolic congestive heart failure (HCC)  Assessment & Plan  Euvolemic and well compensated  Weight at baseline    Continue home loop diuretic  Monitor volume status          Moderate protein-calorie malnutrition (HCC)  Assessment & Plan  Malnutrition Findings:                                 BMI Findings:           Body mass index is 17.1 kg/m².   Present on admission  as evidenced by BMI of 20    Encourage lifestyle modification and weight gain               VTE Pharmacologic Prophylaxis: VTE Score: 3 Moderate Risk (Score 3-4) - Pharmacological DVT Prophylaxis Ordered: heparin.    Mobility:   Basic Mobility Inpatient Raw Score: 10  JH-HLM Goal: 4: Move to chair/commode  JH-HLM Achieved: 4: Move to chair/commode  JH-HLM Goal NOT achieved. Continue with multidisciplinary rounding and encourage appropriate mobility to improve upon JH-HLM goals.    Patient Centered Rounds: I performed bedside rounds with nursing staff today.     Discussions with Specialists or Other Care Team Provider: Nursing    Education and Discussions with Family / Patient: Updated  (sister-in-law) via phone.    Total Time Spent on Date of Encounter in care of patient: 35 mins. This time was spent on one or more of the following: performing physical exam; counseling and coordination of care; obtaining or reviewing history; documenting in the medical record; reviewing/ordering tests, medications or procedures; communicating with other healthcare professionals and discussing with patient's family/caregivers.    Current Length of Stay: 3 day(s)  Current Patient Status: Inpatient   Certification Statement: The patient will continue to require additional inpatient hospital stay due to ambulatory dysfunction  Discharge Plan: Anticipate discharge in 24-48 hrs to rehab facility.    Code Status: Level 3 - DNAR and DNI    Subjective:   Patient seen and examined at bedside. No acute events overnight. Denies chest pain, SOB, diaphoresis, nausea/vomiting/diarrhea, fevers/chills.     Objective:     Vitals:   Temp (24hrs), Av.5 °F (35.8 °C), Min:95.9 °F (35.5 °C), Max:97.1 °F (36.2 °C)    Temp:  [95.9 °F (35.5 °C)-97.1 °F (36.2 °C)] 96 °F (35.6 °C)  HR:  [68-79] 68  Resp:  [16-18] 16  BP: ()/(61-78) 109/68  SpO2:  [92 %-94 %] 94 %  Body mass index is 17.1 kg/m².     Input and Output Summary (last 24  hours):     Intake/Output Summary (Last 24 hours) at 7/11/2024 0750  Last data filed at 7/11/2024 0249  Gross per 24 hour   Intake 400 ml   Output 0 ml   Net 400 ml       Physical Exam:   Physical Exam  Vitals and nursing note reviewed.   Constitutional:       General: He is not in acute distress.     Appearance: He is well-developed.   HENT:      Head: Normocephalic and atraumatic.   Eyes:      Conjunctiva/sclera: Conjunctivae normal.   Cardiovascular:      Rate and Rhythm: Normal rate and regular rhythm.      Heart sounds: No murmur heard.  Pulmonary:      Effort: Pulmonary effort is normal. No respiratory distress.      Breath sounds: Normal breath sounds.   Abdominal:      Palpations: Abdomen is soft.      Tenderness: There is no abdominal tenderness.   Musculoskeletal:         General: No swelling.      Cervical back: Neck supple.   Skin:     General: Skin is warm and dry.      Capillary Refill: Capillary refill takes less than 2 seconds.   Neurological:      Mental Status: He is alert.   Psychiatric:         Mood and Affect: Mood normal.          Additional Data:     Labs:  Results from last 7 days   Lab Units 07/11/24  0438   WBC Thousand/uL 6.97   HEMOGLOBIN g/dL 13.1   HEMATOCRIT % 40.0   PLATELETS Thousands/uL 227   SEGS PCT % 56   LYMPHO PCT % 34   MONO PCT % 8   EOS PCT % 1     Results from last 7 days   Lab Units 07/11/24  0438 07/09/24  0523 07/08/24  1609   SODIUM mmol/L 138   < > 139   POTASSIUM mmol/L 3.8   < > 4.4   CHLORIDE mmol/L 101   < > 99   CO2 mmol/L 29   < > 30   BUN mg/dL 23   < > 31*   CREATININE mg/dL 0.93   < > 1.01   ANION GAP mmol/L 8   < > 10   CALCIUM mg/dL 9.2   < > 9.7   ALBUMIN g/dL  --   --  4.0   TOTAL BILIRUBIN mg/dL  --   --  0.41   ALK PHOS U/L  --   --  86   ALT U/L  --   --  14   AST U/L  --   --  20   GLUCOSE RANDOM mg/dL 89   < > 140    < > = values in this interval not displayed.     Results from last 7 days   Lab Units 07/08/24  1609   INR  0.95     Results from last 7  days   Lab Units 07/11/24  0619 07/10/24  2107 07/10/24  1608 07/10/24  1130 07/10/24  0734 07/09/24  2109 07/09/24  1619 07/09/24  1115 07/09/24  0810 07/09/24  0724 07/08/24  2054 07/08/24  1614   POC GLUCOSE mg/dl 95 278* 125 215* 85 162* 125 235* 141* 64* 119 169*               Lines/Drains:  Invasive Devices       Peripheral Intravenous Line  Duration             Peripheral IV 07/08/24 Right;Ventral (anterior) Forearm 2 days                          Imaging: Reviewed radiology reports from this admission including: CT head    Recent Cultures (last 7 days):         Last 24 Hours Medication List:   Current Facility-Administered Medications   Medication Dose Route Frequency Provider Last Rate    acetaminophen  650 mg Oral Q4H PRN Steven C Dylanrtleah, DO      aspirin  81 mg Oral Daily Steven C Rafaela, DO      atorvastatin  20 mg Oral Daily Steven C Dylanrty, DO      donepezil  10 mg Oral HS Steven C Rafaela, DO      FLUoxetine  20 mg Oral QAM Steven C Rafaela, DO      furosemide  40 mg Oral Daily Steven C Rafaela, DO      heparin (porcine)  5,000 Units Subcutaneous Q8H ZEINAB Steven Russo, DO      insulin glargine  5 Units Subcutaneous HS Steven C Rafaela, DO      insulin lispro  1-5 Units Subcutaneous TID AC Steven C Rafaela, DO      insulin lispro  1-5 Units Subcutaneous HS Steven C Rafaela, DO      memantine  10 mg Oral BID Steven Russo, DO      ondansetron  4 mg Intravenous Q6H PRN Steven Russo, DO      QUEtiapine  25 mg Oral BID before breakfast/lunch Steven Russo, DO      QUEtiapine  50 mg Oral HS Steven C Rafaela, DO      tamsulosin  0.4 mg Oral Daily Steven Russo, DO          Today, Patient Was Seen By: Steven Russo DO    **Please Note: This note may have been constructed using a voice recognition system.**

## 2024-07-11 NOTE — PROGRESS NOTES
Patient:  JEAN PIERRE FOWLER    MRN:  [id number]    Danain Request ID:  [id number]    Level of care reserved:  Skilled Nursing Facility    Partner Reserved:  [facility], [place] [address] [phone]    Clinical needs requested:    Geography searched:  20 miles around [id number]    Start of Service:    Request sent:  2:11pm EDT on 7/10/2024 by Adela Patel    Partner reserved:  12:19pm EDT on 7/11/2024 by Adela Patel    Choice list shared:  11:45am EDT on 7/11/2024 by Adela Patel

## 2024-07-11 NOTE — PHYSICAL THERAPY NOTE
PHYSICAL THERAPY TREATMENT  NAME:  Israel Hughes  DATE: 07/11/24    AGE:   84 y.o.  Mrn:   [id number]  ADMIT DX:  Altered mental state [R41.82]  Weakness [R53.1]  Dementia (HCC) [F03.90]  Problem List:   Patient Active Problem List   Diagnosis    Type 2 diabetes mellitus with retinopathy, without long-term current use of insulin (HCC)    Myasthenia gravis without exacerbation (HCC)    Mixed hyperlipidemia    Retinopathy    Primary open angle glaucoma (POAG) of both eyes, mild stage    Ptosis of both eyelids    Benign prostatic hyperplasia without lower urinary tract symptoms    Essential hypertension    Other age-related cataract    Mild aortic stenosis    Mild mitral regurgitation    Diastolic dysfunction    Mild concentric left ventricular hypertrophy (LVH)    Mild protein-calorie malnutrition (HCC)    Dysphagia    Stage 3 chronic kidney disease, unspecified whether stage 3a or 3b CKD (HCC)    Chronic diastolic congestive heart failure (HCC)    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)    Orthostatic lightheadedness    Peripheral arterial disease (HCC)    Gait dysfunction    Hypomagnesemia    Moderate protein-calorie malnutrition (HCC)    Severe protein-calorie malnutrition (HCC)       Past Medical History  Past Medical History:   Diagnosis Date    CHF (congestive heart failure) (HCC)     CKD (chronic kidney disease)     Diabetes mellitus (HCC)     Hyperlipidemia     Thoracic spine fracture (HCC) 05/10/2024    estimate       Past Surgical History  Past Surgical History:   Procedure Laterality Date    CATARACT EXTRACTION      COLONOSCOPY      DENTAL SURGERY      EYE SURGERY      TONSILLECTOMY      VASECTOMY         Length Of Stay: 3  Performed at least 2 patient identifiers during session: Name, Birthday, and ID bracelet       07/11/24 1308   PT Last Visit   PT Visit Date 07/11/24   Note Type   Note Type Treatment   Pain Assessment   Pain Assessment Tool FLACC   Pain Rating: FLACC (Rest) -  Face 0   Pain Rating: FLACC (Rest) - Legs 0   Pain Rating: FLACC (Rest) - Activity 0   Pain Rating: FLACC (Rest) - Cry 0   Pain Rating: FLACC (Rest) - Consolability 0   Score: FLACC (Rest) 0   Pain Rating: FLACC (Activity) - Face 0   Pain Rating: FLACC (Activity) - Legs 0   Pain Rating: FLACC (Activity) - Activity 0   Pain Rating: FLACC (Activity) - Cry 0   Pain Rating: FLACC (Activity) - Consolability 0   Score: FLACC (Activity) 0   Restrictions/Precautions   Weight Bearing Precautions Per Order No   Other Precautions Cognitive;Chair Alarm;Bed Alarm;Fall Risk   General   Chart Reviewed Yes   Response to Previous Treatment Patient unable to report, no changes reported from family or staff   Family/Caregiver Present No   Cognition   Overall Cognitive Status Impaired   Arousal/Participation Cooperative   Attention Difficulty attending to directions   Orientation Level Oriented to person;Disoriented to place;Disoriented to time;Disoriented to situation   Memory Decreased recall of recent events;Decreased recall of precautions;Decreased short term memory   Following Commands Follows one step commands with increased time or repetition   Comments Pt agreeable to PT session   Bed Mobility   Rolling R 3  Moderate assistance   Additional items Assist x 1;Bedrails;Increased time required;Verbal cues;LE management   Supine to Sit 3  Moderate assistance   Additional items Assist x 1;Increased time required;HOB elevated;Bedrails;LE management   Sit to Supine 2  Maximal assistance   Additional items Assist x 1;Bedrails;Increased time required;Verbal cues;LE management   Additional Comments vitals at start of session /70, HR: 83bpm, Spo2: 95% on RA.Pt without complaints of lightheadedness, dizziness, chest pain throughout session   Transfers   Sit to Stand 2  Maximal assistance   Additional items Assist x 1;Increased time required;Verbal cues   Stand to Sit 2  Maximal assistance   Additional items Assist x 1;Increased time  required;Verbal cues   Stand pivot Unable to assess  (deferred due to safety concerns)   Additional Comments STS from EOB to RW x1 trial with achieving full stand and static standing ~15 seconds maxA to correct retropulsion   Ambulation/Elevation   Gait pattern Not appropriate;Not tested  (due to safety concerns)   Balance   Static Sitting Fair -   Dynamic Sitting Poor +   Static Standing Poor -   Activity Tolerance   Activity Tolerance Patient limited by fatigue  (cognitive impairments)   Nurse Made Aware RN aware of session outcomes   Assessment   Prognosis Fair   Problem List Decreased strength;Decreased endurance;Impaired balance;Decreased mobility;Decreased cognition;Decreased safety awareness   Assessment Pt seen for PT treatment session this date, consisting of ther act focused on bed mobility, transfer training, and safety awareness . Since previous session, pt has made good progress in terms of progression to assist of one for bed mobility and pt able to achieve full stand with maxA. Pt greeted supine in bed and agreeable to PT session reporting 0/10 pain and appeared in NAD. Patient pleasantly confused throughout session and followed one step commands with increased time and repetition. Patient has improved to modA of 1 for sup > sit and maxAx1 for sit> sup. Pt sat EOB ~7 minutes with CGA to Vipin to correct  posterior trunk lean. Pt progressed to STS transfer from EOB to rolling walker maxAx1 and tolerated static stance ~15 seconds; assist from PT to correct retropulsion and cues to maintain forefoot on floor as pt with tendency to maintain majority of weight through his heels. Out of bed transfers and ambulation deferred due to poor standing balance and impaired safety awareness. Pertinent barriers during this session include cognitive status and awareness. Current goals and POC remain appropriate, pt continues to have rehab potential and is making good progress towards STGs. Pt prognosis for achieving  goals is fair, pending pt progress with hospitalization/medical status improvements, and indicated by orientation, ability to follow directions, and awareness. Pt limited d/t  impaired safety awareness . PT recommends level 2, moderate resource intensity upon discharge. Pt continues to be functioning below baseline level, and remains limited 2* factors listed above. PT will continue to see pt during current hospitalization in order to address the deficits listed above and provide interventions consistent w/ POC in effort to achieve STGs.   Barriers to Discharge Decreased caregiver support;Inaccessible home environment   Goals   LTG Expiration Date 07/20/24   PT Treatment Day 1   Plan   Treatment/Interventions Functional transfer training;Therapeutic exercise;Endurance training;Patient/family training;Gait training;Bed mobility;Spoke to nursing   Progress Slow progress, cognitive deficits   PT Frequency 3-5x/wk   Discharge Recommendation   Rehab Resource Intensity Level, PT II (Moderate Resource Intensity)   Equipment Recommended   (DME needs TBD based on pt progress)   AM-PAC Basic Mobility Inpatient   Turning in Flat Bed Without Bedrails 3   Lying on Back to Sitting on Edge of Flat Bed Without Bedrails 2   Moving Bed to Chair 1   Standing Up From Chair Using Arms 1   Walk in Room 1   Climb 3-5 Stairs With Railing 1   Basic Mobility Inpatient Raw Score 9   [facility] Highest Level Of Mobility   -HLM Goal 3: Sit at edge of bed   -HLM Achieved 3: Sit at edge of bed   End of Consult   Patient Position at End of Consult All needs within reach;Bed/Chair alarm activated;Supine       Time In: 1308  Time Out: 1333  Total Treatment Minutes:    Dee Alejandro, PT

## 2024-07-11 NOTE — CASE MANAGEMENT
Case Management Discharge Planning Note    Patient name Israel Hughes  Location /-01 MRN [id number]  : 1940 Date 2024       Current Admission Date: 2024  Current Admission Diagnosis:Gait dysfunction   Patient Active Problem List    Diagnosis Date Noted Date Diagnosed    Severe protein-calorie malnutrition (HCC) 2024     Moderate protein-calorie malnutrition (HCC) 2024     Hypomagnesemia 2024     Gait dysfunction 2024     Peripheral arterial disease (HCC) 2024     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 ([facility]) 2024     Orthostatic lightheadedness 2024     Chronic diastolic congestive heart failure ([facility]) 2024     Stage 3 chronic kidney disease, unspecified whether stage 3a or 3b CKD ([facility]) 2023     Mild protein-calorie malnutrition (HCC) 2022     Dysphagia 2022     Mild aortic stenosis 10/22/2021     Mild mitral regurgitation 10/22/2021     Diastolic dysfunction 10/22/2021     Mild concentric left ventricular hypertrophy (LVH) 10/22/2021     Type 2 diabetes mellitus with retinopathy, without long-term current use of insulin ([facility]) 09/15/2021     Myasthenia gravis without exacerbation ([facility]) 09/15/2021     Mixed hyperlipidemia 09/15/2021     Retinopathy 09/15/2021     Primary open angle glaucoma (POAG) of both eyes, mild stage 09/15/2021     Ptosis of both eyelids 09/15/2021     Benign prostatic hyperplasia without lower urinary tract symptoms 09/15/2021     Essential hypertension 09/15/2021     Other age-related cataract 09/15/2021       LOS (days): 3  Geometric Mean LOS (GMLOS) (days): 3.1  Days to GMLOS:0.3     OBJECTIVE:  Risk of Unplanned Readmission Score: 33.88         Current admission status: Inpatient   Preferred Pharmacy:   [facility] [place] - [address]  [address] [place] [address]  Phone: [phone] Fax: [phone]    Primary Care  Provider: Giovanni Reyez DO    Primary Insurance: [facility] REP  Secondary Insurance:     DISCHARGE DETAILS:  TC to pt granddaughter Bianca to review the STR choices from [employer].  Granddaughter chose [facility].  Reserved same in [employer].  Submitted for authorization.

## 2024-07-11 NOTE — CASE MANAGEMENT
Support Center has received INTENT TO DENY for SNF Authorization.   Insurance: [employer]   Called in by Rep: Angelita MATTY#: [phone]  Intent to Deny Reason: Does not meet CMS guidelines   Facility: [facility]   Pending Auth #: [id number]   Peer to Peer Phone#: [phone] Deadline: 07/12 by 5pm     Please notify Discharge Support if P2P will not be completed.     Care Manager notified: Elizabeth Patel      Please reach out to CM for updates on any clinical information.

## 2024-07-11 NOTE — PLAN OF CARE
Problem: PHYSICAL THERAPY ADULT  Goal: Performs mobility at highest level of function for planned discharge setting.  See evaluation for individualized goals.  Description: Treatment/Interventions: Functional transfer training, Therapeutic exercise, Endurance training, Gait training, Bed mobility, Equipment eval/education  Equipment Recommended:  (TBD)       See flowsheet documentation for full assessment, interventions and recommendations.  Note: Prognosis: Fair  Problem List: Decreased strength, Decreased endurance, Impaired balance, Decreased mobility, Decreased cognition, Decreased safety awareness  Assessment: Pt seen for PT treatment session this date, consisting of ther act focused on bed mobility, transfer training, and safety awareness . Since previous session, pt has made good progress in terms of progression to assist of one for bed mobility and pt able to achieve full stand with maxA. Pt greeted supine in bed and agreeable to PT session reporting 0/10 pain and appeared in NAD. Patient pleasantly confused throughout session and followed one step commands with increased time and repetition. Patient has improved to modA of 1 for sup > sit and maxAx1 for sit> sup. Pt sat EOB ~7 minutes with CGA to Vipin to correct  posterior trunk lean. Pt progressed to STS transfer from EOB to rolling walker maxAx1 and tolerated static stance ~15 seconds; assist from PT to correct retropulsion and cues to maintain forefoot on floor as pt with tendency to maintain majority of weight through his heels. Out of bed transfers and ambulation deferred due to poor standing balance and impaired safety awareness. Pertinent barriers during this session include cognitive status and awareness. Current goals and POC remain appropriate, pt continues to have rehab potential and is making good progress towards STGs. Pt prognosis for achieving goals is fair, pending pt progress with hospitalization/medical status improvements, and indicated  by orientation, ability to follow directions, and awareness. Pt limited d/t  impaired safety awareness . PT recommends level 2, moderate resource intensity upon discharge. Pt continues to be functioning below baseline level, and remains limited 2* factors listed above. PT will continue to see pt during current hospitalization in order to address the deficits listed above and provide interventions consistent w/ POC in effort to achieve STGs.  Barriers to Discharge: Decreased caregiver support, Inaccessible home environment     Rehab Resource Intensity Level, PT: II (Moderate Resource Intensity)    See flowsheet documentation for full assessment.      Dee Alejandro; PT, DPT

## 2024-07-12 LAB
GLUCOSE SERPL-MCNC: 233 MG/DL (ref 65–140)
GLUCOSE SERPL-MCNC: 284 MG/DL (ref 65–140)
GLUCOSE SERPL-MCNC: 349 MG/DL (ref 65–140)
GLUCOSE SERPL-MCNC: 438 MG/DL (ref 65–140)

## 2024-07-12 PROCEDURE — 99232 SBSQ HOSP IP/OBS MODERATE 35: CPT | Performed by: INTERNAL MEDICINE

## 2024-07-12 PROCEDURE — 82948 REAGENT STRIP/BLOOD GLUCOSE: CPT

## 2024-07-12 RX ORDER — INSULIN GLARGINE 100 [IU]/ML
10 INJECTION, SOLUTION SUBCUTANEOUS
Status: DISCONTINUED | OUTPATIENT
Start: 2024-07-12 | End: 2024-07-13

## 2024-07-12 RX ADMIN — HEPARIN SODIUM 5000 UNITS: 5000 INJECTION INTRAVENOUS; SUBCUTANEOUS at 06:34

## 2024-07-12 RX ADMIN — HEPARIN SODIUM 5000 UNITS: 5000 INJECTION INTRAVENOUS; SUBCUTANEOUS at 21:34

## 2024-07-12 RX ADMIN — INSULIN LISPRO 4 UNITS: 100 INJECTION, SOLUTION INTRAVENOUS; SUBCUTANEOUS at 21:26

## 2024-07-12 RX ADMIN — FUROSEMIDE 40 MG: 40 TABLET ORAL at 09:00

## 2024-07-12 RX ADMIN — FLUOXETINE HYDROCHLORIDE 20 MG: 20 CAPSULE ORAL at 09:00

## 2024-07-12 RX ADMIN — INSULIN LISPRO 2 UNITS: 100 INJECTION, SOLUTION INTRAVENOUS; SUBCUTANEOUS at 18:06

## 2024-07-12 RX ADMIN — INSULIN LISPRO 3 UNITS: 100 INJECTION, SOLUTION INTRAVENOUS; SUBCUTANEOUS at 07:50

## 2024-07-12 RX ADMIN — ATORVASTATIN CALCIUM 20 MG: 20 TABLET, FILM COATED ORAL at 09:00

## 2024-07-12 RX ADMIN — DONEPEZIL HYDROCHLORIDE 10 MG: 10 TABLET ORAL at 21:34

## 2024-07-12 RX ADMIN — HEPARIN SODIUM 5000 UNITS: 5000 INJECTION INTRAVENOUS; SUBCUTANEOUS at 18:06

## 2024-07-12 RX ADMIN — QUETIAPINE FUMARATE 50 MG: 50 TABLET ORAL at 21:34

## 2024-07-12 RX ADMIN — MEMANTINE 10 MG: 5 TABLET ORAL at 09:00

## 2024-07-12 RX ADMIN — QUETIAPINE FUMARATE 25 MG: 25 TABLET ORAL at 11:25

## 2024-07-12 RX ADMIN — INSULIN GLARGINE 10 UNITS: 100 INJECTION, SOLUTION SUBCUTANEOUS at 21:34

## 2024-07-12 RX ADMIN — MEMANTINE 10 MG: 5 TABLET ORAL at 18:06

## 2024-07-12 RX ADMIN — QUETIAPINE FUMARATE 25 MG: 25 TABLET ORAL at 06:34

## 2024-07-12 RX ADMIN — TAMSULOSIN HYDROCHLORIDE 0.4 MG: 0.4 CAPSULE ORAL at 09:00

## 2024-07-12 RX ADMIN — INSULIN LISPRO 2 UNITS: 100 INJECTION, SOLUTION INTRAVENOUS; SUBCUTANEOUS at 11:24

## 2024-07-12 RX ADMIN — ASPIRIN 81 MG: 81 TABLET, COATED ORAL at 09:00

## 2024-07-12 NOTE — CASE MANAGEMENT
Case Management Discharge Planning Note    Patient name Israel Hughes  Location /-01 MRN [id number]  : 1940 Date 2024       Current Admission Date: 2024  Current Admission Diagnosis:Gait dysfunction   Patient Active Problem List    Diagnosis Date Noted Date Diagnosed    Severe protein-calorie malnutrition (HCC) 2024     Moderate protein-calorie malnutrition (HCC) 2024     Hypomagnesemia 2024     Gait dysfunction 2024     Peripheral arterial disease (HCC) 2024     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 ([facility]) 2024     Orthostatic lightheadedness 2024     Chronic diastolic congestive heart failure ([facility]) 2024     Stage 3 chronic kidney disease, unspecified whether stage 3a or 3b CKD ([facility]) 2023     Mild protein-calorie malnutrition (HCC) 2022     Dysphagia 2022     Mild aortic stenosis 10/22/2021     Mild mitral regurgitation 10/22/2021     Diastolic dysfunction 10/22/2021     Mild concentric left ventricular hypertrophy (LVH) 10/22/2021     Type 2 diabetes mellitus with retinopathy, without long-term current use of insulin ([facility]) 09/15/2021     Myasthenia gravis without exacerbation ([facility]) 09/15/2021     Mixed hyperlipidemia 09/15/2021     Retinopathy 09/15/2021     Primary open angle glaucoma (POAG) of both eyes, mild stage 09/15/2021     Ptosis of both eyelids 09/15/2021     Benign prostatic hyperplasia without lower urinary tract symptoms 09/15/2021     Essential hypertension 09/15/2021     Other age-related cataract 09/15/2021       LOS (days): 4  Geometric Mean LOS (GMLOS) (days): 3.1  Days to GMLOS:-0.7     OBJECTIVE:  Risk of Unplanned Readmission Score: 34.28      Current admission status: Inpatient   Preferred Pharmacy:   [id number]  [place] - [address]  [address] [place] [address]  Phone: [phone] Fax: [phone]    Primary Care  Provider: Giovanni Reyez,     Primary Insurance: [facility] REP  Secondary Insurance:     DISCHARGE DETAILS:  As per JACQUES, he had contacted [facility] yesterday to do a peer to peer.  MD provided information and the insurance Co did not contact him back.  Notified CM support to expedite to see if anyone at [facility] can be of assistance.

## 2024-07-12 NOTE — PLAN OF CARE
Problem: Potential for Falls  Goal: Patient will remain free of falls  Description: INTERVENTIONS:  - Educate patient/family on patient safety including physical limitations  - Instruct patient to call for assistance with activity   - Consult OT/PT to assist with strengthening/mobility   - Keep Call bell within reach  - Keep bed low and locked with side rails adjusted as appropriate  - Keep care items and personal belongings within reach  - Initiate and maintain comfort rounds  - Make Fall Risk Sign visible to staff  - Offer Toileting every 2 Hours, in advance of need  - Initiate/Maintain bed alarm  - Obtain necessary fall risk management equipment: non-skid socks  - Apply yellow socks and bracelet for high fall risk patients  - Consider moving patient to room near nurses station  Outcome: Progressing     Problem: PAIN - ADULT  Goal: Verbalizes/displays adequate comfort level or baseline comfort level  Description: Interventions:  - Encourage patient to monitor pain and request assistance  - Assess pain using appropriate pain scale  - Administer analgesics based on type and severity of pain and evaluate response  - Implement non-pharmacological measures as appropriate and evaluate response  - Consider cultural and social influences on pain and pain management  - Notify physician/advanced practitioner if interventions unsuccessful or patient reports new pain  Outcome: Progressing

## 2024-07-12 NOTE — TELEPHONE ENCOUNTER
PA for Nystatin [id number]/GM powder    Submitted via    [x]CMM-KEY [id number]  []Surescripts-Case ID #   []Faxed to plan   []Other website   []Phone call Case ID #     Office notes sent, clinical questions answered. Awaiting determination    Turnaround time for your insurance to make a decision on your Prior Authorization can take 7-21 business days.

## 2024-07-12 NOTE — PROGRESS NOTES
Formerly [facility]  Progress Note  Name: Israel Hughes I  MRN: [id number]  Unit/Bed#: -01 I Date of Admission: 7/8/2024   Date of Service: 7/12/2024  Hospital Day: 4    Assessment & Plan   * Gait dysfunction  Assessment & Plan  Patient presents with ambulatory dysfunction and fatigue  Family state patient is unable to care for himself and may need placement to skilled nursing facility  Likely progression of Alzheimer's dementia  Patient and family agreeable to placement by case management    PT/OT evaluation and treatment  Case management consultation for safe disposition planning  Fall precautions    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)  Assessment & Plan  Likely worsening in the setting of fatigue, ambulatory dysfunction, difficulty with ADLs    Continue donepezil, memantine, fluoxetine  Monitor mentation  Outpatient follow-up with Neurology    Type 2 diabetes mellitus with retinopathy, without long-term current use of insulin (HCC)  Assessment & Plan  Lab Results   Component Value Date    HGBA1C 8.2 (H) 05/17/2024       Recent Labs     07/09/24  0810 07/09/24  1115 07/09/24  1619 07/09/24  2109   POCGLU 141* 235* 125 162*         Blood Sugar Average: Last 72 hrs:  (P) 145  Poorly controlled with hemoglobin A1c of 8.2%    Continue home Lantus 5 units daily at bedtime  Sliding scale insulin with Accu-Cheks  Diabetic diet  Hold home SGLT2 inhibitor    Benign prostatic hyperplasia without lower urinary tract symptoms  Assessment & Plan  Asymptomatic and chronic in nature    Continue home Flomax    Chronic diastolic congestive heart failure (HCC)  Assessment & Plan  Euvolemic and well compensated  Weight at baseline    Continue home loop diuretic  Monitor volume status          Severe protein-calorie malnutrition (HCC)  Assessment & Plan  Malnutrition Findings:   Adult Malnutrition type: Chronic illness  Adult Degree of Malnutrition: Other severe protein calorie  malnutrition  Malnutrition Characteristics: Weight loss, Fat loss, Muscle loss                  360 Statement: Malnutrition related to chronic illness as evidenced by severe orbital adipose loss, moderate clavicle muscle loss, >10% body weight loss in 6 months, >7.5% body weight loss in 3 months.  To treat with oral diet and nutrition supplement as tolerated.    BMI Findings:           Body mass index is 17.1 kg/m².   Secondary to chronic disease  As evidenced by severe orbital adipose loss    Nutrition consult    Moderate protein-calorie malnutrition (HCC)  Assessment & Plan  Malnutrition Findings:                                 BMI Findings:           Body mass index is 17.1 kg/m².   Present on admission as evidenced by BMI of 20    Encourage lifestyle modification and weight gain               VTE Pharmacologic Prophylaxis: VTE Score: 3 Moderate Risk (Score 3-4) - Pharmacological DVT Prophylaxis Ordered: heparin.    Mobility:   Basic Mobility Inpatient Raw Score: 9  JH-HLM Goal: 3: Sit at edge of bed  JH-HLM Achieved: 2: Bed activities/Dependent transfer  JH-HLM Goal NOT achieved. Continue with multidisciplinary rounding and encourage appropriate mobility to improve upon JH-HLM goals.    Patient Centered Rounds: I performed bedside rounds with nursing staff today.     Discussions with Specialists or Other Care Team Provider: Nursing    Education and Discussions with Family / Patient: Updated  (sister-in-law) via phone.    Total Time Spent on Date of Encounter in care of patient: 35 mins. This time was spent on one or more of the following: performing physical exam; counseling and coordination of care; obtaining or reviewing history; documenting in the medical record; reviewing/ordering tests, medications or procedures; communicating with other healthcare professionals and discussing with patient's family/caregivers.    Current Length of Stay: 4 day(s)  Current Patient Status: Inpatient    Certification Statement: The patient will continue to require additional inpatient hospital stay due to ambulatory dysfunction  Discharge Plan: Anticipate discharge in 24-48 hrs to rehab facility.    Code Status: Level 3 - DNAR and DNI    Subjective:   Patient seen and examined at bedside. No acute events overnight. Denies chest pain, SOB, diaphoresis, nausea/vomiting/diarrhea, fevers/chills.  Medically stable for discharge to short-term rehab.    Objective:     Vitals:   Temp (24hrs), Av.4 °F (36.3 °C), Min:97 °F (36.1 °C), Max:97.8 °F (36.6 °C)    Temp:  [97 °F (36.1 °C)-97.8 °F (36.6 °C)] 97.8 °F (36.6 °C)  HR:  [61-83] 61  Resp:  [16-18] 18  BP: (105-114)/(66-75) 114/75  SpO2:  [90 %-95 %] 90 %  Body mass index is 17.1 kg/m².     Input and Output Summary (last 24 hours):     Intake/Output Summary (Last 24 hours) at 2024 0805  Last data filed at 2024 1200  Gross per 24 hour   Intake 60 ml   Output --   Net 60 ml       Physical Exam:   Physical Exam  Vitals and nursing note reviewed.   Constitutional:       General: He is not in acute distress.     Appearance: He is well-developed.   HENT:      Head: Normocephalic and atraumatic.   Eyes:      Conjunctiva/sclera: Conjunctivae normal.   Cardiovascular:      Rate and Rhythm: Normal rate and regular rhythm.      Heart sounds: No murmur heard.  Pulmonary:      Effort: Pulmonary effort is normal. No respiratory distress.      Breath sounds: Normal breath sounds.   Abdominal:      Palpations: Abdomen is soft.      Tenderness: There is no abdominal tenderness.   Musculoskeletal:         General: No swelling.      Cervical back: Neck supple.   Skin:     General: Skin is warm and dry.      Capillary Refill: Capillary refill takes less than 2 seconds.   Neurological:      Mental Status: He is alert.   Psychiatric:         Mood and Affect: Mood normal.          Additional Data:     Labs:  Results from last 7 days   Lab Units 24  0438   WBC Thousand/uL  6.97   HEMOGLOBIN g/dL 13.1   HEMATOCRIT % 40.0   PLATELETS Thousands/uL 227   SEGS PCT % 56   LYMPHO PCT % 34   MONO PCT % 8   EOS PCT % 1     Results from last 7 days   Lab Units 07/11/24  0438 07/09/24  0523 07/08/24  1609   SODIUM mmol/L 138   < > 139   POTASSIUM mmol/L 3.8   < > 4.4   CHLORIDE mmol/L 101   < > 99   CO2 mmol/L 29   < > 30   BUN mg/dL 23   < > 31*   CREATININE mg/dL 0.93   < > 1.01   ANION GAP mmol/L 8   < > 10   CALCIUM mg/dL 9.2   < > 9.7   ALBUMIN g/dL  --   --  4.0   TOTAL BILIRUBIN mg/dL  --   --  0.41   ALK PHOS U/L  --   --  86   ALT U/L  --   --  14   AST U/L  --   --  20   GLUCOSE RANDOM mg/dL 89   < > 140    < > = values in this interval not displayed.     Results from last 7 days   Lab Units 07/08/24  1609   INR  0.95     Results from last 7 days   Lab Units 07/12/24  0710 07/11/24  2046 07/11/24  1606 07/11/24  1128 07/11/24  0619 07/10/24  2107 07/10/24  1608 07/10/24  1130 07/10/24  0734 07/09/24  2109 07/09/24  1619 07/09/24  1115   POC GLUCOSE mg/dl 349* 211* 199* 332* 95 278* 125 215* 85 162* 125 235*               Lines/Drains:  Invasive Devices       Peripheral Intravenous Line  Duration             Peripheral IV 07/08/24 Right;Ventral (anterior) Forearm 3 days                          Imaging: Reviewed radiology reports from this admission including: CT head    Recent Cultures (last 7 days):         Last 24 Hours Medication List:   Current Facility-Administered Medications   Medication Dose Route Frequency Provider Last Rate    acetaminophen  650 mg Oral Q4H PRN Steven C Yorty, DO      aspirin  81 mg Oral Daily Steven C Yorty, DO      atorvastatin  20 mg Oral Daily Steven C Yorty, DO      donepezil  10 mg Oral HS Steven C Yorty, DO      FLUoxetine  20 mg Oral QAM Steven C Yorty, DO      furosemide  40 mg Oral Daily Steven C Yorty, DO      heparin (porcine)  5,000 Units Subcutaneous Q8H [facility] Steven Russo DO      insulin glargine  5 Units Subcutaneous HS Steven HUYNH  Rafaela, DO      insulin lispro  1-5 Units Subcutaneous TID AC Steven Russo, DO      insulin lispro  1-5 Units Subcutaneous HS Steven Russo, DO      memantine  10 mg Oral BID Steven Russo, DO      ondansetron  4 mg Intravenous Q6H PRN Steven Russo, DO      QUEtiapine  25 mg Oral BID before breakfast/lunch Steven Russo,       QUEtiapine  50 mg Oral HS Steven Russo, DO      tamsulosin  0.4 mg Oral Daily Steven Russo DO          Today, Patient Was Seen By: Steven Russo DO    **Please Note: This note may have been constructed using a voice recognition system.**

## 2024-07-12 NOTE — CASE MANAGEMENT
Received call from [employer] P2P line (P#: [phone]). Stated to call P2P back to figure out details for when P2P is to be completed.  left on P2P.  notified: Elizabeth Patel

## 2024-07-12 NOTE — CASE MANAGEMENT
Case Management Discharge Planning Note    Patient name Israel Hughes  Location /-01 MRN [id number]  : 1940 Date 2024       Current Admission Date: 2024  Current Admission Diagnosis:Gait dysfunction   Patient Active Problem List    Diagnosis Date Noted Date Diagnosed    Severe protein-calorie malnutrition (HCC) 2024     Moderate protein-calorie malnutrition (HCC) 2024     Hypomagnesemia 2024     Gait dysfunction 2024     Peripheral arterial disease (HCC) 2024     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 ([facility]) 2024     Orthostatic lightheadedness 2024     Chronic diastolic congestive heart failure ([facility]) 2024     Stage 3 chronic kidney disease, unspecified whether stage 3a or 3b CKD ([facility]) 2023     Mild protein-calorie malnutrition (HCC) 2022     Dysphagia 2022     Mild aortic stenosis 10/22/2021     Mild mitral regurgitation 10/22/2021     Diastolic dysfunction 10/22/2021     Mild concentric left ventricular hypertrophy (LVH) 10/22/2021     Type 2 diabetes mellitus with retinopathy, without long-term current use of insulin ([facility]) 09/15/2021     Myasthenia gravis without exacerbation ([facility]) 09/15/2021     Mixed hyperlipidemia 09/15/2021     Retinopathy 09/15/2021     Primary open angle glaucoma (POAG) of both eyes, mild stage 09/15/2021     Ptosis of both eyelids 09/15/2021     Benign prostatic hyperplasia without lower urinary tract symptoms 09/15/2021     Essential hypertension 09/15/2021     Other age-related cataract 09/15/2021       LOS (days): 4  Geometric Mean LOS (GMLOS) (days): 3.1  Days to GMLOS:-0.8     OBJECTIVE:  Risk of Unplanned Readmission Score: 34.28         Current admission status: Inpatient   Preferred Pharmacy:   [facility]  [place] - [address]  [address] [address]  Phone: [phone] Fax: [phone]    Primary Care  Provider: Giovanni Reyez DO    Primary Insurance: [facility] REP  Secondary Insurance:     DISCHARGE DETAILS:  Spoke to SLIM who will call the insurance company again.  Has a deadline of up to 5pm today, SLIM aware.

## 2024-07-12 NOTE — ASSESSMENT & PLAN NOTE
Malnutrition Findings:   Adult Malnutrition type: Chronic illness  Adult Degree of Malnutrition: Other severe protein calorie malnutrition  Malnutrition Characteristics: Weight loss, Fat loss, Muscle loss                  360 Statement: Malnutrition related to chronic illness as evidenced by severe orbital adipose loss, moderate clavicle muscle loss, >10% body weight loss in 6 months, >7.5% body weight loss in 3 months.  To treat with oral diet and nutrition supplement as tolerated.    BMI Findings:           Body mass index is 17.1 kg/m².   Secondary to chronic disease  As evidenced by severe orbital adipose loss    Nutrition consult

## 2024-07-13 LAB
ANION GAP SERPL CALCULATED.3IONS-SCNC: 8 MMOL/L (ref 4–13)
BASOPHILS # BLD AUTO: 0.04 THOUSANDS/ÂΜL (ref 0–0.1)
BASOPHILS NFR BLD AUTO: 1 % (ref 0–1)
BUN SERPL-MCNC: 27 MG/DL (ref 5–25)
CALCIUM SERPL-MCNC: 9.4 MG/DL (ref 8.4–10.2)
CHLORIDE SERPL-SCNC: 100 MMOL/L (ref 96–108)
CO2 SERPL-SCNC: 31 MMOL/L (ref 21–32)
CREAT SERPL-MCNC: 0.96 MG/DL (ref 0.6–1.3)
EOSINOPHIL # BLD AUTO: 0.09 THOUSAND/ÂΜL (ref 0–0.61)
EOSINOPHIL NFR BLD AUTO: 1 % (ref 0–6)
ERYTHROCYTE [DISTWIDTH] IN BLOOD BY AUTOMATED COUNT: 12.4 % (ref 11.6–15.1)
GFR SERPL CREATININE-BSD FRML MDRD: 72 ML/MIN/1.73SQ M
GLUCOSE SERPL-MCNC: 163 MG/DL (ref 65–140)
GLUCOSE SERPL-MCNC: 172 MG/DL (ref 65–140)
GLUCOSE SERPL-MCNC: 384 MG/DL (ref 65–140)
GLUCOSE SERPL-MCNC: 389 MG/DL (ref 65–140)
GLUCOSE SERPL-MCNC: 465 MG/DL (ref 65–140)
HCT VFR BLD AUTO: 40 % (ref 36.5–49.3)
HGB BLD-MCNC: 13 G/DL (ref 12–17)
IMM GRANULOCYTES # BLD AUTO: 0.12 THOUSAND/UL (ref 0–0.2)
IMM GRANULOCYTES NFR BLD AUTO: 1 % (ref 0–2)
LYMPHOCYTES # BLD AUTO: 1.91 THOUSANDS/ÂΜL (ref 0.6–4.47)
LYMPHOCYTES NFR BLD AUTO: 23 % (ref 14–44)
MAGNESIUM SERPL-MCNC: 2.1 MG/DL (ref 1.9–2.7)
MCH RBC QN AUTO: 30.7 PG (ref 26.8–34.3)
MCHC RBC AUTO-ENTMCNC: 32.5 G/DL (ref 31.4–37.4)
MCV RBC AUTO: 95 FL (ref 82–98)
MONOCYTES # BLD AUTO: 0.66 THOUSAND/ÂΜL (ref 0.17–1.22)
MONOCYTES NFR BLD AUTO: 8 % (ref 4–12)
NEUTROPHILS # BLD AUTO: 5.57 THOUSANDS/ÂΜL (ref 1.85–7.62)
NEUTS SEG NFR BLD AUTO: 66 % (ref 43–75)
NRBC BLD AUTO-RTO: 0 /100 WBCS
PHOSPHATE SERPL-MCNC: 3.1 MG/DL (ref 2.3–4.1)
PLATELET # BLD AUTO: 227 THOUSANDS/UL (ref 149–390)
PMV BLD AUTO: 9.6 FL (ref 8.9–12.7)
POTASSIUM SERPL-SCNC: 4.2 MMOL/L (ref 3.5–5.3)
RBC # BLD AUTO: 4.23 MILLION/UL (ref 3.88–5.62)
SODIUM SERPL-SCNC: 139 MMOL/L (ref 135–147)
WBC # BLD AUTO: 8.39 THOUSAND/UL (ref 4.31–10.16)

## 2024-07-13 PROCEDURE — 85025 COMPLETE CBC W/AUTO DIFF WBC: CPT | Performed by: INTERNAL MEDICINE

## 2024-07-13 PROCEDURE — 82948 REAGENT STRIP/BLOOD GLUCOSE: CPT

## 2024-07-13 PROCEDURE — 80048 BASIC METABOLIC PNL TOTAL CA: CPT | Performed by: INTERNAL MEDICINE

## 2024-07-13 PROCEDURE — 84100 ASSAY OF PHOSPHORUS: CPT | Performed by: INTERNAL MEDICINE

## 2024-07-13 PROCEDURE — 83735 ASSAY OF MAGNESIUM: CPT | Performed by: INTERNAL MEDICINE

## 2024-07-13 PROCEDURE — 99232 SBSQ HOSP IP/OBS MODERATE 35: CPT | Performed by: INTERNAL MEDICINE

## 2024-07-13 RX ORDER — INSULIN GLARGINE 100 [IU]/ML
15 INJECTION, SOLUTION SUBCUTANEOUS
Status: DISCONTINUED | OUTPATIENT
Start: 2024-07-13 | End: 2024-07-20 | Stop reason: HOSPADM

## 2024-07-13 RX ORDER — INSULIN LISPRO 100 [IU]/ML
10 INJECTION, SOLUTION INTRAVENOUS; SUBCUTANEOUS ONCE
Status: COMPLETED | OUTPATIENT
Start: 2024-07-13 | End: 2024-07-13

## 2024-07-13 RX ORDER — INSULIN LISPRO 100 [IU]/ML
5 INJECTION, SOLUTION INTRAVENOUS; SUBCUTANEOUS
Status: DISCONTINUED | OUTPATIENT
Start: 2024-07-13 | End: 2024-07-17

## 2024-07-13 RX ADMIN — ATORVASTATIN CALCIUM 20 MG: 20 TABLET, FILM COATED ORAL at 08:03

## 2024-07-13 RX ADMIN — INSULIN LISPRO 5 UNITS: 100 INJECTION, SOLUTION INTRAVENOUS; SUBCUTANEOUS at 17:03

## 2024-07-13 RX ADMIN — INSULIN LISPRO 5 UNITS: 100 INJECTION, SOLUTION INTRAVENOUS; SUBCUTANEOUS at 11:34

## 2024-07-13 RX ADMIN — INSULIN GLARGINE 15 UNITS: 100 INJECTION, SOLUTION SUBCUTANEOUS at 21:12

## 2024-07-13 RX ADMIN — TAMSULOSIN HYDROCHLORIDE 0.4 MG: 0.4 CAPSULE ORAL at 08:03

## 2024-07-13 RX ADMIN — FLUOXETINE HYDROCHLORIDE 20 MG: 20 CAPSULE ORAL at 08:03

## 2024-07-13 RX ADMIN — ASPIRIN 81 MG: 81 TABLET, COATED ORAL at 08:03

## 2024-07-13 RX ADMIN — MEMANTINE 10 MG: 5 TABLET ORAL at 17:03

## 2024-07-13 RX ADMIN — FUROSEMIDE 40 MG: 40 TABLET ORAL at 08:03

## 2024-07-13 RX ADMIN — HEPARIN SODIUM 5000 UNITS: 5000 INJECTION INTRAVENOUS; SUBCUTANEOUS at 06:06

## 2024-07-13 RX ADMIN — HEPARIN SODIUM 5000 UNITS: 5000 INJECTION INTRAVENOUS; SUBCUTANEOUS at 14:49

## 2024-07-13 RX ADMIN — HEPARIN SODIUM 5000 UNITS: 5000 INJECTION INTRAVENOUS; SUBCUTANEOUS at 21:12

## 2024-07-13 RX ADMIN — INSULIN LISPRO 1 UNITS: 100 INJECTION, SOLUTION INTRAVENOUS; SUBCUTANEOUS at 08:04

## 2024-07-13 RX ADMIN — QUETIAPINE FUMARATE 50 MG: 50 TABLET ORAL at 21:12

## 2024-07-13 RX ADMIN — MEMANTINE 10 MG: 5 TABLET ORAL at 08:03

## 2024-07-13 RX ADMIN — QUETIAPINE FUMARATE 25 MG: 25 TABLET ORAL at 11:35

## 2024-07-13 RX ADMIN — QUETIAPINE FUMARATE 25 MG: 25 TABLET ORAL at 06:06

## 2024-07-13 RX ADMIN — INSULIN LISPRO 10 UNITS: 100 INJECTION, SOLUTION INTRAVENOUS; SUBCUTANEOUS at 11:34

## 2024-07-13 RX ADMIN — INSULIN LISPRO 4 UNITS: 100 INJECTION, SOLUTION INTRAVENOUS; SUBCUTANEOUS at 17:03

## 2024-07-13 RX ADMIN — DONEPEZIL HYDROCHLORIDE 10 MG: 10 TABLET ORAL at 21:12

## 2024-07-13 RX ADMIN — INSULIN LISPRO 4 UNITS: 100 INJECTION, SOLUTION INTRAVENOUS; SUBCUTANEOUS at 21:16

## 2024-07-13 NOTE — PLAN OF CARE
Problem: Potential for Falls  Goal: Patient will remain free of falls  Description: INTERVENTIONS:  - Educate patient/family on patient safety including physical limitations  - Instruct patient to call for assistance with activity   - Consult OT/PT to assist with strengthening/mobility   - Keep Call bell within reach  - Keep bed low and locked with side rails adjusted as appropriate  - Keep care items and personal belongings within reach  - Initiate and maintain comfort rounds  - Make Fall Risk Sign visible to staff  - Offer Toileting every 2 Hours, in advance of need  - Initiate/Maintain alarms  - Obtain necessary fall risk management equipment:   - Apply yellow socks and bracelet for high fall risk patients  - Consider moving patient to room near nurses station  Outcome: Progressing     Problem: SAFETY ADULT  Goal: Patient will remain free of falls  Description: INTERVENTIONS:  - Educate patient/family on patient safety including physical limitations  - Instruct patient to call for assistance with activity   - Consult OT/PT to assist with strengthening/mobility   - Keep Call bell within reach  - Keep bed low and locked with side rails adjusted as appropriate  - Keep care items and personal belongings within reach  - Initiate and maintain comfort rounds  - Make Fall Risk Sign visible to staff  - Offer Toileting every 2 Hours, in advance of need  - Initiate/Maintain alarms  - Obtain necessary fall risk management equipment:   - Apply yellow socks and bracelet for high fall risk patients  - Consider moving patient to room near nurses station  Outcome: Progressing

## 2024-07-13 NOTE — PROGRESS NOTES
[facility]  Progress Note  Name: Israel Hughes I  MRN: [id number]  Unit/Bed#: -01 I Date of Admission: 7/8/2024   Date of Service: 7/13/2024  Hospital Day: 5    Assessment & Plan   * Gait dysfunction  Assessment & Plan  Patient presents with ambulatory dysfunction and fatigue  Family state patient is unable to care for himself and may need placement to skilled nursing facility  Likely progression of Alzheimer's dementia  Patient and family agreeable to placement by case management    PT/OT evaluation and treatment  Case management consultation for safe disposition planning  Fall precautions    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)  Assessment & Plan  Likely worsening in the setting of fatigue, ambulatory dysfunction, difficulty with ADLs    Continue donepezil, memantine, fluoxetine  Monitor mentation  Outpatient follow-up with Neurology    Type 2 diabetes mellitus with retinopathy, without long-term current use of insulin (HCC)  Assessment & Plan  Lab Results   Component Value Date    HGBA1C 8.2 (H) 05/17/2024       Recent Labs     07/09/24  0810 07/09/24  1115 07/09/24  1619 07/09/24  2109   POCGLU 141* 235* 125 162*         Blood Sugar Average: Last 72 hrs:  (P) 145  Poorly controlled with hemoglobin A1c of 8.2%    Continue home Lantus 5 units daily at bedtime  Sliding scale insulin with Accu-Cheks  Diabetic diet  Hold home SGLT2 inhibitor    Benign prostatic hyperplasia without lower urinary tract symptoms  Assessment & Plan  Asymptomatic and chronic in nature    Continue home Flomax    Chronic diastolic congestive heart failure (HCC)  Assessment & Plan  Euvolemic and well compensated  Weight at baseline    Continue home loop diuretic  Monitor volume status          Severe protein-calorie malnutrition (HCC)  Assessment & Plan  Malnutrition Findings:   Adult Malnutrition type: Chronic illness  Adult Degree of Malnutrition: Other severe protein calorie  malnutrition  Malnutrition Characteristics: Weight loss, Fat loss, Muscle loss                  360 Statement: Malnutrition related to chronic illness as evidenced by severe orbital adipose loss, moderate clavicle muscle loss, >10% body weight loss in 6 months, >7.5% body weight loss in 3 months.  To treat with oral diet and nutrition supplement as tolerated.    BMI Findings:           Body mass index is 17.1 kg/m².   Secondary to chronic disease  As evidenced by severe orbital adipose loss    Nutrition consult    Moderate protein-calorie malnutrition (HCC)  Assessment & Plan  Malnutrition Findings:                                 BMI Findings:           Body mass index is 17.1 kg/m².   Present on admission as evidenced by BMI of 20    Encourage lifestyle modification and weight gain               VTE Pharmacologic Prophylaxis: VTE Score: 3 Moderate Risk (Score 3-4) - Pharmacological DVT Prophylaxis Ordered: heparin.    Mobility:   Basic Mobility Inpatient Raw Score: 10  JH-HLM Goal: 4: Move to chair/commode  JH-HLM Achieved: 4: Move to chair/commode  JH-HLM Goal NOT achieved. Continue with multidisciplinary rounding and encourage appropriate mobility to improve upon JH-HLM goals.    Patient Centered Rounds: I performed bedside rounds with nursing staff today.     Discussions with Specialists or Other Care Team Provider: Nursing    Education and Discussions with Family / Patient: Updated  (sister-in-law) via phone.    Total Time Spent on Date of Encounter in care of patient: 35 mins. This time was spent on one or more of the following: performing physical exam; counseling and coordination of care; obtaining or reviewing history; documenting in the medical record; reviewing/ordering tests, medications or procedures; communicating with other healthcare professionals and discussing with patient's family/caregivers.    Current Length of Stay: 5 day(s)  Current Patient Status: Inpatient   Certification  Statement: The patient will continue to require additional inpatient hospital stay due to ambulatory dysfunction  Discharge Plan: Anticipate discharge in 24-48 hrs to rehab facility.    Code Status: Level 3 - DNAR and DNI    Subjective:   Patient seen and examined at bedside. No acute events overnight. Denies chest pain, SOB, diaphoresis, nausea/vomiting/diarrhea, fevers/chills.  Medically stable for discharge to short-term rehab.    Objective:     Vitals:   Temp (24hrs), Av.3 °F (36.3 °C), Min:96.1 °F (35.6 °C), Max:97.9 °F (36.6 °C)    Temp:  [96.1 °F (35.6 °C)-97.9 °F (36.6 °C)] 96.1 °F (35.6 °C)  HR:  [68-77] 68  Resp:  [17-18] 17  BP: (108-134)/(69-83) 131/83  SpO2:  [93 %-96 %] 94 %  Body mass index is 21.02 kg/m².     Input and Output Summary (last 24 hours):     Intake/Output Summary (Last 24 hours) at 2024 0814  Last data filed at 2024 1700  Gross per 24 hour   Intake 360 ml   Output --   Net 360 ml       Physical Exam:   Physical Exam  Vitals and nursing note reviewed.   Constitutional:       General: He is not in acute distress.     Appearance: He is well-developed.   HENT:      Head: Normocephalic and atraumatic.   Eyes:      Conjunctiva/sclera: Conjunctivae normal.   Cardiovascular:      Rate and Rhythm: Normal rate and regular rhythm.      Heart sounds: No murmur heard.  Pulmonary:      Effort: Pulmonary effort is normal. No respiratory distress.      Breath sounds: Normal breath sounds.   Abdominal:      Palpations: Abdomen is soft.      Tenderness: There is no abdominal tenderness.   Musculoskeletal:         General: No swelling.      Cervical back: Neck supple.   Skin:     General: Skin is warm and dry.      Capillary Refill: Capillary refill takes less than 2 seconds.   Neurological:      Mental Status: He is alert.   Psychiatric:         Mood and Affect: Mood normal.          Additional Data:     Labs:  Results from last 7 days   Lab Units 24  0443   WBC Thousand/uL 8.39    HEMOGLOBIN g/dL 13.0   HEMATOCRIT % 40.0   PLATELETS Thousands/uL 227   SEGS PCT % 66   LYMPHO PCT % 23   MONO PCT % 8   EOS PCT % 1     Results from last 7 days   Lab Units 07/13/24  0443 07/09/24  0523 07/08/24  1609   SODIUM mmol/L 139   < > 139   POTASSIUM mmol/L 4.2   < > 4.4   CHLORIDE mmol/L 100   < > 99   CO2 mmol/L 31   < > 30   BUN mg/dL 27*   < > 31*   CREATININE mg/dL 0.96   < > 1.01   ANION GAP mmol/L 8   < > 10   CALCIUM mg/dL 9.4   < > 9.7   ALBUMIN g/dL  --   --  4.0   TOTAL BILIRUBIN mg/dL  --   --  0.41   ALK PHOS U/L  --   --  86   ALT U/L  --   --  14   AST U/L  --   --  20   GLUCOSE RANDOM mg/dL 163*   < > 140    < > = values in this interval not displayed.     Results from last 7 days   Lab Units 07/08/24  1609   INR  0.95     Results from last 7 days   Lab Units 07/13/24  0727 07/12/24  2055 07/12/24  1607 07/12/24  1048 07/12/24  0710 07/11/24  2046 07/11/24  1606 07/11/24  1128 07/11/24  0619 07/10/24  2107 07/10/24  1608 07/10/24  1130   POC GLUCOSE mg/dl 172* 438* 233* 284* 349* 211* 199* 332* 95 278* 125 215*               Lines/Drains:  Invasive Devices       Peripheral Intravenous Line  Duration             Peripheral IV 07/13/24 Left;Ventral (anterior) Forearm <1 day                          Imaging: Reviewed radiology reports from this admission including: CT head    Recent Cultures (last 7 days):         Last 24 Hours Medication List:   Current Facility-Administered Medications   Medication Dose Route Frequency Provider Last Rate    acetaminophen  650 mg Oral Q4H PRN Steven C Yorty, DO      aspirin  81 mg Oral Daily Steven C Yorty, DO      atorvastatin  20 mg Oral Daily Steven C Yorty, DO      donepezil  10 mg Oral HS Steven C Yorty, DO      FLUoxetine  20 mg Oral QAM Steven C Yorty, DO      furosemide  40 mg Oral Daily Steven C Yorty, DO      heparin (porcine)  5,000 Units Subcutaneous Q8H [facility] Steven Russo,       insulin glargine  10 Units Subcutaneous HS Steven HUYNH  Rafaela, DO      insulin lispro  1-5 Units Subcutaneous TID AC Steven Russo, DO      insulin lispro  1-5 Units Subcutaneous HS Steven Russo, DO      memantine  10 mg Oral BID Steven Russo, DO      ondansetron  4 mg Intravenous Q6H PRN Steven Russo, DO      QUEtiapine  25 mg Oral BID before breakfast/lunch Steven Russo,       QUEtiapine  50 mg Oral HS Steven Russo, DO      tamsulosin  0.4 mg Oral Daily Steven Russo DO          Today, Patient Was Seen By: Steven Russo DO    **Please Note: This note may have been constructed using a voice recognition system.**

## 2024-07-13 NOTE — PLAN OF CARE
Problem: Potential for Falls  Goal: Patient will remain free of falls  Description: INTERVENTIONS:  - Educate patient/family on patient safety including physical limitations  - Instruct patient to call for assistance with activity   - Consult OT/PT to assist with strengthening/mobility   - Keep Call bell within reach  - Keep bed low and locked with side rails adjusted as appropriate  - Keep care items and personal belongings within reach  - Initiate and maintain comfort rounds  - Make Fall Risk Sign visible to staff  - Offer Toileting every 2 Hours, in advance of need  - Initiate/Maintain alarms  - Obtain necessary fall risk management equipment:   - Apply yellow socks and bracelet for high fall risk patients  - Consider moving patient to room near nurses station  Outcome: Progressing     Problem: PAIN - ADULT  Goal: Verbalizes/displays adequate comfort level or baseline comfort level  Description: Interventions:  - Encourage patient to monitor pain and request assistance  - Assess pain using appropriate pain scale  - Administer analgesics based on type and severity of pain and evaluate response  - Implement non-pharmacological measures as appropriate and evaluate response  - Consider cultural and social influences on pain and pain management  - Notify physician/advanced practitioner if interventions unsuccessful or patient reports new pain  Outcome: Progressing     Problem: INFECTION - ADULT  Goal: Absence or prevention of progression during hospitalization  Description: INTERVENTIONS:  - Assess and monitor for signs and symptoms of infection  - Monitor lab/diagnostic results  - Monitor all insertion sites, i.e. indwelling lines, tubes, and drains  - Monitor endotracheal if appropriate and nasal secretions for changes in amount and color  - [place] appropriate cooling/warming therapies per order  - Administer medications as ordered  - Instruct and encourage patient and family to use good hand hygiene  technique  - Identify and instruct in appropriate isolation precautions for identified infection/condition  Outcome: Progressing     Problem: SAFETY ADULT  Goal: Patient will remain free of falls  Description: INTERVENTIONS:  - Educate patient/family on patient safety including physical limitations  - Instruct patient to call for assistance with activity   - Consult OT/PT to assist with strengthening/mobility   - Keep Call bell within reach  - Keep bed low and locked with side rails adjusted as appropriate  - Keep care items and personal belongings within reach  - Initiate and maintain comfort rounds  - Make Fall Risk Sign visible to staff  - Offer Toileting every 2 Hours, in advance of need  - Initiate/Maintain alarms  - Obtain necessary fall risk management equipment:   - Apply yellow socks and bracelet for high fall risk patients  - Consider moving patient to room near nurses station  Outcome: Progressing  Goal: Maintain or return to baseline ADL function  Description: INTERVENTIONS:  -  Assess patient's ability to carry out ADLs; assess patient's baseline for ADL function and identify physical deficits which impact ability to perform ADLs (bathing, care of mouth/teeth, toileting, grooming, dressing, etc.)  - Assess/evaluate cause of self-care deficits   - Assess range of motion  - Assess patient's mobility; develop plan if impaired  - Assess patient's need for assistive devices and provide as appropriate  - Encourage maximum independence but intervene and supervise when necessary  - Involve family in performance of ADLs  - Assess for home care needs following discharge   - Consider OT consult to assist with ADL evaluation and planning for discharge  - Provide patient education as appropriate  Outcome: Progressing  Goal: Maintains/Returns to pre admission functional level  Description: INTERVENTIONS:  - Perform AM-PAC 6 Click Basic Mobility/ Daily Activity assessment daily.  - Set and communicate daily mobility  goal to care team and patient/family/caregiver.   - Collaborate with rehabilitation services on mobility goals if consulted  - Reposition patient every 2 hours.  - Dangle patient 3 times a day  - Out of bed for toileting  - Record patient progress and toleration of activity level   Outcome: Progressing     Problem: DISCHARGE PLANNING  Goal: Discharge to home or other facility with appropriate resources  Description: INTERVENTIONS:  - Identify barriers to discharge w/patient and caregiver  - Arrange for needed discharge resources and transportation as appropriate  - Identify discharge learning needs (meds, wound care, etc.)  - Arrange for interpretive services to assist at discharge as needed  - Refer to Case Management Department for coordinating discharge planning if the patient needs post-hospital services based on physician/advanced practitioner order or complex needs related to functional status, cognitive ability, or social support system  Outcome: Progressing     Problem: Knowledge Deficit  Goal: Patient/family/caregiver demonstrates understanding of disease process, treatment plan, medications, and discharge instructions  Description: Complete learning assessment and assess knowledge base.  Interventions:  - Provide teaching at level of understanding  - Provide teaching via preferred learning methods  Outcome: Progressing     Problem: Nutrition/Hydration-ADULT  Goal: Nutrient/Hydration intake appropriate for improving, restoring or maintaining nutritional needs  Description: Monitor and assess patient's nutrition/hydration status for malnutrition. Collaborate with interdisciplinary team and initiate plan and interventions as ordered.  Monitor patient's weight and dietary intake as ordered or per policy. Utilize nutrition screening tool and intervene as necessary. Determine patient's food preferences and provide high-protein, high-caloric foods as appropriate.     INTERVENTIONS:  - Monitor oral intake, urinary  output, labs, and treatment plans  - Assess nutrition and hydration status and recommend course of action  - Evaluate amount of meals eaten  - Assist patient with eating if necessary   - Allow adequate time for meals  - Recommend/ encourage appropriate diets, oral nutritional supplements, and vitamin/mineral supplements  - Order, calculate, and assess calorie counts as needed  - Recommend, monitor, and adjust tube feedings and TPN/PPN based on assessed needs  - Assess need for intravenous fluids  - Provide specific nutrition/hydration education as appropriate  - Include patient/family/caregiver in decisions related to nutrition  Outcome: Progressing     Problem: Prexisting or High Potential for Compromised Skin Integrity  Goal: Skin integrity is maintained or improved  Description: INTERVENTIONS:  - Identify patients at risk for skin breakdown  - Assess and monitor skin integrity  - Assess and monitor nutrition and hydration status  - Monitor labs   - Assess for incontinence   - Turn and reposition patient  - Assist with mobility/ambulation  - Relieve pressure over bony prominences  - Avoid friction and shearing  - Provide appropriate hygiene as needed including keeping skin clean and dry  - Evaluate need for skin moisturizer/barrier cream  - Collaborate with interdisciplinary team   - Patient/family teaching  - Consider wound care consult   Outcome: Progressing

## 2024-07-14 LAB
GLUCOSE SERPL-MCNC: 175 MG/DL (ref 65–140)
GLUCOSE SERPL-MCNC: 175 MG/DL (ref 65–140)
GLUCOSE SERPL-MCNC: 300 MG/DL (ref 65–140)
GLUCOSE SERPL-MCNC: 338 MG/DL (ref 65–140)

## 2024-07-14 PROCEDURE — 82948 REAGENT STRIP/BLOOD GLUCOSE: CPT

## 2024-07-14 PROCEDURE — 99232 SBSQ HOSP IP/OBS MODERATE 35: CPT | Performed by: INTERNAL MEDICINE

## 2024-07-14 RX ADMIN — INSULIN LISPRO 1 UNITS: 100 INJECTION, SOLUTION INTRAVENOUS; SUBCUTANEOUS at 08:04

## 2024-07-14 RX ADMIN — MEMANTINE 10 MG: 5 TABLET ORAL at 17:07

## 2024-07-14 RX ADMIN — FUROSEMIDE 40 MG: 40 TABLET ORAL at 08:03

## 2024-07-14 RX ADMIN — MEMANTINE 10 MG: 5 TABLET ORAL at 08:03

## 2024-07-14 RX ADMIN — QUETIAPINE FUMARATE 25 MG: 25 TABLET ORAL at 08:03

## 2024-07-14 RX ADMIN — ATORVASTATIN CALCIUM 20 MG: 20 TABLET, FILM COATED ORAL at 08:03

## 2024-07-14 RX ADMIN — INSULIN LISPRO 1 UNITS: 100 INJECTION, SOLUTION INTRAVENOUS; SUBCUTANEOUS at 17:07

## 2024-07-14 RX ADMIN — INSULIN LISPRO 5 UNITS: 100 INJECTION, SOLUTION INTRAVENOUS; SUBCUTANEOUS at 17:08

## 2024-07-14 RX ADMIN — QUETIAPINE FUMARATE 25 MG: 25 TABLET ORAL at 11:58

## 2024-07-14 RX ADMIN — INSULIN LISPRO 5 UNITS: 100 INJECTION, SOLUTION INTRAVENOUS; SUBCUTANEOUS at 08:04

## 2024-07-14 RX ADMIN — INSULIN LISPRO 3 UNITS: 100 INJECTION, SOLUTION INTRAVENOUS; SUBCUTANEOUS at 22:01

## 2024-07-14 RX ADMIN — HEPARIN SODIUM 5000 UNITS: 5000 INJECTION INTRAVENOUS; SUBCUTANEOUS at 05:24

## 2024-07-14 RX ADMIN — QUETIAPINE FUMARATE 50 MG: 50 TABLET ORAL at 22:01

## 2024-07-14 RX ADMIN — HEPARIN SODIUM 5000 UNITS: 5000 INJECTION INTRAVENOUS; SUBCUTANEOUS at 14:15

## 2024-07-14 RX ADMIN — ASPIRIN 81 MG: 81 TABLET, COATED ORAL at 08:03

## 2024-07-14 RX ADMIN — FLUOXETINE HYDROCHLORIDE 20 MG: 20 CAPSULE ORAL at 08:03

## 2024-07-14 RX ADMIN — TAMSULOSIN HYDROCHLORIDE 0.4 MG: 0.4 CAPSULE ORAL at 08:03

## 2024-07-14 RX ADMIN — HEPARIN SODIUM 5000 UNITS: 5000 INJECTION INTRAVENOUS; SUBCUTANEOUS at 22:01

## 2024-07-14 RX ADMIN — INSULIN LISPRO 5 UNITS: 100 INJECTION, SOLUTION INTRAVENOUS; SUBCUTANEOUS at 11:58

## 2024-07-14 RX ADMIN — INSULIN GLARGINE 15 UNITS: 100 INJECTION, SOLUTION SUBCUTANEOUS at 22:00

## 2024-07-14 RX ADMIN — DONEPEZIL HYDROCHLORIDE 10 MG: 10 TABLET ORAL at 22:01

## 2024-07-14 RX ADMIN — INSULIN LISPRO 3 UNITS: 100 INJECTION, SOLUTION INTRAVENOUS; SUBCUTANEOUS at 11:58

## 2024-07-14 NOTE — PROGRESS NOTES
[facility]  Progress Note  Name: Israel Hughes I  MRN: [id number]  Unit/Bed#: -01 I Date of Admission: 7/8/2024   Date of Service: 7/14/2024 I Hospital Day: 6    Assessment & Plan   * Gait dysfunction  Assessment & Plan  Patient presents with ambulatory dysfunction and fatigue  Family state patient is unable to care for himself and may need placement to skilled nursing facility  Likely progression of Alzheimer's dementia  Patient and family agreeable to placement by case management    PT/OT evaluation and treatment  Case management consultation for safe disposition planning  Fall precautions  Patient is medically stable for discharge to short-term rehab    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)  Assessment & Plan  Likely worsening in the setting of fatigue, ambulatory dysfunction, difficulty with ADLs    Continue donepezil, memantine, fluoxetine  Monitor mentation  Outpatient follow-up with Neurology    Type 2 diabetes mellitus with retinopathy, without long-term current use of insulin (HCC)  Assessment & Plan  Lab Results   Component Value Date    HGBA1C 8.2 (H) 05/17/2024       Recent Labs     07/09/24  0810 07/09/24  1115 07/09/24  1619 07/09/24  2109   POCGLU 141* 235* 125 162*         Blood Sugar Average: Last 72 hrs:  (P) 145  Poorly controlled with hemoglobin A1c of 8.2%    Increase home Lantus to 15 units daily at bedtime  Schedule 5 units of Lantus 3 times daily with meals  Sliding scale insulin with Accu-Cheks  Diabetic diet  Hold home SGLT2 inhibitor    Benign prostatic hyperplasia without lower urinary tract symptoms  Assessment & Plan  Asymptomatic and chronic in nature    Continue home Flomax    Chronic diastolic congestive heart failure (HCC)  Assessment & Plan  Euvolemic and well compensated  Weight at baseline    Continue home loop diuretic  Monitor volume status    Severe protein-calorie malnutrition (HCC)  Assessment & Plan  Malnutrition  Findings:   Adult Malnutrition type: Chronic illness  Adult Degree of Malnutrition: Other severe protein calorie malnutrition  Malnutrition Characteristics: Weight loss, Fat loss, Muscle loss                  360 Statement: Malnutrition related to chronic illness as evidenced by severe orbital adipose loss, moderate clavicle muscle loss, >10% body weight loss in 6 months, >7.5% body weight loss in 3 months.  To treat with oral diet and nutrition supplement as tolerated.    BMI Findings:           Body mass index is 17.1 kg/m².   Secondary to chronic disease  As evidenced by severe orbital adipose loss    Nutrition consult    Moderate protein-calorie malnutrition (HCC)  Assessment & Plan  Malnutrition Findings:                                 BMI Findings:           Body mass index is 17.1 kg/m².   Present on admission as evidenced by BMI of 20    Encourage lifestyle modification and weight gain               VTE Pharmacologic Prophylaxis: VTE Score: 3 Moderate Risk (Score 3-4) - Pharmacological DVT Prophylaxis Ordered: heparin.    Mobility:   Basic Mobility Inpatient Raw Score: 10  JH-HLM Goal: 4: Move to chair/commode  JH-HLM Achieved: 2: Bed activities/Dependent transfer  JH-HLM Goal NOT achieved. Continue with multidisciplinary rounding and encourage appropriate mobility to improve upon JH-HLM goals.    Patient Centered Rounds: I performed bedside rounds with nursing staff today.     Discussions with Specialists or Other Care Team Provider: Nursing    Education and Discussions with Family / Patient: Updated  (sister-in-law) via phone.    Total Time Spent on Date of Encounter in care of patient: 35 mins. This time was spent on one or more of the following: performing physical exam; counseling and coordination of care; obtaining or reviewing history; documenting in the medical record; reviewing/ordering tests, medications or procedures; communicating with other healthcare professionals and discussing  with patient's family/caregivers.    Current Length of Stay: 6 day(s)  Current Patient Status: Inpatient   Certification Statement: The patient will continue to require additional inpatient hospital stay due to ambulatory dysfunction  Discharge Plan: Anticipate discharge in 24-48 hrs to rehab facility.    Code Status: Level 3 - DNAR and DNI    Subjective:   Patient seen and examined at bedside. No acute events overnight. Denies chest pain, SOB, diaphoresis, nausea/vomiting/diarrhea, fevers/chills.  Medically stable for discharge to short-term rehab.    Objective:     Vitals:   Temp (24hrs), Av.8 °F (36 °C), Min:96.3 °F (35.7 °C), Max:97.7 °F (36.5 °C)    Temp:  [96.3 °F (35.7 °C)-97.7 °F (36.5 °C)] 96.4 °F (35.8 °C)  HR:  [62-84] 62  Resp:  [18] 18  BP: (111-135)/(72-82) 135/82  SpO2:  [95 %-96 %] 95 %  Body mass index is 16.36 kg/m².     Input and Output Summary (last 24 hours):     Intake/Output Summary (Last 24 hours) at 2024 0828  Last data filed at 2024 1657  Gross per 24 hour   Intake 1120 ml   Output --   Net 1120 ml       Physical Exam:   Physical Exam  Vitals and nursing note reviewed.   Constitutional:       General: He is not in acute distress.     Appearance: He is well-developed.   HENT:      Head: Normocephalic and atraumatic.   Eyes:      Conjunctiva/sclera: Conjunctivae normal.   Cardiovascular:      Rate and Rhythm: Normal rate and regular rhythm.      Heart sounds: No murmur heard.  Pulmonary:      Effort: Pulmonary effort is normal. No respiratory distress.      Breath sounds: Normal breath sounds.   Abdominal:      Palpations: Abdomen is soft.      Tenderness: There is no abdominal tenderness.   Musculoskeletal:         General: No swelling.      Cervical back: Neck supple.   Skin:     General: Skin is warm and dry.      Capillary Refill: Capillary refill takes less than 2 seconds.   Neurological:      Mental Status: He is alert.   Psychiatric:         Mood and Affect: Mood normal.           Additional Data:     Labs:  Results from last 7 days   Lab Units 07/13/24  0443   WBC Thousand/uL 8.39   HEMOGLOBIN g/dL 13.0   HEMATOCRIT % 40.0   PLATELETS Thousands/uL 227   SEGS PCT % 66   LYMPHO PCT % 23   MONO PCT % 8   EOS PCT % 1     Results from last 7 days   Lab Units 07/13/24  0443 07/09/24  0523 07/08/24  1609   SODIUM mmol/L 139   < > 139   POTASSIUM mmol/L 4.2   < > 4.4   CHLORIDE mmol/L 100   < > 99   CO2 mmol/L 31   < > 30   BUN mg/dL 27*   < > 31*   CREATININE mg/dL 0.96   < > 1.01   ANION GAP mmol/L 8   < > 10   CALCIUM mg/dL 9.4   < > 9.7   ALBUMIN g/dL  --   --  4.0   TOTAL BILIRUBIN mg/dL  --   --  0.41   ALK PHOS U/L  --   --  86   ALT U/L  --   --  14   AST U/L  --   --  20   GLUCOSE RANDOM mg/dL 163*   < > 140    < > = values in this interval not displayed.     Results from last 7 days   Lab Units 07/08/24  1609   INR  0.95     Results from last 7 days   Lab Units 07/14/24  0720 07/13/24  2101 07/13/24  1559 07/13/24  1049 07/13/24  0727 07/12/24  2055 07/12/24  1607 07/12/24  1048 07/12/24  0710 07/11/24  2046 07/11/24  1606 07/11/24  1128   POC GLUCOSE mg/dl 175* 384* 389* 465* 172* 438* 233* 284* 349* 211* 199* 332*               Lines/Drains:  Invasive Devices       Peripheral Intravenous Line  Duration             Peripheral IV 07/13/24 Left;Ventral (anterior) Forearm 1 day                          Imaging: Reviewed radiology reports from this admission including: CT head    Recent Cultures (last 7 days):         Last 24 Hours Medication List:   Current Facility-Administered Medications   Medication Dose Route Frequency Provider Last Rate    acetaminophen  650 mg Oral Q4H PRN Steven C Yorty, DO      aspirin  81 mg Oral Daily Steven C Yorty, DO      atorvastatin  20 mg Oral Daily Steven C Yorty, DO      donepezil  10 mg Oral HS Steven C Yorty, DO      FLUoxetine  20 mg Oral QAM Steven C Yorty, DO      furosemide  40 mg Oral Daily Steven Russo, DO      heparin (porcine)   5,000 Units Subcutaneous Q8H ZEINAB Steven Russo, DO      insulin glargine  15 Units Subcutaneous HS Steven Russo, DO      insulin lispro  1-5 Units Subcutaneous TID AC Steven Russo, DO      insulin lispro  1-5 Units Subcutaneous HS Steven Russo, DO      insulin lispro  5 Units Subcutaneous TID With Meals Steven Russo, DO      memantine  10 mg Oral BID Steven Russo, DO      ondansetron  4 mg Intravenous Q6H PRN Steven Russo, DO      QUEtiapine  25 mg Oral BID before breakfast/lunch Steven Russo, DO      QUEtiapine  50 mg Oral HS Steven Russo, DO      tamsulosin  0.4 mg Oral Daily Steven Russo, DO          Today, Patient Was Seen By: Steven Russo DO    **Please Note: This note may have been constructed using a voice recognition system.**

## 2024-07-14 NOTE — PLAN OF CARE
Problem: Potential for Falls  Goal: Patient will remain free of falls  Description: INTERVENTIONS:  - Educate patient/family on patient safety including physical limitations  - Instruct patient to call for assistance with activity   - Consult OT/PT to assist with strengthening/mobility   - Keep Call bell within reach  - Keep bed low and locked with side rails adjusted as appropriate  - Keep care items and personal belongings within reach  - Initiate and maintain comfort rounds  - Make Fall Risk Sign visible to staff  - Offer Toileting every 2 Hours, in advance of need  - Initiate/Maintain alarms  - Obtain necessary fall risk management equipment:   - Apply yellow socks and bracelet for high fall risk patients  - Consider moving patient to room near nurses station  Outcome: Progressing     Problem: PAIN - ADULT  Goal: Verbalizes/displays adequate comfort level or baseline comfort level  Description: Interventions:  - Encourage patient to monitor pain and request assistance  - Assess pain using appropriate pain scale  - Administer analgesics based on type and severity of pain and evaluate response  - Implement non-pharmacological measures as appropriate and evaluate response  - Consider cultural and social influences on pain and pain management  - Notify physician/advanced practitioner if interventions unsuccessful or patient reports new pain  Outcome: Progressing     Problem: INFECTION - ADULT  Goal: Absence or prevention of progression during hospitalization  Description: INTERVENTIONS:  - Assess and monitor for signs and symptoms of infection  - Monitor lab/diagnostic results  - Monitor all insertion sites, i.e. indwelling lines, tubes, and drains  - Monitor endotracheal if appropriate and nasal secretions for changes in amount and color  - [place] appropriate cooling/warming therapies per order  - Administer medications as ordered  - Instruct and encourage patient and family to use good hand hygiene  technique  - Identify and instruct in appropriate isolation precautions for identified infection/condition  Outcome: Progressing     Problem: SAFETY ADULT  Goal: Patient will remain free of falls  Description: INTERVENTIONS:  - Educate patient/family on patient safety including physical limitations  - Instruct patient to call for assistance with activity   - Consult OT/PT to assist with strengthening/mobility   - Keep Call bell within reach  - Keep bed low and locked with side rails adjusted as appropriate  - Keep care items and personal belongings within reach  - Initiate and maintain comfort rounds  - Make Fall Risk Sign visible to staff  - Offer Toileting every 2 Hours, in advance of need  - Initiate/Maintain alarms  - Obtain necessary fall risk management equipment:   - Apply yellow socks and bracelet for high fall risk patients  - Consider moving patient to room near nurses station  Outcome: Progressing  Goal: Maintain or return to baseline ADL function  Description: INTERVENTIONS:  -  Assess patient's ability to carry out ADLs; assess patient's baseline for ADL function and identify physical deficits which impact ability to perform ADLs (bathing, care of mouth/teeth, toileting, grooming, dressing, etc.)  - Assess/evaluate cause of self-care deficits   - Assess range of motion  - Assess patient's mobility; develop plan if impaired  - Assess patient's need for assistive devices and provide as appropriate  - Encourage maximum independence but intervene and supervise when necessary  - Involve family in performance of ADLs  - Assess for home care needs following discharge   - Consider OT consult to assist with ADL evaluation and planning for discharge  - Provide patient education as appropriate  Outcome: Progressing  Goal: Maintains/Returns to pre admission functional level  Description: INTERVENTIONS:  - Perform AM-PAC 6 Click Basic Mobility/ Daily Activity assessment daily.  - Set and communicate daily mobility  goal to care team and patient/family/caregiver.   - Collaborate with rehabilitation services on mobility goals if consulted  - Reposition patient every 2 hours.  - Dangle patient 3 times a day  - Out of bed for toileting  - Record patient progress and toleration of activity level   Outcome: Progressing     Problem: DISCHARGE PLANNING  Goal: Discharge to home or other facility with appropriate resources  Description: INTERVENTIONS:  - Identify barriers to discharge w/patient and caregiver  - Arrange for needed discharge resources and transportation as appropriate  - Identify discharge learning needs (meds, wound care, etc.)  - Arrange for interpretive services to assist at discharge as needed  - Refer to Case Management Department for coordinating discharge planning if the patient needs post-hospital services based on physician/advanced practitioner order or complex needs related to functional status, cognitive ability, or social support system  Outcome: Progressing     Problem: Knowledge Deficit  Goal: Patient/family/caregiver demonstrates understanding of disease process, treatment plan, medications, and discharge instructions  Description: Complete learning assessment and assess knowledge base.  Interventions:  - Provide teaching at level of understanding  - Provide teaching via preferred learning methods  Outcome: Progressing     Problem: Nutrition/Hydration-ADULT  Goal: Nutrient/Hydration intake appropriate for improving, restoring or maintaining nutritional needs  Description: Monitor and assess patient's nutrition/hydration status for malnutrition. Collaborate with interdisciplinary team and initiate plan and interventions as ordered.  Monitor patient's weight and dietary intake as ordered or per policy. Utilize nutrition screening tool and intervene as necessary. Determine patient's food preferences and provide high-protein, high-caloric foods as appropriate.     INTERVENTIONS:  - Monitor oral intake, urinary  output, labs, and treatment plans  - Assess nutrition and hydration status and recommend course of action  - Evaluate amount of meals eaten  - Assist patient with eating if necessary   - Allow adequate time for meals  - Recommend/ encourage appropriate diets, oral nutritional supplements, and vitamin/mineral supplements  - Order, calculate, and assess calorie counts as needed  - Recommend, monitor, and adjust tube feedings and TPN/PPN based on assessed needs  - Assess need for intravenous fluids  - Provide specific nutrition/hydration education as appropriate  - Include patient/family/caregiver in decisions related to nutrition  Outcome: Progressing     Problem: Prexisting or High Potential for Compromised Skin Integrity  Goal: Skin integrity is maintained or improved  Description: INTERVENTIONS:  - Identify patients at risk for skin breakdown  - Assess and monitor skin integrity  - Assess and monitor nutrition and hydration status  - Monitor labs   - Assess for incontinence   - Turn and reposition patient  - Assist with mobility/ambulation  - Relieve pressure over bony prominences  - Avoid friction and shearing  - Provide appropriate hygiene as needed including keeping skin clean and dry  - Evaluate need for skin moisturizer/barrier cream  - Collaborate with interdisciplinary team   - Patient/family teaching  - Consider wound care consult   Outcome: Progressing

## 2024-07-15 LAB
ANION GAP SERPL CALCULATED.3IONS-SCNC: 7 MMOL/L (ref 4–13)
BASOPHILS # BLD AUTO: 0.05 THOUSANDS/ÂΜL (ref 0–0.1)
BASOPHILS NFR BLD AUTO: 1 % (ref 0–1)
BUN SERPL-MCNC: 28 MG/DL (ref 5–25)
CALCIUM SERPL-MCNC: 9.9 MG/DL (ref 8.4–10.2)
CHLORIDE SERPL-SCNC: 99 MMOL/L (ref 96–108)
CO2 SERPL-SCNC: 31 MMOL/L (ref 21–32)
CREAT SERPL-MCNC: 0.9 MG/DL (ref 0.6–1.3)
EOSINOPHIL # BLD AUTO: 0.11 THOUSAND/ÂΜL (ref 0–0.61)
EOSINOPHIL NFR BLD AUTO: 1 % (ref 0–6)
ERYTHROCYTE [DISTWIDTH] IN BLOOD BY AUTOMATED COUNT: 12.8 % (ref 11.6–15.1)
GFR SERPL CREATININE-BSD FRML MDRD: 78 ML/MIN/1.73SQ M
GLUCOSE SERPL-MCNC: 145 MG/DL (ref 65–140)
GLUCOSE SERPL-MCNC: 175 MG/DL (ref 65–140)
GLUCOSE SERPL-MCNC: 240 MG/DL (ref 65–140)
GLUCOSE SERPL-MCNC: 337 MG/DL (ref 65–140)
GLUCOSE SERPL-MCNC: 399 MG/DL (ref 65–140)
HCT VFR BLD AUTO: 41.3 % (ref 36.5–49.3)
HGB BLD-MCNC: 13.1 G/DL (ref 12–17)
IMM GRANULOCYTES # BLD AUTO: 0.2 THOUSAND/UL (ref 0–0.2)
IMM GRANULOCYTES NFR BLD AUTO: 2 % (ref 0–2)
LYMPHOCYTES # BLD AUTO: 2.6 THOUSANDS/ÂΜL (ref 0.6–4.47)
LYMPHOCYTES NFR BLD AUTO: 26 % (ref 14–44)
MAGNESIUM SERPL-MCNC: 2.1 MG/DL (ref 1.9–2.7)
MCH RBC QN AUTO: 31 PG (ref 26.8–34.3)
MCHC RBC AUTO-ENTMCNC: 31.7 G/DL (ref 31.4–37.4)
MCV RBC AUTO: 98 FL (ref 82–98)
MONOCYTES # BLD AUTO: 0.7 THOUSAND/ÂΜL (ref 0.17–1.22)
MONOCYTES NFR BLD AUTO: 7 % (ref 4–12)
NEUTROPHILS # BLD AUTO: 6.4 THOUSANDS/ÂΜL (ref 1.85–7.62)
NEUTS SEG NFR BLD AUTO: 63 % (ref 43–75)
NRBC BLD AUTO-RTO: 0 /100 WBCS
PHOSPHATE SERPL-MCNC: 3.3 MG/DL (ref 2.3–4.1)
PLATELET # BLD AUTO: 248 THOUSANDS/UL (ref 149–390)
PMV BLD AUTO: 9.4 FL (ref 8.9–12.7)
POTASSIUM SERPL-SCNC: 4.2 MMOL/L (ref 3.5–5.3)
RBC # BLD AUTO: 4.23 MILLION/UL (ref 3.88–5.62)
SODIUM SERPL-SCNC: 137 MMOL/L (ref 135–147)
WBC # BLD AUTO: 10.06 THOUSAND/UL (ref 4.31–10.16)

## 2024-07-15 PROCEDURE — 97530 THERAPEUTIC ACTIVITIES: CPT

## 2024-07-15 PROCEDURE — 97535 SELF CARE MNGMENT TRAINING: CPT

## 2024-07-15 PROCEDURE — 84100 ASSAY OF PHOSPHORUS: CPT | Performed by: INTERNAL MEDICINE

## 2024-07-15 PROCEDURE — 97112 NEUROMUSCULAR REEDUCATION: CPT

## 2024-07-15 PROCEDURE — 82948 REAGENT STRIP/BLOOD GLUCOSE: CPT

## 2024-07-15 PROCEDURE — 83735 ASSAY OF MAGNESIUM: CPT | Performed by: INTERNAL MEDICINE

## 2024-07-15 PROCEDURE — 97110 THERAPEUTIC EXERCISES: CPT

## 2024-07-15 PROCEDURE — 99232 SBSQ HOSP IP/OBS MODERATE 35: CPT | Performed by: INTERNAL MEDICINE

## 2024-07-15 PROCEDURE — 80048 BASIC METABOLIC PNL TOTAL CA: CPT | Performed by: INTERNAL MEDICINE

## 2024-07-15 PROCEDURE — 85025 COMPLETE CBC W/AUTO DIFF WBC: CPT | Performed by: INTERNAL MEDICINE

## 2024-07-15 RX ORDER — MINERAL OIL 100 G/100G
1 OIL RECTAL ONCE
Status: COMPLETED | OUTPATIENT
Start: 2024-07-15 | End: 2024-07-15

## 2024-07-15 RX ORDER — POLYETHYLENE GLYCOL 3350 17 G/17G
17 POWDER, FOR SOLUTION ORAL DAILY
Status: DISCONTINUED | OUTPATIENT
Start: 2024-07-15 | End: 2024-07-20 | Stop reason: HOSPADM

## 2024-07-15 RX ORDER — AMOXICILLIN 250 MG
1 CAPSULE ORAL
Status: DISCONTINUED | OUTPATIENT
Start: 2024-07-15 | End: 2024-07-20 | Stop reason: HOSPADM

## 2024-07-15 RX ADMIN — MINERAL OIL 1 ENEMA: 118 ENEMA RECTAL at 15:25

## 2024-07-15 RX ADMIN — QUETIAPINE FUMARATE 50 MG: 50 TABLET ORAL at 22:09

## 2024-07-15 RX ADMIN — MEMANTINE 10 MG: 5 TABLET ORAL at 08:01

## 2024-07-15 RX ADMIN — QUETIAPINE FUMARATE 25 MG: 25 TABLET ORAL at 08:01

## 2024-07-15 RX ADMIN — ATORVASTATIN CALCIUM 20 MG: 20 TABLET, FILM COATED ORAL at 08:01

## 2024-07-15 RX ADMIN — HEPARIN SODIUM 5000 UNITS: 5000 INJECTION INTRAVENOUS; SUBCUTANEOUS at 22:09

## 2024-07-15 RX ADMIN — HEPARIN SODIUM 5000 UNITS: 5000 INJECTION INTRAVENOUS; SUBCUTANEOUS at 05:15

## 2024-07-15 RX ADMIN — INSULIN LISPRO 4 UNITS: 100 INJECTION, SOLUTION INTRAVENOUS; SUBCUTANEOUS at 11:14

## 2024-07-15 RX ADMIN — FUROSEMIDE 40 MG: 40 TABLET ORAL at 08:01

## 2024-07-15 RX ADMIN — INSULIN LISPRO 1 UNITS: 100 INJECTION, SOLUTION INTRAVENOUS; SUBCUTANEOUS at 17:27

## 2024-07-15 RX ADMIN — INSULIN LISPRO 3 UNITS: 100 INJECTION, SOLUTION INTRAVENOUS; SUBCUTANEOUS at 22:14

## 2024-07-15 RX ADMIN — FLUOXETINE HYDROCHLORIDE 20 MG: 20 CAPSULE ORAL at 08:01

## 2024-07-15 RX ADMIN — POLYETHYLENE GLYCOL 3350 17 G: 17 POWDER, FOR SOLUTION ORAL at 10:08

## 2024-07-15 RX ADMIN — MEMANTINE 10 MG: 5 TABLET ORAL at 17:25

## 2024-07-15 RX ADMIN — TAMSULOSIN HYDROCHLORIDE 0.4 MG: 0.4 CAPSULE ORAL at 08:01

## 2024-07-15 RX ADMIN — ASPIRIN 81 MG: 81 TABLET, COATED ORAL at 08:01

## 2024-07-15 RX ADMIN — HEPARIN SODIUM 5000 UNITS: 5000 INJECTION INTRAVENOUS; SUBCUTANEOUS at 14:44

## 2024-07-15 RX ADMIN — INSULIN LISPRO 1 UNITS: 100 INJECTION, SOLUTION INTRAVENOUS; SUBCUTANEOUS at 08:02

## 2024-07-15 RX ADMIN — INSULIN LISPRO 5 UNITS: 100 INJECTION, SOLUTION INTRAVENOUS; SUBCUTANEOUS at 08:02

## 2024-07-15 RX ADMIN — QUETIAPINE FUMARATE 25 MG: 25 TABLET ORAL at 11:13

## 2024-07-15 RX ADMIN — INSULIN GLARGINE 15 UNITS: 100 INJECTION, SOLUTION SUBCUTANEOUS at 22:08

## 2024-07-15 RX ADMIN — DONEPEZIL HYDROCHLORIDE 10 MG: 10 TABLET ORAL at 22:09

## 2024-07-15 RX ADMIN — SENNOSIDES AND DOCUSATE SODIUM 1 TABLET: 50; 8.6 TABLET ORAL at 22:09

## 2024-07-15 RX ADMIN — INSULIN LISPRO 5 UNITS: 100 INJECTION, SOLUTION INTRAVENOUS; SUBCUTANEOUS at 17:27

## 2024-07-15 RX ADMIN — INSULIN LISPRO 5 UNITS: 100 INJECTION, SOLUTION INTRAVENOUS; SUBCUTANEOUS at 11:14

## 2024-07-15 NOTE — PLAN OF CARE
Problem: OCCUPATIONAL THERAPY ADULT  Goal: Performs self-care activities at highest level of function for planned discharge setting.  See evaluation for individualized goals.  Description: Treatment Interventions: ADL retraining, Functional transfer training, UE strengthening/ROM, Endurance training, Patient/family training, Cognitive reorientation, Equipment evaluation/education, Compensatory technique education, Energy conservation, Activityengagement          See flowsheet documentation for full assessment, interventions and recommendations.   Outcome: Progressing  Note: Limitation: Decreased ADL status, Decreased UE ROM, Decreased UE strength, Decreased endurance, Decreased self-care trans, Decreased high-level ADLs  Prognosis: Fair  Assessment: Patient participated in Skilled OT session this date with interventions consisting of functional transfer training,  ADL re training with the use of correct body mechnaics, Energy Conservation techniques, therapeutic exercise to: increase functional use of BUEs, increase BUE muscle strength , and increase dynamic sit/ stand balance during functional activity  . Patient agreeable to OT treatment session, upon arrival patient was found supine in bed, alert, responsive , and in no apparent distress.  Patient was set up for ADL supine in bed and performs same as detailed in flow sheet. Patient rolls side to side in bed multiple times for care with Min A x 1. Patient then performs UB TE using 1# weight as detailed in flow sheet while supine in bed. Patient then transfers supine to sit EOB with S x 1. Patient then sits with bilateral support of Ues and occasional Min A from therapist due to significant R side lateral leaning both in sitting and in stance. Patient then transfers sit to stand and pivots bed <> commode with Max A x 2. Patient is TD with bowel hygiene. Patient then re-enters bed with Max A x 2 sit to supine. Session ended with patient supine in bed, all needs  met, and call bell within reach. In comparison to previous session, patient with improvements in UB strength, endurance, and self care performance.  Patient requiring frequent re direction, verbal cues for safety, verbal cues for correct technique, verbal cues for pacing thru activity steps, and frequent rest periods. Patient performance  demonstrated good carryover of learned techniques and strategies to facilitate safety during functional tasks. Patient continues to be functioning below baseline level, occupational performance remains limited secondary to factors listed above and increased risk for falls and injury.   From OT standpoint, recommendation at time of d/c would be Level II (Moderate Resource Intensity). Patient to benefit from continued Occupational Therapy treatment while in the hospital to address deficits as defined above and maximize level of functional independence with ADLs and functional mobility in order to return to PLOF.     Rehab Resource Intensity Level, OT: II (Moderate Resource Intensity)

## 2024-07-15 NOTE — CASE MANAGEMENT
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) 2024     Orthostatic lightheadedness 2024     Chronic diastolic congestive heart failure ([facility]) 2024     Stage 3 chronic kidney disease, unspecified whether stage 3a or 3b CKD ([facility]) 2023     Mild protein-calorie malnutrition (HCC) 2022     Dysphagia 2022     Mild aortic stenosis 10/22/2021     Mild mitral regurgitation 10/22/2021     Diastolic dysfunction 10/22/2021     Mild concentric left ventricular hypertrophy (LVH) 10/22/2021     Type 2 diabetes mellitus with retinopathy, without long-term current use of insulin ([facility]) 09/15/2021     Myasthenia gravis without exacerbation ([facility]) 09/15/2021     Mixed hyperlipidemia 09/15/2021     Retinopathy 09/15/2021     Primary open angle glaucoma (POAG) of both eyes, mild stage 09/15/2021     Ptosis of both eyelids 09/15/2021     Benign prostatic hyperplasia without lower urinary tract symptoms 09/15/2021     Essential hypertension 09/15/2021     Other age-related cataract 09/15/2021       LOS (days): 7  Geometric Mean LOS (GMLOS) (days): 4.5  Days to GMLOS:-2.4     OBJECTIVE:  Risk of Unplanned Readmission Score: 40.01         Current admission status: Inpatient   Preferred Pharmacy:   [facility] [address] [place] - [address]  [address] [address]  Phone: [phone] Fax: [phone]    Primary Care  Provider: Giovanni Reyez DO    Primary Insurance: [facility] REP  Secondary Insurance:     DISCHARGE DETAILS:  Pt has been denied by the insurance company.  TC to pt [facility] Chiqui Burns with the insurance phone number and denial number.  Notified Rhiannon at [facility] of same.  The business office at [facility] will reach out to the [facility].

## 2024-07-15 NOTE — PHYSICAL THERAPY NOTE
"   PHYSICAL THERAPY TREATMENT NOTE  NAME:  Israel Hughes  DATE: 07/15/24    Length Of Stay: 7  Performed at least 2 patient identifiers during session: Name and ID bracelet    TREATMENT FLOWSHEET:    07/15/24 0850   PT Last Visit   PT Visit Date 07/15/24   Note Type   Note Type Treatment   Pain Assessment   Pain Assessment Tool 0-10   Pain Score 10 - Worst Possible Pain   Pain Location/Orientation Other (Comment)  (anus)   Pain Onset/Description Onset: Ongoing;Frequency: Intermittent;Descriptor: Burning;Descriptor: Pressure;Other (Comment)  (tearing)   Patient's Stated Pain Goal No pain   Hospital Pain Intervention(s) Repositioned;Ambulation/increased activity;Emotional support   Restrictions/Precautions   Weight Bearing Precautions Per Order No   General   Chart Reviewed Yes   Response to Previous Treatment Patient unable to report, no changes reported from family or staff   Family/Caregiver Present No   Cognition   Overall Cognitive Status Impaired   Arousal/Participation Responsive   Attention Difficulty attending to directions   Orientation Level Oriented to person   Memory Decreased recall of recent events;Decreased recall of precautions;Decreased short term memory   Following Commands Follows one step commands with increased time or repetition   Subjective   Subjective \"I can't get it out it is stuck.\"   Bed Mobility   Rolling R 3  Moderate assistance   Additional items Assist x 1;Bedrails;Increased time required;Verbal cues;LE management   Rolling L 3  Moderate assistance   Additional items Assist x 1;Bedrails;Increased time required;Verbal cues;LE management   Supine to Sit 5  Supervision   Additional items Assist x 1;HOB elevated;Bedrails;Increased time required;Verbal cues   Sit to Supine 2  Maximal assistance   Additional items Assist x 1;HOB elevated;Bedrails;Increased time required;Verbal cues;LE management   Additional Comments vc's for use of BSR's.  Pt. tolerated sitting at EOB for 10 mins with " SBA/CG to maintain upright sitting posture. Pt. with R lateral and posterior lean requiring vc's and SBA /CGA to correct   Transfers   Sit to Stand 2  Maximal assistance   Additional items Assist x 2;Increased time required;Verbal cues  (attempted RW but unsafe so RW removed and HHA with axillary support)   Stand to Sit 2  Maximal assistance   Additional items Assist x 2;Armrests;Increased time required;Verbal cues   Stand pivot 2  Maximal assistance   Additional items Assist x 2;Armrests;Increased time required;Verbal cues  (HHA)   Toilet transfer 2  Maximal assistance   Additional items Assist x 2;Armrests;Increased time required;Verbal cues;Commode  (HHA)   Additional Comments Upon stance pt with severe R lateral lean requiring max Ax2 with HHA and axillary support required due to pt. unable to safely manage AD.  SPS performed onto BSC where patient sat struggling to push out BM with CGA due to continued R lateral lean and exhaustion from attempting to push.  Pt unable to expell all fecal matter due to large hard stool right at exit. RN aware and MD notified.  RN asked to have pt. placed back in bed so ready to be seen by MD.  requiring max Ax2 with HHA to pivot back to EOB.  Pt. provided step by step instruction but was inconsistent with direction follow.  Pt. reportiung severe discomfort at rectum area from forcing.  Pt. assisted into supine with max Ax2 and placed onto side and held in place by green wedge to remove pressure from rectal area.   Ambulation/Elevation   Gait pattern Not appropriate   Balance   Static Sitting Poor   Dynamic Sitting Poor -   Static Standing Zero   Endurance Deficit   Endurance Deficit Yes   Activity Tolerance   Activity Tolerance Patient limited by pain;Patient limited by fatigue  (cog impairments)   Medical Staff Made Aware LEARY aware and present for safety due to medical complexity   Nurse Made Aware RN aware and present   Exercises   Neuro re-ed Patient performed dynamic sitting  "balance activities while sitting at EOB  supported by UE's on mattress and  SBA/CGA  including multidirectional weight shifting, reaching, and scooting.   Assessment   Prognosis Guarded   Problem List Decreased strength;Decreased endurance;Impaired balance;Decreased mobility;Decreased cognition;Decreased safety awareness;Impaired judgement;Decreased coordination;Pain   Goals   Patient Goals \"to POOP\"   PT Treatment Day 2   Plan   Treatment/Interventions Functional transfer training;LE strengthening/ROM;Endurance training;Cognitive reorientation;Patient/family training;Equipment eval/education;Bed mobility;Compensatory technique education;Spoke to nursing;OT;Spoke to case management   Progress Slow progress, cognitive deficits   PT Frequency 3-5x/wk   Discharge Recommendation   Rehab Resource Intensity Level, PT II (Moderate Resource Intensity)   AM-PAC Basic Mobility Inpatient   Turning in Flat Bed Without Bedrails 3   Lying on Back to Sitting on Edge of Flat Bed Without Bedrails 3   Moving Bed to Chair 1   Standing Up From Chair Using Arms 1   Walk in Room 1   Climb 3-5 Stairs With Railing 1   Basic Mobility Inpatient Raw Score 10   Turning Head Towards Sound 4   Follow Simple Instructions 2   Low Function Basic Mobility Raw Score  16   Low Function Basic Mobility Standardized Score  25.72   [facility] Highest Level Of Mobility   -[facility] Goal 4: Move to chair/commode   -[facility] Achieved 4: Move to chair/commode       The patient's AM-PAC Basic Mobility Inpatient Short Form Raw Score is 10. A raw score less than 16 suggests the patient may benefit from discharge to post-acute rehabilitation services. Please also refer to the recommendation of the Physical Therapist for safe discharge planning.    Pt seen for PT treatment session this date with interventions consisting of bed mobility tasks, neuromuscular re-education of movement performed to improve dynamic sitting balance, transfer training, toilet transfers, and " education provided as needed for safety and direction to improve functional mobility, safety awareness, and activity tolerance. Pt agreeable to PT treatment session upon arrival, pt found resting in bed. At end of session, pt left in bed with bed alarm activated, positioned on L side with green wedge to maintain position for pressure relief, and with all needs in reach. In comparison to previous session, pt with improvements in supine to sit transfer . Continue to recommend Level II (Moderate Resource Intensity at time of d/c in order to maximize pt's functional independence and safety w/ mobility. Pt continues to be functioning below baseline level. PT will continue to see pt while here in order to address the deficits listed above and provide interventions consistent w/ POC in effort to achieve STGs.    Lindsey Odonnell, PTA

## 2024-07-15 NOTE — CASE MANAGEMENT
Support Center has received DENIAL for SNF Authorization.   Insurance: [employer]   Received via [employer]   Denial Reason: Does not meet CMS guidelines   Facility: [facility]    Denial #: [id number]    Appeal P#: [phone] / Appeal F#: [phone]    Voicemail left for Angelita @ [employer] requesting official denial notice.     Care Manager notified: Elizabeth Patel       Please reach out to  for updates on any clinical information.

## 2024-07-15 NOTE — PROGRESS NOTES
[facility]  Progress Note  Name: Israel Hughes I  MRN: [id number]  Unit/Bed#: -01 I Date of Admission: 7/8/2024   Date of Service: 7/15/2024  Hospital Day: 7    Assessment & Plan   * Gait dysfunction  Assessment & Plan  Patient presents with ambulatory dysfunction and fatigue  Family state patient is unable to care for himself and may need placement to skilled nursing facility  Likely progression of Alzheimer's dementia  Patient and family agreeable to placement by case management    PT/OT evaluation and treatment  Case management consultation for safe disposition planning  Fall precautions  Patient is medically stable for discharge to short-term rehab    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)  Assessment & Plan  Likely worsening in the setting of fatigue, ambulatory dysfunction, difficulty with ADLs    Continue donepezil, memantine, fluoxetine  Monitor mentation  Outpatient follow-up with Neurology    Type 2 diabetes mellitus with retinopathy, without long-term current use of insulin (HCC)  Assessment & Plan  Lab Results   Component Value Date    HGBA1C 8.2 (H) 05/17/2024       Recent Labs     07/09/24  0810 07/09/24  1115 07/09/24  1619 07/09/24  2109   POCGLU 141* 235* 125 162*         Blood Sugar Average: Last 72 hrs:  (P) 145  Poorly controlled with hemoglobin A1c of 8.2%    Increase home Lantus to 15 units daily at bedtime  Schedule 5 units of Lantus 3 times daily with meals  Sliding scale insulin with Accu-Cheks  Diabetic diet  Hold home SGLT2 inhibitor    Benign prostatic hyperplasia without lower urinary tract symptoms  Assessment & Plan  Asymptomatic and chronic in nature    Continue home Flomax    Chronic diastolic congestive heart failure (HCC)  Assessment & Plan  Euvolemic and well compensated  Weight at baseline    Continue home loop diuretic  Monitor volume status    Severe protein-calorie malnutrition (HCC)  Assessment & Plan  Malnutrition  Findings:   Adult Malnutrition type: Chronic illness  Adult Degree of Malnutrition: Other severe protein calorie malnutrition  Malnutrition Characteristics: Weight loss, Fat loss, Muscle loss                  360 Statement: Malnutrition related to chronic illness as evidenced by severe orbital adipose loss, moderate clavicle muscle loss, >10% body weight loss in 6 months, >7.5% body weight loss in 3 months.  To treat with oral diet and nutrition supplement as tolerated.    BMI Findings:           Body mass index is 17.1 kg/m².   Secondary to chronic disease  As evidenced by severe orbital adipose loss    Nutrition consult    Moderate protein-calorie malnutrition (HCC)  Assessment & Plan  Malnutrition Findings:                                 BMI Findings:           Body mass index is 17.1 kg/m².   Present on admission as evidenced by BMI of 20    Encourage lifestyle modification and weight gain               VTE Pharmacologic Prophylaxis: VTE Score: 3 Moderate Risk (Score 3-4) - Pharmacological DVT Prophylaxis Ordered: heparin.    Mobility:   Basic Mobility Inpatient Raw Score: 9  JH-HLM Goal: 3: Sit at edge of bed  JH-HLM Achieved: 1: Laying in bed  JH-HLM Goal NOT achieved. Continue with multidisciplinary rounding and encourage appropriate mobility to improve upon JH-HLM goals.    Patient Centered Rounds: I performed bedside rounds with nursing staff today.     Discussions with Specialists or Other Care Team Provider: Nursing    Education and Discussions with Family / Patient: Updated  (sister-in-law) via phone.    Total Time Spent on Date of Encounter in care of patient: 35 mins. This time was spent on one or more of the following: performing physical exam; counseling and coordination of care; obtaining or reviewing history; documenting in the medical record; reviewing/ordering tests, medications or procedures; communicating with other healthcare professionals and discussing with patient's  family/caregivers.    Current Length of Stay: 7 day(s)  Current Patient Status: Inpatient   Certification Statement: The patient will continue to require additional inpatient hospital stay due to ambulatory dysfunction  Discharge Plan: Anticipate discharge in 24-48 hrs to rehab facility.    Code Status: Level 3 - DNAR and DNI    Subjective:   Patient seen and examined at bedside. No acute events overnight. Denies chest pain, SOB, diaphoresis, nausea/vomiting/diarrhea, fevers/chills.  Medically stable for discharge to short-term rehab.    Objective:     Vitals:   Temp (24hrs), Av.2 °F (36.2 °C), Min:96.9 °F (36.1 °C), Max:97.5 °F (36.4 °C)    Temp:  [96.9 °F (36.1 °C)-97.5 °F (36.4 °C)] 96.9 °F (36.1 °C)  HR:  [66-82] 66  Resp:  [17-18] 17  BP: (115-126)/(69-78) 126/78  SpO2:  [95 %-97 %] 96 %  Body mass index is 16.91 kg/m².     Input and Output Summary (last 24 hours):     Intake/Output Summary (Last 24 hours) at 7/15/2024 0749  Last data filed at 2024 1746  Gross per 24 hour   Intake 940 ml   Output --   Net 940 ml       Physical Exam:   Physical Exam  Vitals and nursing note reviewed.   Constitutional:       General: He is not in acute distress.     Appearance: He is well-developed.   HENT:      Head: Normocephalic and atraumatic.   Eyes:      Conjunctiva/sclera: Conjunctivae normal.   Cardiovascular:      Rate and Rhythm: Normal rate and regular rhythm.      Heart sounds: No murmur heard.  Pulmonary:      Effort: Pulmonary effort is normal. No respiratory distress.      Breath sounds: Normal breath sounds.   Abdominal:      Palpations: Abdomen is soft.      Tenderness: There is no abdominal tenderness.   Musculoskeletal:         General: No swelling.      Cervical back: Neck supple.   Skin:     General: Skin is warm and dry.      Capillary Refill: Capillary refill takes less than 2 seconds.   Neurological:      Mental Status: He is alert.   Psychiatric:         Mood and Affect: Mood normal.           Additional Data:     Labs:  Results from last 7 days   Lab Units 07/15/24  0431   WBC Thousand/uL 10.06   HEMOGLOBIN g/dL 13.1   HEMATOCRIT % 41.3   PLATELETS Thousands/uL 248   SEGS PCT % 63   LYMPHO PCT % 26   MONO PCT % 7   EOS PCT % 1     Results from last 7 days   Lab Units 07/15/24  0431 07/09/24  0523 07/08/24  1609   SODIUM mmol/L 137   < > 139   POTASSIUM mmol/L 4.2   < > 4.4   CHLORIDE mmol/L 99   < > 99   CO2 mmol/L 31   < > 30   BUN mg/dL 28*   < > 31*   CREATININE mg/dL 0.90   < > 1.01   ANION GAP mmol/L 7   < > 10   CALCIUM mg/dL 9.9   < > 9.7   ALBUMIN g/dL  --   --  4.0   TOTAL BILIRUBIN mg/dL  --   --  0.41   ALK PHOS U/L  --   --  86   ALT U/L  --   --  14   AST U/L  --   --  20   GLUCOSE RANDOM mg/dL 145*   < > 140    < > = values in this interval not displayed.     Results from last 7 days   Lab Units 07/08/24  1609   INR  0.95     Results from last 7 days   Lab Units 07/15/24  0716 07/14/24  2106 07/14/24  1616 07/14/24  1103 07/14/24  0720 07/13/24  2101 07/13/24  1559 07/13/24  1049 07/13/24  0727 07/12/24  2055 07/12/24  1607 07/12/24  1048   POC GLUCOSE mg/dl 175* 300* 175* 338* 175* 384* 389* 465* 172* 438* 233* 284*               Lines/Drains:  Invasive Devices       Peripheral Intravenous Line  Duration             Peripheral IV 07/13/24 Left;Ventral (anterior) Forearm 2 days                          Imaging: Reviewed radiology reports from this admission including: CT head    Recent Cultures (last 7 days):         Last 24 Hours Medication List:   Current Facility-Administered Medications   Medication Dose Route Frequency Provider Last Rate    acetaminophen  650 mg Oral Q4H PRN Steven C Yorty, DO      aspirin  81 mg Oral Daily Steven C Yorty, DO      atorvastatin  20 mg Oral Daily Steven C Yorty, DO      donepezil  10 mg Oral HS Steven C Yorty, DO      FLUoxetine  20 mg Oral QAM Steven C Yorty, DO      furosemide  40 mg Oral Daily Steven LINO Yorty, DO      heparin (porcine)  5,000  Units Subcutaneous Q8H ZEINAB Steven Russo,       insulin glargine  15 Units Subcutaneous HS Steven Russo, DO      insulin lispro  1-5 Units Subcutaneous TID AC Steven Russo, DO      insulin lispro  1-5 Units Subcutaneous HS Steven Russo, DO      insulin lispro  5 Units Subcutaneous TID With Meals Steven Russo, DO      memantine  10 mg Oral BID Steven Russo, DO      ondansetron  4 mg Intravenous Q6H PRN Steven Russo, DO      QUEtiapine  25 mg Oral BID before breakfast/lunch Steven Russo, DO      QUEtiapine  50 mg Oral HS Steven Russo, DO      tamsulosin  0.4 mg Oral Daily Steven Russo, DO          Today, Patient Was Seen By: Steven Russo DO    **Please Note: This note may have been constructed using a voice recognition system.**

## 2024-07-15 NOTE — OCCUPATIONAL THERAPY NOTE
07/15/24 1013   OT Last Visit   OT Visit Date 07/15/24   Note Type   Note Type Treatment   Pain Assessment   Pain Assessment Tool 0-10   Pain Score No Pain   Restrictions/Precautions   Weight Bearing Precautions Per Order No   Other Precautions Cognitive;Chair Alarm;Bed Alarm;Fall Risk;Impulsive   ADL   Where Assessed Supine, bed   Grooming Assistance 3  Moderate Assistance   Grooming Deficit Setup;Verbal cueing;Supervision/safety;Increased time to complete;Wash/dry face;Wash/dry hands   UB Bathing Assistance 3  Moderate Assistance   UB Bathing Deficit Setup;Verbal cueing;Increased time to complete;Supervision/safety   LB Bathing Assistance 1  Total Assistance   LB Bathing Deficit Setup;Verbal cueing;Supervision/safety;Increased time to complete;Perineal area;Buttocks;Right upper leg;Left upper leg;Right lower leg including foot;Left lower leg including foot   UB Dressing Assistance 3  Moderate Assistance   UB Dressing Deficit Setup;Verbal cueing;Supervision/safety;Increased time to complete;Thread RUE;Thread LUE;Fasteners   LB Dressing Assistance 1  Total Assistance   LB Dressing Deficit Don/doff R sock;Don/doff L sock   Toileting Assistance  2  Maximal Assistance   Toileting Deficit Setup;Verbal cueing;Supervison/safety;Increased time to complete;Bedside commode;Perineal hygiene   Toileting Comments Max A for bowel hygiene   Bed Mobility   Rolling R 4  Minimal assistance   Additional items Assist x 1;HOB elevated;Bedrails;Increased time required;Verbal cues;LE management   Rolling L 4  Minimal assistance   Additional items Assist x 1;Bedrails;Increased time required;Verbal cues;LE management   Supine to Sit 5  Supervision   Additional items Assist x 1;HOB elevated;Bedrails;Increased time required;Verbal cues;LE management   Sit to Supine 2  Maximal assistance   Additional items Assist x 2;HOB elevated;Bedrails;Increased time required;Verbal cues;Other   Additional Comments Sits with B/L UE support x 5 mins; R  side lateral lean noted in sitting/stance   Transfers   Sit to Stand 2  Maximal assistance   Additional items Assist x 2;Bedrails;Impulsive;Increased time required;Verbal cues   Stand to Sit 2  Maximal assistance   Additional items Assist x 2;Armrests;Increased time required;Verbal cues   Stand pivot 2  Maximal assistance   Additional items Assist x 2;Increased time required;Verbal cues   Toilet transfer 2  Maximal assistance   Additional items Assist x 2;Armrests;Increased time required;Verbal cues;Commode;Impulsive   Toilet Transfers   Toilet Transfer From Bed   Toilet Transfer Type To and from   Toilet Transfer to Standard bedside commode   Toilet Transfer Technique Stand pivot;To right;To left   Toilet Transfers Maximal assistance   Toilet Transfers Comments Max A x 2   Therapeutic Excerise-Strength   UE Strength Yes   Right Upper Extremity- Strength   R Shoulder Flexion;Extension  (scap pro/ret)   R Elbow Elbow flexion;Elbow extension   R Position Supine   Equipment Dumbbell   R Weight/Reps/Sets 1# weight x 30 reps with Max VCs   Left Upper Extremity-Strength   L Weights/Reps/Sets TE same as R UE above   Cognition   Overall Cognitive Status Impaired   Arousal/Participation Alert;Responsive;Cooperative   Attention Difficulty attending to directions   Orientation Level Oriented to person;Disoriented to place;Disoriented to time;Disoriented to situation   Memory Decreased recall of recent events;Decreased recall of precautions;Decreased short term memory   Following Commands Follows one step commands with increased time or repetition   Comments PT agreeable to OT treatment,   Activity Tolerance   Activity Tolerance Patient tolerated treatment well   Assessment   Assessment Patient participated in Skilled OT session this date with interventions consisting of functional transfer training,  ADL re training with the use of correct body mechnaics, Energy Conservation techniques, therapeutic exercise to: increase  functional use of BUEs, increase BUE muscle strength , and increase dynamic sit/ stand balance during functional activity  . Patient agreeable to OT treatment session, upon arrival patient was found supine in bed, alert, responsive , and in no apparent distress.  Patient was set up for ADL supine in bed and performs same as detailed in flow sheet. Patient rolls side to side in bed multiple times for care with Min A x 1. Patient then performs UB TE using 1# weight as detailed in flow sheet while supine in bed. Patient then transfers supine to sit EOB with S x 1. Patient then sits with bilateral support of Ues and occasional Min A from therapist due to significant R side lateral leaning both in sitting and in stance. Patient then transfers sit to stand and pivots bed <> commode with Max A x 2. Patient is TD with bowel hygiene. Patient then re-enters bed with Max A x 2 sit to supine. Session ended with patient supine in bed, all needs met, and call bell within reach. In comparison to previous session, patient with improvements in UB strength, endurance, and self care performance.  Patient requiring frequent re direction, verbal cues for safety, verbal cues for correct technique, verbal cues for pacing thru activity steps, and frequent rest periods. Patient performance  demonstrated good carryover of learned techniques and strategies to facilitate safety during functional tasks. Patient continues to be functioning below baseline level, occupational performance remains limited secondary to factors listed above and increased risk for falls and injury.   From OT standpoint, recommendation at time of d/c would be Level II (Moderate Resource Intensity). Patient to benefit from continued Occupational Therapy treatment while in the hospital to address deficits as defined above and maximize level of functional independence with ADLs and functional mobility in order to return to PLOF.   Plan   Treatment Interventions ADL  retraining;Functional transfer training;UE strengthening/ROM;Energy conservation   Goal Expiration Date 07/20/24   OT Treatment Day 1   OT Frequency 3-5x/wk   Discharge Recommendation   Rehab Resource Intensity Level, OT II (Moderate Resource Intensity)   Additional Comments  The patient's raw score on the AM-PAC Daily Activity Inpatient Short Form is 11. A raw score of less than 19 suggests the patient may benefit from discharge to post-acute rehabilitation services. Please refer to the recommendation of the Occupational Therapist for safe discharge planning.   AM-PAC Daily Activity Inpatient   Lower Body Dressing 2   Bathing 2   Toileting 2   Upper Body Dressing 2   Grooming 2   Eating 1   Daily Activity Raw Score 11   Daily Activity Standardized Score (Calc for Raw Score >=11) 29.04   AM-[facility] Applied Cognition Inpatient   Following a Speech/Presentation 3   Understanding Ordinary Conversation 3   Taking Medications 1   Remembering Where Things Are Placed or Put Away 1   Remembering List of 4-5 Errands 1   Taking Care of Complicated Tasks 1   Applied Cognition Raw Score 10   Applied Cognition Standardized Score 24.98     Kimmy Thompson     Quality 402: Tobacco Use And Help With Quitting Among Adolescents: Patient screened for tobacco and never smoked Quality 431: Preventive Care And Screening: Unhealthy Alcohol Use - Screening: Patient screened for unhealthy alcohol use using a single question and scores less than 2 times per year Quality 226: Preventive Care And Screening: Tobacco Use: Screening And Cessation Intervention: Patient screened for tobacco use and is an ex/non-smoker Detail Level: Detailed

## 2024-07-15 NOTE — PLAN OF CARE
Problem: Potential for Falls  Goal: Patient will remain free of falls  Description: INTERVENTIONS:  - Educate patient/family on patient safety including physical limitations  - Instruct patient to call for assistance with activity   - Consult OT/PT to assist with strengthening/mobility   - Keep Call bell within reach  - Keep bed low and locked with side rails adjusted as appropriate  - Keep care items and personal belongings within reach  - Initiate and maintain comfort rounds  - Make Fall Risk Sign visible to staff  - Offer Toileting every 2 Hours, in advance of need  - Initiate/Maintain alarms  - Obtain necessary fall risk management equipment:   - Apply yellow socks and bracelet for high fall risk patients  - Consider moving patient to room near nurses station  Outcome: Progressing     Problem: PAIN - ADULT  Goal: Verbalizes/displays adequate comfort level or baseline comfort level  Description: Interventions:  - Encourage patient to monitor pain and request assistance  - Assess pain using appropriate pain scale  - Administer analgesics based on type and severity of pain and evaluate response  - Implement non-pharmacological measures as appropriate and evaluate response  - Consider cultural and social influences on pain and pain management  - Notify physician/advanced practitioner if interventions unsuccessful or patient reports new pain  Outcome: Progressing     Problem: INFECTION - ADULT  Goal: Absence or prevention of progression during hospitalization  Description: INTERVENTIONS:  - Assess and monitor for signs and symptoms of infection  - Monitor lab/diagnostic results  - Monitor all insertion sites, i.e. indwelling lines, tubes, and drains  - Monitor endotracheal if appropriate and nasal secretions for changes in amount and color  - [place] appropriate cooling/warming therapies per order  - Administer medications as ordered  - Instruct and encourage patient and family to use good hand hygiene  technique  - Identify and instruct in appropriate isolation precautions for identified infection/condition  Outcome: Progressing     Problem: SAFETY ADULT  Goal: Patient will remain free of falls  Description: INTERVENTIONS:  - Educate patient/family on patient safety including physical limitations  - Instruct patient to call for assistance with activity   - Consult OT/PT to assist with strengthening/mobility   - Keep Call bell within reach  - Keep bed low and locked with side rails adjusted as appropriate  - Keep care items and personal belongings within reach  - Initiate and maintain comfort rounds  - Make Fall Risk Sign visible to staff  - Offer Toileting every 2 Hours, in advance of need  - Initiate/Maintain alarms  - Obtain necessary fall risk management equipment:   - Apply yellow socks and bracelet for high fall risk patients  - Consider moving patient to room near nurses station  Outcome: Progressing  Goal: Maintain or return to baseline ADL function  Description: INTERVENTIONS:  -  Assess patient's ability to carry out ADLs; assess patient's baseline for ADL function and identify physical deficits which impact ability to perform ADLs (bathing, care of mouth/teeth, toileting, grooming, dressing, etc.)  - Assess/evaluate cause of self-care deficits   - Assess range of motion  - Assess patient's mobility; develop plan if impaired  - Assess patient's need for assistive devices and provide as appropriate  - Encourage maximum independence but intervene and supervise when necessary  - Involve family in performance of ADLs  - Assess for home care needs following discharge   - Consider OT consult to assist with ADL evaluation and planning for discharge  - Provide patient education as appropriate  Outcome: Progressing  Goal: Maintains/Returns to pre admission functional level  Description: INTERVENTIONS:  - Perform AM-PAC 6 Click Basic Mobility/ Daily Activity assessment daily.  - Set and communicate daily mobility  goal to care team and patient/family/caregiver.   - Collaborate with rehabilitation services on mobility goals if consulted  - Reposition patient every 2 hours.  - Dangle patient 3 times a day  - Out of bed for toileting  - Record patient progress and toleration of activity level   Outcome: Progressing     Problem: DISCHARGE PLANNING  Goal: Discharge to home or other facility with appropriate resources  Description: INTERVENTIONS:  - Identify barriers to discharge w/patient and caregiver  - Arrange for needed discharge resources and transportation as appropriate  - Identify discharge learning needs (meds, wound care, etc.)  - Arrange for interpretive services to assist at discharge as needed  - Refer to Case Management Department for coordinating discharge planning if the patient needs post-hospital services based on physician/advanced practitioner order or complex needs related to functional status, cognitive ability, or social support system  Outcome: Progressing     Problem: Knowledge Deficit  Goal: Patient/family/caregiver demonstrates understanding of disease process, treatment plan, medications, and discharge instructions  Description: Complete learning assessment and assess knowledge base.  Interventions:  - Provide teaching at level of understanding  - Provide teaching via preferred learning methods  Outcome: Progressing     Problem: Nutrition/Hydration-ADULT  Goal: Nutrient/Hydration intake appropriate for improving, restoring or maintaining nutritional needs  Description: Monitor and assess patient's nutrition/hydration status for malnutrition. Collaborate with interdisciplinary team and initiate plan and interventions as ordered.  Monitor patient's weight and dietary intake as ordered or per policy. Utilize nutrition screening tool and intervene as necessary. Determine patient's food preferences and provide high-protein, high-caloric foods as appropriate.     INTERVENTIONS:  - Monitor oral intake, urinary  output, labs, and treatment plans  - Assess nutrition and hydration status and recommend course of action  - Evaluate amount of meals eaten  - Assist patient with eating if necessary   - Allow adequate time for meals  - Recommend/ encourage appropriate diets, oral nutritional supplements, and vitamin/mineral supplements  - Order, calculate, and assess calorie counts as needed  - Recommend, monitor, and adjust tube feedings and TPN/PPN based on assessed needs  - Assess need for intravenous fluids  - Provide specific nutrition/hydration education as appropriate  - Include patient/family/caregiver in decisions related to nutrition  Outcome: Progressing     Problem: Prexisting or High Potential for Compromised Skin Integrity  Goal: Skin integrity is maintained or improved  Description: INTERVENTIONS:  - Identify patients at risk for skin breakdown  - Assess and monitor skin integrity  - Assess and monitor nutrition and hydration status  - Monitor labs   - Assess for incontinence   - Turn and reposition patient  - Assist with mobility/ambulation  - Relieve pressure over bony prominences  - Avoid friction and shearing  - Provide appropriate hygiene as needed including keeping skin clean and dry  - Evaluate need for skin moisturizer/barrier cream  - Collaborate with interdisciplinary team   - Patient/family teaching  - Consider wound care consult   Outcome: Progressing

## 2024-07-15 NOTE — PLAN OF CARE
Problem: PHYSICAL THERAPY ADULT  Goal: Performs mobility at highest level of function for planned discharge setting.  See evaluation for individualized goals.  Description: Treatment/Interventions: Functional transfer training, Therapeutic exercise, Endurance training, Gait training, Bed mobility, Equipment eval/education  Equipment Recommended:  (TBD)       See flowsheet documentation for full assessment, interventions and recommendations.  Outcome: Progressing  Note: Prognosis: Guarded  Problem List: Decreased strength, Decreased endurance, Impaired balance, Decreased mobility, Decreased cognition, Decreased safety awareness, Impaired judgement, Decreased coordination, Pain  Assessment: Pt seen for PT treatment session this date with interventions consisting of bed mobility tasks, neuromuscular re-education of movement performed to improve dynamic sitting balance, transfer training, toilet transfers, and education provided as needed for safety and direction to improve functional mobility, safety awareness, and activity tolerance. Pt agreeable to PT treatment session upon arrival, pt found resting in bed. At end of session, pt left in bed with bed alarm activated, positioned on L side with green wedge to maintain position for pressure relief, and with all needs in reach. In comparison to previous session, pt with improvements in supine to sit transfer . Continue to recommend Level II (Moderate Resource Intensity at time of d/c in order to maximize pt's functional independence and safety w/ mobility. Pt continues to be functioning below baseline level. PT will continue to see pt while here in order to address the deficits listed above and provide interventions consistent w/ POC in effort to achieve STGs.  Barriers to Discharge: Decreased caregiver support, Inaccessible home environment     Rehab Resource Intensity Level, PT: II (Moderate Resource Intensity)    See flowsheet documentation for full assessment.

## 2024-07-16 LAB
GLUCOSE SERPL-MCNC: 105 MG/DL (ref 65–140)
GLUCOSE SERPL-MCNC: 288 MG/DL (ref 65–140)
GLUCOSE SERPL-MCNC: 395 MG/DL (ref 65–140)
GLUCOSE SERPL-MCNC: 87 MG/DL (ref 65–140)

## 2024-07-16 PROCEDURE — 82948 REAGENT STRIP/BLOOD GLUCOSE: CPT

## 2024-07-16 PROCEDURE — 99232 SBSQ HOSP IP/OBS MODERATE 35: CPT | Performed by: INTERNAL MEDICINE

## 2024-07-16 RX ADMIN — INSULIN LISPRO 2 UNITS: 100 INJECTION, SOLUTION INTRAVENOUS; SUBCUTANEOUS at 11:46

## 2024-07-16 RX ADMIN — SENNOSIDES AND DOCUSATE SODIUM 1 TABLET: 50; 8.6 TABLET ORAL at 22:08

## 2024-07-16 RX ADMIN — QUETIAPINE FUMARATE 25 MG: 25 TABLET ORAL at 11:46

## 2024-07-16 RX ADMIN — DONEPEZIL HYDROCHLORIDE 10 MG: 10 TABLET ORAL at 22:08

## 2024-07-16 RX ADMIN — FUROSEMIDE 40 MG: 40 TABLET ORAL at 08:19

## 2024-07-16 RX ADMIN — INSULIN LISPRO 5 UNITS: 100 INJECTION, SOLUTION INTRAVENOUS; SUBCUTANEOUS at 11:47

## 2024-07-16 RX ADMIN — HEPARIN SODIUM 5000 UNITS: 5000 INJECTION INTRAVENOUS; SUBCUTANEOUS at 06:23

## 2024-07-16 RX ADMIN — TAMSULOSIN HYDROCHLORIDE 0.4 MG: 0.4 CAPSULE ORAL at 08:19

## 2024-07-16 RX ADMIN — ATORVASTATIN CALCIUM 20 MG: 20 TABLET, FILM COATED ORAL at 08:19

## 2024-07-16 RX ADMIN — INSULIN LISPRO 5 UNITS: 100 INJECTION, SOLUTION INTRAVENOUS; SUBCUTANEOUS at 08:19

## 2024-07-16 RX ADMIN — HEPARIN SODIUM 5000 UNITS: 5000 INJECTION INTRAVENOUS; SUBCUTANEOUS at 13:50

## 2024-07-16 RX ADMIN — POLYETHYLENE GLYCOL 3350 17 G: 17 POWDER, FOR SOLUTION ORAL at 08:19

## 2024-07-16 RX ADMIN — INSULIN GLARGINE 15 UNITS: 100 INJECTION, SOLUTION SUBCUTANEOUS at 22:09

## 2024-07-16 RX ADMIN — ASPIRIN 81 MG: 81 TABLET, COATED ORAL at 08:19

## 2024-07-16 RX ADMIN — HEPARIN SODIUM 5000 UNITS: 5000 INJECTION INTRAVENOUS; SUBCUTANEOUS at 22:08

## 2024-07-16 RX ADMIN — FLUOXETINE HYDROCHLORIDE 20 MG: 20 CAPSULE ORAL at 08:19

## 2024-07-16 RX ADMIN — QUETIAPINE FUMARATE 25 MG: 25 TABLET ORAL at 06:23

## 2024-07-16 RX ADMIN — MEMANTINE 10 MG: 5 TABLET ORAL at 17:01

## 2024-07-16 RX ADMIN — INSULIN LISPRO 3 UNITS: 100 INJECTION, SOLUTION INTRAVENOUS; SUBCUTANEOUS at 22:10

## 2024-07-16 RX ADMIN — QUETIAPINE FUMARATE 50 MG: 50 TABLET ORAL at 22:08

## 2024-07-16 RX ADMIN — MEMANTINE 10 MG: 5 TABLET ORAL at 08:19

## 2024-07-16 RX ADMIN — INSULIN LISPRO 5 UNITS: 100 INJECTION, SOLUTION INTRAVENOUS; SUBCUTANEOUS at 17:01

## 2024-07-16 NOTE — PROGRESS NOTES
[facility]  Progress Note  Name: Israel Hughes I  MRN: [id number]  Unit/Bed#: -01 I Date of Admission: 7/8/2024   Date of Service: 7/16/2024 I Hospital Day: 8    Assessment & Plan   * Gait dysfunction  Assessment & Plan  Patient presents with ambulatory dysfunction and fatigue  Family state patient is unable to care for himself and may need placement to skilled nursing facility  Likely progression of Alzheimer's dementia  Patient and family agreeable to placement by case management    PT/OT evaluation and treatment  Case management consultation for safe disposition planning  Fall precautions  Patient is medically stable for discharge to short-term rehab    Severe protein-calorie malnutrition (HCC)  Assessment & Plan  Malnutrition Findings:   Adult Malnutrition type: Chronic illness  Adult Degree of Malnutrition: Other severe protein calorie malnutrition  Malnutrition Characteristics: Weight loss, Fat loss, Muscle loss                  360 Statement: Malnutrition related to chronic illness as evidenced by severe orbital adipose loss, moderate clavicle muscle loss, >10% body weight loss in 6 months, >7.5% body weight loss in 3 months.  To treat with oral diet and nutrition supplement as tolerated.    BMI Findings:           Body mass index is 17.1 kg/m².   Secondary to chronic disease  As evidenced by severe orbital adipose loss    Nutrition consult    Moderate protein-calorie malnutrition (HCC)  Assessment & Plan  Malnutrition Findings:                                 BMI Findings:           Body mass index is 17.1 kg/m².   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)  Assessment & Plan  Likely worsening in the setting of fatigue, ambulatory dysfunction, difficulty with ADLs    Continue donepezil, memantine, fluoxetine  Monitor mentation  Outpatient follow-up with  Neurology    Chronic diastolic congestive heart failure (HCC)  Assessment & Plan  Euvolemic and well compensated  Weight at baseline    Continue home loop diuretic  Monitor volume status    Benign prostatic hyperplasia without lower urinary tract symptoms  Assessment & Plan  Asymptomatic and chronic in nature    Continue home Flomax    Type 2 diabetes mellitus with retinopathy, without long-term current use of insulin (HCC)  Assessment & Plan  Lab Results   Component Value Date    HGBA1C 8.2 (H) 2024       Recent Labs     24  0810 24  1115 24  1619 24  2109   POCGLU 141* 235* 125 162*         Blood Sugar Average: Last 72 hrs:  (P) 145  Poorly controlled with hemoglobin A1c of 8.2%    Lantus 15 units daily at bedtime  Schedule 5 units of Lantus 3 times daily with meals  Sliding scale insulin with Accu-Cheks  Diabetic diet  Hold home SGLT2 inhibitor           VTE Pharmacologic Prophylaxis: VTE Score: 3 Moderate Risk (Score 3-4) - Pharmacological DVT Prophylaxis Ordered: heparin.    Mobility:   Basic Mobility Inpatient Raw Score: 10  -HLM Goal: 4: Move to chair/commode  JH-HLM Achieved: 3: Sit at edge of bed  JH-HLM Goal achieved. Continue to encourage appropriate mobility.    Patient Centered Rounds: I performed bedside rounds with nursing staff today.   Discussions with Specialists or Other Care Team Provider: yes    Education and Discussions with Family / Patient: Updated  (sister in law) via phone.    Current Length of Stay: 8 day(s)  Current Patient Status: Inpatient   Certification Statement: The patient will continue to require additional inpatient hospital stay due to pending placement  Discharge Plan: Anticipate discharge in 24-48 hrs to rehab facility.    Code Status: Level 3 - DNAR and DNI    Subjective:   No overnight events noted    Objective:     Vitals:   Temp (24hrs), Av.8 °F (36 °C), Min:96.3 °F (35.7 °C), Max:97.5 °F (36.4 °C)    Temp:  [96.3 °F (35.7  °C)-97.5 °F (36.4 °C)] 96.7 °F (35.9 °C)  HR:  [63-76] 63  Resp:  [16-17] 17  BP: (113-160)/(66-80) 115/68  SpO2:  [95 %-97 %] 96 %  Body mass index is 16.71 kg/m².     Input and Output Summary (last 24 hours):     Intake/Output Summary (Last 24 hours) at 7/16/2024 1444  Last data filed at 7/16/2024 0900  Gross per 24 hour   Intake 960 ml   Output 150 ml   Net 810 ml       Physical Exam:   Physical Exam  Constitutional:       General: He is not in acute distress.  HENT:      Head: Normocephalic and atraumatic.      Nose: Nose normal.      Mouth/Throat:      Mouth: Mucous membranes are moist.   Eyes:      Extraocular Movements: Extraocular movements intact.      Conjunctiva/sclera: Conjunctivae normal.   Cardiovascular:      Rate and Rhythm: Normal rate and regular rhythm.   Pulmonary:      Effort: Pulmonary effort is normal. No respiratory distress.   Abdominal:      Palpations: Abdomen is soft.      Tenderness: There is no abdominal tenderness.   Musculoskeletal:         General: Normal range of motion.      Cervical back: Normal range of motion and neck supple.      Comments: Generalized weakness   Skin:     General: Skin is warm and dry.   Neurological:      General: No focal deficit present.      Mental Status: He is alert. Mental status is at baseline.      Cranial Nerves: No cranial nerve deficit.   Psychiatric:         Mood and Affect: Mood normal.         Behavior: Behavior normal.      Comments: Baseline dementia          Additional Data:     Labs:  Results from last 7 days   Lab Units 07/15/24  0431   WBC Thousand/uL 10.06   HEMOGLOBIN g/dL 13.1   HEMATOCRIT % 41.3   PLATELETS Thousands/uL 248   SEGS PCT % 63   LYMPHO PCT % 26   MONO PCT % 7   EOS PCT % 1     Results from last 7 days   Lab Units 07/15/24  0431   SODIUM mmol/L 137   POTASSIUM mmol/L 4.2   CHLORIDE mmol/L 99   CO2 mmol/L 31   BUN mg/dL 28*   CREATININE mg/dL 0.90   ANION GAP mmol/L 7   CALCIUM mg/dL 9.9   GLUCOSE RANDOM mg/dL 145*          Results from last 7 days   Lab Units 07/16/24  1140 07/16/24  0716 07/15/24  2038 07/15/24  1611 07/15/24  1108 07/15/24  0716 07/14/24  2106 07/14/24  1616 07/14/24  1103 07/14/24  0720 07/13/24  2101 07/13/24  1559   POC GLUCOSE mg/dl 288* 87 337* 240* 399* 175* 300* 175* 338* 175* 384* 389*               Lines/Drains:  Invasive Devices       Peripheral Intravenous Line  Duration             Peripheral IV 07/13/24 Left;Ventral (anterior) Forearm 3 days                  Imaging: No pertinent imaging reviewed.    Recent Cultures (last 7 days):         Last 24 Hours Medication List:   Current Facility-Administered Medications   Medication Dose Route Frequency Provider Last Rate    acetaminophen  650 mg Oral Q4H PRN Steven C Yorty, DO      aspirin  81 mg Oral Daily Steven C Yorty, DO      atorvastatin  20 mg Oral Daily Steven C Yorty, DO      donepezil  10 mg Oral HS Steven C Yorty, DO      FLUoxetine  20 mg Oral QAM Steven C Yorty, DO      furosemide  40 mg Oral Daily Steven C Yorty, DO      heparin (porcine)  5,000 Units Subcutaneous Q8H ZEINAB Steven C Yorty, DO      insulin glargine  15 Units Subcutaneous HS Steven C Yorty, DO      insulin lispro  1-5 Units Subcutaneous TID AC Steven C Yorty, DO      insulin lispro  1-5 Units Subcutaneous HS Steven C Yorty, DO      insulin lispro  5 Units Subcutaneous TID With Meals Steven C Yorty, DO      memantine  10 mg Oral BID Steven C Yorty, DO      ondansetron  4 mg Intravenous Q6H PRN Steven C Yorty, DO      polyethylene glycol  17 g Oral Daily Steven C Yorty, DO      QUEtiapine  25 mg Oral BID before breakfast/lunch Steven C Yorty, DO      QUEtiapine  50 mg Oral HS Steven C Yorty, DO      senna-docusate sodium  1 tablet Oral HS Steven C Yorty, DO      tamsulosin  0.4 mg Oral Daily Steven C Yorty, DO          Today, Patient Was Seen By: Celestino Coelho MD    **Please Note: This note may have been constructed using a voice recognition system.**

## 2024-07-16 NOTE — PLAN OF CARE
Problem: Potential for Falls  Goal: Patient will remain free of falls  Description: INTERVENTIONS:  - Educate patient/family on patient safety including physical limitations  - Instruct patient to call for assistance with activity   - Consult OT/PT to assist with strengthening/mobility   - Keep Call bell within reach  - Keep bed low and locked with side rails adjusted as appropriate  - Keep care items and personal belongings within reach  - Initiate and maintain comfort rounds  - Make Fall Risk Sign visible to staff  - Offer Toileting every 2 Hours, in advance of need  - Initiate/Maintain alarms  - Obtain necessary fall risk management equipment:   - Apply yellow socks and bracelet for high fall risk patients  - Consider moving patient to room near nurses station  Outcome: Progressing     Problem: PAIN - ADULT  Goal: Verbalizes/displays adequate comfort level or baseline comfort level  Description: Interventions:  - Encourage patient to monitor pain and request assistance  - Assess pain using appropriate pain scale  - Administer analgesics based on type and severity of pain and evaluate response  - Implement non-pharmacological measures as appropriate and evaluate response  - Consider cultural and social influences on pain and pain management  - Notify physician/advanced practitioner if interventions unsuccessful or patient reports new pain  Outcome: Progressing     Problem: INFECTION - ADULT  Goal: Absence or prevention of progression during hospitalization  Description: INTERVENTIONS:  - Assess and monitor for signs and symptoms of infection  - Monitor lab/diagnostic results  - Monitor all insertion sites, i.e. indwelling lines, tubes, and drains  - Monitor endotracheal if appropriate and nasal secretions for changes in amount and color  - [place] appropriate cooling/warming therapies per order  - Administer medications as ordered  - Instruct and encourage patient and family to use good hand hygiene  technique  - Identify and instruct in appropriate isolation precautions for identified infection/condition  Outcome: Progressing     Problem: SAFETY ADULT  Goal: Patient will remain free of falls  Description: INTERVENTIONS:  - Educate patient/family on patient safety including physical limitations  - Instruct patient to call for assistance with activity   - Consult OT/PT to assist with strengthening/mobility   - Keep Call bell within reach  - Keep bed low and locked with side rails adjusted as appropriate  - Keep care items and personal belongings within reach  - Initiate and maintain comfort rounds  - Make Fall Risk Sign visible to staff  - Offer Toileting every 2 Hours, in advance of need  - Initiate/Maintain alarms  - Obtain necessary fall risk management equipment:   - Apply yellow socks and bracelet for high fall risk patients  - Consider moving patient to room near nurses station  Outcome: Progressing  Goal: Maintain or return to baseline ADL function  Description: INTERVENTIONS:  -  Assess patient's ability to carry out ADLs; assess patient's baseline for ADL function and identify physical deficits which impact ability to perform ADLs (bathing, care of mouth/teeth, toileting, grooming, dressing, etc.)  - Assess/evaluate cause of self-care deficits   - Assess range of motion  - Assess patient's mobility; develop plan if impaired  - Assess patient's need for assistive devices and provide as appropriate  - Encourage maximum independence but intervene and supervise when necessary  - Involve family in performance of ADLs  - Assess for home care needs following discharge   - Consider OT consult to assist with ADL evaluation and planning for discharge  - Provide patient education as appropriate  Outcome: Progressing  Goal: Maintains/Returns to pre admission functional level  Description: INTERVENTIONS:  - Perform AM-PAC 6 Click Basic Mobility/ Daily Activity assessment daily.  - Set and communicate daily mobility  goal to care team and patient/family/caregiver.   - Collaborate with rehabilitation services on mobility goals if consulted  - Reposition patient every 2 hours.  - Dangle patient 3 times a day  - Out of bed for toileting  - Record patient progress and toleration of activity level   Outcome: Progressing     Problem: DISCHARGE PLANNING  Goal: Discharge to home or other facility with appropriate resources  Description: INTERVENTIONS:  - Identify barriers to discharge w/patient and caregiver  - Arrange for needed discharge resources and transportation as appropriate  - Identify discharge learning needs (meds, wound care, etc.)  - Arrange for interpretive services to assist at discharge as needed  - Refer to Case Management Department for coordinating discharge planning if the patient needs post-hospital services based on physician/advanced practitioner order or complex needs related to functional status, cognitive ability, or social support system  Outcome: Progressing     Problem: Knowledge Deficit  Goal: Patient/family/caregiver demonstrates understanding of disease process, treatment plan, medications, and discharge instructions  Description: Complete learning assessment and assess knowledge base.  Interventions:  - Provide teaching at level of understanding  - Provide teaching via preferred learning methods  Outcome: Progressing     Problem: Nutrition/Hydration-ADULT  Goal: Nutrient/Hydration intake appropriate for improving, restoring or maintaining nutritional needs  Description: Monitor and assess patient's nutrition/hydration status for malnutrition. Collaborate with interdisciplinary team and initiate plan and interventions as ordered.  Monitor patient's weight and dietary intake as ordered or per policy. Utilize nutrition screening tool and intervene as necessary. Determine patient's food preferences and provide high-protein, high-caloric foods as appropriate.     INTERVENTIONS:  - Monitor oral intake, urinary  output, labs, and treatment plans  - Assess nutrition and hydration status and recommend course of action  - Evaluate amount of meals eaten  - Assist patient with eating if necessary   - Allow adequate time for meals  - Recommend/ encourage appropriate diets, oral nutritional supplements, and vitamin/mineral supplements  - Order, calculate, and assess calorie counts as needed  - Recommend, monitor, and adjust tube feedings and TPN/PPN based on assessed needs  - Assess need for intravenous fluids  - Provide specific nutrition/hydration education as appropriate  - Include patient/family/caregiver in decisions related to nutrition  Outcome: Progressing     Problem: Prexisting or High Potential for Compromised Skin Integrity  Goal: Skin integrity is maintained or improved  Description: INTERVENTIONS:  - Identify patients at risk for skin breakdown  - Assess and monitor skin integrity  - Assess and monitor nutrition and hydration status  - Monitor labs   - Assess for incontinence   - Turn and reposition patient  - Assist with mobility/ambulation  - Relieve pressure over bony prominences  - Avoid friction and shearing  - Provide appropriate hygiene as needed including keeping skin clean and dry  - Evaluate need for skin moisturizer/barrier cream  - Collaborate with interdisciplinary team   - Patient/family teaching  - Consider wound care consult   Outcome: Progressing

## 2024-07-16 NOTE — PLAN OF CARE
Problem: PAIN - ADULT  Goal: Verbalizes/displays adequate comfort level or baseline comfort level  Description: Interventions:  - Encourage patient to monitor pain and request assistance  - Assess pain using appropriate pain scale  - Administer analgesics based on type and severity of pain and evaluate response  - Implement non-pharmacological measures as appropriate and evaluate response  - Consider cultural and social influences on pain and pain management  - Notify physician/advanced practitioner if interventions unsuccessful or patient reports new pain  Outcome: Progressing     Problem: INFECTION - ADULT  Goal: Absence or prevention of progression during hospitalization  Description: INTERVENTIONS:  - Assess and monitor for signs and symptoms of infection  - Monitor lab/diagnostic results  - Monitor all insertion sites, i.e. indwelling lines, tubes, and drains  - Monitor endotracheal if appropriate and nasal secretions for changes in amount and color  - [place] appropriate cooling/warming therapies per order  - Administer medications as ordered  - Instruct and encourage patient and family to use good hand hygiene technique  - Identify and instruct in appropriate isolation precautions for identified infection/condition  Outcome: Progressing     Problem: SAFETY ADULT  Goal: Patient will remain free of falls  Description: INTERVENTIONS:  - Educate patient/family on patient safety including physical limitations  - Instruct patient to call for assistance with activity   - Consult OT/PT to assist with strengthening/mobility   - Keep Call bell within reach  - Keep bed low and locked with side rails adjusted as appropriate  - Keep care items and personal belongings within reach  - Initiate and maintain comfort rounds  - Make Fall Risk Sign visible to staff  - Offer Toileting every 2 Hours, in advance of need  - Initiate/Maintain bed/chair alarms  - Obtain necessary fall risk management equipment: non-slip socks,  walker, bed/chair alarms  - Apply yellow socks and bracelet for high fall risk patients  - Consider moving patient to room near nurses station  Outcome: Progressing  Goal: Maintain or return to baseline ADL function  Description: INTERVENTIONS:  -  Assess patient's ability to carry out ADLs; assess patient's baseline for ADL function and identify physical deficits which impact ability to perform ADLs (bathing, care of mouth/teeth, toileting, grooming, dressing, etc.)  - Assess/evaluate cause of self-care deficits   - Assess range of motion  - Assess patient's mobility; develop plan if impaired  - Assess patient's need for assistive devices and provide as appropriate  - Encourage maximum independence but intervene and supervise when necessary  - Involve family in performance of ADLs  - Assess for home care needs following discharge   - Consider OT consult to assist with ADL evaluation and planning for discharge  - Provide patient education as appropriate  Outcome: Progressing  Goal: Maintains/Returns to pre admission functional level  Description: INTERVENTIONS:  - Perform AM-PAC 6 Click Basic Mobility/ Daily Activity assessment daily.  - Set and communicate daily mobility goal to care team and patient/family/caregiver.   - Collaborate with rehabilitation services on mobility goals if consulted  - Reposition patient every 2 hours.  - Dangle patient 3 times a day  - Record patient progress and toleration of activity level   Outcome: Progressing     Problem: DISCHARGE PLANNING  Goal: Discharge to home or other facility with appropriate resources  Description: INTERVENTIONS:  - Identify barriers to discharge w/patient and caregiver  - Arrange for needed discharge resources and transportation as appropriate  - Identify discharge learning needs (meds, wound care, etc.)  - Arrange for interpretive services to assist at discharge as needed  - Refer to Case Management Department for coordinating discharge planning if the  patient needs post-hospital services based on physician/advanced practitioner order or complex needs related to functional status, cognitive ability, or social support system  Outcome: Progressing     Problem: Knowledge Deficit  Goal: Patient/family/caregiver demonstrates understanding of disease process, treatment plan, medications, and discharge instructions  Description: Complete learning assessment and assess knowledge base.  Interventions:  - Provide teaching at level of understanding  - Provide teaching via preferred learning methods  Outcome: Progressing     Problem: Nutrition/Hydration-ADULT  Goal: Nutrient/Hydration intake appropriate for improving, restoring or maintaining nutritional needs  Description: Monitor and assess patient's nutrition/hydration status for malnutrition. Collaborate with interdisciplinary team and initiate plan and interventions as ordered.  Monitor patient's weight and dietary intake as ordered or per policy. Utilize nutrition screening tool and intervene as necessary. Determine patient's food preferences and provide high-protein, high-caloric foods as appropriate.     INTERVENTIONS:  - Monitor oral intake, urinary output, labs, and treatment plans  - Assess nutrition and hydration status and recommend course of action  - Evaluate amount of meals eaten  - Assist patient with eating if necessary   - Allow adequate time for meals  - Recommend/ encourage appropriate diets, oral nutritional supplements, and vitamin/mineral supplements  - Order, calculate, and assess calorie counts as needed  - Recommend, monitor, and adjust tube feedings and TPN/PPN based on assessed needs  - Assess need for intravenous fluids  - Provide specific nutrition/hydration education as appropriate  - Include patient/family/caregiver in decisions related to nutrition  Outcome: Progressing     Problem: Prexisting or High Potential for Compromised Skin Integrity  Goal: Skin integrity is maintained or  improved  Description: INTERVENTIONS:  - Identify patients at risk for skin breakdown  - Assess and monitor skin integrity  - Assess and monitor nutrition and hydration status  - Monitor labs   - Assess for incontinence   - Turn and reposition patient  - Assist with mobility/ambulation  - Relieve pressure over bony prominences  - Avoid friction and shearing  - Provide appropriate hygiene as needed including keeping skin clean and dry  - Evaluate need for skin moisturizer/barrier cream  - Collaborate with interdisciplinary team   - Patient/family teaching  - Consider wound care consult   Outcome: Progressing     Problem: METABOLIC, FLUID AND ELECTROLYTES - ADULT  Goal: Glucose maintained within target range  Description: INTERVENTIONS:  - Monitor Blood Glucose as ordered  - Assess for signs and symptoms of hyperglycemia and hypoglycemia  - Administer ordered medications to maintain glucose within target range  - Assess nutritional intake and initiate nutrition service referral as needed  Outcome: Progressing

## 2024-07-16 NOTE — CASE MANAGEMENT
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 ([facility]) 2024     Orthostatic lightheadedness 2024     Chronic diastolic congestive heart failure ([facility]) 2024     Stage 3 chronic kidney disease, unspecified whether stage 3a or 3b CKD ([facility]) 2023     Mild protein-calorie malnutrition (HCC) 2022     Dysphagia 2022     Mild aortic stenosis 10/22/2021     Mild mitral regurgitation 10/22/2021     Diastolic dysfunction 10/22/2021     Mild concentric left ventricular hypertrophy (LVH) 10/22/2021     Type 2 diabetes mellitus with retinopathy, without long-term current use of insulin ([facility]) 09/15/2021     Myasthenia gravis without exacerbation ([facility]) 09/15/2021     Mixed hyperlipidemia 09/15/2021     Retinopathy 09/15/2021     Primary open angle glaucoma (POAG) of both eyes, mild stage 09/15/2021     Ptosis of both eyelids 09/15/2021     Benign prostatic hyperplasia without lower urinary tract symptoms 09/15/2021     Essential hypertension 09/15/2021     Other age-related cataract 09/15/2021       LOS (days): 8  Geometric Mean LOS (GMLOS) (days): 4.5  Days to GMLOS:-3.4     OBJECTIVE:  Risk of Unplanned Readmission Score: 40.43         Current admission status: Inpatient   Preferred Pharmacy:   [facility] [address] - [address]  [address] [address]  Phone: [phone] Fax: [phone]    Primary Care  Provider: Giovanni Reyez DO    [facility]  Secondary Insurance:     DISCHARGE DETAILS:  Spoke to the CHAD who states [facility] told her it could take up to 72 hours for the appeal process.  [facility] has not had a call back from [facility].  Notified [facility] via [employer] of same.  CHAD the office from [facility] has contacted her.  Will follow for determination from the insurance company.

## 2024-07-17 LAB
GLUCOSE SERPL-MCNC: 154 MG/DL (ref 65–140)
GLUCOSE SERPL-MCNC: 160 MG/DL (ref 65–140)
GLUCOSE SERPL-MCNC: 369 MG/DL (ref 65–140)
GLUCOSE SERPL-MCNC: 87 MG/DL (ref 65–140)

## 2024-07-17 PROCEDURE — 97110 THERAPEUTIC EXERCISES: CPT

## 2024-07-17 PROCEDURE — 97112 NEUROMUSCULAR REEDUCATION: CPT

## 2024-07-17 PROCEDURE — 99232 SBSQ HOSP IP/OBS MODERATE 35: CPT | Performed by: INTERNAL MEDICINE

## 2024-07-17 PROCEDURE — 82948 REAGENT STRIP/BLOOD GLUCOSE: CPT

## 2024-07-17 PROCEDURE — 97530 THERAPEUTIC ACTIVITIES: CPT

## 2024-07-17 RX ORDER — INSULIN LISPRO 100 [IU]/ML
8 INJECTION, SOLUTION INTRAVENOUS; SUBCUTANEOUS
Status: DISCONTINUED | OUTPATIENT
Start: 2024-07-17 | End: 2024-07-18

## 2024-07-17 RX ADMIN — INSULIN LISPRO 5 UNITS: 100 INJECTION, SOLUTION INTRAVENOUS; SUBCUTANEOUS at 07:47

## 2024-07-17 RX ADMIN — MEMANTINE 10 MG: 5 TABLET ORAL at 09:15

## 2024-07-17 RX ADMIN — INSULIN LISPRO 3 UNITS: 100 INJECTION, SOLUTION INTRAVENOUS; SUBCUTANEOUS at 12:03

## 2024-07-17 RX ADMIN — HEPARIN SODIUM 5000 UNITS: 5000 INJECTION INTRAVENOUS; SUBCUTANEOUS at 13:49

## 2024-07-17 RX ADMIN — TAMSULOSIN HYDROCHLORIDE 0.4 MG: 0.4 CAPSULE ORAL at 09:15

## 2024-07-17 RX ADMIN — QUETIAPINE FUMARATE 25 MG: 25 TABLET ORAL at 06:16

## 2024-07-17 RX ADMIN — INSULIN LISPRO 1 UNITS: 100 INJECTION, SOLUTION INTRAVENOUS; SUBCUTANEOUS at 22:11

## 2024-07-17 RX ADMIN — QUETIAPINE FUMARATE 50 MG: 50 TABLET ORAL at 22:12

## 2024-07-17 RX ADMIN — ATORVASTATIN CALCIUM 20 MG: 20 TABLET, FILM COATED ORAL at 09:15

## 2024-07-17 RX ADMIN — DONEPEZIL HYDROCHLORIDE 10 MG: 10 TABLET ORAL at 22:12

## 2024-07-17 RX ADMIN — SENNOSIDES AND DOCUSATE SODIUM 1 TABLET: 50; 8.6 TABLET ORAL at 22:12

## 2024-07-17 RX ADMIN — FUROSEMIDE 40 MG: 40 TABLET ORAL at 09:15

## 2024-07-17 RX ADMIN — MEMANTINE 10 MG: 5 TABLET ORAL at 17:10

## 2024-07-17 RX ADMIN — INSULIN LISPRO 5 UNITS: 100 INJECTION, SOLUTION INTRAVENOUS; SUBCUTANEOUS at 12:04

## 2024-07-17 RX ADMIN — HEPARIN SODIUM 5000 UNITS: 5000 INJECTION INTRAVENOUS; SUBCUTANEOUS at 06:16

## 2024-07-17 RX ADMIN — FLUOXETINE HYDROCHLORIDE 20 MG: 20 CAPSULE ORAL at 09:15

## 2024-07-17 RX ADMIN — ASPIRIN 81 MG: 81 TABLET, COATED ORAL at 09:15

## 2024-07-17 RX ADMIN — POLYETHYLENE GLYCOL 3350 17 G: 17 POWDER, FOR SOLUTION ORAL at 09:14

## 2024-07-17 RX ADMIN — QUETIAPINE FUMARATE 25 MG: 25 TABLET ORAL at 12:03

## 2024-07-17 RX ADMIN — HEPARIN SODIUM 5000 UNITS: 5000 INJECTION INTRAVENOUS; SUBCUTANEOUS at 22:11

## 2024-07-17 RX ADMIN — INSULIN LISPRO 1 UNITS: 100 INJECTION, SOLUTION INTRAVENOUS; SUBCUTANEOUS at 17:11

## 2024-07-17 RX ADMIN — INSULIN GLARGINE 15 UNITS: 100 INJECTION, SOLUTION SUBCUTANEOUS at 22:12

## 2024-07-17 NOTE — ASSESSMENT & PLAN NOTE
Malnutrition Findings:   Adult Malnutrition type: Chronic illness  Adult Degree of Malnutrition: Other severe protein calorie malnutrition  Malnutrition Characteristics: Weight loss, Fat loss, Muscle loss                  360 Statement: Malnutrition related to chronic illness as evidenced by severe orbital adipose loss, moderate clavicle muscle loss, >10% body weight loss in 6 months, >7.5% body weight loss in 3 months.  To treat with oral diet and nutrition supplement as tolerated.    BMI Findings:           Body mass index is 18.82 kg/m².   Secondary to chronic disease  As evidenced by severe orbital adipose loss    Nutrition consult

## 2024-07-17 NOTE — PROGRESS NOTES
Pastoral Care Progress Note    2024  Patient: Israel Hughes : 1940  Admission Date & Time: 2024 1548  MRN: [id number] CSN: [id number]      CH initiated support visit to pt in setting of long admission. Pt received CH upright in bed, no family present.  Pt shared that he discourages his family from visiting. Pt shared that he hopes to discharge soon.  CH introduced self and role, encouraged pt to ask for her if she can be of service.             Chaplaincy Interventions Utilized:   Empowerment: Encouraged self-care    Exploration: Explored relational needs & resources     24 0900   Clinical Encounter Type   Visited With Patient

## 2024-07-17 NOTE — PLAN OF CARE
Problem: PHYSICAL THERAPY ADULT  Goal: Performs mobility at highest level of function for planned discharge setting.  See evaluation for individualized goals.  Description: Treatment/Interventions: Functional transfer training, Therapeutic exercise, Endurance training, Gait training, Bed mobility, Equipment eval/education  Equipment Recommended:  (TBD)       See flowsheet documentation for full assessment, interventions and recommendations.  Outcome: Not Progressing  Note: Prognosis: Guarded  Problem List: Decreased strength, Decreased endurance, Impaired balance, Decreased mobility, Decreased cognition, Decreased safety awareness, Impaired judgement, Decreased coordination, Pain  Assessment: Pt seen for PT treatment session this date with interventions consisting of bed mobility tasks, transfer training, seated TE, neuromuscular re-education of movement performed to improve dynamic sitting balance, and education provided as needed for safety and direction to improve functional mobility, safety awareness, and activity tolerance. Pt agreeable to PT treatment session upon arrival, pt found resting in bed. At end of session, pt left in bed with bed alarm activated and with all needs in reach. In comparison to previous session, pt with no improvements as evidenced by no functional gains made this session . Continue to recommend Level II (Moderate Resource Intensity at time of d/c in order to maximize pt's functional independence and safety w/ mobility. Pt continues to be functioning below baseline level. PT will continue to see pt while here in order to address the deficits listed above and provide interventions consistent w/ POC in effort to achieve STGs.  Barriers to Discharge: Decreased caregiver support, Inaccessible home environment     Rehab Resource Intensity Level, PT: II (Moderate Resource Intensity)    See flowsheet documentation for full assessment.

## 2024-07-17 NOTE — PROGRESS NOTES
[facility]  Progress Note  Name: Israel Hughes I  MRN: [id number]  Unit/Bed#: -01 I Date of Admission: 7/8/2024   Date of Service: 7/17/2024 I Hospital Day: 9    Assessment & Plan   * Gait dysfunction  Assessment & Plan  Patient presents with ambulatory dysfunction and fatigue  Family state patient is unable to care for himself and may need placement to skilled nursing facility  Likely progression of Alzheimer's dementia  Patient and family agreeable to placement by case management    PT/OT evaluation and treatment  Case management consultation for safe disposition planning  Fall precautions  Patient is medically stable for discharge to short-term rehab    Severe protein-calorie malnutrition (HCC)  Assessment & Plan  Malnutrition Findings:   Adult Malnutrition type: Chronic illness  Adult Degree of Malnutrition: Other severe protein calorie malnutrition  Malnutrition Characteristics: Weight loss, Fat loss, Muscle loss                  360 Statement: Malnutrition related to chronic illness as evidenced by severe orbital adipose loss, moderate clavicle muscle loss, >10% body weight loss in 6 months, >7.5% body weight loss in 3 months.  To treat with oral diet and nutrition supplement as tolerated.    BMI Findings:           Body mass index is 18.82 kg/m².   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)  Assessment & Plan  Likely worsening in the setting of fatigue, ambulatory dysfunction, difficulty with ADLs    Continue donepezil, memantine, fluoxetine  Monitor mentation  Outpatient follow-up with Neurology    Chronic diastolic congestive heart failure (HCC)  Assessment & Plan  Euvolemic and well compensated  Weight at baseline    Continue home loop diuretic  Monitor volume status    Benign prostatic hyperplasia without lower urinary tract symptoms  Assessment &  Plan  Asymptomatic and chronic in nature    Continue home Flomax    Type 2 diabetes mellitus with retinopathy, without long-term current use of insulin ([facility])  Assessment & Plan  Lab Results   Component Value Date    HGBA1C 8.2 (H) 2024       Recent Labs     24  1621 24  2110 24  0707 24  1103   POCGLU 105 395* 87 369*         Blood Sugar Average: Last 72 hrs:  (P) 247.7134098532708529  Poorly controlled with hemoglobin A1c of 8.2%    Lantus 15 units daily at bedtime  5 units of Humalog 3 times daily with meals  Sliding scale insulin with Accu-Cheks  Diabetic diet  Hold home SGLT2 inhibitor           VTE Pharmacologic Prophylaxis: VTE Score: 3 Moderate Risk (Score 3-4) - Pharmacological DVT Prophylaxis Ordered: heparin.    Mobility:   Basic Mobility Inpatient Raw Score: 10  JH-HLM Goal: 4: Move to chair/commode  JH-HLM Achieved: 4: Move to chair/commode  JH-HLM Goal achieved. Continue to encourage appropriate mobility.    Patient Centered Rounds: I performed bedside rounds with nursing staff today.   Discussions with Specialists or Other Care Team Provider: yes    Education and Discussions with Family / Patient:  Spoke with patient's family yesterday.  Will update family regarding plan of care today once available at bedside.     Current Length of Stay: 9 day(s)  Current Patient Status: Inpatient   Certification Statement: The patient will continue to require additional inpatient hospital stay due to pending placement  Discharge Plan: Anticipate discharge in 24-48 hrs to rehab facility.    Code Status: Level 3 - DNAR and DNI    Subjective:   No overnight events noted    Objective:     Vitals:   Temp (24hrs), Av °F (36.1 °C), Min:96.4 °F (35.8 °C), Max:97.6 °F (36.4 °C)    Temp:  [96.4 °F (35.8 °C)-97.6 °F (36.4 °C)] 97.6 °F (36.4 °C)  HR:  [] 71  Resp:  [16-21] 16  BP: (107-133)/(62-76) 133/76  SpO2:  [92 %-96 %] 92 %  Body mass index is 18.82 kg/m².     Input and Output  Summary (last 24 hours):     Intake/Output Summary (Last 24 hours) at 7/17/2024 1412  Last data filed at 7/17/2024 1407  Gross per 24 hour   Intake 880 ml   Output 214 ml   Net 666 ml       Physical Exam:   Physical Exam  Constitutional:       General: He is not in acute distress.     Comments: Frail elderly male   HENT:      Head: Normocephalic and atraumatic.      Nose: Nose normal.      Mouth/Throat:      Mouth: Mucous membranes are moist.   Eyes:      Extraocular Movements: Extraocular movements intact.      Conjunctiva/sclera: Conjunctivae normal.   Cardiovascular:      Rate and Rhythm: Normal rate and regular rhythm.   Pulmonary:      Effort: Pulmonary effort is normal. No respiratory distress.   Abdominal:      Palpations: Abdomen is soft.      Tenderness: There is no abdominal tenderness.   Musculoskeletal:         General: Normal range of motion.      Cervical back: Normal range of motion and neck supple.      Comments: Generalized weakness   Skin:     General: Skin is warm and dry.   Neurological:      General: No focal deficit present.      Mental Status: He is alert. Mental status is at baseline.      Cranial Nerves: No cranial nerve deficit.   Psychiatric:         Mood and Affect: Mood normal.         Behavior: Behavior normal.          Additional Data:     Labs:  Results from last 7 days   Lab Units 07/15/24  0431   WBC Thousand/uL 10.06   HEMOGLOBIN g/dL 13.1   HEMATOCRIT % 41.3   PLATELETS Thousands/uL 248   SEGS PCT % 63   LYMPHO PCT % 26   MONO PCT % 7   EOS PCT % 1     Results from last 7 days   Lab Units 07/15/24  0431   SODIUM mmol/L 137   POTASSIUM mmol/L 4.2   CHLORIDE mmol/L 99   CO2 mmol/L 31   BUN mg/dL 28*   CREATININE mg/dL 0.90   ANION GAP mmol/L 7   CALCIUM mg/dL 9.9   GLUCOSE RANDOM mg/dL 145*         Results from last 7 days   Lab Units 07/17/24  1103 07/17/24  0707 07/16/24  2110 07/16/24  1621 07/16/24  1140 07/16/24  0716 07/15/24  2038 07/15/24  1611 07/15/24  1108 07/15/24  0716  07/14/24  2106 07/14/24  1616   POC GLUCOSE mg/dl 369* 87 395* 105 288* 87 337* 240* 399* 175* 300* 175*               Lines/Drains:  Invasive Devices       Drain  Duration             External Urinary Catheter Medium <1 day                    Imaging: No pertinent imaging reviewed.    Recent Cultures (last 7 days):         Last 24 Hours Medication List:   Current Facility-Administered Medications   Medication Dose Route Frequency Provider Last Rate    acetaminophen  650 mg Oral Q4H PRN Steven C Yorty, DO      aspirin  81 mg Oral Daily Steven C Yorty, DO      atorvastatin  20 mg Oral Daily Steven C Yorty, DO      donepezil  10 mg Oral HS Steven C Yorty, DO      FLUoxetine  20 mg Oral QAM Steven C Yorty, DO      furosemide  40 mg Oral Daily Steven C Yorty, DO      heparin (porcine)  5,000 Units Subcutaneous Q8H ZEINAB Steven C Yorty, DO      insulin glargine  15 Units Subcutaneous HS Steven C Yorty, DO      insulin lispro  1-5 Units Subcutaneous TID AC Steven C Yorty, DO      insulin lispro  1-5 Units Subcutaneous HS Steven C Yorty, DO      insulin lispro  8 Units Subcutaneous TID With Meals Celestino Coelho MD      memantine  10 mg Oral BID Steven C Yorty, DO      ondansetron  4 mg Intravenous Q6H PRN Steven C Yorty, DO      polyethylene glycol  17 g Oral Daily Steven C Yorty, DO      QUEtiapine  25 mg Oral BID before breakfast/lunch Steven C Yorty, DO      QUEtiapine  50 mg Oral HS Steven C Yorty, DO      senna-docusate sodium  1 tablet Oral HS Steven C Yorty, DO      tamsulosin  0.4 mg Oral Daily Steven C Yorty, DO          Today, Patient Was Seen By: Celestino Coelho MD    **Please Note: This note may have been constructed using a voice recognition system.**

## 2024-07-17 NOTE — PLAN OF CARE
Problem: PAIN - ADULT  Goal: Verbalizes/displays adequate comfort level or baseline comfort level  Description: Interventions:  - Encourage patient to monitor pain and request assistance  - Assess pain using appropriate pain scale  - Administer analgesics based on type and severity of pain and evaluate response  - Implement non-pharmacological measures as appropriate and evaluate response  - Consider cultural and social influences on pain and pain management  - Notify physician/advanced practitioner if interventions unsuccessful or patient reports new pain  Outcome: Progressing     Problem: INFECTION - ADULT  Goal: Absence or prevention of progression during hospitalization  Description: INTERVENTIONS:  - Assess and monitor for signs and symptoms of infection  - Monitor lab/diagnostic results  - Monitor all insertion sites, i.e. indwelling lines, tubes, and drains  - Monitor endotracheal if appropriate and nasal secretions for changes in amount and color  - [place] appropriate cooling/warming therapies per order  - Administer medications as ordered  - Instruct and encourage patient and family to use good hand hygiene technique  - Identify and instruct in appropriate isolation precautions for identified infection/condition  Outcome: Progressing     Problem: SAFETY ADULT  Goal: Patient will remain free of falls  Description: INTERVENTIONS:  - Educate patient/family on patient safety including physical limitations  - Instruct patient to call for assistance with activity   - Consult OT/PT to assist with strengthening/mobility   - Keep Call bell within reach  - Keep bed low and locked with side rails adjusted as appropriate  - Keep care items and personal belongings within reach  - Initiate and maintain comfort rounds  - Make Fall Risk Sign visible to staff  - Offer Toileting every 2 Hours, in advance of need  - Initiate/Maintain bed/chair alarms  - Obtain necessary fall risk management equipment: non-slip socks,  walker, bed/chair alarms  - Apply yellow socks and bracelet for high fall risk patients  - Consider moving patient to room near nurses station  Outcome: Progressing  Goal: Maintain or return to baseline ADL function  Description: INTERVENTIONS:  -  Assess patient's ability to carry out ADLs; assess patient's baseline for ADL function and identify physical deficits which impact ability to perform ADLs (bathing, care of mouth/teeth, toileting, grooming, dressing, etc.)  - Assess/evaluate cause of self-care deficits   - Assess range of motion  - Assess patient's mobility; develop plan if impaired  - Assess patient's need for assistive devices and provide as appropriate  - Encourage maximum independence but intervene and supervise when necessary  - Involve family in performance of ADLs  - Assess for home care needs following discharge   - Consider OT consult to assist with ADL evaluation and planning for discharge  - Provide patient education as appropriate  Outcome: Progressing  Goal: Maintains/Returns to pre admission functional level  Description: INTERVENTIONS:  - Perform AM-PAC 6 Click Basic Mobility/ Daily Activity assessment daily.  - Set and communicate daily mobility goal to care team and patient/family/caregiver.   - Collaborate with rehabilitation services on mobility goals if consulted  - Reposition patient every 2 hours.  - Dangle patient 3 times a day  - Out of bed for toileting  - Record patient progress and toleration of activity level   Outcome: Progressing     Problem: DISCHARGE PLANNING  Goal: Discharge to home or other facility with appropriate resources  Description: INTERVENTIONS:  - Identify barriers to discharge w/patient and caregiver  - Arrange for needed discharge resources and transportation as appropriate  - Identify discharge learning needs (meds, wound care, etc.)  - Arrange for interpretive services to assist at discharge as needed  - Refer to Case Management Department for coordinating  discharge planning if the patient needs post-hospital services based on physician/advanced practitioner order or complex needs related to functional status, cognitive ability, or social support system  Outcome: Progressing     Problem: Knowledge Deficit  Goal: Patient/family/caregiver demonstrates understanding of disease process, treatment plan, medications, and discharge instructions  Description: Complete learning assessment and assess knowledge base.  Interventions:  - Provide teaching at level of understanding  - Provide teaching via preferred learning methods  Outcome: Progressing     Problem: Nutrition/Hydration-ADULT  Goal: Nutrient/Hydration intake appropriate for improving, restoring or maintaining nutritional needs  Description: Monitor and assess patient's nutrition/hydration status for malnutrition. Collaborate with interdisciplinary team and initiate plan and interventions as ordered.  Monitor patient's weight and dietary intake as ordered or per policy. Utilize nutrition screening tool and intervene as necessary. Determine patient's food preferences and provide high-protein, high-caloric foods as appropriate.     INTERVENTIONS:  - Monitor oral intake, urinary output, labs, and treatment plans  - Assess nutrition and hydration status and recommend course of action  - Evaluate amount of meals eaten  - Assist patient with eating if necessary   - Allow adequate time for meals  - Recommend/ encourage appropriate diets, oral nutritional supplements, and vitamin/mineral supplements  - Order, calculate, and assess calorie counts as needed  - Assess need for intravenous fluids  - Provide specific nutrition/hydration education as appropriate  - Include patient/family/caregiver in decisions related to nutrition  Outcome: Progressing     Problem: Prexisting or High Potential for Compromised Skin Integrity  Goal: Skin integrity is maintained or improved  Description: INTERVENTIONS:  - Identify patients at risk for  skin breakdown  - Assess and monitor skin integrity  - Assess and monitor nutrition and hydration status  - Monitor labs   - Assess for incontinence   - Turn and reposition patient  - Assist with mobility/ambulation  - Relieve pressure over bony prominences  - Avoid friction and shearing  - Provide appropriate hygiene as needed including keeping skin clean and dry  - Evaluate need for skin moisturizer/barrier cream  - Collaborate with interdisciplinary team   - Patient/family teaching  - Consider wound care consult   Outcome: Progressing     Problem: METABOLIC, FLUID AND ELECTROLYTES - ADULT  Goal: Glucose maintained within target range  Description: INTERVENTIONS:  - Monitor Blood Glucose as ordered  - Assess for signs and symptoms of hyperglycemia and hypoglycemia  - Administer ordered medications to maintain glucose within target range  - Assess nutritional intake and initiate nutrition service referral as needed  Outcome: Progressing

## 2024-07-17 NOTE — CASE MANAGEMENT
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) 2024     Orthostatic lightheadedness 2024     Chronic diastolic congestive heart failure ([facility]) 2024     Stage 3 chronic kidney disease, unspecified whether stage 3a or 3b CKD ([facility]) 2023     Mild protein-calorie malnutrition (HCC) 2022     Dysphagia 2022     Mild aortic stenosis 10/22/2021     Mild mitral regurgitation 10/22/2021     Diastolic dysfunction 10/22/2021     Mild concentric left ventricular hypertrophy (LVH) 10/22/2021     Type 2 diabetes mellitus with retinopathy, without long-term current use of insulin ([facility]) 09/15/2021     Myasthenia gravis without exacerbation ([facility]) 09/15/2021     Mixed hyperlipidemia 09/15/2021     Retinopathy 09/15/2021     Primary open angle glaucoma (POAG) of both eyes, mild stage 09/15/2021     Ptosis of both eyelids 09/15/2021     Benign prostatic hyperplasia without lower urinary tract symptoms 09/15/2021     Essential hypertension 09/15/2021     Other age-related cataract 09/15/2021       LOS (days): 9  Geometric Mean LOS (GMLOS) (days): 4.5  Days to GMLOS:-4.3     OBJECTIVE:  Risk of Unplanned Readmission Score: 40.55         Current admission status: Inpatient   Preferred Pharmacy:   [facility] [address] - [address]  [address] [address]  Phone: [phone] Fax: [phone]    Primary Care  Provider: Giovanni Reyez DO    Primary Insurance: [employer]  REP  Secondary Insurance:     DISCHARGE DETAILS:  Received a call from the CHAD Chiqui who reports she called [employer].  Was told by the insurance co they need more information from the MD.  Was told the process could take up to tomorrow.  Notified [employer] and provided the number for peer to peer.  Received a message from [employer] the [facility] T[facility].  Spoke to Chiqui the CHAD that if pt is denied by insurance and medically cleared can't stay in the hospital [facility] to [facility].  Sent a message via [employer] to determine the OOP expense for the pt to [facility].  CHAD states she would be in agreement with same.

## 2024-07-17 NOTE — ASSESSMENT & PLAN NOTE
Lab Results   Component Value Date    HGBA1C 8.2 (H) 05/17/2024       Recent Labs     07/16/24  1621 07/16/24  2110 07/17/24  0707 07/17/24  1103   POCGLU 105 395* 87 369*         Blood Sugar Average: Last 72 hrs:  (P) [id number]  Poorly controlled with hemoglobin A1c of 8.2%    Lantus 15 units daily at bedtime  5 units of Humalog 3 times daily with meals  Sliding scale insulin with Accu-Cheks  Diabetic diet  Hold home SGLT2 inhibitor

## 2024-07-17 NOTE — PHYSICAL THERAPY NOTE
"   PHYSICAL THERAPY TREATMENT NOTE  NAME:  Israel Hughes  DATE: 07/17/24    Length Of Stay: 9  Performed at least 2 patient identifiers during session: Name and ID bracelet    TREATMENT FLOWSHEET:    07/17/24 1256   PT Last Visit   PT Visit Date 07/17/24   Note Type   Note Type Treatment   Pain Assessment   Pain Assessment Tool 0-10   Pain Score No Pain   Restrictions/Precautions   Weight Bearing Precautions Per Order No   Other Precautions Cognitive;Chair Alarm;Bed Alarm;Fall Risk;Impulsive   General   Chart Reviewed Yes   Response to Previous Treatment Patient unable to report, no changes reported from family or staff   Family/Caregiver Present No   Cognition   Overall Cognitive Status Impaired   Arousal/Participation Alert;Responsive;Cooperative   Attention Difficulty attending to directions   Orientation Level Oriented to person;Oriented to time   Memory Decreased recall of recent events;Decreased recall of precautions;Decreased short term memory   Following Commands Follows one step commands with increased time or repetition   Subjective   Subjective \"A horse is a horse a course a course.\"   Bed Mobility   Rolling R 4  Minimal assistance   Additional items Assist x 1;Bedrails;Increased time required;Verbal cues;LE management   Rolling L 4  Minimal assistance   Additional items Assist x 1;Bedrails;Increased time required;Verbal cues;LE management   Supine to Sit 5  Supervision   Additional items Assist x 1;Bedrails;Increased time required;Verbal cues   Sit to Supine 2  Maximal assistance   Additional items Assist x 1;Increased time required;Verbal cues;LE management;Bedrails;HOB elevated   Additional Comments Tolerated sitting EOB to perform BLE ther ex with close S/CGA  due to R lateral and posterior lean   Transfers   Additional Comments DNT due to safety concerns   Ambulation/Elevation   Ambulation/Elevation Additional Comments DNT due to safety concerns   Balance   Static Sitting Poor   Dynamic Sitting " Poor -   Endurance Deficit   Endurance Deficit Yes   Activity Tolerance   Activity Tolerance Patient limited by fatigue  (cog impairment)   Nurse Made Aware yes   Exercises   Quad Sets Sitting;15 reps;AROM;Bilateral   Heelslides Sitting;15 reps;AROM;Bilateral   Hip Flexion Sitting;15 reps;AROM;Bilateral   Hip Abduction Sitting;15 reps;AROM;Bilateral   Hip Adduction Sitting;15 reps;AROM;Bilateral   Knee AROM Long Arc Quad Sitting;15 reps;AROM;Bilateral   Ankle Pumps Sitting;15 reps;AROM;Bilateral   Marching Sitting;15 reps;AROM;Bilateral   Neuro re-ed Patient performed dynamic sitting balance activities while sitting at EOB  supported by UE on BSR and close S/ CG   including multidirectional weight shifting, reaching, and scooting.   Assessment   Prognosis Guarded   Problem List Decreased strength;Decreased endurance;Impaired balance;Decreased mobility;Decreased cognition;Decreased safety awareness;Impaired judgement;Decreased coordination;Pain   Goals   Patient Goals to ride his horse   PT Treatment Day 3   Plan   Treatment/Interventions Functional transfer training;LE strengthening/ROM;Therapeutic exercise;Endurance training;Cognitive reorientation;Patient/family training;Bed mobility;Compensatory technique education;Spoke to nursing   Progress No functional improvements   PT Frequency 3-5x/wk   Discharge Recommendation   Rehab Resource Intensity Level, PT II (Moderate Resource Intensity)   AM-PAC Basic Mobility Inpatient   Turning in Flat Bed Without Bedrails 3   Lying on Back to Sitting on Edge of Flat Bed Without Bedrails 3   Moving Bed to Chair 1   Standing Up From Chair Using Arms 1   Walk in Room 1   Climb 3-5 Stairs With Railing 1   Basic Mobility Inpatient Raw Score 10   Turning Head Towards Sound 4   Follow Simple Instructions 2   Low Function Basic Mobility Raw Score  16   Low Function Basic Mobility Standardized Score  25.72   [facility] Level Of Mobility   [facility] Goal 4: Move to chair/commode    FLOYD Achieved 3: Sit at edge of bed       The patient's AM-PAC Basic Mobility Inpatient Short Form Raw Score is 10. A raw score less than 16 suggests the patient may benefit from discharge to post-acute rehabilitation services. Please also refer to the recommendation of the Physical Therapist for safe discharge planning.    Pt seen for PT treatment session this date with interventions consisting of bed mobility tasks, transfer training, seated TE, neuromuscular re-education of movement performed to improve dynamic sitting balance, and education provided as needed for safety and direction to improve functional mobility, safety awareness, and activity tolerance. Pt agreeable to PT treatment session upon arrival, pt found resting in bed. At end of session, pt left in bed with bed alarm activated and with all needs in reach. In comparison to previous session, pt with no improvements as evidenced by no functional gains made this session . Continue to recommend Level II (Moderate Resource Intensity at time of d/c in order to maximize pt's functional independence and safety w/ mobility. Pt continues to be functioning below baseline level. PT will continue to see pt while here in order to address the deficits listed above and provide interventions consistent w/ POC in effort to achieve STGs.    Lindsey Odonnell, PTA

## 2024-07-18 LAB
GLUCOSE SERPL-MCNC: 182 MG/DL (ref 65–140)
GLUCOSE SERPL-MCNC: 355 MG/DL (ref 65–140)
GLUCOSE SERPL-MCNC: 421 MG/DL (ref 65–140)
GLUCOSE SERPL-MCNC: 64 MG/DL (ref 65–140)

## 2024-07-18 PROCEDURE — 97110 THERAPEUTIC EXERCISES: CPT

## 2024-07-18 PROCEDURE — 99231 SBSQ HOSP IP/OBS SF/LOW 25: CPT | Performed by: INTERNAL MEDICINE

## 2024-07-18 PROCEDURE — 97530 THERAPEUTIC ACTIVITIES: CPT

## 2024-07-18 PROCEDURE — 82948 REAGENT STRIP/BLOOD GLUCOSE: CPT

## 2024-07-18 RX ORDER — INSULIN LISPRO 100 [IU]/ML
12 INJECTION, SOLUTION INTRAVENOUS; SUBCUTANEOUS
Status: DISCONTINUED | OUTPATIENT
Start: 2024-07-19 | End: 2024-07-20 | Stop reason: HOSPADM

## 2024-07-18 RX ORDER — INSULIN LISPRO 100 [IU]/ML
15 INJECTION, SOLUTION INTRAVENOUS; SUBCUTANEOUS
Status: DISCONTINUED | OUTPATIENT
Start: 2024-07-18 | End: 2024-07-20 | Stop reason: HOSPADM

## 2024-07-18 RX ADMIN — ATORVASTATIN CALCIUM 20 MG: 20 TABLET, FILM COATED ORAL at 08:02

## 2024-07-18 RX ADMIN — SENNOSIDES AND DOCUSATE SODIUM 1 TABLET: 50; 8.6 TABLET ORAL at 22:20

## 2024-07-18 RX ADMIN — INSULIN GLARGINE 15 UNITS: 100 INJECTION, SOLUTION SUBCUTANEOUS at 22:20

## 2024-07-18 RX ADMIN — QUETIAPINE FUMARATE 25 MG: 25 TABLET ORAL at 11:07

## 2024-07-18 RX ADMIN — INSULIN LISPRO 4 UNITS: 100 INJECTION, SOLUTION INTRAVENOUS; SUBCUTANEOUS at 17:15

## 2024-07-18 RX ADMIN — TAMSULOSIN HYDROCHLORIDE 0.4 MG: 0.4 CAPSULE ORAL at 08:03

## 2024-07-18 RX ADMIN — QUETIAPINE FUMARATE 50 MG: 50 TABLET ORAL at 22:20

## 2024-07-18 RX ADMIN — ASPIRIN 81 MG: 81 TABLET, COATED ORAL at 08:03

## 2024-07-18 RX ADMIN — FUROSEMIDE 40 MG: 40 TABLET ORAL at 08:03

## 2024-07-18 RX ADMIN — QUETIAPINE FUMARATE 25 MG: 25 TABLET ORAL at 06:35

## 2024-07-18 RX ADMIN — FLUOXETINE HYDROCHLORIDE 20 MG: 20 CAPSULE ORAL at 08:03

## 2024-07-18 RX ADMIN — INSULIN LISPRO 3 UNITS: 100 INJECTION, SOLUTION INTRAVENOUS; SUBCUTANEOUS at 11:07

## 2024-07-18 RX ADMIN — MEMANTINE 10 MG: 5 TABLET ORAL at 08:02

## 2024-07-18 RX ADMIN — INSULIN LISPRO 8 UNITS: 100 INJECTION, SOLUTION INTRAVENOUS; SUBCUTANEOUS at 11:07

## 2024-07-18 RX ADMIN — INSULIN LISPRO 1 UNITS: 100 INJECTION, SOLUTION INTRAVENOUS; SUBCUTANEOUS at 22:21

## 2024-07-18 RX ADMIN — HEPARIN SODIUM 5000 UNITS: 5000 INJECTION INTRAVENOUS; SUBCUTANEOUS at 06:20

## 2024-07-18 RX ADMIN — DONEPEZIL HYDROCHLORIDE 10 MG: 10 TABLET ORAL at 22:20

## 2024-07-18 RX ADMIN — HEPARIN SODIUM 5000 UNITS: 5000 INJECTION INTRAVENOUS; SUBCUTANEOUS at 22:20

## 2024-07-18 RX ADMIN — HEPARIN SODIUM 5000 UNITS: 5000 INJECTION INTRAVENOUS; SUBCUTANEOUS at 14:01

## 2024-07-18 RX ADMIN — POLYETHYLENE GLYCOL 3350 17 G: 17 POWDER, FOR SOLUTION ORAL at 08:03

## 2024-07-18 RX ADMIN — MEMANTINE 10 MG: 5 TABLET ORAL at 17:14

## 2024-07-18 RX ADMIN — INSULIN LISPRO 15 UNITS: 100 INJECTION, SOLUTION INTRAVENOUS; SUBCUTANEOUS at 17:14

## 2024-07-18 NOTE — PROGRESS NOTES
[facility]  Progress Note  Name: Israel Hughes I  MRN: [id number]  Unit/Bed#: -01 I Date of Admission: 2024   Date of Service: 2024 I Hospital Day: 10    Assessment & Plan   * Gait dysfunction  Assessment & Plan  Patient presents with ambulatory dysfunction and fatigue  Family state patient is unable to care for himself and may need placement to skilled nursing facility  Likely progression of Alzheimer's dementia  Patient and family agreeable to placement by case management    PT/OT evaluation and treatment  Case management consultation for safe disposition planning  Fall precautions  Patient is medically stable for discharge to short-term rehab           VTE Pharmacologic Prophylaxis: VTE Score: 3 Moderate Risk (Score 3-4) - Pharmacological DVT Prophylaxis Ordered: heparin.    Mobility:   Basic Mobility Inpatient Raw Score: 9  JH-HLM Goal: 3: Sit at edge of bed  JH-HLM Achieved: 2: Bed activities/Dependent transfer  JH-HLM Goal achieved. Continue to encourage appropriate mobility.    Patient Centered Rounds: I performed bedside rounds with nursing staff today.   Discussions with Specialists or Other Care Team Provider: yes    Education and Discussions with Family / Patient: Updated  (sister) at bedside.    Current Length of Stay: 10 day(s)  Current Patient Status: Inpatient   Certification Statement: The patient will continue to require additional inpatient hospital stay due to pending placement  Discharge Plan: Anticipate discharge tomorrow to rehab facility.    Code Status: Level 3 - DNAR and DNI    Subjective:   No overnight events noted    Objective:     Vitals:   Temp (24hrs), Av °F (36.7 °C), Min:97.3 °F (36.3 °C), Max:98.5 °F (36.9 °C)    Temp:  [97.3 °F (36.3 °C)-98.5 °F (36.9 °C)] 98.5 °F (36.9 °C)  HR:  [65-87] 87  Resp:  [16-19] 17  BP: (108-155)/(70-90) 155/90  SpO2:  [91 %-95 %] 91 %  Body mass index is 18.86 kg/m².     Input and Output  Summary (last 24 hours):     Intake/Output Summary (Last 24 hours) at 7/18/2024 1655  Last data filed at 7/18/2024 0800  Gross per 24 hour   Intake 850 ml   Output 750 ml   Net 100 ml       Physical Exam:   Physical Exam  Constitutional:       General: He is not in acute distress.     Comments: Frail elderly male   HENT:      Head: Normocephalic and atraumatic.      Nose: Nose normal.      Mouth/Throat:      Mouth: Mucous membranes are moist.   Eyes:      Extraocular Movements: Extraocular movements intact.      Conjunctiva/sclera: Conjunctivae normal.   Cardiovascular:      Rate and Rhythm: Normal rate and regular rhythm.   Pulmonary:      Effort: Pulmonary effort is normal. No respiratory distress.   Abdominal:      Palpations: Abdomen is soft.      Tenderness: There is no abdominal tenderness.   Musculoskeletal:         General: Normal range of motion.      Cervical back: Normal range of motion and neck supple.      Comments: Generalized weakness   Skin:     General: Skin is warm and dry.   Neurological:      General: No focal deficit present.      Mental Status: He is alert. Mental status is at baseline.      Cranial Nerves: No cranial nerve deficit.   Psychiatric:         Mood and Affect: Mood normal.         Behavior: Behavior normal.          Additional Data:     Labs:  Results from last 7 days   Lab Units 07/15/24  0431   WBC Thousand/uL 10.06   HEMOGLOBIN g/dL 13.1   HEMATOCRIT % 41.3   PLATELETS Thousands/uL 248   SEGS PCT % 63   LYMPHO PCT % 26   MONO PCT % 7   EOS PCT % 1     Results from last 7 days   Lab Units 07/15/24  0431   SODIUM mmol/L 137   POTASSIUM mmol/L 4.2   CHLORIDE mmol/L 99   CO2 mmol/L 31   BUN mg/dL 28*   CREATININE mg/dL 0.90   ANION GAP mmol/L 7   CALCIUM mg/dL 9.9   GLUCOSE RANDOM mg/dL 145*         Results from last 7 days   Lab Units 07/18/24  1606 07/18/24  1052 07/18/24  0658 07/17/24  2054 07/17/24  1617 07/17/24  1103 07/17/24  0707 07/16/24  2110 07/16/24  1621 07/16/24  1140  07/16/24  0716 07/15/24  2038   POC GLUCOSE mg/dl 421* 355* 64* 160* 154* 369* 87 395* 105 288* 87 337*               Lines/Drains:  Invasive Devices       Drain  Duration             External Urinary Catheter Medium 1 day                          Imaging: No pertinent imaging reviewed.    Recent Cultures (last 7 days):         Last 24 Hours Medication List:   Current Facility-Administered Medications   Medication Dose Route Frequency Provider Last Rate    acetaminophen  650 mg Oral Q4H PRN Steven C Yorty, DO      aspirin  81 mg Oral Daily Steven C Yorty, DO      atorvastatin  20 mg Oral Daily Steven C Yorty, DO      donepezil  10 mg Oral HS Steven C Yorty, DO      FLUoxetine  20 mg Oral QAM Steven C Yorty, DO      furosemide  40 mg Oral Daily Steven C Yorty, DO      heparin (porcine)  5,000 Units Subcutaneous Q8H ZEINAB Steven C Yorty, DO      insulin glargine  15 Units Subcutaneous HS Steven C Yorty, DO      insulin lispro  1-5 Units Subcutaneous TID AC Steven C Yorty, DO      insulin lispro  1-5 Units Subcutaneous HS Steven C Yorty, DO      [START ON 7/19/2024] insulin lispro  12 Units Subcutaneous Daily With Lunch Celestino Coelho MD      insulin lispro  15 Units Subcutaneous Daily With Dinner eClestino Coelho MD      memantine  10 mg Oral BID Steven C Yorty, DO      ondansetron  4 mg Intravenous Q6H PRN Steven C Yorty, DO      polyethylene glycol  17 g Oral Daily Steven C Yorty, DO      QUEtiapine  25 mg Oral BID before breakfast/lunch Steven C Yorty, DO      QUEtiapine  50 mg Oral HS Steven C Yorty, DO      senna-docusate sodium  1 tablet Oral HS Steven C Yorty, DO      tamsulosin  0.4 mg Oral Daily Steven C Yorty, DO          Today, Patient Was Seen By: Celestino Coelho MD    **Please Note: This note may have been constructed using a voice recognition system.**

## 2024-07-18 NOTE — PLAN OF CARE
Problem: PHYSICAL THERAPY ADULT  Goal: Performs mobility at highest level of function for planned discharge setting.  See evaluation for individualized goals.  Description: Treatment/Interventions: Functional transfer training, Therapeutic exercise, Endurance training, Gait training, Bed mobility, Equipment eval/education  Equipment Recommended:  (TBD)       See flowsheet documentation for full assessment, interventions and recommendations.  Outcome: Not Progressing  Note: Prognosis: Guarded  Problem List: Decreased strength, Decreased endurance, Impaired balance, Decreased mobility, Decreased cognition, Decreased safety awareness, Impaired judgement, Decreased coordination, Pain  Assessment: Pt seen for PT treatment session this date with interventions consisting of bed mobility tasks, supine TE, and education provided as needed for safety and direction to improve functional mobility, safety awareness, and activity tolerance. Pt agreeable to PT treatment session upon arrival, pt found resting in bed. At end of session, pt left in bed with bed alarm activated and with all needs in reach. In comparison to previous session, pt with no improvements as evidenced by no functional gains made this session . Continue to recommend Level II (Moderate Resource Intensity) at time of d/c in order to maximize pt's functional independence and safety w/ mobility. Pt continues to be functioning below baseline level. PT will continue to see pt while here in order to address the deficits listed above and provide interventions consistent w/ POC in effort to achieve STGs.  Barriers to Discharge: Decreased caregiver support, Inaccessible home environment     Rehab Resource Intensity Level, PT: II (Moderate Resource Intensity)    See flowsheet documentation for full assessment.

## 2024-07-18 NOTE — PLAN OF CARE
Problem: Potential for Falls  Goal: Patient will remain free of falls  Description: INTERVENTIONS:  - Educate patient/family on patient safety including physical limitations  - Instruct patient to call for assistance with activity   - Consult OT/PT to assist with strengthening/mobility   - Keep Call bell within reach  - Keep bed low and locked with side rails adjusted as appropriate  - Keep care items and personal belongings within reach  - Initiate and maintain comfort rounds  - Make Fall Risk Sign visible to staff  - Offer Toileting every 2 Hours, in advance of need  - Initiate/Maintain bed/chair alarms  - Obtain necessary fall risk management equipment: non-slip socks, walker, bed/chair alarms  - Apply yellow socks and bracelet for high fall risk patients  - Consider moving patient to room near nurses station  Outcome: Progressing     Problem: PAIN - ADULT  Goal: Verbalizes/displays adequate comfort level or baseline comfort level  Description: Interventions:  - Encourage patient to monitor pain and request assistance  - Assess pain using appropriate pain scale  - Administer analgesics based on type and severity of pain and evaluate response  - Implement non-pharmacological measures as appropriate and evaluate response  - Consider cultural and social influences on pain and pain management  - Notify physician/advanced practitioner if interventions unsuccessful or patient reports new pain  Outcome: Progressing     Problem: INFECTION - ADULT  Goal: Absence or prevention of progression during hospitalization  Description: INTERVENTIONS:  - Assess and monitor for signs and symptoms of infection  - Monitor lab/diagnostic results  - Monitor all insertion sites, i.e. indwelling lines, tubes, and drains  - Monitor endotracheal if appropriate and nasal secretions for changes in amount and color  - [place] appropriate cooling/warming therapies per order  - Administer medications as ordered  - Instruct and encourage  patient and family to use good hand hygiene technique  - Identify and instruct in appropriate isolation precautions for identified infection/condition  Outcome: Progressing     Problem: SAFETY ADULT  Goal: Patient will remain free of falls  Description: INTERVENTIONS:  - Educate patient/family on patient safety including physical limitations  - Instruct patient to call for assistance with activity   - Consult OT/PT to assist with strengthening/mobility   - Keep Call bell within reach  - Keep bed low and locked with side rails adjusted as appropriate  - Keep care items and personal belongings within reach  - Initiate and maintain comfort rounds  - Make Fall Risk Sign visible to staff  - Offer Toileting every 2 Hours, in advance of need  - Initiate/Maintain bed/chair alarms  - Obtain necessary fall risk management equipment: non-slip socks, walker, bed/chair alarms  - Apply yellow socks and bracelet for high fall risk patients  - Consider moving patient to room near nurses station  Outcome: Progressing  Goal: Maintain or return to baseline ADL function  Description: INTERVENTIONS:  -  Assess patient's ability to carry out ADLs; assess patient's baseline for ADL function and identify physical deficits which impact ability to perform ADLs (bathing, care of mouth/teeth, toileting, grooming, dressing, etc.)  - Assess/evaluate cause of self-care deficits   - Assess range of motion  - Assess patient's mobility; develop plan if impaired  - Assess patient's need for assistive devices and provide as appropriate  - Encourage maximum independence but intervene and supervise when necessary  - Involve family in performance of ADLs  - Assess for home care needs following discharge   - Consider OT consult to assist with ADL evaluation and planning for discharge  - Provide patient education as appropriate  Outcome: Progressing  Goal: Maintains/Returns to pre admission functional level  Description: INTERVENTIONS:  - Perform AM-PAC 6  Click Basic Mobility/ Daily Activity assessment daily.  - Set and communicate daily mobility goal to care team and patient/family/caregiver.   - Collaborate with rehabilitation services on mobility goals if consulted  - Reposition patient every 2 hours.  - Dangle patient 3 times a day  - Out of bed for toileting  - Record patient progress and toleration of activity level   Outcome: Progressing     Problem: DISCHARGE PLANNING  Goal: Discharge to home or other facility with appropriate resources  Description: INTERVENTIONS:  - Identify barriers to discharge w/patient and caregiver  - Arrange for needed discharge resources and transportation as appropriate  - Identify discharge learning needs (meds, wound care, etc.)  - Arrange for interpretive services to assist at discharge as needed  - Refer to Case Management Department for coordinating discharge planning if the patient needs post-hospital services based on physician/advanced practitioner order or complex needs related to functional status, cognitive ability, or social support system  Outcome: Progressing     Problem: Knowledge Deficit  Goal: Patient/family/caregiver demonstrates understanding of disease process, treatment plan, medications, and discharge instructions  Description: Complete learning assessment and assess knowledge base.  Interventions:  - Provide teaching at level of understanding  - Provide teaching via preferred learning methods  Outcome: Progressing     Problem: Nutrition/Hydration-ADULT  Goal: Nutrient/Hydration intake appropriate for improving, restoring or maintaining nutritional needs  Description: Monitor and assess patient's nutrition/hydration status for malnutrition. Collaborate with interdisciplinary team and initiate plan and interventions as ordered.  Monitor patient's weight and dietary intake as ordered or per policy. Utilize nutrition screening tool and intervene as necessary. Determine patient's food preferences and provide  high-protein, high-caloric foods as appropriate.     INTERVENTIONS:  - Monitor oral intake, urinary output, labs, and treatment plans  - Assess nutrition and hydration status and recommend course of action  - Evaluate amount of meals eaten  - Assist patient with eating if necessary   - Allow adequate time for meals  - Recommend/ encourage appropriate diets, oral nutritional supplements, and vitamin/mineral supplements  - Order, calculate, and assess calorie counts as needed  - Recommend, monitor, and adjust tube feedings and TPN/PPN based on assessed needs  - Assess need for intravenous fluids  - Provide specific nutrition/hydration education as appropriate  - Include patient/family/caregiver in decisions related to nutrition  Outcome: Progressing     Problem: Prexisting or High Potential for Compromised Skin Integrity  Goal: Skin integrity is maintained or improved  Description: INTERVENTIONS:  - Identify patients at risk for skin breakdown  - Assess and monitor skin integrity  - Assess and monitor nutrition and hydration status  - Monitor labs   - Assess for incontinence   - Turn and reposition patient  - Assist with mobility/ambulation  - Relieve pressure over bony prominences  - Avoid friction and shearing  - Provide appropriate hygiene as needed including keeping skin clean and dry  - Evaluate need for skin moisturizer/barrier cream  - Collaborate with interdisciplinary team   - Patient/family teaching  - Consider wound care consult   Outcome: Progressing     Problem: METABOLIC, FLUID AND ELECTROLYTES - ADULT  Goal: Glucose maintained within target range  Description: INTERVENTIONS:  - Monitor Blood Glucose as ordered  - Assess for signs and symptoms of hyperglycemia and hypoglycemia  - Administer ordered medications to maintain glucose within target range  - Assess nutritional intake and initiate nutrition service referral as needed  Outcome: Progressing

## 2024-07-18 NOTE — PHYSICAL THERAPY NOTE
"   PHYSICAL THERAPY TREATMENT NOTE  NAME:  Israel Hughes  DATE: 07/18/24    Length Of Stay: 10  Performed at least 2 patient identifiers during session: Name and ID bracelet    TREATMENT FLOWSHEET:    07/18/24 1341   PT Last Visit   PT Visit Date 07/18/24   Note Type   Note Type Treatment   Pain Assessment   Pain Assessment Tool 0-10   Pain Score No Pain   Restrictions/Precautions   Weight Bearing Precautions Per Order No   Other Precautions Cognitive;Chair Alarm;Bed Alarm;Fall Risk;Impulsive   General   Chart Reviewed Yes   Response to Previous Treatment Patient unable to report, no changes reported from family or staff   Family/Caregiver Present No   Cognition   Overall Cognitive Status Impaired   Arousal/Participation Alert;Responsive   Attention Difficulty attending to directions   Orientation Level Oriented to person   Memory Decreased recall of recent events;Decreased recall of precautions;Decreased short term memory   Following Commands Follows one step commands with increased time or repetition   Subjective   Subjective \" Look under the covers here and telll me what you see.\"   Bed Mobility   Rolling R 3  Moderate assistance   Additional items Assist x 1;Bedrails;Increased time required;Verbal cues;LE management   Rolling L 3  Moderate assistance   Additional items Assist x 1;Bedrails;Increased time required;Verbal cues;LE management   Additional Comments Pt. assisted with repositioning in bed due to reports of laying on lumps by rolling from side to side to side to straighten bedding and remove any wrinkles that may feel lke lumps under pt   Transfers   Additional Comments DNT due to safety concerns   Ambulation/Elevation   Gait pattern Not appropriate   Endurance Deficit   Endurance Deficit Yes   Activity Tolerance   Activity Tolerance Patient limited by fatigue   Exercises   Quad Sets Sitting;25 reps;AAROM;Bilateral   Heelslides Sitting;25 reps;AAROM;Bilateral   Hip Flexion Sitting;25 " reps;AAROM;Bilateral   Hip Abduction Sitting;25 reps;AAROM;Bilateral   Hip Adduction Sitting;25 reps;AAROM;Bilateral   Knee AROM Sitting;25 reps;AAROM;Bilateral   Knee AROM Short Arc Quad Sitting;25 reps;AAROM;Bilateral   Ankle Pumps Sitting;25 reps;AAROM;Bilateral   Assessment   Prognosis Guarded   Problem List Decreased strength;Decreased endurance;Impaired balance;Decreased mobility;Decreased cognition;Decreased safety awareness;Impaired judgement;Decreased coordination;Pain   Goals   Patient Goals to get lumps out   PT Treatment Day 4   Plan   Treatment/Interventions Functional transfer training;LE strengthening/ROM;Therapeutic exercise;Endurance training;Patient/family training;Bed mobility;Compensatory technique education;Spoke to nursing   Progress No functional improvements   PT Frequency 3-5x/wk   Discharge Recommendation   Rehab Resource Intensity Level, PT II (Moderate Resource Intensity)   AM-PAC Basic Mobility Inpatient   Turning in Flat Bed Without Bedrails 2   Lying on Back to Sitting on Edge of Flat Bed Without Bedrails 3   Moving Bed to Chair 1   Standing Up From Chair Using Arms 1   Walk in Room 1   Climb 3-5 Stairs With Railing 1   Basic Mobility Inpatient Raw Score 9   Turning Head Towards Sound 4   Follow Simple Instructions 2   Low Function Basic Mobility Raw Score  15   Low Function Basic Mobility Standardized Score  23.9   [facility] Highest Level Of Mobility   -[facility] Goal 3: Sit at edge of bed   -[facility] Achieved 2: Bed activities/Dependent transfer         The patient's AM-PAC Basic Mobility Inpatient Short Form Raw Score is 10. A raw score less than 16 suggests the patient may benefit from discharge to post-acute rehabilitation services. Please also refer to the recommendation of the Physical Therapist for safe discharge planning.    Pt seen for PT treatment session this date with interventions consisting of bed mobility tasks, supine TE, and education provided as needed for safety and  direction to improve functional mobility, safety awareness, and activity tolerance. Pt agreeable to PT treatment session upon arrival, pt found resting in bed. At end of session, pt left in bed with bed alarm activated and with all needs in reach. In comparison to previous session, pt with no improvements as evidenced by no functional gains made this session . Continue to recommend Level II (Moderate Resource Intensity) at time of d/c in order to maximize pt's functional independence and safety w/ mobility. Pt continues to be functioning below baseline level. PT will continue to see pt while here in order to address the deficits listed above and provide interventions consistent w/ POC in effort to achieve STGs.    Lindsey Odonnell, PTA

## 2024-07-18 NOTE — PLAN OF CARE
Problem: Potential for Falls  Goal: Patient will remain free of falls  Description: INTERVENTIONS:  - Educate patient/family on patient safety including physical limitations  - Instruct patient to call for assistance with activity   - Consult OT/PT to assist with strengthening/mobility   - Keep Call bell within reach  - Keep bed low and locked with side rails adjusted as appropriate  - Keep care items and personal belongings within reach  - Initiate and maintain comfort rounds  - Make Fall Risk Sign visible to staff  - Offer Toileting every 2 Hours, in advance of need  - Initiate/Maintain bed/chair alarms  - Obtain necessary fall risk management equipment: non-slip socks, walker, bed/chair alarms  - Apply yellow socks and bracelet for high fall risk patients  - Consider moving patient to room near nurses station  Outcome: Progressing     Problem: SAFETY ADULT  Goal: Patient will remain free of falls  Description: INTERVENTIONS:  - Educate patient/family on patient safety including physical limitations  - Instruct patient to call for assistance with activity   - Consult OT/PT to assist with strengthening/mobility   - Keep Call bell within reach  - Keep bed low and locked with side rails adjusted as appropriate  - Keep care items and personal belongings within reach  - Initiate and maintain comfort rounds  - Make Fall Risk Sign visible to staff  - Offer Toileting every 2 Hours, in advance of need  - Initiate/Maintain bed/chair alarms  - Obtain necessary fall risk management equipment: non-slip socks, walker, bed/chair alarms  - Apply yellow socks and bracelet for high fall risk patients  - Consider moving patient to room near nurses station  Outcome: Progressing  Goal: Maintain or return to baseline ADL function  Description: INTERVENTIONS:  -  Assess patient's ability to carry out ADLs; assess patient's baseline for ADL function and identify physical deficits which impact ability to perform ADLs (bathing, care of  mouth/teeth, toileting, grooming, dressing, etc.)  - Assess/evaluate cause of self-care deficits   - Assess range of motion  - Assess patient's mobility; develop plan if impaired  - Assess patient's need for assistive devices and provide as appropriate  - Encourage maximum independence but intervene and supervise when necessary  - Involve family in performance of ADLs  - Assess for home care needs following discharge   - Consider OT consult to assist with ADL evaluation and planning for discharge  - Provide patient education as appropriate  Outcome: Progressing

## 2024-07-19 LAB
ANION GAP SERPL CALCULATED.3IONS-SCNC: 6 MMOL/L (ref 4–13)
BUN SERPL-MCNC: 31 MG/DL (ref 5–25)
CALCIUM SERPL-MCNC: 9.5 MG/DL (ref 8.4–10.2)
CHLORIDE SERPL-SCNC: 101 MMOL/L (ref 96–108)
CO2 SERPL-SCNC: 33 MMOL/L (ref 21–32)
CREAT SERPL-MCNC: 1.01 MG/DL (ref 0.6–1.3)
ERYTHROCYTE [DISTWIDTH] IN BLOOD BY AUTOMATED COUNT: 13.2 % (ref 11.6–15.1)
GFR SERPL CREATININE-BSD FRML MDRD: 68 ML/MIN/1.73SQ M
GLUCOSE SERPL-MCNC: 100 MG/DL (ref 65–140)
GLUCOSE SERPL-MCNC: 274 MG/DL (ref 65–140)
GLUCOSE SERPL-MCNC: 299 MG/DL (ref 65–140)
GLUCOSE SERPL-MCNC: 357 MG/DL (ref 65–140)
GLUCOSE SERPL-MCNC: 81 MG/DL (ref 65–140)
GLUCOSE SERPL-MCNC: 89 MG/DL (ref 65–140)
HCT VFR BLD AUTO: 39.8 % (ref 36.5–49.3)
HGB BLD-MCNC: 12.8 G/DL (ref 12–17)
MCH RBC QN AUTO: 31.4 PG (ref 26.8–34.3)
MCHC RBC AUTO-ENTMCNC: 32.2 G/DL (ref 31.4–37.4)
MCV RBC AUTO: 98 FL (ref 82–98)
PLATELET # BLD AUTO: 223 THOUSANDS/UL (ref 149–390)
PMV BLD AUTO: 9.2 FL (ref 8.9–12.7)
POTASSIUM SERPL-SCNC: 4.4 MMOL/L (ref 3.5–5.3)
RBC # BLD AUTO: 4.08 MILLION/UL (ref 3.88–5.62)
SODIUM SERPL-SCNC: 140 MMOL/L (ref 135–147)
WBC # BLD AUTO: 8.21 THOUSAND/UL (ref 4.31–10.16)

## 2024-07-19 PROCEDURE — 99232 SBSQ HOSP IP/OBS MODERATE 35: CPT | Performed by: INTERNAL MEDICINE

## 2024-07-19 PROCEDURE — 85027 COMPLETE CBC AUTOMATED: CPT | Performed by: INTERNAL MEDICINE

## 2024-07-19 PROCEDURE — 82948 REAGENT STRIP/BLOOD GLUCOSE: CPT

## 2024-07-19 PROCEDURE — 80048 BASIC METABOLIC PNL TOTAL CA: CPT | Performed by: INTERNAL MEDICINE

## 2024-07-19 RX ADMIN — HEPARIN SODIUM 5000 UNITS: 5000 INJECTION INTRAVENOUS; SUBCUTANEOUS at 22:11

## 2024-07-19 RX ADMIN — ATORVASTATIN CALCIUM 20 MG: 20 TABLET, FILM COATED ORAL at 08:33

## 2024-07-19 RX ADMIN — INSULIN LISPRO 3 UNITS: 100 INJECTION, SOLUTION INTRAVENOUS; SUBCUTANEOUS at 11:56

## 2024-07-19 RX ADMIN — ASPIRIN 81 MG: 81 TABLET, COATED ORAL at 08:32

## 2024-07-19 RX ADMIN — TAMSULOSIN HYDROCHLORIDE 0.4 MG: 0.4 CAPSULE ORAL at 08:33

## 2024-07-19 RX ADMIN — QUETIAPINE FUMARATE 25 MG: 25 TABLET ORAL at 08:33

## 2024-07-19 RX ADMIN — QUETIAPINE FUMARATE 25 MG: 25 TABLET ORAL at 11:56

## 2024-07-19 RX ADMIN — HEPARIN SODIUM 5000 UNITS: 5000 INJECTION INTRAVENOUS; SUBCUTANEOUS at 17:22

## 2024-07-19 RX ADMIN — INSULIN GLARGINE 15 UNITS: 100 INJECTION, SOLUTION SUBCUTANEOUS at 22:12

## 2024-07-19 RX ADMIN — FUROSEMIDE 40 MG: 40 TABLET ORAL at 08:33

## 2024-07-19 RX ADMIN — INSULIN LISPRO 2 UNITS: 100 INJECTION, SOLUTION INTRAVENOUS; SUBCUTANEOUS at 22:12

## 2024-07-19 RX ADMIN — POLYETHYLENE GLYCOL 3350 17 G: 17 POWDER, FOR SOLUTION ORAL at 08:33

## 2024-07-19 RX ADMIN — FLUOXETINE HYDROCHLORIDE 20 MG: 20 CAPSULE ORAL at 08:33

## 2024-07-19 RX ADMIN — DONEPEZIL HYDROCHLORIDE 10 MG: 10 TABLET ORAL at 22:11

## 2024-07-19 RX ADMIN — QUETIAPINE FUMARATE 50 MG: 50 TABLET ORAL at 22:11

## 2024-07-19 RX ADMIN — INSULIN LISPRO 12 UNITS: 100 INJECTION, SOLUTION INTRAVENOUS; SUBCUTANEOUS at 11:57

## 2024-07-19 RX ADMIN — MEMANTINE 10 MG: 5 TABLET ORAL at 17:23

## 2024-07-19 RX ADMIN — INSULIN LISPRO 15 UNITS: 100 INJECTION, SOLUTION INTRAVENOUS; SUBCUTANEOUS at 18:00

## 2024-07-19 RX ADMIN — HEPARIN SODIUM 5000 UNITS: 5000 INJECTION INTRAVENOUS; SUBCUTANEOUS at 05:19

## 2024-07-19 RX ADMIN — MEMANTINE 10 MG: 5 TABLET ORAL at 08:33

## 2024-07-19 NOTE — UTILIZATION REVIEW
Continued Stay Review    Date: 7/19                          Current Patient Class: Inpatient  Current Level of Care: MEd/surg    HPI:84 y.o. male initially admitted on 7/19     Assessment/Plan:   Unable to care for himself safely at home.  Case management working on safe dc plan to skilled nursing facility.     Vital Signs (last 3 days)       Date/Time Temp Pulse Resp BP MAP (mmHg) SpO2 O2 Device Patient Position - Orthostatic VS Agrenis Coma Scale Score Pain    07/19/24 0900 -- -- -- -- -- -- -- -- 15 No Pain    07/19/24 07:20:45 97.5 °F (36.4 °C) 65 18 114/71 85 94 % None (Room air) Lying -- --    07/18/24 2300 -- 77 -- 101/67 78 93 % -- -- -- --    07/18/24 22:50:59 96.7 °F (35.9 °C) -- 18 101/67 78 -- None (Room air) Lying -- --    07/18/24 2100 -- -- -- -- -- -- None (Room air) -- 14 No Pain    07/18/24 14:54:03 98.5 °F (36.9 °C) 87 17 155/90 112 91 % -- -- -- --    07/18/24 1341 -- -- -- -- -- -- -- -- -- No Pain    07/18/24 0800 -- -- -- -- -- -- None (Room air) -- -- No Pain    07/18/24 07:31:16 97.3 °F (36.3 °C) 65 19 117/71 86 92 % -- -- -- --    07/17/24 22:15:10 98.1 °F (36.7 °C) 70 16 108/70 83 95 % None (Room air) Lying -- --    07/17/24 2110 -- -- -- -- -- -- None (Room air) -- 14 No Pain    07/17/24 14:50:30 98.2 °F (36.8 °C) 78 18 115/59 78 93 % None (Room air) Lying -- --    07/17/24 1256 -- -- -- -- -- -- -- -- -- No Pain    07/17/24 0710 -- -- -- -- -- -- -- -- 15 No Pain    07/17/24 06:59:34 97.6 °F (36.4 °C) 71 16 133/76 95 92 % None (Room air) Lying -- No Pain    07/16/24 22:07:11 -- 71 21 123/66 85 93 % -- -- -- --    07/16/24 14:55:14 96.4 °F (35.8 °C) 104 16 107/62 77 96 % None (Room air) Lying -- --    07/16/24 0845 -- -- -- -- -- 96 % None (Room air) -- 15 No Pain    07/16/24 07:18:39 96.7 °F (35.9 °C) 63 17 115/68 84 95 % None (Room air) Lying -- --          Weight (last 2 days)       Date/Time Weight    07/19/24 0600 51.4 (113.32)    07/18/24 0600 51.4 (113.32)    07/17/24 0600 51.3  (113.1)            Pertinent Labs/Diagnostic Results:   Radiology:  CT head without contrast   Final Interpretation by Venkat Londono MD (07/08 1705)      No acute intracranial abnormality.                  Workstation performed: [id number]           Cardiology:    Results from last 7 days   Lab Units 07/19/24  0520 07/15/24  0431 07/13/24  0443   WBC Thousand/uL 8.21 10.06 8.39   HEMOGLOBIN g/dL 12.8 13.1 13.0   HEMATOCRIT % 39.8 41.3 40.0   PLATELETS Thousands/uL 223 248 227   TOTAL NEUT ABS Thousands/µL  --  6.40 5.57         Results from last 7 days   Lab Units 07/19/24  0520 07/15/24  0431 07/13/24  0443   SODIUM mmol/L 140 137 139   POTASSIUM mmol/L 4.4 4.2 4.2   CHLORIDE mmol/L 101 99 100   CO2 mmol/L 33* 31 31   ANION GAP mmol/L 6 7 8   BUN mg/dL 31* 28* 27*   CREATININE mg/dL 1.01 0.90 0.96   EGFR ml/min/1.73sq m 68 78 72   CALCIUM mg/dL 9.5 9.9 9.4   MAGNESIUM mg/dL  --  2.1 2.1   PHOSPHORUS mg/dL  --  3.3 3.1         Results from last 7 days   Lab Units 07/19/24  1100 07/19/24  0722 07/18/24  2119 07/18/24  1606 07/18/24  1052 07/18/24  0658 07/17/24  2054 07/17/24  1617 07/17/24  1103 07/17/24  0707 07/16/24  2110 07/16/24  1621   POC GLUCOSE mg/dl 357* 81 182* 421* 355* 64* 160* 154* 369* 87 395* 105     Results from last 7 days   Lab Units 07/19/24  0520 07/15/24  0431 07/13/24  0443   GLUCOSE RANDOM mg/dL 89 145* 163*         Medications:   Scheduled Medications:  aspirin, 81 mg, Oral, Daily  atorvastatin, 20 mg, Oral, Daily  donepezil, 10 mg, Oral, HS  FLUoxetine, 20 mg, Oral, QAM  furosemide, 40 mg, Oral, Daily  heparin (porcine), 5,000 Units, Subcutaneous, Q8H ZEINAB  insulin glargine, 15 Units, Subcutaneous, HS  insulin lispro, 1-5 Units, Subcutaneous, TID AC  insulin lispro, 1-5 Units, Subcutaneous, HS  insulin lispro, 12 Units, Subcutaneous, Daily With Lunch  insulin lispro, 15 Units, Subcutaneous, Daily With Dinner  memantine, 10 mg, Oral, BID  polyethylene glycol, 17 g, Oral,  Daily  QUEtiapine, 25 mg, Oral, BID before breakfast/lunch  QUEtiapine, 50 mg, Oral, HS  senna-docusate sodium, 1 tablet, Oral, HS  tamsulosin, 0.4 mg, Oral, Daily      Continuous IV Infusions:     PRN Meds:  acetaminophen, 650 mg, Oral, Q4H PRN  ondansetron, 4 mg, Intravenous, Q6H PRN        Discharge Plan: TBD    Network Utilization Review Department  ATTENTION: Please call with any questions or concerns to [phone] and carefully listen to the prompts so that you are directed to the right person. All voicemails are confidential.   For Discharge needs, contact Care Management DC Support Team at [phone] opt. 2  Send all requests for admission clinical reviews, approved or denied determinations and any other requests to dedicated fax number below belonging to the [facility] where the patient is receiving treatment. List of dedicated fax numbers for the Facilities:  FACILITY NAME UR FAX NUMBER   ADMISSION DENIALS (Administrative/Medical Necessity) [phone]   DISCHARGE SUPPORT TEAM (NETWORK) [phone]   PARENT CHILD HEALTH (Maternity/NICU/Pediatrics) [phone]   [facility] [phone]   [facility] [phone]   [facility] [phone]   [facility] [phone]   [facility] [phone]   [facility] [phone]   [facility] [phone]   [facility] [phone]   [facility] [phone]   [facility] [phone]   [facility] [phone]   [facility] [phone]

## 2024-07-19 NOTE — ASSESSMENT & PLAN NOTE
Malnutrition Findings:   Adult Malnutrition type: Chronic illness  Adult Degree of Malnutrition: Other severe protein calorie malnutrition  Malnutrition Characteristics: Weight loss, Fat loss, Muscle loss                  360 Statement: Malnutrition related to chronic illness as evidenced by severe orbital adipose loss, moderate clavicle muscle loss, >10% body weight loss in 6 months, >7.5% body weight loss in 3 months.  To treat with oral diet and nutrition supplement as tolerated.    BMI Findings:           Body mass index is 18.86 kg/m².   Present on admission as evidenced by BMI of 20    Encourage lifestyle modification and weight gain

## 2024-07-19 NOTE — CASE MANAGEMENT
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 ([facility]) 2024     Orthostatic lightheadedness 2024     Chronic diastolic congestive heart failure ([facility]) 2024     Stage 3 chronic kidney disease, unspecified whether stage 3a or 3b CKD ([facility]) 2023     Mild protein-calorie malnutrition (HCC) 2022     Dysphagia 2022     Mild aortic stenosis 10/22/2021     Mild mitral regurgitation 10/22/2021     Diastolic dysfunction 10/22/2021     Mild concentric left ventricular hypertrophy (LVH) 10/22/2021     Type 2 diabetes mellitus with retinopathy, without long-term current use of insulin ([facility]) 09/15/2021     Myasthenia gravis without exacerbation ([facility]) 09/15/2021     Mixed hyperlipidemia 09/15/2021     Retinopathy 09/15/2021     Primary open angle glaucoma (POAG) of both eyes, mild stage 09/15/2021     Ptosis of both eyelids 09/15/2021     Benign prostatic hyperplasia without lower urinary tract symptoms 09/15/2021     Essential hypertension 09/15/2021     Other age-related cataract 09/15/2021       LOS (days): 11  Geometric Mean LOS (GMLOS) (days): 4.5  Days to GMLOS:-6.4     OBJECTIVE:  Risk of Unplanned Readmission Score: 39.1      Current admission status: Inpatient   Preferred Pharmacy:   [id number]  [place] - [address]  [address] [address]  Phone: [phone] Fax: [phone]    Primary Care  Provider: Giovanni Reyez DO    Primary Insurance: [facility] REP  Secondary Insurance:     DISCHARGE DETAILS:  Received message from [place] the daily room and board rate is $360/day.  TC to pt CHAD Florian who is in agreement with the cost.  [facility].  Paty made aware of same via [facility].  Awaiting word from Paty as to  when can set up transport to the facility.

## 2024-07-19 NOTE — PROGRESS NOTES
[facility]  Progress Note  Name: Israel Hughes I  MRN: [id number]  Unit/Bed#: -01 I Date of Admission: 7/8/2024   Date of Service: 7/19/2024 I Hospital Day: 11    Assessment & Plan   * Gait dysfunction  Assessment & Plan  Patient presents with ambulatory dysfunction and fatigue  Family state patient is unable to care for himself and may need placement to skilled nursing facility  Likely progression of Alzheimer's dementia  Patient and family agreeable to placement by case management    PT/OT evaluation and treatment  Case management consultation for safe disposition planning  Fall precautions  Patient is medically stable for discharge to short-term rehab    Moderate protein-calorie malnutrition (HCC)  Assessment & Plan  Malnutrition Findings:   Adult Malnutrition type: Chronic illness  Adult Degree of Malnutrition: Other severe protein calorie malnutrition  Malnutrition Characteristics: Weight loss, Fat loss, Muscle loss                  360 Statement: Malnutrition related to chronic illness as evidenced by severe orbital adipose loss, moderate clavicle muscle loss, >10% body weight loss in 6 months, >7.5% body weight loss in 3 months.  To treat with oral diet and nutrition supplement as tolerated.    BMI Findings:           Body mass index is 18.86 kg/m².   Present on admission as evidenced by BMI of 20    Encourage lifestyle modification and weight gain    Type 2 diabetes mellitus with retinopathy, without long-term current use of insulin (HCC)  Assessment & Plan  Lab Results   Component Value Date    HGBA1C 8.2 (H) 05/17/2024       Recent Labs     07/18/24  1606 07/18/24  2119 07/19/24  0722 07/19/24  1100   POCGLU 421* 182* 81 357*         Blood Sugar Average: Last 72 hrs:  (P) 221.4002980891386100  Poorly controlled with hemoglobin A1c of 8.2%    Lantus 15 units daily at bedtime  Humalog 12 units with lunch and 15 units with dinner  Sliding scale insulin with  Accu-Cheks  Diabetic diet  Hold home SGLT2 inhibitor           VTE Pharmacologic Prophylaxis: VTE Score: 3 Moderate Risk (Score 3-4) - Pharmacological DVT Prophylaxis Ordered: heparin.    Mobility:   Basic Mobility Inpatient Raw Score: 9  JH-HLM Goal: 3: Sit at edge of bed  JH-HLM Achieved: 2: Bed activities/Dependent transfer  JH-HLM Goal achieved. Continue to encourage appropriate mobility.    Patient Centered Rounds: I performed bedside rounds with nursing staff today.   Discussions with Specialists or Other Care Team Provider: yes    Education and Discussions with Family / Patient: Updated  (sister) via phone.    Current Length of Stay: 11 day(s)  Current Patient Status: Inpatient   Certification Statement: The patient will continue to require additional inpatient hospital stay due to pending placement   Discharge Plan: Anticipate discharge tomorrow to rehab facility.    Code Status: Level 3 - DNAR and DNI    Subjective:   No overnight events    Objective:     Vitals:   Temp (24hrs), Av.1 °F (36.2 °C), Min:96.7 °F (35.9 °C), Max:97.5 °F (36.4 °C)    Temp:  [96.7 °F (35.9 °C)-97.5 °F (36.4 °C)] 97.5 °F (36.4 °C)  HR:  [65-77] 65  Resp:  [18] 18  BP: (101-114)/(67-71) 114/71  SpO2:  [93 %-94 %] 94 %  Body mass index is 18.86 kg/m².     Input and Output Summary (last 24 hours):     Intake/Output Summary (Last 24 hours) at 2024 1539  Last data filed at 2024 0900  Gross per 24 hour   Intake 800 ml   Output 1000 ml   Net -200 ml       Physical Exam:   Physical Exam  Constitutional:       General: He is not in acute distress.  HENT:      Head: Normocephalic and atraumatic.      Nose: Nose normal.      Mouth/Throat:      Mouth: Mucous membranes are moist.   Eyes:      Extraocular Movements: Extraocular movements intact.      Conjunctiva/sclera: Conjunctivae normal.   Cardiovascular:      Rate and Rhythm: Normal rate and regular rhythm.   Pulmonary:      Effort: Pulmonary effort is normal. No  respiratory distress.   Abdominal:      Palpations: Abdomen is soft.      Tenderness: There is no abdominal tenderness.   Musculoskeletal:         General: Normal range of motion.      Cervical back: Normal range of motion and neck supple.      Comments: Generalized weakness   Skin:     General: Skin is warm and dry.   Neurological:      General: No focal deficit present.      Mental Status: He is alert. Mental status is at baseline.      Cranial Nerves: No cranial nerve deficit.   Psychiatric:         Mood and Affect: Mood normal.         Behavior: Behavior normal.          Additional Data:     Labs:  Results from last 7 days   Lab Units 07/19/24  0520 07/15/24  0431   WBC Thousand/uL 8.21 10.06   HEMOGLOBIN g/dL 12.8 13.1   HEMATOCRIT % 39.8 41.3   PLATELETS Thousands/uL 223 248   SEGS PCT %  --  63   LYMPHO PCT %  --  26   MONO PCT %  --  7   EOS PCT %  --  1     Results from last 7 days   Lab Units 07/19/24  0520   SODIUM mmol/L 140   POTASSIUM mmol/L 4.4   CHLORIDE mmol/L 101   CO2 mmol/L 33*   BUN mg/dL 31*   CREATININE mg/dL 1.01   ANION GAP mmol/L 6   CALCIUM mg/dL 9.5   GLUCOSE RANDOM mg/dL 89         Results from last 7 days   Lab Units 07/19/24  1100 07/19/24  0722 07/18/24  2119 07/18/24  1606 07/18/24  1052 07/18/24  0658 07/17/24  2054 07/17/24  1617 07/17/24  1103 07/17/24  0707 07/16/24  2110 07/16/24  1621   POC GLUCOSE mg/dl 357* 81 182* 421* 355* 64* 160* 154* 369* 87 395* 105               Lines/Drains:  Invasive Devices       Drain  Duration             External Urinary Catheter Medium 2 days                          Imaging: No pertinent imaging reviewed.    Recent Cultures (last 7 days):         Last 24 Hours Medication List:   Current Facility-Administered Medications   Medication Dose Route Frequency Provider Last Rate    acetaminophen  650 mg Oral Q4H PRN Steven C Yorty, DO      aspirin  81 mg Oral Daily Steven C Yorty, DO      atorvastatin  20 mg Oral Daily Steven C Yorty, DO       donepezil  10 mg Oral HS Steven C Yorty, DO      FLUoxetine  20 mg Oral QAM Steven C Yorty, DO      furosemide  40 mg Oral Daily Steven C Yorty, DO      heparin (porcine)  5,000 Units Subcutaneous Q8H ZEINAB Steven C Yorty, DO      insulin glargine  15 Units Subcutaneous HS Steven C Yorty, DO      insulin lispro  1-5 Units Subcutaneous TID AC Steven C Yorty, DO      insulin lispro  1-5 Units Subcutaneous HS Steven C Yorty, DO      insulin lispro  12 Units Subcutaneous Daily With Lunch Celestino Coelho MD      insulin lispro  15 Units Subcutaneous Daily With Dinner Celestino Coelho MD      memantine  10 mg Oral BID Steven C Yorty, DO      ondansetron  4 mg Intravenous Q6H PRN Steven C Yorty, DO      polyethylene glycol  17 g Oral Daily Steven C Yorty, DO      QUEtiapine  25 mg Oral BID before breakfast/lunch Steven C Yorty, DO      QUEtiapine  50 mg Oral HS Steven C Yorty, DO      senna-docusate sodium  1 tablet Oral HS Steven C Yorty, DO      tamsulosin  0.4 mg Oral Daily Steven C Dylanrty, DO          Today, Patient Was Seen By: Celestino Coelho MD    **Please Note: This note may have been constructed using a voice recognition system.**

## 2024-07-19 NOTE — ASSESSMENT & PLAN NOTE
Lab Results   Component Value Date    HGBA1C 8.2 (H) 05/17/2024       Recent Labs     07/18/24  1606 07/18/24  2119 07/19/24  0722 07/19/24  1100   POCGLU 421* 182* 81 357*         Blood Sugar Average: Last 72 hrs:  (P) 221.1117525209462554  Poorly controlled with hemoglobin A1c of 8.2%    Lantus 15 units daily at bedtime  Humalog 12 units with lunch and 15 units with dinner  Sliding scale insulin with Accu-Cheks  Diabetic diet  Hold home SGLT2 inhibitor   90

## 2024-07-19 NOTE — PLAN OF CARE
Problem: Potential for Falls  Goal: Patient will remain free of falls  Description: INTERVENTIONS:  - Educate patient/family on patient safety including physical limitations  - Instruct patient to call for assistance with activity   - Consult OT/PT to assist with strengthening/mobility   - Keep Call bell within reach  - Keep bed low and locked with side rails adjusted as appropriate  - Keep care items and personal belongings within reach  - Initiate and maintain comfort rounds  - Make Fall Risk Sign visible to staff  - Offer Toileting every 2 Hours, in advance of need  - Initiate/Maintain bed/chair alarms  - Obtain necessary fall risk management equipment: non-slip socks, walker, bed/chair alarms  - Apply yellow socks and bracelet for high fall risk patients  - Consider moving patient to room near nurses station  Outcome: Progressing     Problem: SAFETY ADULT  Goal: Patient will remain free of falls  Description: INTERVENTIONS:  - Educate patient/family on patient safety including physical limitations  - Instruct patient to call for assistance with activity   - Consult OT/PT to assist with strengthening/mobility   - Keep Call bell within reach  - Keep bed low and locked with side rails adjusted as appropriate  - Keep care items and personal belongings within reach  - Initiate and maintain comfort rounds  - Make Fall Risk Sign visible to staff  - Offer Toileting every 2 Hours, in advance of need  - Initiate/Maintain bed/chair alarms  - Obtain necessary fall risk management equipment: non-slip socks, walker, bed/chair alarms  - Apply yellow socks and bracelet for high fall risk patients  - Consider moving patient to room near nurses station  Outcome: Progressing  Goal: Maintain or return to baseline ADL function  Description: INTERVENTIONS:  -  Assess patient's ability to carry out ADLs; assess patient's baseline for ADL function and identify physical deficits which impact ability to perform ADLs (bathing, care of  mouth/teeth, toileting, grooming, dressing, etc.)  - Assess/evaluate cause of self-care deficits   - Assess range of motion  - Assess patient's mobility; develop plan if impaired  - Assess patient's need for assistive devices and provide as appropriate  - Encourage maximum independence but intervene and supervise when necessary  - Involve family in performance of ADLs  - Assess for home care needs following discharge   - Consider OT consult to assist with ADL evaluation and planning for discharge  - Provide patient education as appropriate  Outcome: Progressing  Goal: Maintains/Returns to pre admission functional level  Description: INTERVENTIONS:  - Perform AM-PAC 6 Click Basic Mobility/ Daily Activity assessment daily.  - Set and communicate daily mobility goal to care team and patient/family/caregiver.   - Collaborate with rehabilitation services on mobility goals if consulted  - Reposition patient every 2 hours.  - Dangle patient 3 times a day  - Out of bed for toileting  - Record patient progress and toleration of activity level   Outcome: Progressing

## 2024-07-19 NOTE — PLAN OF CARE
Problem: Potential for Falls  Goal: Patient will remain free of falls  Description: INTERVENTIONS:  - Educate patient/family on patient safety including physical limitations  - Instruct patient to call for assistance with activity   - Consult OT/PT to assist with strengthening/mobility   - Keep Call bell within reach  - Keep bed low and locked with side rails adjusted as appropriate  - Keep care items and personal belongings within reach  - Initiate and maintain comfort rounds  - Make Fall Risk Sign visible to staff  - Offer Toileting every 2 Hours, in advance of need  - Initiate/Maintain bed/chair alarms  - Obtain necessary fall risk management equipment: non-slip socks, walker, bed/chair alarms  - Apply yellow socks and bracelet for high fall risk patients  - Consider moving patient to room near nurses station  Outcome: Progressing     Problem: PAIN - ADULT  Goal: Verbalizes/displays adequate comfort level or baseline comfort level  Description: Interventions:  - Encourage patient to monitor pain and request assistance  - Assess pain using appropriate pain scale  - Administer analgesics based on type and severity of pain and evaluate response  - Implement non-pharmacological measures as appropriate and evaluate response  - Consider cultural and social influences on pain and pain management  - Notify physician/advanced practitioner if interventions unsuccessful or patient reports new pain  Outcome: Progressing     Problem: INFECTION - ADULT  Goal: Absence or prevention of progression during hospitalization  Description: INTERVENTIONS:  - Assess and monitor for signs and symptoms of infection  - Monitor lab/diagnostic results  - Monitor all insertion sites, i.e. indwelling lines, tubes, and drains  - Monitor endotracheal if appropriate and nasal secretions for changes in amount and color  - [place] appropriate cooling/warming therapies per order  - Administer medications as ordered  - Instruct and encourage  patient and family to use good hand hygiene technique  - Identify and instruct in appropriate isolation precautions for identified infection/condition  Outcome: Progressing

## 2024-07-20 ENCOUNTER — TELEPHONE (OUTPATIENT)
Dept: OTHER | Facility: OTHER | Age: 84
End: 2024-07-20

## 2024-07-20 VITALS
HEART RATE: 84 BPM | RESPIRATION RATE: 16 BRPM | DIASTOLIC BLOOD PRESSURE: 66 MMHG | WEIGHT: 102.95 LBS | OXYGEN SATURATION: 95 % | BODY MASS INDEX: 17.15 KG/M2 | TEMPERATURE: 98.2 F | SYSTOLIC BLOOD PRESSURE: 108 MMHG | HEIGHT: 65 IN

## 2024-07-20 LAB
GLUCOSE SERPL-MCNC: 77 MG/DL (ref 65–140)
SARS-COV-2 AG UPPER RESP QL IA: NEGATIVE
SARS-COV-2 AG UPPER RESP QL IA: NORMAL

## 2024-07-20 PROCEDURE — 82948 REAGENT STRIP/BLOOD GLUCOSE: CPT

## 2024-07-20 PROCEDURE — 99239 HOSP IP/OBS DSCHRG MGMT >30: CPT | Performed by: INTERNAL MEDICINE

## 2024-07-20 RX ORDER — AMOXICILLIN 250 MG
1 CAPSULE ORAL
Start: 2024-07-20

## 2024-07-20 RX ORDER — INSULIN LISPRO 100 [IU]/ML
12 INJECTION, SOLUTION INTRAVENOUS; SUBCUTANEOUS
Start: 2024-07-20

## 2024-07-20 RX ORDER — INSULIN LISPRO 100 [IU]/ML
15 INJECTION, SOLUTION INTRAVENOUS; SUBCUTANEOUS
Start: 2024-07-20

## 2024-07-20 RX ORDER — POLYETHYLENE GLYCOL 3350 17 G/17G
17 POWDER, FOR SOLUTION ORAL DAILY PRN
Start: 2024-07-20

## 2024-07-20 RX ADMIN — ASPIRIN 81 MG: 81 TABLET, COATED ORAL at 08:00

## 2024-07-20 RX ADMIN — MEMANTINE 10 MG: 5 TABLET ORAL at 08:00

## 2024-07-20 RX ADMIN — QUETIAPINE FUMARATE 25 MG: 25 TABLET ORAL at 07:59

## 2024-07-20 RX ADMIN — FLUOXETINE HYDROCHLORIDE 20 MG: 20 CAPSULE ORAL at 08:00

## 2024-07-20 RX ADMIN — HEPARIN SODIUM 5000 UNITS: 5000 INJECTION INTRAVENOUS; SUBCUTANEOUS at 05:12

## 2024-07-20 RX ADMIN — ATORVASTATIN CALCIUM 20 MG: 20 TABLET, FILM COATED ORAL at 08:00

## 2024-07-20 RX ADMIN — TAMSULOSIN HYDROCHLORIDE 0.4 MG: 0.4 CAPSULE ORAL at 08:00

## 2024-07-20 NOTE — DISCHARGE SUMMARY
[facility]  Discharge- Israel Hughes 1940, 84 y.o. male MRN: [id number]  Unit/Bed#: [facility] Encounter: [id number]  Primary Care Provider: Giovanni Reyez DO   Date and time admitted to hospital: 7/8/2024  3:48 PM    * Gait dysfunction  Assessment & Plan  Patient presents with ambulatory dysfunction and fatigue  Family state patient is unable to care for himself and may need placement to skilled nursing facility  Likely progression of Alzheimer's dementia  Patient and family agreeable to placement by case management    PT/OT evaluation and treatment  Case management consultation for safe disposition planning  Fall precautions  Patient is medically stable for discharge to short-term rehab    Severe protein-calorie malnutrition (HCC)  Assessment & Plan  Malnutrition Findings:   Adult Malnutrition type: Chronic illness  Adult Degree of Malnutrition: Other severe protein calorie malnutrition  Malnutrition Characteristics: Weight loss, Fat loss, Muscle loss                  360 Statement: Malnutrition related to chronic illness as evidenced by severe orbital adipose loss, moderate clavicle muscle loss, >10% body weight loss in 6 months, >7.5% body weight loss in 3 months.  To treat with oral diet and nutrition supplement as tolerated.    BMI Findings:           Body mass index is 17.13 kg/m².   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)  Assessment & Plan  Likely worsening in the setting of fatigue, ambulatory dysfunction, difficulty with ADLs    Continue donepezil, memantine, fluoxetine  Monitor mentation  Outpatient follow-up with Neurology    Chronic diastolic congestive heart failure (HCC)  Assessment & Plan  Euvolemic and well compensated  Weight at baseline    Continue home loop diuretic  Monitor volume status    Benign prostatic hyperplasia without lower urinary tract symptoms  Assessment &  Plan  Asymptomatic and chronic in nature    Continue home Flomax    Type 2 diabetes mellitus with retinopathy, without long-term current use of insulin ([facility])  Assessment & Plan  Lab Results   Component Value Date    HGBA1C 8.2 (H) 05/17/2024       Recent Labs     07/19/24  1614 07/19/24  1758 07/19/24  2036 07/20/24  0726   POCGLU 100 274* 299* 77         Blood Sugar Average: Last 72 hrs:  (P) [id number]  Poorly controlled with hemoglobin A1c of 8.2%    Continue metformin and Lantus at bedtime  Humalog added with lunch and dinner      Medical Problems       Resolved Problems  Date Reviewed: 7/9/2024   None       Discharging Physician / Practitioner: Celestino Coelho MD  PCP: Giovanni Reyez DO  Admission Date:   Admission Orders (From admission, onward)       Ordered        07/08/24 1732  INPATIENT ADMISSION  Once                          Discharge Date: 07/20/24    Consultations During Hospital Stay:  None    Procedures Performed:   None    Significant Findings / Test Results:   Gait dysfunction    Incidental Findings:   None    Test Results Pending at Discharge (will require follow up):   None     Outpatient Tests Requested:  Routine labs with PCP as outpatient    Complications: None    Reason for Admission: Gait dysfunction    Hospital Course:   Israel Hughes is a 84 y.o. male patient who originally presented to the hospital on 7/8/2024 due to altered mental status.  CT head was negative for any acute findings.  Patient was admitted and PT/OT eval was obtained.  Mental status returned to baseline.  Patient does have baseline dementia.  PT/OT recommended rehab.  Case management was working on placement.  Patient now has an accepting facility and will be transition there for the next few days until he goes to a [facility] to [place] where his family prefers him to go.  Patient has been medically stable.  Glucose levels have been uncontrolled.  Insulin regimen has been adjusted.   "Patient started on Premeal coverage with lunch and dinner which has helped with glucose levels.    Please see above list of diagnoses and related plan for additional information.     Condition at Discharge: stable    Discharge Day Visit / Exam:   Subjective: No complaints at this time  Vitals: Blood Pressure: 108/66 (07/20/24 0725)  Pulse: 84 (07/20/24 0725)  Temperature: 98.2 °F (36.8 °C) (07/20/24 0725)  Temp Source: Axillary (07/19/24 1615)  Respirations: 16 (07/20/24 0725)  Height: 5' 5\" (165.1 cm) (07/08/24 1809)  Weight - Scale: 46.7 kg (102 lb 15.3 oz) (07/20/24 0600)  SpO2: 95 % (07/20/24 0725)  Exam:   Physical Exam  Constitutional:       General: He is not in acute distress.  HENT:      Head: Normocephalic and atraumatic.      Nose: Nose normal.      Mouth/Throat:      Mouth: Mucous membranes are moist.   Eyes:      Extraocular Movements: Extraocular movements intact.      Conjunctiva/sclera: Conjunctivae normal.   Cardiovascular:      Rate and Rhythm: Normal rate and regular rhythm.   Pulmonary:      Effort: Pulmonary effort is normal. No respiratory distress.   Abdominal:      Palpations: Abdomen is soft.      Tenderness: There is no abdominal tenderness.   Musculoskeletal:         General: Normal range of motion.      Cervical back: Normal range of motion and neck supple.      Comments: Generalized weakness   Skin:     General: Skin is warm and dry.   Neurological:      General: No focal deficit present.      Mental Status: He is alert. Mental status is at baseline.      Cranial Nerves: No cranial nerve deficit.   Psychiatric:         Mood and Affect: Mood normal.         Behavior: Behavior normal.          Discussion with Family:  Spoke with patient's family yesterday regarding discharge plan.     Discharge instructions/Information to patient and family:   See after visit summary for information provided to patient and family.      Provisions for Follow-Up Care:  See after visit summary for information " related to follow-up care and any pertinent home health orders.      Mobility at time of Discharge:   Basic Mobility Inpatient Raw Score: 9  JH-HLM Goal: 3: Sit at edge of bed  JH-HLM Achieved: 2: Bed activities/Dependent transfer  HLM Goal achieved. Continue to encourage appropriate mobility.     Disposition:   Other Skilled Nursing Facility at rehab    Planned Readmission: no     Discharge Statement:  I spent 35 minutes discharging the patient. This time was spent on the day of discharge. I had direct contact with the patient on the day of discharge. Greater than 50% of the total time was spent examining patient, answering all patient questions, arranging and discussing plan of care with patient as well as directly providing post-discharge instructions.  Additional time then spent on discharge activities.    Discharge Medications:  See after visit summary for reconciled discharge medications provided to patient and/or family.      **Please Note: This note may have been constructed using a voice recognition system**

## 2024-07-20 NOTE — ASSESSMENT & PLAN NOTE
Malnutrition Findings:   Adult Malnutrition type: Chronic illness  Adult Degree of Malnutrition: Other severe protein calorie malnutrition  Malnutrition Characteristics: Weight loss, Fat loss, Muscle loss                  360 Statement: Malnutrition related to chronic illness as evidenced by severe orbital adipose loss, moderate clavicle muscle loss, >10% body weight loss in 6 months, >7.5% body weight loss in 3 months.  To treat with oral diet and nutrition supplement as tolerated.    BMI Findings:           Body mass index is 17.13 kg/m².   Secondary to chronic disease  As evidenced by severe orbital adipose loss

## 2024-07-20 NOTE — PLAN OF CARE
Problem: Potential for Falls  Goal: Patient will remain free of falls  Description: INTERVENTIONS:  - Educate patient/family on patient safety including physical limitations  - Instruct patient to call for assistance with activity   - Consult OT/PT to assist with strengthening/mobility   - Keep Call bell within reach  - Keep bed low and locked with side rails adjusted as appropriate  - Keep care items and personal belongings within reach  - Initiate and maintain comfort rounds  - Make Fall Risk Sign visible to staff  - Offer Toileting every 2 Hours, in advance of need  - Initiate/Maintain bed/chair alarms  - Obtain necessary fall risk management equipment: non-slip socks, walker, bed/chair alarms  - Apply yellow socks and bracelet for high fall risk patients  - Consider moving patient to room near nurses station  Outcome: Progressing     Problem: PAIN - ADULT  Goal: Verbalizes/displays adequate comfort level or baseline comfort level  Description: Interventions:  - Encourage patient to monitor pain and request assistance  - Assess pain using appropriate pain scale  - Administer analgesics based on type and severity of pain and evaluate response  - Implement non-pharmacological measures as appropriate and evaluate response  - Consider cultural and social influences on pain and pain management  - Notify physician/advanced practitioner if interventions unsuccessful or patient reports new pain  Outcome: Progressing     Problem: INFECTION - ADULT  Goal: Absence or prevention of progression during hospitalization  Description: INTERVENTIONS:  - Assess and monitor for signs and symptoms of infection  - Monitor lab/diagnostic results  - Monitor all insertion sites, i.e. indwelling lines, tubes, and drains  - Monitor endotracheal if appropriate and nasal secretions for changes in amount and color  - [place] appropriate cooling/warming therapies per order  - Administer medications as ordered  - Instruct and encourage  patient and family to use good hand hygiene technique  - Identify and instruct in appropriate isolation precautions for identified infection/condition  Outcome: Progressing

## 2024-07-20 NOTE — TELEPHONE ENCOUNTER
"Kassandra from [facility] stated,\" I am calling to confirm orders on a new admit.\"    Paged on call VIA EPIC   "

## 2024-07-20 NOTE — DISCHARGE INSTR - AVS FIRST PAGE
Please monitor glucose levels and dose insulin accordingly.  Premeal coverage was added with lunch and dinner due to hyperglycemia.

## 2024-07-20 NOTE — ASSESSMENT & PLAN NOTE
Lab Results   Component Value Date    HGBA1C 8.2 (H) 05/17/2024       Recent Labs     07/19/24  1614 07/19/24  1758 07/19/24 2036 07/20/24  0726   POCGLU 100 274* 299* 77         Blood Sugar Average: Last 72 hrs:  (P) [id number]  Poorly controlled with hemoglobin A1c of 8.2%    Continue metformin and Lantus at bedtime  Humalog added with lunch and dinner

## 2024-07-21 ENCOUNTER — TELEPHONE (OUTPATIENT)
Dept: OTHER | Facility: OTHER | Age: 84
End: 2024-07-21

## 2024-07-22 ENCOUNTER — NURSING HOME VISIT (OUTPATIENT)
Dept: GERIATRICS | Facility: OTHER | Age: 84
End: 2024-07-22
Payer: COMMERCIAL

## 2024-07-22 VITALS
TEMPERATURE: 97.5 F | OXYGEN SATURATION: 96 % | SYSTOLIC BLOOD PRESSURE: 106 MMHG | WEIGHT: 115.5 LBS | BODY MASS INDEX: 19.22 KG/M2 | DIASTOLIC BLOOD PRESSURE: 70 MMHG | RESPIRATION RATE: 20 BRPM | HEART RATE: 70 BPM

## 2024-07-22 DIAGNOSIS — R26.9 NEUROLOGIC GAIT DYSFUNCTION: ICD-10-CM

## 2024-07-22 DIAGNOSIS — N40.0 BENIGN PROSTATIC HYPERPLASIA WITHOUT LOWER URINARY TRACT SYMPTOMS: ICD-10-CM

## 2024-07-22 DIAGNOSIS — N18.30 STAGE 3 CHRONIC KIDNEY DISEASE, UNSPECIFIED WHETHER STAGE 3A OR 3B CKD (HCC): ICD-10-CM

## 2024-07-22 DIAGNOSIS — R13.10 DYSPHAGIA, UNSPECIFIED TYPE: ICD-10-CM

## 2024-07-22 DIAGNOSIS — Z79.4 TYPE 2 DIABETES MELLITUS WITH MILD NONPROLIFERATIVE RETINOPATHY OF BOTH EYES, WITH LONG-TERM CURRENT USE OF INSULIN, MACULAR EDEMA PRESENCE UNSPECIFIED (HCC): ICD-10-CM

## 2024-07-22 DIAGNOSIS — G30.1 MILD LATE ONSET ALZHEIMER'S DEMENTIA WITH AGITATION (HCC): Primary | ICD-10-CM

## 2024-07-22 DIAGNOSIS — I50.32 CHRONIC DIASTOLIC CONGESTIVE HEART FAILURE (HCC): ICD-10-CM

## 2024-07-22 DIAGNOSIS — F02.A11 MILD LATE ONSET ALZHEIMER'S DEMENTIA WITH AGITATION (HCC): Primary | ICD-10-CM

## 2024-07-22 DIAGNOSIS — E11.3293 TYPE 2 DIABETES MELLITUS WITH MILD NONPROLIFERATIVE RETINOPATHY OF BOTH EYES, WITH LONG-TERM CURRENT USE OF INSULIN, MACULAR EDEMA PRESENCE UNSPECIFIED (HCC): ICD-10-CM

## 2024-07-22 PROCEDURE — 99310 SBSQ NF CARE HIGH MDM 45: CPT

## 2024-07-22 NOTE — CASE MANAGEMENT
Auth approved following appeal process.     [facility] has received APPROVED authorization.  Insurance: [employer]      Called in by Rep: Angelita STARR#: [phone]  Authorization received for: SNF  Facility: [facility]     Authorization #: [id number]     Start of Care: 07/11  Next Review Date: 07/25  Continued Stay Care Coordinator: Filippo STARR#: [phone]  Submit next review to F#: [phone]    Care Manager notified: Elizabeth Patel        Please reach out to CM for updates on any clinical information.

## 2024-07-22 NOTE — ASSESSMENT & PLAN NOTE
Lab Results   Component Value Date    HGBA1C 8.2 (H) 05/17/2024     Would recommend HgA1c < 7.5% for geriatric patient  Avoid over correcting and hypoglycemia  Started on humalog coverage at lunch and dinner while hospitalized  Continue to monitor blood sugar checks AC&HS  Adjust insulin regimen as needed to avoid hypoglycemia  Continue metformin, januvia, and jardiance   Would recommend application of continuous monitoring device such as freestyle barrie

## 2024-07-22 NOTE — ASSESSMENT & PLAN NOTE
Possibly related to poor dentition   Continue mechanical soft texture, thin consistency  Follow up with speech therapy as needed  Aspiration precautions in place

## 2024-07-22 NOTE — ASSESSMENT & PLAN NOTE
Family with increased difficulty with caring for at home  Looking [facility] with [facility]  Alert and oriented x 2 on exam  Pleasant and cooperative throughout exam  Requires 24/7 supportive care  Fall precautions  Continue namenda 10 mg po BID  Continue donepezil 10 mg po daily HS  Would question if medications are continuing to be effective or should be weaned off?  Follow up with neurology

## 2024-07-22 NOTE — ASSESSMENT & PLAN NOTE
Lab Results   Component Value Date    EGFR 68 07/19/2024    EGFR 78 07/15/2024    EGFR 72 07/13/2024    CREATININE 1.01 07/19/2024    CREATININE 0.90 07/15/2024    CREATININE 0.96 07/13/2024     Baseline creatinine noted in May to be between 1-1.2  Currently at baseline  Continue to monitor labs periodically  Follow up with nephrology as needed

## 2024-07-22 NOTE — ASSESSMENT & PLAN NOTE
Wt Readings from Last 3 Encounters:   07/22/24 52.4 kg (115 lb 8 oz)   07/20/24 46.7 kg (102 lb 15.3 oz)   05/01/24 51.2 kg (112 lb 14 oz)     Euvolemic on exam  Denies SOB  Negative for lower extremity edema  Continue furosemide 40 mg po daily  Follow up with cardiology

## 2024-07-22 NOTE — PROGRESS NOTES
[facility]  [address] [place] [address] Rd, [address], [place] [address]  [phone]    NAME: Israel Hughes  AGE: 84 y.o. SEX: male    Progress Note    Location: [facility]  POS: 32 (LTC)  Code Status: DNR/DNI    Chief complaint / Reason for visit:  Follow-up visit    Assessment/Plan:    Chronic diastolic congestive heart failure (HCC)  Wt Readings from Last 3 Encounters:   07/22/24 52.4 kg (115 lb 8 oz)   07/20/24 46.7 kg (102 lb 15.3 oz)   05/01/24 51.2 kg (112 lb 14 oz)     Euvolemic on exam  Denies SOB  Negative for lower extremity edema  Continue furosemide 40 mg po daily  Follow up with cardiology          Dysphagia  Possibly related to poor dentition   Continue mechanical soft texture, thin consistency  Follow up with speech therapy as needed  Aspiration precautions in place    Type 2 diabetes mellitus with retinopathy, with long-term current use of insulin ([facility])    Lab Results   Component Value Date    HGBA1C 8.2 (H) 05/17/2024     Would recommend HgA1c < 7.5% for geriatric patient  Avoid over correcting and hypoglycemia  Started on humalog coverage at lunch and dinner while hospitalized  Continue to monitor blood sugar checks AC&HS  Adjust insulin regimen as needed to avoid hypoglycemia  Continue metformin, januvia, and jardiance   Would recommend application of continuous monitoring device such as freestyle barrie    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  Follow up with neurology     Stage 3 chronic kidney disease, unspecified whether stage 3a or 3b CKD ([facility])  Lab Results   Component Value Date     EGFR 68 07/19/2024    EGFR 78 07/15/2024    EGFR 72 07/13/2024    CREATININE 1.01 07/19/2024    CREATININE 0.90 07/15/2024    CREATININE 0.96 07/13/2024     Baseline creatinine noted in May to be between 1-1.2  Currently at baseline  Continue to monitor labs periodically  Follow up with nephrology as needed    Benign prostatic hyperplasia without lower urinary tract symptoms  Continue flomax 0.4 mg po daily  Monitor for signs of urinary retention    Gait dysfunction  Fall precautions  PTA resident states he was using a cane  Continue PT/OT      This is an 84 y.o. male seen today at [facility].  Medical history includes, not limited to, type 2 DM, BPH, CHF, Alzheimer's Disease, abnormalities of gait and mobility, and moderate protein-calorie malnutrition.      Resident with recent hospitalization at [facility] from 07/08-07/20.  He presented to the hospital on 07/08 due to altered mental status.  While hospitalized resident was evaluated by PT/OT with recommendations for STR.  Family also looking for [facility].      Today he is evaluated while OOB in his wheelchair.  He is out of his room in the dining room.  On exam today he is alert and oriented x 2.  Unable to recall day, year.  He states prior to his hospitalization he was living with his sister-in-law, who is also his POA.  He is able to follow cues, answer questions to the best of his ability, is calm and cooperative on exam.  He currently denies pain.  States that he has been eating well.  Denies N/V.  Prior to hospitalization he states that his baseline ambulation was with a cane.  Wear bifocal eyeglasses, no dentures, no hearing aides.      [facility].    Reviewed resident with nursing staff and .  Reviewed current medications and orders.  Will work with  to have a safe discharge to [facility] facility.           Nursing and prior provider notes reviewed  on this visit. Discussed visit with PCP and nursing staff/ supervisor.      Review of Systems   Constitutional:  Negative for activity change, appetite change, fatigue and fever.   HENT:  Positive for dental problem (missing teeth). Negative for congestion and rhinorrhea.    Eyes:  Negative for pain and visual disturbance.        Bifocal eyeglasses   Respiratory:  Negative for cough and shortness of breath.    Cardiovascular:  Negative for chest pain and leg swelling.   Gastrointestinal:  Negative for abdominal pain and constipation.   Genitourinary:  Negative for difficulty urinating and dysuria.   Musculoskeletal:  Negative for arthralgias.   Skin:  Negative for color change and rash.   Neurological:  Negative for dizziness, syncope and weakness.   Psychiatric/Behavioral:  Negative for behavioral problems and confusion.    All other systems reviewed and are negative.         ALLERGY: Reviewed, unchanged  Allergies   Allergen Reactions    Bactrim [Sulfamethoxazole-Trimethoprim] GI Intolerance    Simvastatin Myalgia and Other (See Comments)       HISTORY:  Medical Hx: Reviewed, unchanged  Family Hx: Reviewed, unchanged  Soc Hx: Reviewed,  unchanged      Physical Exam  Vitals reviewed.   Constitutional:       General: He is not in acute distress.     Appearance: Normal appearance. He is not ill-appearing.   HENT:      Head: Normocephalic.      Right Ear: Tympanic membrane normal.      Left Ear: Tympanic membrane normal.      Nose: Nose normal. No congestion.      Mouth/Throat:      Mouth: Mucous membranes are moist.      Pharynx: Oropharynx is clear.   Eyes:      General:         Right eye: No discharge.         Left eye: No discharge.      Extraocular Movements: Extraocular movements intact.      Conjunctiva/sclera: Conjunctivae normal.      Pupils: Pupils are equal, round, and reactive to light.   Cardiovascular:      Rate and Rhythm: Normal rate and regular rhythm.      Pulses: Normal pulses.      Heart sounds:  "Normal heart sounds.   Pulmonary:      Effort: Pulmonary effort is normal. No respiratory distress.      Breath sounds: Normal breath sounds.   Chest:      Chest wall: No tenderness.   Abdominal:      General: Bowel sounds are normal.      Palpations: Abdomen is soft.      Tenderness: There is no abdominal tenderness.   Musculoskeletal:         General: No swelling. Normal range of motion.      Cervical back: Normal range of motion.      Right lower leg: No edema.      Left lower leg: No edema.   Skin:     General: Skin is warm and dry.   Neurological:      Mental Status: He is alert. Mental status is at baseline.      Motor: No weakness.      Comments: Oriented x 2   Psychiatric:         Mood and Affect: Mood normal.         Behavior: Behavior normal.         Thought Content: Thought content normal.            Laboratory results / Imaging reviewed: Hard copy/ies in medical chart:    Vitals:    07/22/24 1908   BP: 106/70   Pulse: 70   Resp: 20   Temp: 97.5 °F (36.4 °C)   SpO2: 96%       Current Medications:  All medications reviewed and updated in Nursing Home Chart    Please note: This note was completed in part utilizing a voice-recognition software may have been used in the preparation of this document. Grammatical errors, random word insertion, spelling mistakes, and incomplete sentences may be an occasional consequence of the system secondary to software limitations, ambient noise and hardware issues. Occasional wrong word or \"sound-alike\" substitutions may have occurred due to the inherent limitations of voice recognition software. At the time of dictation, efforts were made to edit, clarify and/or correct errors. Interpretation should be guided by context. Please read the chart carefully and recognize, using context, where substitutions have occurred. If you have any questions or concerns about the context, text or information contained within the body of this dictation, please contact myself, the provider, " for further clarification.      Jenniffer Angeles, [facility]  7/22/2024

## 2024-07-22 NOTE — CASE MANAGEMENT
Case Management Discharge Planning Note    Patient name Israel Hughes  Location /-01 MRN [id number]  : 1940 Date 2024       Current Admission Date: 2024  Current Admission Diagnosis:Gait dysfunction   Patient Active Problem List    Diagnosis Date Noted Date Diagnosed    Severe protein-calorie malnutrition (HCC) 2024     Moderate protein-calorie malnutrition (HCC) 2024     Hypomagnesemia 2024     Gait dysfunction 2024     Peripheral arterial disease (HCC) 2024     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 ([facility]) 2024     Orthostatic lightheadedness 2024     Chronic diastolic congestive heart failure ([facility]) 2024     Stage 3 chronic kidney disease, unspecified whether stage 3a or 3b CKD ([facility]) 2023     Mild protein-calorie malnutrition (HCC) 2022     Dysphagia 2022     Mild aortic stenosis 10/22/2021     Mild mitral regurgitation 10/22/2021     Diastolic dysfunction 10/22/2021     Mild concentric left ventricular hypertrophy (LVH) 10/22/2021     Type 2 diabetes mellitus with retinopathy, without long-term current use of insulin ([facility]) 09/15/2021     Myasthenia gravis without exacerbation ([facility]) 09/15/2021     Mixed hyperlipidemia 09/15/2021     Retinopathy 09/15/2021     Primary open angle glaucoma (POAG) of both eyes, mild stage 09/15/2021     Ptosis of both eyelids 09/15/2021     Benign prostatic hyperplasia without lower urinary tract symptoms 09/15/2021     Essential hypertension 09/15/2021     Other age-related cataract 09/15/2021       LOS (days): 12  Geometric Mean LOS (GMLOS) (days): 4.5  Days to GMLOS:-7.2     OBJECTIVE:  Risk of Unplanned Readmission Score: 40.68         Current admission status: Inpatient   Preferred Pharmacy:   [facility] [address] - [address]  [address] [address]  Phone: [phone] Fax: [phone]    Primary Care  Provider: Giovanni Reyez DO    Primary Insurance: [facility] REP  Secondary Insurance:     DISCHARGE DETAILS:                                                                                                               Facility Insurance Auth Number: [id number]

## 2024-07-24 ENCOUNTER — NURSING HOME VISIT (OUTPATIENT)
Dept: GERIATRICS | Facility: OTHER | Age: 84
End: 2024-07-24
Payer: COMMERCIAL

## 2024-07-24 DIAGNOSIS — E78.2 MIXED HYPERLIPIDEMIA: ICD-10-CM

## 2024-07-24 DIAGNOSIS — G70.00 MYASTHENIA GRAVIS WITHOUT EXACERBATION (HCC): ICD-10-CM

## 2024-07-24 DIAGNOSIS — R26.9 NEUROLOGIC GAIT DYSFUNCTION: ICD-10-CM

## 2024-07-24 DIAGNOSIS — H40.1131 PRIMARY OPEN ANGLE GLAUCOMA (POAG) OF BOTH EYES, MILD STAGE: ICD-10-CM

## 2024-07-24 DIAGNOSIS — N40.0 BENIGN PROSTATIC HYPERPLASIA WITHOUT LOWER URINARY TRACT SYMPTOMS: ICD-10-CM

## 2024-07-24 DIAGNOSIS — I10 ESSENTIAL HYPERTENSION: ICD-10-CM

## 2024-07-24 DIAGNOSIS — Z91.89 AT RISK FOR DELIRIUM: ICD-10-CM

## 2024-07-24 DIAGNOSIS — E11.3293 TYPE 2 DIABETES MELLITUS WITH MILD NONPROLIFERATIVE RETINOPATHY OF BOTH EYES, WITH LONG-TERM CURRENT USE OF INSULIN, MACULAR EDEMA PRESENCE UNSPECIFIED (HCC): ICD-10-CM

## 2024-07-24 DIAGNOSIS — Z79.4 TYPE 2 DIABETES MELLITUS WITH MILD NONPROLIFERATIVE RETINOPATHY OF BOTH EYES, WITH LONG-TERM CURRENT USE OF INSULIN, MACULAR EDEMA PRESENCE UNSPECIFIED (HCC): ICD-10-CM

## 2024-07-24 DIAGNOSIS — R13.10 DYSPHAGIA, UNSPECIFIED TYPE: ICD-10-CM

## 2024-07-24 DIAGNOSIS — I35.0 MILD AORTIC STENOSIS: ICD-10-CM

## 2024-07-24 DIAGNOSIS — G30.1 MILD LATE ONSET ALZHEIMER'S DEMENTIA WITH AGITATION (HCC): Primary | ICD-10-CM

## 2024-07-24 DIAGNOSIS — N18.30 STAGE 3 CHRONIC KIDNEY DISEASE, UNSPECIFIED WHETHER STAGE 3A OR 3B CKD (HCC): ICD-10-CM

## 2024-07-24 DIAGNOSIS — I50.32 CHRONIC DIASTOLIC CONGESTIVE HEART FAILURE (HCC): ICD-10-CM

## 2024-07-24 DIAGNOSIS — F02.A11 MILD LATE ONSET ALZHEIMER'S DEMENTIA WITH AGITATION (HCC): Primary | ICD-10-CM

## 2024-07-24 DIAGNOSIS — Z71.89 ADVANCE CARE PLANNING: ICD-10-CM

## 2024-07-24 DIAGNOSIS — I51.89 DIASTOLIC DYSFUNCTION: ICD-10-CM

## 2024-07-24 PROCEDURE — 99306 1ST NF CARE HIGH MDM 50: CPT | Performed by: STUDENT IN AN ORGANIZED HEALTH CARE EDUCATION/TRAINING PROGRAM

## 2024-07-24 NOTE — PROGRESS NOTES
[facility]  Facility: [facility]    HISTORY AND PHYSICAL (and also Discharge Summary)  Nursing Home Place of Service: nursing home place of service: POS 32 Unskilled- No Part A Coverage    NAME: Israel Hughes  : 1940 AGE: 84 y.o. SEX: male MRN: [id number]  DATE OF ENCOUNTER: 2024    Records Reviewed include: Hospital records    DATE OF ADMISSION: 24 DATE OF DISCHARGE:planned 24 DISCHARGE DISPOSITION: stable, [facility]    Chief Complaint/ Reason for Admission:   Dementia/increasing care needs    History of Present Illness:     Israel Hughes is a 84 y.o. male with PMH including DM2, myasthenia gravis, HLD, glaucoma/retinopathy, BPH, HTN, CHF, CKD3, dementia   Patient was hospitalized recently at [facility] from  to 24  For details of hospitalization, see hospital records including discharge documentation  Briefly, patient hospitalized due to AMS in setting of baseline dementia; workup including neuroimaging reported generally unremarkable; mentation reported improved to baseline; diabetic regimen was adjusted; transitioning to LTC and came to [facility]      I spoke with our team including CASA Abad and GUCCI Diaz regarding patient. Patient planned to be discharged to [facility] tomorrow 24 for long term care in setting of dementia and increasing care needs unable to be adequately cared for at home; he has been here at [facility] only briefly/temporarily pending this long term care placement. Therefore in addition to an admission note, please consider this note to be a discharge summary as well; patient stable for discharge to other LTC facility tomorrow.      Patient observed to self-propel in wheelchair back to his room  Patient seen and examined in room  Others present: none  Patient seated in chair  Appears comfortable, awake, alert, oriented to situation, able to  "converse appropriately  Patient polite and in good spirits, at times smiling/joking appropriately, Aox1-2 (to self, to state \"[place]\" and \"hospital\" but now to town or specific rehab location, not to month/year) and fairly forgetful of details including names of several family members. Patient reports he feels well overall with no acute complaints. Feels he is breathing fine, no orthopnea. No acute pain anywhere. Appetite good, no acute swallowing issues (though endorses some chronic mild dysphagia mainly with dry solids). Reports regular BM, no abd pain/N/V, no acute urinary symptoms. No acute cardiopulmonary, abdominal, or urinary symptoms; see ROS for more details.     No further questions or acute concerns identified.  No acute concerns per NP or nursing.    Lab Review:   7/19/24: BMP with GFR 68 (baseline 50s-60s); CBC generally stable/non-actionable    Lab Results   Component Value Date    HGBA1C 8.2 (H) 05/17/2024     Lab Results   Component Value Date    BCB7RERYDMQX 3.821 06/03/2024     Lab Results   Component Value Date    CSUHXGIK45 375 06/03/2024     No results found for: \"IRON\", \"TIBC\", \"FERRITIN\"  Lab Results   Component Value Date    CHOLESTEROL 156 06/03/2024     Lab Results   Component Value Date    HDL 64 06/03/2024     Lab Results   Component Value Date    TRIG 170 (H) 06/03/2024     No results found for: \"NONHDLC\"  Lab Results   Component Value Date    LDLCALC 58 06/03/2024           CT head without contrast  Result Date: 7/8/2024  No acute intracranial abnormality.          Encounter Date: 04/27/24   ECG 12 lead   Result Value    Ventricular Rate 63    Atrial Rate 63    ID Interval 152    QRSD Interval 84    QT Interval 390    QTC Interval 399    P Axis 44    QRS Axis 60    T Wave Axis 49    Narrative    Normal sinus rhythm  Cannot rule out prior  Inferior infarct (cited on or before 19-JAN-2024)  Nonspecific T wave changes  When compared with ECG of 25-APR-2024 19:00,  No significant " change was found  Confirmed by Dillon Hutton ([id number]) on 4/27/2024 10:24:39 PM     Echo 2021: EF 60, normal systolic function, grade 1 diastolic dysfunction  Echo 1/22/24: EF 50, mildly reduced systolic function, grade 1 diastolic dysfunction, mild AS      Social: Patient used to live in a 3 story home with granddaughter Angelita and son-in-law and with support from sister-in-law Chiqui Burns as well. In setting of progressive dementia/increasing care needs family had been helping with with housekeeping, meal prep, medication management and he was reportedly having increasing care needs. Reports lately he has generally been getting around via wheelchair and reports perhaps 3 falls in the past year. Now transitioning to long term care.    Lives: Long Term Care Facility,  Social Support: family, LTC  Fall in the past 12 months: ~3  Use of assistance Device: Walker and Wheelchair    Allergies    Allergies   Allergen Reactions    Bactrim [Sulfamethoxazole-Trimethoprim] GI Intolerance    Simvastatin Myalgia and Other (See Comments)       Past Medical History  Past Medical History:   Diagnosis Date    CHF (congestive heart failure) (HCC)     CKD (chronic kidney disease)     Diabetes mellitus (HCC)     Hyperlipidemia     Thoracic spine fracture (HCC) 05/10/2024    estimate        Past Surgical History:   Procedure Laterality Date    CATARACT EXTRACTION      COLONOSCOPY      DENTAL SURGERY      EYE SURGERY      TONSILLECTOMY      VASECTOMY         Family History  History reviewed. No pertinent family history.    Social History  Social History     Tobacco Use   Smoking Status Never    Passive exposure: Never   Smokeless Tobacco Never      Social History     Substance and Sexual Activity   Alcohol Use Not Currently      Social History     Substance and Sexual Activity   Drug Use Never            Physical Exam    Vital Signs  There were no vitals filed for this visit.  BP: 111/73 mmHg  7/23/2024 16:39   Temp:97.5 °F  7/22/2024 08:30  Pulse:73 bpm  7/24/2024 11:03 Weight:115.5 Lbs  7/21/2024 14:41   Resp:20 Breaths/min  7/22/2024 08:30 BS:242 mg/dL  7/24/2024 10:48 O2:96 %  7/22/2024 08:30 Pain:0  7/24/2024 10:41         Physical Exam  Vitals reviewed.   Constitutional:       General: He is not in acute distress.     Appearance: He is not toxic-appearing or diaphoretic.   HENT:      Head: Normocephalic and atraumatic.      Right Ear: External ear normal.      Left Ear: External ear normal.      Nose: Nose normal. No rhinorrhea.      Mouth/Throat:      Mouth: Mucous membranes are dry.      Pharynx: Oropharynx is clear. No oropharyngeal exudate or posterior oropharyngeal erythema.      Comments: Slightly dry  Eyes:      General: No scleral icterus.        Right eye: No discharge.         Left eye: No discharge.      Extraocular Movements: Extraocular movements intact.      Conjunctiva/sclera: Conjunctivae normal.      Pupils: Pupils are equal, round, and reactive to light.   Cardiovascular:      Rate and Rhythm: Normal rate and regular rhythm.   Pulmonary:      Effort: Pulmonary effort is normal. No respiratory distress.      Breath sounds: No wheezing or rales.   Abdominal:      General: Bowel sounds are normal. There is no distension.      Palpations: Abdomen is soft.      Tenderness: There is no abdominal tenderness. There is no guarding.   Musculoskeletal:         General: No swelling or tenderness.      Cervical back: No rigidity.      Comments: No notable peripheral edema   Skin:     General: Skin is warm and dry.      Coloration: Skin is not jaundiced.   Neurological:      General: No focal deficit present.      Mental Status: He is alert. Mental status is at baseline.      Comments: AOx1-2   Psychiatric:         Mood and Affect: Mood normal.         Behavior: Behavior normal.         Thought Content: Thought content normal.         Review of Systems:  Review of Systems   Constitutional:  Negative for appetite change, chills, diaphoresis,  fatigue and fever.   HENT:  Negative for drooling, ear pain, hearing loss, rhinorrhea, sore throat and trouble swallowing.    Eyes:  Negative for pain, discharge, redness, itching and visual disturbance.   Respiratory:  Negative for cough, chest tightness, shortness of breath and wheezing.    Cardiovascular:  Negative for chest pain, palpitations and leg swelling.   Gastrointestinal:  Negative for abdominal pain, blood in stool, constipation, diarrhea, nausea and vomiting.   Genitourinary:  Negative for difficulty urinating, dysuria, flank pain and hematuria.   Musculoskeletal:  Positive for gait problem. Negative for arthralgias, back pain and neck pain.   Skin:  Negative for color change.   Neurological:  Negative for tremors, seizures, facial asymmetry, weakness and light-headedness (none acute but notes chronically can get lightheaded on rising).   Psychiatric/Behavioral:  Negative for agitation, behavioral problems and confusion (baseline forgetfulness). The patient is not nervous/anxious and is not hyperactive.        List of Current Medications:  Current Outpatient Medications   Medication Instructions    acetaminophen (TYLENOL) 650 mg, Oral, Every 6 hours PRN    aspirin (ECOTRIN LOW STRENGTH) 81 mg, Oral, Daily    atorvastatin (LIPITOR) 20 mg, Oral, Daily    Blood Glucose Monitoring Suppl (Accu-Chek Guide Me) w/Device KIT 1 kit, Does not apply, Daily    D 1000 1,000 Units, Oral, Daily    donepezil (ARICEPT) 10 mg, Oral, Daily at bedtime    Empagliflozin (JARDIANCE) 10 mg, Oral, Daily    fish oil 1,000 mg, Oral, Daily    FLUoxetine (PROZAC) 20 mg, Oral, Every morning    furosemide (LASIX) 40 mg, Oral, Daily    glucose blood test strip 1 each, Other, Daily, Use as instructed    insulin glargine (LANTUS) 10 Units, Subcutaneous, Daily at bedtime    insulin lispro (HUMALOG/ADMELOG) 12 Units, Subcutaneous, Daily with lunch    insulin lispro (HUMALOG/ADMELOG) 15 Units, Subcutaneous, Daily with dinner    Insulin  "Syringe-Needle U-100 27G X 1/2\" 1 ML MISC Does not apply, Daily    Lancets (accu-chek multiclix) lancets USE   TO CHECK GLUCOSE IN THE MORNING AS DIRECTED    memantine (NAMENDA) 10 mg, Oral, 2 times daily    metFORMIN (GLUCOPHAGE) 1,000 mg, Oral, 2 times daily with meals    Needles & Syringes MISC Does not apply, Daily    nystatin (MYCOSTATIN) powder Topical, 2 times daily    OneTouch Delica Lancets 33G MISC Test once a day    polyethylene glycol (MIRALAX) 17 g, Oral, Daily PRN    QUEtiapine (SEROQUEL) 25 mg, Oral, Daily    QUEtiapine (SEROQUEL) 50 mg, Oral, Daily at bedtime    senna-docusate sodium (SENOKOT S) 8.6-50 mg per tablet 1 tablet, Oral, Daily at bedtime    sitaGLIPtin (JANUVIA) 25 mg, Oral, Daily    tamsulosin (FLOMAX) 0.4 mg, Oral, Daily         Medication reviewed. All orders signed. Complete list is in the paper chart.     Allergies    Allergies   Allergen Reactions    Bactrim [Sulfamethoxazole-Trimethoprim] GI Intolerance    Simvastatin Myalgia and Other (See Comments)       Labs/Diagnostics (reviewed by this provider):     I personally reviewed lab results and imaging studies. Full reports are in the paper chart.     Assessment/Plan:    Advance care planning  HCP and code discussion as below    Chronic diastolic congestive heart failure (HCC)  Wt Readings from Last 3 Encounters:   07/22/24 52.4 kg (115 lb 8 oz)   07/20/24 46.7 kg (102 lb 15.3 oz)   05/01/24 51.2 kg (112 lb 14 oz)       strike-out   1/30/2024 05:31 120.1 Lbs Bed dhoward (Manual)    strike-out   1/29/2024 05:24 126.4 Lbs Bed shazzard (Manual)    strike-out   1/28/2024 05:00 126.2 Lbs Bed shazzard (Manual)    strike-out   1/27/2024 05:33 125.6 Lbs Bed dhoward (Manual)    strike-out   1/26/2024 05:05 128.7 Lbs Bed dhoward (Manual)    strike-out   1/26/2024 04:30 128.7 Lbs Bed mtrexler (Manual)    strike-out   1/25/2024 05:01 129.8 Lbs Standing shazzard (Manual)    strike-out   1/24/2024 17:00 128.0 Lbs Standing jcarr (Manual)        Echo " 2021: EF 60, normal systolic function, grade 1 diastolic dysfunction  Echo 1/22/24: EF 50, mildly reduced systolic function, grade 1 diastolic dysfunction, mild AS  Monitor weight  Monitor volume status clinically - seems euvolemic, no notable peripheral edema, denies orthopnea  Continue lasix 40mg daily  Continue Jardiance  Follow up with [facility]CP/[facility], Cardiology as appropriate        Essential hypertension  Monitor BP - generally stable ranging around 110s-140s systolic recently  Avoid hypotension  Continue lasix    Mild aortic stenosis  No apparent acute/associated symptoms  Follow up with Cardiology as appropriate, consider continued outpatient surveillance    Dysphagia  Possibly related to poor dentition   Continue mechanical soft texture, thin consistency  Follow up with speech therapy as needed  Aspiration precautions    Type 2 diabetes mellitus with retinopathy, with long-term current use of insulin (HCC)    Lab Results   Component Value Date    HGBA1C 8.2 (H) 05/17/2024       Lab Results   Component Value Date    HGBA1C 8.2 (H) 05/17/2024     Monitor glucose - fasting generally mid 100s  Avoid hypoglycemia  Continue regimen including metformin, Jardiance, Januvia, and insulin coverage including glargine 10u daily and lispro 8u BID  Adjust regimen as appropriate  Encourage lifestyle modifications including healthy diet, weight management, exercise as appropriate      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)  Reported hx of dementia per PCP notes and family  Per exam and history likely consistent with at least mild dementia  Reported to also have behavioral disturbance including sundowning  No significant hallucinations, personality change, or rigidity per family in the past  In the past has required frequent redirection and sitter for supervision due to concern for wandering/elopement risk and unsafe/impulsive behaviors and fall risk  Supportive care  Continue regimen including donepezil,  memantine, fluoxetine, quetiapine  SW to follow for safe discharge planning/homecare services as appropriate  Follow up with PCP/[facility], Neurology as appropriate    Myasthenia gravis without exacerbation (HCC)  Reported history noted in chart  No apparent acute/associated symptoms  Follow up with PCP/[facility], specialist as appropriate    Benign prostatic hyperplasia without lower urinary tract symptoms  Stable  Continue tamsulosin  Monitor for retention, bladder scan PRN  Follow up with PCP/[facility], Urology as appropriate    Stage 3 chronic kidney disease, unspecified whether stage 3a or 3b CKD (HCC)  Lab Results   Component Value Date    EGFR 68 07/19/2024    EGFR 78 07/15/2024    EGFR 72 07/13/2024    CREATININE 1.01 07/19/2024    CREATININE 0.90 07/15/2024    CREATININE 0.96 07/13/2024       Monitor renal function on routine labs  Avoid nephrotoxins, NSAIDs as able  Encourage PO hydration, respecting volume status  Follow up with PCP, Nephrology as appropriate      Gait dysfunction  -PT/OT as appropriate  -fall precautions  -encourage appropriate DME use      Primary open angle glaucoma (POAG) of both eyes, mild stage  Seems stable  Follow up with Ophthalmology as appropriate    Diastolic dysfunction  See CHF    Mixed hyperlipidemia  Continue statin    At risk for delirium  Delirium precautions  -Patient is high risk of delirium due to age, comorbidities, hospitalization/unfamiliar environment  -delirium precautions  -maintain normal sleep/wake cycle  -minimize overnight interruptions, group overnight vitals/labs/nursing checks as possible  -dim lights, close blinds and turn off tv to minimize stimulation and encourage sleep environment in evenings  -ensure that pain is well controlled  -monitor for fecal and urinary retention which may precipitate delirium  -encourage early mobilization and ambulation  -provide frequent reorientation and redirection  -encourage family and friends at the bedside to help help calm patient if  anxious  -avoid medications which may precipitate or worsen delirium such as tramadol, benzodiazepine, anticholinergics, and benadryl  -encourage hydration and nutrition   -redirect unwanted behaviors as first line, avoid physical restraints, use chemical restraint only if all other attempts have been unsuccessful       Pain: none acute  Rehab Potential:Fair  Patient Informed of Medical Condition: yes   Patient is Capable of Understanding Their Right: yes appears to have sufficient capability despite dementia   Discharge Plan: LTC  Vaccination:   Immunization History   Administered Date(s) Administered    COVID-19 Moderna mRNA Vaccine 12 Yr+ 50 mcg/0.5 mL (Spikevax) 10/14/2023    COVID-19 PFIZER VACCINE 0.3 ML IM 04/01/2021, 04/22/2021, 10/25/2021    COVID-19 Pfizer vac (Joe-sucrose, gray cap) 12 yr+ IM 04/20/2022    H1N1, All Formulations 12/31/2009    INFLUENZA 01/01/2002, 08/04/2003, 09/15/2003, 12/10/2004, 10/25/2005, 10/31/2006, 10/11/2007, 11/16/2007, 10/07/2008, 10/13/2009, 09/23/2010, 10/17/2011, 10/16/2012, 10/01/2016, 10/01/2017, 10/13/2018, 10/01/2019, 09/07/2020, 10/31/2022    Influenza, high dose seasonal 0.7 mL 09/15/2021, 10/31/2022, 12/01/2023    Pneumococcal 12/10/2004, 12/31/2009    Pneumococcal Conjugate 13-Valent 06/01/2016, 09/15/2021    Pneumococcal Conjugate Vaccine 20-valent (Pcv20), Polysace 08/09/2023    Pneumococcal Polysaccharide PPV23 12/02/2020    Tdap 05/19/2023    Tuberculin Skin Test 01/24/2024, 02/02/2024, 05/01/2024, 05/10/2024    Zoster 09/18/2014         Advanced Directives: Patient verbalizes HCP as sister-in-law Chiqui. He notes he has some other family members he would trust as well but at present appears unable to recall their names. In the past he has names individuals including sister-in-law Chiqui Burns, granddaughter Angelita Colbert, and granddaughter Tejal Chel.  Code status:DNR (Per discussion with resident or POA) and consistent with chart  PCP: Aissatou      -Total time  spent on this encounter today including documentation and workup review, face to face time, history and exam, and documentation/orders was approximately 65 minutes.  -This note will be copied to [facility] EMR where vitals and medication orders are placed.    Jf Macedo D.O.  Geriatric Medicine  7/24/2024 11:11 PM

## 2024-07-25 NOTE — ASSESSMENT & PLAN NOTE
Lab Results   Component Value Date    HGBA1C 8.2 (H) 05/17/2024       Lab Results   Component Value Date    HGBA1C 8.2 (H) 05/17/2024     Monitor glucose - fasting generally mid 100s  Avoid hypoglycemia  Continue regimen including metformin, Jardiance, Januvia, and insulin coverage including glargine 10u daily and lispro 8u BID  Adjust regimen as appropriate  Encourage lifestyle modifications including healthy diet, weight management, exercise as appropriate    
-PT/OT as appropriate  -fall precautions  -encourage appropriate DME use    
Continue statin  
Delirium precautions  -Patient is high risk of delirium due to age, comorbidities, hospitalization/unfamiliar environment  -delirium precautions  -maintain normal sleep/wake cycle  -minimize overnight interruptions, group overnight vitals/labs/nursing checks as possible  -dim lights, close blinds and turn off tv to minimize stimulation and encourage sleep environment in evenings  -ensure that pain is well controlled  -monitor for fecal and urinary retention which may precipitate delirium  -encourage early mobilization and ambulation  -provide frequent reorientation and redirection  -encourage family and friends at the bedside to help help calm patient if anxious  -avoid medications which may precipitate or worsen delirium such as tramadol, benzodiazepine, anticholinergics, and benadryl  -encourage hydration and nutrition   -redirect unwanted behaviors as first line, avoid physical restraints, use chemical restraint only if all other attempts have been unsuccessful    
HCP and code discussion as below  
Lab Results   Component Value Date    EGFR 68 07/19/2024    EGFR 78 07/15/2024    EGFR 72 07/13/2024    CREATININE 1.01 07/19/2024    CREATININE 0.90 07/15/2024    CREATININE 0.96 07/13/2024       Monitor renal function on routine labs  Avoid nephrotoxins, NSAIDs as able  Encourage PO hydration, respecting volume status  Follow up with PCP, Nephrology as appropriate    
Monitor BP - generally stable ranging around 110s-140s systolic recently  Avoid hypotension  Continue lasix  
No apparent acute/associated symptoms  Follow up with Cardiology as appropriate, consider continued outpatient surveillance  
Possibly related to poor dentition   Continue mechanical soft texture, thin consistency  Follow up with speech therapy as needed  Aspiration precautions  
Reported history noted in chart  No apparent acute/associated symptoms  Follow up with PCP/[facility], specialist as appropriate  
Reported hx of dementia per PCP notes and family  Per exam and history likely consistent with at least mild dementia  Reported to also have behavioral disturbance including sundowning  No significant hallucinations, personality change, or rigidity per family in the past  In the past has required frequent redirection and sitter for supervision due to concern for wandering/elopement risk and unsafe/impulsive behaviors and fall risk  Supportive care  Continue regimen including donepezil, memantine, fluoxetine, quetiapine  SW to follow for safe discharge planning/homecare services as appropriate  Follow up with PCP/[facility], Neurology as appropriate  
See CHF  
Seems stable  Follow up with Ophthalmology as appropriate  
Stable  Continue tamsulosin  Monitor for retention, bladder scan PRN  Follow up with PCP/[facility], Urology as appropriate  
Wt Readings from Last 3 Encounters:   07/22/24 52.4 kg (115 lb 8 oz)   07/20/24 46.7 kg (102 lb 15.3 oz)   05/01/24 51.2 kg (112 lb 14 oz)       strike-out   1/30/2024 05:31 120.1 Lbs Bed dhoward (Manual)    strike-out   1/29/2024 05:24 126.4 Lbs Bed shazzard (Manual)    strike-out   1/28/2024 05:00 126.2 Lbs Bed shazzard (Manual)    strike-out   1/27/2024 05:33 125.6 Lbs Bed dhoward (Manual)    strike-out   1/26/2024 05:05 128.7 Lbs Bed dhoward (Manual)    strike-out   1/26/2024 04:30 128.7 Lbs Bed mtrexler (Manual)    strike-out   1/25/2024 05:01 129.8 Lbs Standing shazzard (Manual)    strike-out   1/24/2024 17:00 128.0 Lbs Standing jcarr (Manual)        Echo 2021: EF 60, normal systolic function, grade 1 diastolic dysfunction  Echo 1/22/24: EF 50, mildly reduced systolic function, grade 1 diastolic dysfunction, mild AS  Monitor weight  Monitor volume status clinically - seems euvolemic, no notable peripheral edema, denies orthopnea  Continue lasix 40mg daily  Continue Jardiance  Follow up with PCP/[facility], Cardiology as appropriate      
Detail Level: Detailed

## 2024-07-31 NOTE — PROGRESS NOTES
finasteride (PROPECIA) 1 MG tablet      Last Written Prescription Date:  6/13/24  Last Fill Quantity: 90,   # refills: 3     Patient requesting medication be sent to alt pharmacy:   Remaining refills sent to requested pharmacy.             Assessment/Plan:  Problem List Items Addressed This Visit        Endocrine    Type 2 diabetes mellitus with retinopathy, without long-term current use of insulin ([facility] [address] ) - Primary    Relevant Orders    POCT hemoglobin A1c (Completed)       Cardiovascular and Mediastinum    Essential hypertension       Nervous and Auditory    Myasthenia gravis without exacerbation ([facility] [address] )       Other    Mixed hyperlipidemia    Mild protein-calorie malnutrition ([facility] [address] )    Memory difficulty   Other Visit Diagnoses     Encounter for vaccination               Diagnoses and all orders for this visit:    Type 2 diabetes mellitus with retinopathy of both eyes, without long-term current use of insulin, macular edema presence unspecified, unspecified retinopathy severity ([address] )  -     POCT hemoglobin A1c    Essential hypertension    Myasthenia gravis without exacerbation ([facility] [address] )    Mixed hyperlipidemia    Mild protein-calorie malnutrition ([facility] [address] )    Memory difficulty    Encounter for vaccination      No problem-specific Assessment & Plan notes found for this encounter  A/P: Doing ok and labs up to date except for HgA1, which was significantly worse at 8 2  Admits to some dietary indiscretions and decrease activity due to the weather  Discussed meds adjustment and defers  Wants to get back on his diet and exercise  Lashon Chong BMI is good and will continue with the same program  Discussed vaccines and already had his flu vaccine and past pneumonia vaccines    Continue current treatment and RTC three months for routine  Subjective:      Patient ID: Sunil Monteiro is a 81 y o  male  WM RTC for f/u DM, HTN, etc  Doing ok and no new issues  Remains active w/o difficulty and no falls  Sugars less than 180 and no low sugar events  Denies CP, SOB, edema, palpitations, orthopnea or PND  MG is stable  Memory no worse and no safety concerns  Vision is stable  Due for labs and vaccines         The following portions of the patient's history were reviewed and updated as appropriate:   He has a past medical history of Diabetes mellitus ([address] ) and Hyperlipidemia ,  does not have any pertinent problems on file  ,   has a past surgical history that includes Tonsillectomy; Eye surgery; Cataract extraction; Dental surgery; Vasectomy; and Colonoscopy  ,  family history is not on file  ,   reports that he has never smoked  He has never used smokeless tobacco  He reports that he does not currently use alcohol  He reports that he does not use drugs  ,  is allergic to simvastatin     Current Outpatient Medications   Medication Sig Dispense Refill   • atorvastatin (LIPITOR) 20 mg tablet Take 1 tablet (20 mg total) by mouth daily 90 tablet 1   • B Complex-C (B COMPLEX-B12-C IJ) Take 1 capsule by mouth if needed     • donepezil (ARICEPT) 5 mg tablet Take 2 tablets (10 mg total) by mouth daily at bedtime 90 tablet 1   • glimepiride (AMARYL) 4 mg tablet Take 1 tablet (4 mg total) by mouth daily with breakfast 180 tablet 3   • lisinopril (ZESTRIL) 5 mg tablet Take 1 tablet (5 mg total) by mouth daily 90 tablet 3   • metFORMIN (GLUCOPHAGE) 1000 MG tablet Take 1 tablet (1,000 mg total) by mouth 2 (two) times a day with meals 180 tablet 1   • Omega-3 Fatty Acids (fish oil) 1,000 mg Take 1,000 mg by mouth if needed     • Saw Palmetto, Serenoa repens, (Saw [place] Berries) 540 MG CAPS Take 1 capsule by mouth in the morning     • tamsulosin (FLOMAX) 0 4 mg Take 1 capsule (0 4 mg total) by mouth daily with dinner 90 capsule 1   • aspirin (ECOTRIN LOW STRENGTH) 81 mg EC tablet Take 81 mg by mouth (Patient not taking: No sig reported)     • Blood Glucose Monitoring Suppl (OneTouch Verio Reflect) w/Device KIT USE  ONCE DAILY 1 kit 0   • glucose blood (OneTouch Verio) test strip USE 1 STRIP TO CHECK GLUCOSE ONCE DAILY 50 each 0   • Misc Natural Products (PUMPKIN SEED OIL PO) Take by mouth (Patient not taking: No sig reported)     • OneTouch Delica Lancets 31J MISC Test once a day 100 each 5     No current facility-administered medications for this visit  Review of Systems   Constitutional: Negative for activity change, chills, diaphoresis, fatigue and fever  HENT: Negative  Eyes: Positive for visual disturbance  Respiratory: Negative for cough, chest tightness, shortness of breath and wheezing  Cardiovascular: Negative for chest pain, palpitations and leg swelling  Gastrointestinal: Negative for abdominal pain, constipation, diarrhea, nausea and vomiting  Endocrine: Negative for cold intolerance and heat intolerance  Genitourinary: Negative for difficulty urinating, dysuria and frequency  Musculoskeletal: Negative for arthralgias, gait problem and myalgias  Neurological: Negative for dizziness, seizures, syncope, weakness, light-headedness and headaches  Psychiatric/Behavioral: Negative for confusion, dysphoric mood and sleep disturbance  The patient is not nervous/anxious  PHQ-2/9 Depression Screening          Objective:  Vitals:    02/07/23 1300   BP: 132/74   Pulse: 74   Temp: (!) 97 4 °F (36 3 °C)   SpO2: 97%   Weight: 64 9 kg (143 lb 2 oz)   Height: 5' 5" (1 651 m)     Body mass index is 23 82 kg/m²  Physical Exam  Vitals and nursing note reviewed  Constitutional:       General: He is not in acute distress  Appearance: Normal appearance  He is not ill-appearing  HENT:      Head: Normocephalic and atraumatic  Mouth/Throat:      Mouth: Mucous membranes are moist    Eyes:      Extraocular Movements: Extraocular movements intact  Conjunctiva/sclera: Conjunctivae normal       Pupils: Pupils are equal, round, and reactive to light  Neck:      Vascular: No carotid bruit  Cardiovascular:      Rate and Rhythm: Normal rate and regular rhythm  Heart sounds: Murmur heard  Pulmonary:      Effort: Pulmonary effort is normal  No respiratory distress  Breath sounds: Normal breath sounds  No wheezing, rhonchi or rales     Abdominal:      General: Bowel sounds are normal  There is no distension  Palpations: Abdomen is soft  Tenderness: There is no abdominal tenderness  Musculoskeletal:      Cervical back: Neck supple  Right lower leg: No edema  Left lower leg: No edema  Neurological:      General: No focal deficit present  Mental Status: He is alert and oriented to person, place, and time  Mental status is at baseline  Psychiatric:         Mood and Affect: Mood normal          Behavior: Behavior normal          Thought Content:  Thought content normal          Judgment: Judgment normal

## 2024-12-17 ENCOUNTER — VBI (OUTPATIENT)
Dept: ADMINISTRATIVE | Facility: OTHER | Age: 84
End: 2024-12-17

## 2024-12-17 NOTE — TELEPHONE ENCOUNTER
12/17/24 8:35 AM     Chart reviewed for Hemoglobin A1c , blood pressure reading was/were submitted to the patient's insurance.     Liz Kolb   [facility]

## 2025-01-25 ENCOUNTER — VBI (OUTPATIENT)
Dept: ADMINISTRATIVE | Facility: OTHER | Age: 85
End: 2025-01-25

## 2025-01-25 NOTE — TELEPHONE ENCOUNTER
01/25/25 1:02 PM     Chart reviewed for  ; nothing is submitted to the patient's insurance at this time.     Liz Kolb   [facility]

## 2025-04-23 ENCOUNTER — DOCUMENTATION (OUTPATIENT)
Dept: ADMINISTRATIVE | Facility: OTHER | Age: 85
End: 2025-04-23

## 2025-04-23 NOTE — PROGRESS NOTES
04/23/25 7:27 AM    Annual Wellness Visit outreach is not required, patient is living in a nursing home/ long term care facility/ other facility.     Thank you.  VENKATESH BEAR MA  [facility]

## 2025-08-12 ENCOUNTER — VBI (OUTPATIENT)
Dept: ADMINISTRATIVE | Facility: OTHER | Age: 85
End: 2025-08-12